# Patient Record
Sex: MALE | Race: BLACK OR AFRICAN AMERICAN | NOT HISPANIC OR LATINO | Employment: OTHER | URBAN - METROPOLITAN AREA
[De-identification: names, ages, dates, MRNs, and addresses within clinical notes are randomized per-mention and may not be internally consistent; named-entity substitution may affect disease eponyms.]

---

## 2018-01-23 ENCOUNTER — OFFICE VISIT CONVERTED (OUTPATIENT)
Dept: NEUROSURGERY | Facility: CLINIC | Age: 73
End: 2018-01-23
Attending: NEUROLOGICAL SURGERY

## 2018-02-08 ENCOUNTER — OFFICE VISIT CONVERTED (OUTPATIENT)
Dept: PULMONOLOGY | Facility: CLINIC | Age: 73
End: 2018-02-08
Attending: INTERNAL MEDICINE

## 2018-02-13 ENCOUNTER — OFFICE VISIT CONVERTED (OUTPATIENT)
Dept: PULMONOLOGY | Facility: CLINIC | Age: 73
End: 2018-02-13
Attending: INTERNAL MEDICINE

## 2018-04-16 ENCOUNTER — OFFICE VISIT CONVERTED (OUTPATIENT)
Dept: UROLOGY | Facility: CLINIC | Age: 73
End: 2018-04-16
Attending: UROLOGY

## 2018-04-16 ENCOUNTER — CONVERSION ENCOUNTER (OUTPATIENT)
Dept: UROLOGY | Facility: CLINIC | Age: 73
End: 2018-04-16

## 2018-08-20 ENCOUNTER — OFFICE VISIT CONVERTED (OUTPATIENT)
Dept: PULMONOLOGY | Facility: CLINIC | Age: 73
End: 2018-08-20
Attending: INTERNAL MEDICINE

## 2018-09-11 ENCOUNTER — OFFICE VISIT CONVERTED (OUTPATIENT)
Dept: PULMONOLOGY | Facility: CLINIC | Age: 73
End: 2018-09-11
Attending: PHYSICIAN ASSISTANT

## 2018-10-16 ENCOUNTER — OFFICE VISIT CONVERTED (OUTPATIENT)
Dept: UROLOGY | Facility: CLINIC | Age: 73
End: 2018-10-16
Attending: NURSE PRACTITIONER

## 2018-11-30 ENCOUNTER — OFFICE VISIT CONVERTED (OUTPATIENT)
Dept: SURGERY | Facility: CLINIC | Age: 73
End: 2018-11-30
Attending: NURSE PRACTITIONER

## 2018-11-30 ENCOUNTER — CONVERSION ENCOUNTER (OUTPATIENT)
Dept: SURGERY | Facility: CLINIC | Age: 73
End: 2018-11-30

## 2019-01-02 ENCOUNTER — HOSPITAL ENCOUNTER (OUTPATIENT)
Dept: OTHER | Facility: HOSPITAL | Age: 74
Discharge: HOME | End: 2019-01-02
Attending: NURSE PRACTITIONER

## 2019-01-07 ENCOUNTER — HOSPITAL ENCOUNTER (OUTPATIENT)
Dept: GASTROENTEROLOGY | Facility: HOSPITAL | Age: 74
Setting detail: HOSPITAL OUTPATIENT SURGERY
Discharge: HOME | End: 2019-01-07
Attending: SURGERY

## 2019-01-14 ENCOUNTER — OFFICE VISIT CONVERTED (OUTPATIENT)
Dept: SURGERY | Facility: CLINIC | Age: 74
End: 2019-01-14
Attending: SURGERY

## 2019-01-14 ENCOUNTER — CONVERSION ENCOUNTER (OUTPATIENT)
Dept: SURGERY | Facility: CLINIC | Age: 74
End: 2019-01-14

## 2019-01-30 ENCOUNTER — HOSPITAL ENCOUNTER (OUTPATIENT)
Dept: OTHER | Facility: HOSPITAL | Age: 74
Discharge: HOME | End: 2019-01-30
Attending: NURSE PRACTITIONER

## 2019-02-21 ENCOUNTER — HOSPITAL ENCOUNTER (OUTPATIENT)
Dept: CT IMAGING | Facility: HOSPITAL | Age: 74
Discharge: HOME | End: 2019-02-21
Attending: INTERNAL MEDICINE

## 2019-02-21 LAB
CREAT BLD-MCNC: 1.7 MG/DL (ref 0.6–1.4)
GFR SERPLBLD BASED ON 1.73 SQ M-ARVRAT: 45 ML/MIN/{1.73_M2}

## 2019-02-25 ENCOUNTER — OFFICE VISIT CONVERTED (OUTPATIENT)
Dept: PULMONOLOGY | Facility: CLINIC | Age: 74
End: 2019-02-25
Attending: INTERNAL MEDICINE

## 2019-02-25 ENCOUNTER — HOSPITAL ENCOUNTER (OUTPATIENT)
Dept: OTHER | Facility: HOSPITAL | Age: 74
Discharge: HOME | End: 2019-02-25
Attending: INTERNAL MEDICINE

## 2019-03-26 ENCOUNTER — OFFICE VISIT CONVERTED (OUTPATIENT)
Dept: PULMONOLOGY | Facility: CLINIC | Age: 74
End: 2019-03-26
Attending: PHYSICIAN ASSISTANT

## 2019-03-27 ENCOUNTER — HOSPITAL ENCOUNTER (OUTPATIENT)
Dept: OTHER | Facility: HOSPITAL | Age: 74
Discharge: HOME | End: 2019-03-27
Attending: INTERNAL MEDICINE

## 2019-03-29 ENCOUNTER — CONVERSION ENCOUNTER (OUTPATIENT)
Dept: GASTROENTEROLOGY | Facility: CLINIC | Age: 74
End: 2019-03-29

## 2019-04-16 ENCOUNTER — CONVERSION ENCOUNTER (OUTPATIENT)
Dept: UROLOGY | Facility: CLINIC | Age: 74
End: 2019-04-16

## 2019-04-16 ENCOUNTER — OFFICE VISIT CONVERTED (OUTPATIENT)
Dept: UROLOGY | Facility: CLINIC | Age: 74
End: 2019-04-16
Attending: UROLOGY

## 2019-05-01 ENCOUNTER — HOSPITAL ENCOUNTER (OUTPATIENT)
Dept: OTHER | Facility: HOSPITAL | Age: 74
Discharge: HOME | End: 2019-05-01
Attending: NURSE PRACTITIONER

## 2019-05-29 ENCOUNTER — HOSPITAL ENCOUNTER (OUTPATIENT)
Dept: OTHER | Facility: HOSPITAL | Age: 74
Discharge: HOME | End: 2019-05-29
Attending: INTERNAL MEDICINE

## 2019-06-25 ENCOUNTER — HOSPITAL ENCOUNTER (OUTPATIENT)
Dept: GASTROENTEROLOGY | Facility: HOSPITAL | Age: 74
Setting detail: HOSPITAL OUTPATIENT SURGERY
Discharge: HOME | End: 2019-06-25
Attending: INTERNAL MEDICINE

## 2019-07-24 ENCOUNTER — HOSPITAL ENCOUNTER (OUTPATIENT)
Dept: OTHER | Facility: HOSPITAL | Age: 74
Discharge: HOME | End: 2019-07-24
Attending: INTERNAL MEDICINE

## 2019-08-16 ENCOUNTER — OFFICE VISIT CONVERTED (OUTPATIENT)
Dept: PULMONOLOGY | Facility: CLINIC | Age: 74
End: 2019-08-16
Attending: INTERNAL MEDICINE

## 2019-08-21 ENCOUNTER — HOSPITAL ENCOUNTER (OUTPATIENT)
Dept: OTHER | Facility: HOSPITAL | Age: 74
Discharge: HOME | End: 2019-08-21
Attending: INTERNAL MEDICINE

## 2019-08-26 ENCOUNTER — HOSPITAL ENCOUNTER (OUTPATIENT)
Dept: CT IMAGING | Facility: HOSPITAL | Age: 74
Discharge: HOME | End: 2019-08-26
Attending: NURSE PRACTITIONER

## 2019-08-26 LAB
ALBUMIN SERPL-MCNC: 4 G/DL (ref 3.5–5)
ALBUMIN/GLOB SERPL: 1.4 {RATIO} (ref 1.4–2.6)
ALP SERPL-CCNC: 94 U/L (ref 56–155)
ALT SERPL-CCNC: 7 U/L (ref 10–40)
ANION GAP SERPL CALC-SCNC: 19 MMOL/L (ref 8–19)
AST SERPL-CCNC: 14 U/L (ref 15–50)
BASOPHILS # BLD AUTO: 0.04 10*3/UL (ref 0–0.2)
BASOPHILS NFR BLD AUTO: 1 % (ref 0–3)
BILIRUB SERPL-MCNC: 0.26 MG/DL (ref 0.2–1.3)
BUN SERPL-MCNC: 23 MG/DL (ref 5–25)
BUN/CREAT SERPL: 16 {RATIO} (ref 6–20)
CALCIUM SERPL-MCNC: 9.2 MG/DL (ref 8.7–10.4)
CHLORIDE SERPL-SCNC: 106 MMOL/L (ref 99–111)
CONV ABS IMM GRAN: 0.01 10*3/UL (ref 0–0.2)
CONV CO2: 24 MMOL/L (ref 22–32)
CONV IMMATURE GRAN: 0.2 % (ref 0–1.8)
CONV TOTAL PROTEIN: 6.9 G/DL (ref 6.3–8.2)
CREAT BLD-MCNC: 1.4 MG/DL (ref 0.6–1.4)
CREAT UR-MCNC: 1.45 MG/DL (ref 0.7–1.2)
DEPRECATED RDW RBC AUTO: 40.7 FL (ref 35.1–43.9)
EOSINOPHIL # BLD AUTO: 0.15 10*3/UL (ref 0–0.7)
EOSINOPHIL # BLD AUTO: 3.6 % (ref 0–7)
ERYTHROCYTE [DISTWIDTH] IN BLOOD BY AUTOMATED COUNT: 14.2 % (ref 11.6–14.4)
GFR SERPLBLD BASED ON 1.73 SQ M-ARVRAT: 54 ML/MIN/{1.73_M2}
GFR SERPLBLD BASED ON 1.73 SQ M-ARVRAT: 56 ML/MIN/{1.73_M2}
GLOBULIN UR ELPH-MCNC: 2.9 G/DL (ref 2–3.5)
GLUCOSE SERPL-MCNC: 112 MG/DL (ref 70–99)
HCT VFR BLD AUTO: 38.4 % (ref 42–52)
HGB BLD-MCNC: 12.1 G/DL (ref 14–18)
LYMPHOCYTES # BLD AUTO: 1.29 10*3/UL (ref 1–5)
LYMPHOCYTES NFR BLD AUTO: 30.8 % (ref 20–45)
MCH RBC QN AUTO: 25.1 PG (ref 27–31)
MCHC RBC AUTO-ENTMCNC: 31.5 G/DL (ref 33–37)
MCV RBC AUTO: 79.7 FL (ref 80–96)
MONOCYTES # BLD AUTO: 0.34 10*3/UL (ref 0.2–1.2)
MONOCYTES NFR BLD AUTO: 8.1 % (ref 3–10)
NEUTROPHILS # BLD AUTO: 2.36 10*3/UL (ref 2–8)
NEUTROPHILS NFR BLD AUTO: 56.3 % (ref 30–85)
NRBC CBCN: 0 % (ref 0–0.7)
OSMOLALITY SERPL CALC.SUM OF ELEC: 304 MOSM/KG (ref 273–304)
PLATELET # BLD AUTO: 161 10*3/UL (ref 130–400)
PMV BLD AUTO: 9.3 FL (ref 9.4–12.4)
POTASSIUM SERPL-SCNC: 3.9 MMOL/L (ref 3.5–5.3)
RBC # BLD AUTO: 4.82 10*6/UL (ref 4.7–6.1)
SODIUM SERPL-SCNC: 145 MMOL/L (ref 135–147)
WBC # BLD AUTO: 4.19 10*3/UL (ref 4.8–10.8)

## 2019-08-29 ENCOUNTER — OFFICE VISIT CONVERTED (OUTPATIENT)
Dept: PULMONOLOGY | Facility: CLINIC | Age: 74
End: 2019-08-29
Attending: INTERNAL MEDICINE

## 2019-08-29 ENCOUNTER — HOSPITAL ENCOUNTER (OUTPATIENT)
Dept: OTHER | Facility: HOSPITAL | Age: 74
Discharge: HOME | End: 2019-08-29
Attending: INTERNAL MEDICINE

## 2019-09-18 ENCOUNTER — HOSPITAL ENCOUNTER (OUTPATIENT)
Dept: OTHER | Facility: HOSPITAL | Age: 74
Discharge: HOME | End: 2019-09-18
Attending: NURSE PRACTITIONER

## 2019-10-23 ENCOUNTER — HOSPITAL ENCOUNTER (OUTPATIENT)
Dept: OTHER | Facility: HOSPITAL | Age: 74
Discharge: HOME | End: 2019-10-23
Attending: NURSE PRACTITIONER

## 2019-10-28 ENCOUNTER — OFFICE VISIT CONVERTED (OUTPATIENT)
Dept: UROLOGY | Facility: CLINIC | Age: 74
End: 2019-10-28
Attending: UROLOGY

## 2019-10-29 ENCOUNTER — HOSPITAL ENCOUNTER (OUTPATIENT)
Dept: GASTROENTEROLOGY | Facility: HOSPITAL | Age: 74
Setting detail: HOSPITAL OUTPATIENT SURGERY
Discharge: HOME | End: 2019-10-29
Attending: INTERNAL MEDICINE

## 2019-11-20 ENCOUNTER — HOSPITAL ENCOUNTER (OUTPATIENT)
Dept: OTHER | Facility: HOSPITAL | Age: 74
Discharge: HOME | End: 2019-11-20
Attending: INTERNAL MEDICINE

## 2019-12-18 ENCOUNTER — HOSPITAL ENCOUNTER (OUTPATIENT)
Dept: OTHER | Facility: HOSPITAL | Age: 74
Discharge: HOME | End: 2019-12-18
Attending: INTERNAL MEDICINE

## 2020-01-15 ENCOUNTER — HOSPITAL ENCOUNTER (OUTPATIENT)
Dept: OTHER | Facility: HOSPITAL | Age: 75
Discharge: HOME | End: 2020-01-15
Attending: NURSE PRACTITIONER

## 2020-02-06 ENCOUNTER — OFFICE VISIT CONVERTED (OUTPATIENT)
Dept: PULMONOLOGY | Facility: CLINIC | Age: 75
End: 2020-02-06
Attending: INTERNAL MEDICINE

## 2020-02-25 ENCOUNTER — HOSPITAL ENCOUNTER (OUTPATIENT)
Dept: CT IMAGING | Facility: HOSPITAL | Age: 75
Discharge: HOME | End: 2020-02-25
Attending: INTERNAL MEDICINE

## 2020-02-25 LAB
CREAT BLD-MCNC: 0.8 MG/DL (ref 0.6–1.4)
GFR SERPLBLD BASED ON 1.73 SQ M-ARVRAT: >60 ML/MIN/{1.73_M2}

## 2020-03-05 ENCOUNTER — OFFICE VISIT CONVERTED (OUTPATIENT)
Dept: PULMONOLOGY | Facility: CLINIC | Age: 75
End: 2020-03-05
Attending: INTERNAL MEDICINE

## 2020-03-05 ENCOUNTER — HOSPITAL ENCOUNTER (OUTPATIENT)
Dept: OTHER | Facility: HOSPITAL | Age: 75
Discharge: HOME | End: 2020-03-05
Attending: INTERNAL MEDICINE

## 2020-04-01 ENCOUNTER — HOSPITAL ENCOUNTER (OUTPATIENT)
Dept: OTHER | Facility: HOSPITAL | Age: 75
Discharge: HOME | End: 2020-04-01
Attending: INTERNAL MEDICINE

## 2020-04-28 ENCOUNTER — HOSPITAL ENCOUNTER (OUTPATIENT)
Dept: UROLOGY | Facility: CLINIC | Age: 75
Discharge: HOME | End: 2020-04-28
Attending: NURSE PRACTITIONER

## 2020-04-29 LAB — PSA SERPL-MCNC: <0.01 NG/ML (ref 0–4)

## 2020-05-19 ENCOUNTER — OFFICE VISIT CONVERTED (OUTPATIENT)
Dept: UROLOGY | Facility: CLINIC | Age: 75
End: 2020-05-19
Attending: NURSE PRACTITIONER

## 2020-05-27 ENCOUNTER — HOSPITAL ENCOUNTER (OUTPATIENT)
Dept: OTHER | Facility: HOSPITAL | Age: 75
Discharge: HOME | End: 2020-05-27
Attending: INTERNAL MEDICINE

## 2020-06-07 LAB — SARS-COV-2 RNA SPEC QL NAA+PROBE: NOT DETECTED

## 2020-06-08 ENCOUNTER — HOSPITAL ENCOUNTER (OUTPATIENT)
Dept: CARDIOLOGY | Facility: HOSPITAL | Age: 75
Discharge: HOME | End: 2020-06-08
Attending: INTERNAL MEDICINE

## 2020-07-22 ENCOUNTER — HOSPITAL ENCOUNTER (OUTPATIENT)
Dept: OTHER | Facility: HOSPITAL | Age: 75
Discharge: HOME | End: 2020-07-22
Attending: INTERNAL MEDICINE

## 2020-08-19 ENCOUNTER — HOSPITAL ENCOUNTER (OUTPATIENT)
Dept: OTHER | Facility: HOSPITAL | Age: 75
Discharge: HOME | End: 2020-08-19
Attending: INTERNAL MEDICINE

## 2020-08-20 ENCOUNTER — OFFICE VISIT CONVERTED (OUTPATIENT)
Dept: PULMONOLOGY | Facility: CLINIC | Age: 75
End: 2020-08-20
Attending: PHYSICIAN ASSISTANT

## 2020-09-04 ENCOUNTER — HOSPITAL ENCOUNTER (OUTPATIENT)
Dept: CT IMAGING | Facility: HOSPITAL | Age: 75
Discharge: HOME | End: 2020-09-04
Attending: INTERNAL MEDICINE

## 2020-09-08 ENCOUNTER — HOSPITAL ENCOUNTER (OUTPATIENT)
Dept: OTHER | Facility: HOSPITAL | Age: 75
Discharge: HOME | End: 2020-09-08
Attending: INTERNAL MEDICINE

## 2020-09-08 ENCOUNTER — OFFICE VISIT CONVERTED (OUTPATIENT)
Dept: ONCOLOGY | Facility: HOSPITAL | Age: 75
End: 2020-09-08
Attending: INTERNAL MEDICINE

## 2020-10-28 ENCOUNTER — HOSPITAL ENCOUNTER (OUTPATIENT)
Dept: OTHER | Facility: HOSPITAL | Age: 75
Discharge: HOME | End: 2020-10-28
Attending: INTERNAL MEDICINE

## 2020-11-04 ENCOUNTER — OFFICE VISIT CONVERTED (OUTPATIENT)
Dept: UROLOGY | Facility: CLINIC | Age: 75
End: 2020-11-04
Attending: UROLOGY

## 2020-11-04 ENCOUNTER — HOSPITAL ENCOUNTER (OUTPATIENT)
Dept: UROLOGY | Facility: CLINIC | Age: 75
Discharge: HOME | End: 2020-11-04
Attending: UROLOGY

## 2020-11-04 LAB — PSA SERPL-MCNC: <0.01 NG/ML (ref 0–4)

## 2020-11-27 ENCOUNTER — HOSPITAL ENCOUNTER (OUTPATIENT)
Dept: PREADMISSION TESTING | Facility: HOSPITAL | Age: 75
Discharge: HOME | End: 2020-11-27
Attending: INTERNAL MEDICINE

## 2020-11-27 LAB — SARS-COV-2 RNA SPEC QL NAA+PROBE: NOT DETECTED

## 2020-12-02 ENCOUNTER — HOSPITAL ENCOUNTER (OUTPATIENT)
Dept: GASTROENTEROLOGY | Facility: HOSPITAL | Age: 75
Setting detail: HOSPITAL OUTPATIENT SURGERY
Discharge: HOME | End: 2020-12-02
Attending: INTERNAL MEDICINE

## 2020-12-02 LAB — GLUCOSE BLD-MCNC: 81 MG/DL (ref 70–99)

## 2021-01-20 ENCOUNTER — HOSPITAL ENCOUNTER (OUTPATIENT)
Dept: OTHER | Facility: HOSPITAL | Age: 76
Discharge: HOME | End: 2021-01-20
Attending: INTERNAL MEDICINE

## 2021-02-26 ENCOUNTER — OFFICE VISIT CONVERTED (OUTPATIENT)
Dept: ONCOLOGY | Facility: HOSPITAL | Age: 76
End: 2021-02-26
Attending: NURSE PRACTITIONER

## 2021-03-04 ENCOUNTER — HOSPITAL ENCOUNTER (OUTPATIENT)
Dept: CT IMAGING | Facility: HOSPITAL | Age: 76
Discharge: HOME | End: 2021-03-04
Attending: INTERNAL MEDICINE

## 2021-03-04 LAB
CREAT BLD-MCNC: 1.5 MG/DL (ref 0.6–1.4)
GFR SERPLBLD BASED ON 1.73 SQ M-ARVRAT: 51 ML/MIN/{1.73_M2}

## 2021-03-08 ENCOUNTER — HOSPITAL ENCOUNTER (OUTPATIENT)
Dept: OTHER | Facility: HOSPITAL | Age: 76
Discharge: HOME | End: 2021-03-08
Attending: INTERNAL MEDICINE

## 2021-03-08 ENCOUNTER — OFFICE VISIT CONVERTED (OUTPATIENT)
Dept: ONCOLOGY | Facility: HOSPITAL | Age: 76
End: 2021-03-08
Attending: INTERNAL MEDICINE

## 2021-04-14 ENCOUNTER — HOSPITAL ENCOUNTER (OUTPATIENT)
Dept: OTHER | Facility: HOSPITAL | Age: 76
Discharge: HOME | End: 2021-04-14
Attending: INTERNAL MEDICINE

## 2021-05-04 ENCOUNTER — OFFICE VISIT CONVERTED (OUTPATIENT)
Dept: UROLOGY | Facility: CLINIC | Age: 76
End: 2021-05-04
Attending: UROLOGY

## 2021-05-04 LAB
BILIRUB UR QL STRIP: NEGATIVE
COLOR UR: YELLOW
CONV BACTERIA IN URINE MICRO: 0
CONV CALCIUM OXALATE CRYSTALS /HPF IN URINE SEDIMENT BY MICROSCOPY: 0
CONV CLARITY OF URINE: CLEAR
CONV PROTEIN IN URINE BY AUTOMATED TEST STRIP: NEGATIVE
CONV UROBILINOGEN IN URINE BY AUTOMATED TEST STRIP: NORMAL
GLUCOSE UR QL: NEGATIVE
HGB UR QL STRIP: NORMAL
KETONES UR QL STRIP: NEGATIVE
LEUKOCYTE ESTERASE UR QL STRIP: NEGATIVE
NITRITE UR QL STRIP: NEGATIVE
PH UR STRIP.AUTO: 5 [PH]
RBC #/AREA URNS HPF: 0 /[HPF]
RENAL EPI CELLS #/AREA URNS HPF: 0 /[HPF]
SP GR UR: 1.02
SQUAMOUS SPT QL MICRO: NORMAL
WBC #/AREA URNS HPF: 0 /[HPF]

## 2021-05-13 NOTE — PROGRESS NOTES
Progress Note      Patient Name: Daron Whitfield   Patient ID: [id number]   Sex: Male   YOB: 1945    Primary Care Provider: Angela STACK   Referring Provider: Angela STACK    Visit Date: May 19, 2020    Provider: LALY Salinas   Location: [facility]   Location Address: [address] [place] [address], [address] [place], [place] [address]  [id number]   Location Phone: [phone]          Chief Complaint  · Prostate cancer      History Of Present Illness  This patient returns for a scheduled follow-up visit for prostate cancer. He is on casodex and depolupron. He is doing well without any problems. CMP drawn per pcp office today and patient will have them forward to us. Denies any voiding problems and has a strong stream. Patient history of cryo and placed on androgen deprivation therapy due to increase in psas. His level as stayed down.        Lupron 22.5mg on 4/28/20  PSA was 0.01 on 4/28/20       Past Medical History  Bladder outflow obstruction; Cancer of lung; Cancer of prostate; Cervical spinal stenosis; Colon polyp; Emphysema; Hand paresthesia; HTN (hypertension); Hypercholesterolemia; Muscle atrophy of upper extremity; Numbness and tingling in hands; OAB (overactive bladder); Ulnar neuropathy         Past Surgical History  Colonoscopy; Cystoscopy; Lung Biopsy(s); Prostate cryoablation; Teeth extraction; TURP         Medication List  aspirin 81 mg Oral tablet,delayed release (DR/EC); bicalutamide 50 mg oral tablet; Detrol LA 4 mg oral capsule,extended release 24hr; Flomax 0.4 mg oral capsule; folic acid 1 mg oral tablet; lisinopril 10 mg Oral tablet; Lupron Depot (3 month) 22.5 mg intramuscular syringe kit; Spiriva with HandiHaler 18 mcg inhalation capsule, w/inhalation device; Toprol XL 25 mg oral tablet extended release 24 hr         Allergy List  NO KNOWN DRUG ALLERGIES         Family Medical History  Coronary Artery Disease; Colon Cancer; Diabetes         Social  "History  Alcohol (Current some day); ; Tobacco (Former)         Review of Systems  · Constitutional  o Denies  o : fever, headache, chills  · Eyes  o Denies  o : eye pain, double vision, blurred vision  · HENT  o Denies  o : sinus problems, sore throat, ear infection  · Cardiovascular  o Denies  o : chest pain, high blood pressure, varicosities  · Respiratory  o Denies  o : shortness of breath, wheezing, frequent cough  · Gastrointestinal  o Denies  o : nausea, vomiting, heartburn, indigestion, abdominal pain  · Genitourinary  o Denies  o : urgency, frequency, urinary retention, painful urination  · Integument  o Denies  o : rash, itching, boils  · Neurologic  o Denies  o : tingling or numbness, tremors, dizzy spells  · Musculoskeletal  o Denies  o : joint pain, neck pain, back pain  · Endocrine  o Denies  o : cold intolerance, heat intolerance, tired, excessive thirst, sluggish  · Psychiatric  o Admits  o : feels satisfied with life  o Denies  o : severe depression, concerns with hurting themselves  · Heme-Lymph  o Denies  o : swollen glands, blood clotting problems  · Allergic-Immunologic  o Denies  o : sinus allergy symptoms, hay fever  · All Others Negative      Vitals  Date Time BP Position Site L\R Cuff Size HR RR TEMP (F) WT  HT  BMI kg/m2 BSA m2 O2 Sat        05/19/2020 12:52 /66 Sitting    76 - R  98.2 195lbs 16oz 5'  11\" 27.34 2.11           Physical Examination  · Constitutional  o Appearance  o : Well nourished, well developed patient in no acute distress. Ambulating without difficulty.  · Respiratory  o Respiratory Effort  o : Breathing is unlabored without accessory muscle use  o Inspection of Chest  o : normal appearance, no retractions  o Auscultation of Lungs  o : diminished bibasilar fine crackles.  · Cardiovascular  o Heart  o :   § Auscultation of Heart  § : regular rate and rhythm, no murmurs, gallops or rubs  · Gastrointestinal  o Abdominal Examination  o : Scaphoid abdomen which " is non-tender to palpation with normal tone and without rigidity or guarding. Normal bowel sounds. No masses present.  o Hernias  o : No abdominal wall hernias are present.small left inguinal  · Genitourinary  o Bladder  o : no abnormalities  o Penis  o : Normal appearance without lesion, discharge or masses.meatus is very small and tight  o Urethral Meatus  o : very small stenosis  o Scrotum and Scrotal Contents  o :   § Scrotum  § : no abnormalities  § Epididymides  § : no abnormalities  § Testes  § : no abnormalities  o Digital Rectal Examination  o :   § Prostate  § : nontender to palpation, size small no nodules present, consistency normal external hemorrhoids x 3  · Lymphatic  o Groin  o : No lymphadenopathy present  · Skin and Subcutaneous Tissue  o General Inspection  o : No rashes, lesions or areas of discoloration present. Skin turgor is normal.  o General Palpation  o : No abnormalities, masses or tenderness on palpation.  · Neurologic  o Mental Status Examination  o : grossly oriented to person, place and time  o Gait and Station  o : normal gait, able to stand without difficulty  · Psychiatric  o Mood and Affect  o : mood normal, affect appropriate      Figure 1.0: Pain Rating Scale-[place]         Results  · In-Office Procedures  o Lab procedure  § Automated dipstick urinalysis with microscopy (12022)   § Color Ur: Yellow   § Clarity Ur: Clear   § Glucose Ur Ql Strip: Negative   § Bilirub Ur Ql Strip: Negative   § Ketones Ur Ql Strip: Negative   § Sp Gr Ur Qn: 1.010   § Hgb Ur Ql Strip: Moderate   § pH Ur-LsCnc: 5.0   § Prot Ur Ql Strip: Negative   § Urobilinogen Ur Strip-mCnc: 0.2 E.U./dL   § Nitrite Ur Ql Strip: Negative   § WBC Est Ur Ql Strip: Negative       Assessment  · Cancer of prostate     185/C61  · History of lung cancer in adulthood     V10.11/Z85.118  · Urethral meatal stenosis     598.9/N35.919      Plan  · Medications  o Medications have been Reconciled  o Transition of Care or Provider  Policy     continue depolupron q 3 months  and casodex as prescribed per dr Ames. follow up with dr Ames in 6 months.             Electronically Signed by: LALY Salinas -Author on May 19, 2020 01:37:48 PM

## 2021-05-13 NOTE — PROGRESS NOTES
Progress Note      Patient Name: Daron Whitfield   Patient ID: [id number]   Sex: Male   YOB: 1945    Primary Care Provider: LAURA RUFFIN   Referring Provider: Angela STACK    Visit Date: November 4, 2020    Provider: Merrill Ames MD   Location: [facility]   Location Address: [address] [place] [address], [address] [place] [address]  [id number]   Location Phone: [phone]          Chief Complaint  · F/U Prostate Cancer check      History Of Present Illness  The patient is a 75 year old /Black male , who presents to follow up on prostate cancer.        He gets Lupron, last injection was 10/27/2020. PSA 0.01 ON 4/28/2020.  Patient is doing well.  He has lost some weight because of side effect from higher dose of Metformin and inhalers.  He is taking lower dose of Metformin and is already feeling better.  Is urinating with no problems.  No dysuria or gross hematuria.  Patient had cryoablation of prostate in 2012.  Patient is also having erectile dysfunction which is concerning him       Past Medical History  Bladder outflow obstruction; Cancer of lung; Cancer of prostate; Cervical spinal stenosis; Colon polyp; Emphysema; Hand paresthesia; HTN (hypertension); Hypercholesterolemia; Muscle atrophy of upper extremity; Numbness and tingling in hands; OAB (overactive bladder); Ulnar neuropathy         Past Surgical History  Colonoscopy; Cystoscopy; Lung Biopsy(s); Prostate cryoablation; Teeth extraction; TURP         Medication List  aspirin 81 mg Oral tablet,delayed release (DR/EC); atorvastatin 40 mg oral tablet; bicalutamide 50 mg oral tablet; Detrol LA 4 mg oral capsule,extended release 24hr; ferrous sulfate 325 mg (65 mg iron) oral tablet,delayed release (DR/EC); Flomax 0.4 mg oral capsule; folic acid 1 mg oral tablet; lisinopril 10 mg Oral tablet; Lupron Depot (3 month) 22.5 mg intramuscular syringe kit; metformin 1,000 mg oral tablet; NuLYTELY with Flavor Packs  "420 gram oral recon soln; Spiriva with HandiHaler 18 mcg inhalation capsule, w/inhalation device; Toprol XL 25 mg oral tablet extended release 24 hr         Allergy List  NO KNOWN DRUG ALLERGIES         Family Medical History  Coronary Artery Disease; Colon Cancer; Family history of colon cancer; Diabetes         Social History  Alcohol (Current some day); ; Tobacco (Former)         Review of Systems  · Constitutional  o Denies  o : fever, headache, chills  · Eyes  o Denies  o : eye pain, double vision, blurred vision  · HENT  o Denies  o : sinus problems, sore throat, ear infection  · Cardiovascular  o Admits  o : high blood pressure  o Denies  o : chest pain, varicosities  · Respiratory  o Admits  o : shortness of breath  o Denies  o : wheezing, frequent cough  · Gastrointestinal  o Denies  o : nausea, vomiting, heartburn, indigestion, abdominal pain  · Genitourinary  o Denies  o : urgency, frequency, urinary retention, painful urination  · Integument  o Denies  o : rash, itching, boils  · Neurologic  o Denies  o : tingling or numbness, tremors, dizzy spells  · Musculoskeletal  o Denies  o : joint pain, neck pain, back pain  · Endocrine  o Denies  o : cold intolerance, heat intolerance, tired, excessive thirst, sluggish  · Psychiatric  o Admits  o : feels satisfied with life  o Denies  o : severe depression, concerns with hurting themselves  · Heme-Lymph  o Denies  o : swollen glands, blood clotting problems  · Allergic-Immunologic  o Denies  o : sinus allergy symptoms, hay fever      Vitals  Date Time BP Position Site L\R Cuff Size HR RR TEMP (F) WT  HT  BMI kg/m2 BSA m2 O2 Sat FR L/min FiO2        11/04/2020 10:41 /58 Sitting    94 - R  97.7 185lbs 16oz 5'  11\" 25.94 2.06             Physical Examination  · Constitutional  o Appearance  o : Well nourished, well developed patient in no acute distress. Ambulating without difficulty.  · Neck  o Thyroid  o : Normal size without tenderness, nodules or " masses  · Respiratory  o Respiratory Effort  o : Breathing is unlabored without accessory muscle use  o Inspection of Chest  o : normal appearance, no retractions  o Auscultation of Lungs  o : Rhonchi on the right side of chest. Left side is okay  · Cardiovascular  o Heart  o :   § Auscultation of Heart  § : regular rate and rhythm, no murmurs, gallops or rubs  o Peripheral Vascular System  o : No abnormalities  · Gastrointestinal  o Abdominal Examination  o : abdomen nontender to palpation, normal bowel sounds, tone normal without rigidity or guarding, no masses present, abdomen obese upon supine. Has divarication of recti  o Liver and spleen  o : No hepatomegaly present. Liver is non-tender to palpation and spleen is not palpable.  o Hernias  o : No abdominal wall hernias are present.  · Genitourinary  o Bladder  o : no abnormalities  o Penis  o : Normal appearance without lesion, discharge or masses.  o Urethral Meatus  o : no abnormalities  o Scrotum and Scrotal Contents  o :   § Scrotum  § : no abnormalities  § Epididymides  § : no abnormalities  § Testes  § : no abnormalities  o Digital Rectal Examination  o :   § Prostate  § : nontender to palpation, size normal, no nodules present, consistency normal  · Lymphatic  o Neck  o : No lymphadenopathy present  o Axilla  o : No lymphadenopathy present  o Groin  o : No lymphadenopathy present  · Skin and Subcutaneous Tissue  o General Inspection  o : No rashes, lesions or areas of discoloration present. Skin turgor is normal.  o General Palpation  o : No abnormalities, masses or tenderness on palpation.  · Neurologic  o Mental Status Examination  o : grossly oriented to person, place and time  o Gait and Station  o : normal gait, able to stand without difficulty  · Psychiatric  o Mood and Affect  o : mood normal, affect appropriate          Results  · In-Office Procedures  o Lab procedure  § Automated dipstick urinalysis with microscopy (17541)   § Color Ur: Yellow    § Clarity Ur: Clear   § Glucose Ur Ql Strip: Negative   § Bilirub Ur Ql Strip: Negative   § Ketones Ur Ql Strip: Negative   § Sp Gr Ur Qn: 1.020   § Hgb Ur Ql Strip: Negative   § pH Ur-LsCnc: 5.0   § Prot Ur Ql Strip: Negative   § Urobilinogen Ur Strip-mCnc: 0.2 E.U./dL   § Nitrite Ur Ql Strip: Negative   § WBC Est Ur Ql Strip: Negative   § RBC UrnS Qn HPF: 0   § WBC UrnS Qn HPF: 0   § Bacteria UrnS Qn HPF: 0   § Crystals UrnS Qn HPF: 0   § Epithelial Cells (non renal): 0 /HPF  § Epithelial Cells (renal): 0       Assessment  · Erectile dysfunction     607.84/N52.9  · OAB (overactive bladder)     596.51/N32.81  · Cancer of prostate     185/C61  · Cancer of lung     162.9/C34.90  Radiation and chemo      Plan  · Orders  o PSA ultrasensitive DIAGNOSTIC [facility] ([address]) - 185/C61 - 11/04/2020  · Instructions  o Patient is in remission from prostate carcinoma. At this time his concern is erectile dysfunction and I have offered him penile implant or injection in the penis but does not have any of those. We will continue injection of Depo-Lupron, Casodex and recheck him in 6 months            Electronically Signed by: Merrill Ames MD -Author on November 4, 2020 11:23:17 AM

## 2021-05-14 VITALS
WEIGHT: 186 LBS | HEART RATE: 94 BPM | BODY MASS INDEX: 26.04 KG/M2 | SYSTOLIC BLOOD PRESSURE: 113 MMHG | DIASTOLIC BLOOD PRESSURE: 58 MMHG | HEIGHT: 71 IN | TEMPERATURE: 97.7 F

## 2021-05-14 NOTE — PROGRESS NOTES
Progress Note      Patient Name: Daron Whitfield   Patient ID: [id number]   Sex: Male   YOB: 1945    Primary Care Provider: LAURA RUFFIN   Referring Provider: Angela STACK    Visit Date: May 4, 2021    Provider: Merrill Ames MD   Location: [facility]   Location Address: [address] [place] [address], [address] [place] [address]  [id number]   Location Phone: [phone]          Chief Complaint  · Prostate cancer      History Of Present Illness  This patient returns for a scheduled follow-up visit for prostate cancer.        he gets Lupron  PSA 0.01 ON 11/4/2020.  Patient is urinating just fine.  He has no dysuria.  Patient is not having any erection and I have discussed with him about androgen ablation therapy affecting his erection.  Patient has no problem except having sexual activity.       Past Medical History  Bladder outflow obstruction; Cancer of lung; Cancer of prostate; Cervical spinal stenosis; Colon polyp; Emphysema; Hand paresthesia; HTN (hypertension); Hypercholesterolemia; Muscle atrophy of upper extremity; Numbness and tingling in hands; OAB (overactive bladder); Ulnar neuropathy         Past Surgical History  Colonoscopy; Cystoscopy; Lung Biopsy(s); Prostate cryoablation; Teeth extraction; TURP         Medication List  aspirin 81 mg Oral tablet,delayed release (DR/EC); atorvastatin 40 mg oral tablet; bicalutamide 50 mg oral tablet; Detrol LA 4 mg oral capsule,extended release 24hr; ferrous sulfate 325 mg (65 mg iron) oral tablet,delayed release (DR/EC); Flomax 0.4 mg oral capsule; folic acid 1 mg oral tablet; lisinopril 10 mg Oral tablet; Lupron Depot (3 month) 22.5 mg intramuscular syringe kit; metformin 1,000 mg oral tablet; NuLYTELY with Flavor Packs 420 gram oral recon soln; Spiriva with HandiHaler 18 mcg inhalation capsule, w/inhalation device; Toprol XL 25 mg oral tablet extended release 24 hr         Allergy List  NO KNOWN DRUG ALLERGIES         Family  "Medical History  Coronary Artery Disease; Colon Cancer; Family history of colon cancer; Diabetes         Social History  Alcohol (Current some day); ; Tobacco (Former)         Review of Systems  · Constitutional  o Denies  o : fever, headache, chills  · Eyes  o Denies  o : eye pain, double vision, blurred vision  · HENT  o Denies  o : sinus problems, sore throat, ear infection  · Cardiovascular  o Denies  o : chest pain, high blood pressure, varicosities  · Respiratory  o Denies  o : shortness of breath, wheezing, frequent cough  · Gastrointestinal  o Denies  o : nausea, vomiting, heartburn, indigestion, abdominal pain  · Genitourinary  o Denies  o : urgency, frequency, urinary retention, painful urination  · Integument  o Denies  o : rash, itching, boils  · Neurologic  o Denies  o : tingling or numbness, tremors, dizzy spells  · Musculoskeletal  o Denies  o : joint pain, neck pain, back pain  · Endocrine  o Denies  o : cold intolerance, heat intolerance, tired, excessive thirst, sluggish  · Psychiatric  o Admits  o : feels satisfied with life  o Denies  o : severe depression, concerns with hurting themselves  · Heme-Lymph  o Denies  o : swollen glands, blood clotting problems  · Allergic-Immunologic  o Denies  o : sinus allergy symptoms, hay fever      Vitals  Date Time BP Position Site L\R Cuff Size HR RR TEMP (F) WT  HT  BMI kg/m2 BSA m2 O2 Sat FR L/min FiO2 HC       05/04/2021 10:29 /62 Sitting    95 - R   193lbs 16oz 5'  11\" 27.06 2.1             Physical Examination  · Constitutional  o Appearance  o : 76-year-old -American male who looks pale. Does have some shortness of breath on walking  · Neck  o Thyroid  o : gland size normal, nontender, no nodules or masses present on palpation  · Respiratory  o Respiratory Effort  o : Breathing is unlabored without accessory muscle use  o Inspection of Chest  o : normal appearance, no retractions  o Auscultation of Lungs  o : Normal breath " sounds  · Cardiovascular  o Heart  o :   § Auscultation of Heart  § : regular rate and rhythm, no murmurs, gallops or rubs  o Peripheral Vascular System  o : No abnormalities  · Gastrointestinal  o Abdominal Examination  o : Scaphoid abdomen which is non-tender to palpation with normal tone and without rigidity or guarding. Normal bowel sounds. No masses present.  o Liver and spleen  o : No hepatomegaly present. Liver is non-tender to palpation and spleen is not palpable.  o Hernias  o : No abdominal wall hernias are present.  · Genitourinary  o Bladder  o : no abnormalities  o Penis  o : normal general appearance, no lesions present, no discharge. Uncircumcised  o Urethral Meatus  o : no abnormalities  o Scrotum and Scrotal Contents  o :   § Scrotum  § : no abnormalities  § Epididymides  § : no abnormalities  § Testes  § : no abnormalities  o Digital Rectal Examination  o :   § Prostate  § : nontender to palpation, size normal, no nodules present, consistency normal  · Lymphatic  o Neck  o : No lymphadenopathy present  o Groin  o : No lymphadenopathy present  · Skin and Subcutaneous Tissue  o General Inspection  o : No rashes, lesions or areas of discoloration present. Skin turgor is normal.  o General Palpation  o : No abnormalities, masses or tenderness on palpation.  · Neurologic  o Mental Status Examination  o : grossly oriented to person, place and time  o Gait and Station  o : normal gait, able to stand without difficulty  · Psychiatric  o Mood and Affect  o : mood normal, affect appropriate          Results  · In-Office Procedures  o Lab procedure  § Automated dipstick urinalysis with microscopy (87797)   § Color Ur: Yellow   § Clarity Ur: Clear   § Glucose Ur Ql Strip: Negative   § Bilirub Ur Ql Strip: Negative   § Ketones Ur Ql Strip: Negative   § Sp Gr Ur Qn: 1.020   § Hgb Ur Ql Strip: Trace-Intact   § pH Ur-LsCnc: 5.0   § Prot Ur Ql Strip: Negative   § Urobilinogen Ur Strip-mCnc: 0.2 E.U./dL   § Nitrite  Ur Ql Strip: Negative   § WBC Est Ur Ql Strip: Negative   § RBC UrnS Qn HPF: 0   § WBC UrnS Qn HPF: 0   § Bacteria UrnS Qn HPF: 0   § Crystals UrnS Qn HPF: 0   § Epithelial Cells (non renal): 3-1-fhbqropt cells /HPF  § Epithelial Cells (renal): 0       Assessment  · Cancer of prostate     185/C61  · Cancer of lung     162.9/C34.90  Radiation and chemo      Plan  · Orders  o PSA ultrasensitive DIAGNOSTIC [facility] (06900) - 185/C61 - 05/04/2021  · Instructions  o Patient is in remission with prostate carcinoma. His main problem is erectile dysfunction but he is on antiandrogen ablation. The only way he will get erection will be either penile implant or injections in the penis and he refuses to get either one of them. He is a survivor for cancer of lung also. I will recheck him in 6 months time with a PSA.            Electronically Signed by: Merrill Ames MD -Author on May 4, 2021 10:54:42 AM

## 2021-05-15 VITALS
BODY MASS INDEX: 27.3 KG/M2 | DIASTOLIC BLOOD PRESSURE: 79 MMHG | WEIGHT: 195 LBS | HEART RATE: 73 BPM | TEMPERATURE: 98 F | HEIGHT: 71 IN | SYSTOLIC BLOOD PRESSURE: 136 MMHG

## 2021-05-15 VITALS
WEIGHT: 190 LBS | SYSTOLIC BLOOD PRESSURE: 143 MMHG | TEMPERATURE: 98.1 F | BODY MASS INDEX: 26.6 KG/M2 | HEIGHT: 71 IN | HEART RATE: 67 BPM | DIASTOLIC BLOOD PRESSURE: 76 MMHG

## 2021-05-15 VITALS
WEIGHT: 196 LBS | HEART RATE: 76 BPM | HEIGHT: 71 IN | TEMPERATURE: 98.2 F | DIASTOLIC BLOOD PRESSURE: 66 MMHG | SYSTOLIC BLOOD PRESSURE: 116 MMHG | BODY MASS INDEX: 27.44 KG/M2

## 2021-05-15 VITALS
TEMPERATURE: 98.4 F | HEART RATE: 93 BPM | WEIGHT: 190.12 LBS | HEIGHT: 71 IN | BODY MASS INDEX: 26.62 KG/M2 | SYSTOLIC BLOOD PRESSURE: 101 MMHG | DIASTOLIC BLOOD PRESSURE: 53 MMHG

## 2021-05-16 VITALS
SYSTOLIC BLOOD PRESSURE: 127 MMHG | BODY MASS INDEX: 28.6 KG/M2 | TEMPERATURE: 97.8 F | HEART RATE: 77 BPM | DIASTOLIC BLOOD PRESSURE: 54 MMHG | WEIGHT: 204.25 LBS | HEIGHT: 71 IN

## 2021-05-16 VITALS
WEIGHT: 197 LBS | DIASTOLIC BLOOD PRESSURE: 62 MMHG | SYSTOLIC BLOOD PRESSURE: 100 MMHG | BODY MASS INDEX: 27.58 KG/M2 | HEIGHT: 71 IN

## 2021-05-16 VITALS
HEART RATE: 90 BPM | WEIGHT: 204 LBS | TEMPERATURE: 98.5 F | SYSTOLIC BLOOD PRESSURE: 104 MMHG | HEIGHT: 71 IN | BODY MASS INDEX: 28.56 KG/M2 | DIASTOLIC BLOOD PRESSURE: 58 MMHG

## 2021-05-16 VITALS — HEIGHT: 71 IN | WEIGHT: 198 LBS | RESPIRATION RATE: 16 BRPM | BODY MASS INDEX: 27.72 KG/M2

## 2021-05-16 VITALS — RESPIRATION RATE: 16 BRPM | BODY MASS INDEX: 27.58 KG/M2 | WEIGHT: 197 LBS | HEIGHT: 71 IN

## 2021-05-23 ENCOUNTER — TRANSCRIBE ORDERS (OUTPATIENT)
Dept: ONCOLOGY | Facility: HOSPITAL | Age: 76
End: 2021-05-23

## 2021-05-23 DIAGNOSIS — C34.90 MALIGNANT NEOPLASM OF LUNG, UNSPECIFIED LATERALITY, UNSPECIFIED PART OF LUNG (HCC): Primary | ICD-10-CM

## 2021-05-28 VITALS
TEMPERATURE: 97.7 F | RESPIRATION RATE: 16 BRPM | HEART RATE: 93 BPM | WEIGHT: 191.8 LBS | DIASTOLIC BLOOD PRESSURE: 67 MMHG | BODY MASS INDEX: 26.75 KG/M2 | SYSTOLIC BLOOD PRESSURE: 138 MMHG | OXYGEN SATURATION: 94 %

## 2021-05-28 VITALS
WEIGHT: 195.31 LBS | RESPIRATION RATE: 16 BRPM | DIASTOLIC BLOOD PRESSURE: 62 MMHG | OXYGEN SATURATION: 92 % | SYSTOLIC BLOOD PRESSURE: 124 MMHG | OXYGEN SATURATION: 93 % | TEMPERATURE: 98.2 F | TEMPERATURE: 98.2 F | HEIGHT: 71 IN | WEIGHT: 200 LBS | HEART RATE: 83 BPM | DIASTOLIC BLOOD PRESSURE: 62 MMHG | SYSTOLIC BLOOD PRESSURE: 120 MMHG | DIASTOLIC BLOOD PRESSURE: 66 MMHG | RESPIRATION RATE: 18 BRPM | SYSTOLIC BLOOD PRESSURE: 122 MMHG | RESPIRATION RATE: 16 BRPM | HEIGHT: 71 IN | BODY MASS INDEX: 27.38 KG/M2 | WEIGHT: 195.56 LBS | HEIGHT: 71 IN | HEART RATE: 100 BPM | HEART RATE: 87 BPM | BODY MASS INDEX: 28 KG/M2 | BODY MASS INDEX: 27.34 KG/M2 | TEMPERATURE: 98.2 F | OXYGEN SATURATION: 94 %

## 2021-05-28 VITALS
BODY MASS INDEX: 25.89 KG/M2 | WEIGHT: 185.63 LBS | SYSTOLIC BLOOD PRESSURE: 123 MMHG | WEIGHT: 185.19 LBS | OXYGEN SATURATION: 95 % | SYSTOLIC BLOOD PRESSURE: 124 MMHG | OXYGEN SATURATION: 98 % | DIASTOLIC BLOOD PRESSURE: 64 MMHG | BODY MASS INDEX: 25.83 KG/M2 | TEMPERATURE: 97.5 F | DIASTOLIC BLOOD PRESSURE: 73 MMHG | RESPIRATION RATE: 16 BRPM | HEART RATE: 98 BPM | HEART RATE: 98 BPM | RESPIRATION RATE: 20 BRPM | TEMPERATURE: 99 F

## 2021-05-28 VITALS
RESPIRATION RATE: 14 BRPM | OXYGEN SATURATION: 88 % | SYSTOLIC BLOOD PRESSURE: 123 MMHG | DIASTOLIC BLOOD PRESSURE: 62 MMHG | HEIGHT: 71 IN | WEIGHT: 197.5 LBS | DIASTOLIC BLOOD PRESSURE: 60 MMHG | BODY MASS INDEX: 27.65 KG/M2 | BODY MASS INDEX: 25.81 KG/M2 | BODY MASS INDEX: 26.38 KG/M2 | WEIGHT: 184.37 LBS | HEIGHT: 71 IN | RESPIRATION RATE: 14 BRPM | TEMPERATURE: 98.6 F | HEART RATE: 88 BPM | WEIGHT: 188.44 LBS | SYSTOLIC BLOOD PRESSURE: 118 MMHG | TEMPERATURE: 98.2 F | HEIGHT: 71 IN | OXYGEN SATURATION: 96 % | DIASTOLIC BLOOD PRESSURE: 64 MMHG | TEMPERATURE: 98.3 F | HEART RATE: 84 BPM | OXYGEN SATURATION: 93 % | HEART RATE: 97 BPM | SYSTOLIC BLOOD PRESSURE: 102 MMHG | RESPIRATION RATE: 16 BRPM

## 2021-05-28 VITALS
RESPIRATION RATE: 18 BRPM | RESPIRATION RATE: 16 BRPM | BODY MASS INDEX: 28.53 KG/M2 | WEIGHT: 195.55 LBS | OXYGEN SATURATION: 97 % | OXYGEN SATURATION: 92 % | WEIGHT: 195.99 LBS | SYSTOLIC BLOOD PRESSURE: 119 MMHG | SYSTOLIC BLOOD PRESSURE: 126 MMHG | HEIGHT: 71 IN | OXYGEN SATURATION: 99 % | SYSTOLIC BLOOD PRESSURE: 141 MMHG | HEIGHT: 70 IN | BODY MASS INDEX: 27.44 KG/M2 | TEMPERATURE: 97.5 F | WEIGHT: 199.3 LBS | HEART RATE: 91 BPM | HEART RATE: 80 BPM | TEMPERATURE: 98.1 F | RESPIRATION RATE: 16 BRPM | DIASTOLIC BLOOD PRESSURE: 79 MMHG | BODY MASS INDEX: 27.27 KG/M2 | HEART RATE: 87 BPM | DIASTOLIC BLOOD PRESSURE: 75 MMHG | DIASTOLIC BLOOD PRESSURE: 60 MMHG | TEMPERATURE: 97.1 F

## 2021-05-28 VITALS
DIASTOLIC BLOOD PRESSURE: 67 MMHG | BODY MASS INDEX: 26.6 KG/M2 | WEIGHT: 190 LBS | TEMPERATURE: 97.7 F | SYSTOLIC BLOOD PRESSURE: 119 MMHG | RESPIRATION RATE: 15 BRPM | HEART RATE: 90 BPM | HEIGHT: 71 IN | OXYGEN SATURATION: 96 %

## 2021-05-28 VITALS
OXYGEN SATURATION: 91 % | SYSTOLIC BLOOD PRESSURE: 127 MMHG | TEMPERATURE: 97.2 F | HEIGHT: 70 IN | HEART RATE: 108 BPM | DIASTOLIC BLOOD PRESSURE: 64 MMHG | WEIGHT: 200.62 LBS | BODY MASS INDEX: 28.72 KG/M2

## 2021-05-28 NOTE — PROGRESS NOTES
Patient: SELEAN WU     Acct: [id number]     Report: #[id number]  UNIT #: [id number]     : 1945    Encounter Date:2018  PRIMARY CARE: LAURA HANCOCK  ***Signed***  --------------------------------------------------------------------------------------------------------------------  Chief Complaint      Encounter Date      2018            Referring Provider      VIVIANA SEWELL            Patient Complaint      Patient is complaining of      3 month follow up            VITALS      Height 5 ft 11 in / 180.34 cm      Weight 200 lbs 0 oz / 90.26941 kg      BSA 2.15 m2      BMI 27.9 kg/m2      Temperature 98.2 F / 36.78 C - Oral      Pulse 100      Respirations 16      Blood Pressure 120/62 Sitting, Right Arm      Pulse Oximetry 92%, room air      Exhaled Nitrous Oxide Testin            HPI      The patient is a 73 year old male with history of moderately severe chronic     obstructive pulmonary disease and adenocarcinoma. He is here for follow up.             He had repeat CT scan of the chest for surveillance of lung cancer. It does not     show any recurrence. He is currently taking Advair twice daily, Spiriva daily     and uses albuterol sparingly. He uses albuterol nebulizer 2-3 times a day. It     helps him clear up secretions. He has wheezing and feels like he not been able     to clear his secretions as much. No fevers or chills, no nausea and vomiting.     He has gained some weight. At times he feels that he has chest tightness and     wheezing and thinks it is related to his pneumonitis. He is going to follow up     with Dr. Bright and Dr. Bergeron later next week.  He has postnasal drip at times.      Tobacco      Counseling given:  Patient declined            ROS      Constitutional:  Complains of: Fatigue, Denies: Fever, Weight gain, Weight loss    , Chills, Insomnia, Other      Respiratory/Breathing:  Complains of: Shortness of air, Wheezing, Cough, Denies    : Hemoptysis,  Pleuritic pain, Other      Endocrine:  Denies: Polydipsia, Polyuria, Heat/cold intolerance, Diabetes, Other      Eyes:  Denies: Blurred vision, Vision Changes, Other      Ears, nose, mouth, throat:  Denies: Mouth lesions, Thrush, Throat pain,     Hoarseness, Allergies/Hay Fever, Post Nasal Drip, Headaches, Recent Head Injury    , Nose Bleeding, Neck Stiffness, Thyroid Mass, Hearing Loss, Ear Fullness, Dry     Mouth, Nasal or Sinus Pain, Dry Lips, Nasal discharge, Nasal congestion, Other      Cardiovascular:  Denies: Palpitations, Syncope, Claudication, Chest Pain, Wake     up Gasping for air, Leg Swelling, Irregular Heart Rate, Cyanosis, Dyspnea on     Exertion, Other      Gastrointestinal:  Denies: Nausea, Constipation, Diarrhea, Abdominal pain,     Vomiting, Difficulty Swallowing, Reflux/Heartburn, Dysphagia, Jaundice, Bloating    , Melena, Bloody stools, Other      Genitourinary:  Denies: Urinary frequency, Incontinence, Hematuria, Urgency,     Nocturia, Dysuria, Testicular problems, Other      Musculoskeletal:  Denies: Joint Pain, Joint Stiffness, Joint Swelling, Myalgias    , Other      Hematologic/lymphatic:  DENIES: Lymphadenopathy, Bruising, Bleeding tendencies,     Other      Neurological:  Denies: Headache, Numbness, Weakness, Seizures, Other      Psychiatric:  Denies: Anxiety, Appropriate Effect, Depression, Other      Sleep:  No: Excessive daytime sleep, Morning Headache?, Snoring, Insomnia?,     Stop breathing at sleep?, Other      Integumentary:  Denies: Rash, Dry skin, Skin Warm to Touch, Other      Immunologic/Allergic:  Denies: Latex allergy, Seasonal allergies, Asthma,     Urticaria, Eczema, Other      Immunization status:  No: Up to date            FAMILY/SOCIAL/MEDICAL HX      Current History      Lives with his wife.      Smoker for 50 years, 1 pack per day.      No alcohol or illicit drug use.      Surgical History:  Yes: Bowel Surgery (COLONOSCOPY, cryo prostate surgery),     Orthopedic  Surgery (RIGHT HAND SURG), No: AAA Repair, Abdominal Surgery,     Angioplasty, Appendectomy, Back Surgery, Bladder Surgery, Breast Surgery, CABG,     Carotid Stenosis, Cholecystectomy, Ear Surgery, Eye Surgery, Head Surgery,     Hernia Surgery, Kidney Surgery, Nose Surgery, Oral Surgery, Prostatectomy,     Rectal Surgery, Spinal Surgery, Testicular Surgery, Throat Surgery, Valve     Replacement, Vascular Surgery, Other Surgeries      Diabetes - Family Hx:  Mother, Sister      Cancer/Type - Family Hx:  Brother      Other Family Medical History:  Sister (copd)      Is Father Still Living?:  No      Is Mother Still Living?:  No      Social History:  Tobacco Use, No Alcohol Use, No Recreational Drug use      Smoking status:  Former smoker (1 ppd x 50y quit 2016)      Smoking packs/day:  1      Smoking history:  25-50 pack years      Counseling given:  Patient declined      Anticoagulation Therapy:  No      Antibiotic Prophylaxis:  No      Medical History:  Yes: Arthritis, Chemotherapy/Cancer (PROSTATE, LUNG), Chronic     Bronchitis/COPD, High Blood Pressure (CONTROLLED), Shortness Of Breath (LUNG CA    , ON INHALERS), No: Alcoholism, Allergies, Anemia, Asthma, Blood Disease,     Broken Bones, Cataracts, Chemical Dependency, Emphysema, Chronic Liver Disease,     Colon Trouble, Colitis, Diverticulitis, Congestive Heart Failu, Deafness or     Ringing Ears, Convulsions, Depression, Anxiety, Bipolar Disorder, Diabetes,     Epilepsy, Seizures, Forgetfullness, Glaucoma, Gall Stones, Gout, Head Injury,     Heart Attack, Heart Murmur, Hemorrhoids/Rectal Prob, Hepatitis, Hiatal Hernia,     High Cholesterol, HIV (Do not ask - volu, Jaundice, Kidney or Bladder Disease,     Kidney Stones, Migrane Headaches, Mitral Valve Prolapse, Night sweats, Phlebitis    , Psychiatric Care, Reflux Disease, Rheumatic Fever, Sexually Transmitted Dis,     Sinus Trouble, Skin Disease/Psoriais/Ecz, Stroke, Thyroid Problem, Tuberculosis     or Pos TB  Te, Miscellaneous Medical/oth            Hx Influenza Vaccination:  Yes      Date Influenza Vaccine Given:  Nov 1, 2017      Influenza Vaccine Declined:  Yes      2 or More Falls Past Year?:  No      Fall Past Year with Injury?:  No      Hx Pneumococcal Vaccination:  Yes      Encouraged to follow-up with:  PCP regarding preventative exams.      Chart initiated by      brandyn dubon ma            ALLERGIES/MEDICATIONS      Allergies:        Coded Allergies:             NO KNOWN ALLERGIES (Unverified , 2/8/18)      Medications    Last Reconciled on 2/8/18 08:57 by ROSHAN HOOD MD      Montelukast Sodium (Singulair*) 10 Mg Tab      10 MG PO HS, #30 TAB 9 Refills         Prov: Roshan Hood         2/8/18       Neb-NaCl 3% (Sodium Chloride 3% Neb) 4 Ml Vial.neb      4 ML INH RTBID for 30 Days, #60 NEB 4 Refills         Prov: Roshan Hood         2/8/18       Leuprolide Acetate (Lupron Depot) 22.5 Mg Syringekit      22.5 MG IM ONCE, SYRINGE         Reported         2/8/18       Cilostazol (Pletal*) 100 Mg Tab      100 MG PO BID, #60 TAB         Reported         2/8/18       NEB-Albuterol Sulf (Albuterol) 2.5 Mg/3 Ml Vial.neb      2.5 MG INH Q4-6H Y for DYSPNEA, #120 NEB 0 Refills         Prov: Roshan Hood         11/15/17       Nebulizer/Compressor (Nebulizer) 1 Each Each      EACH XX ONCE, #1 0 Refills         Prov: Roshan Hood         11/15/17       Metoprolol Succinate (Toprol XL*) 25 Mg Tab.er.24h      12.5 MG PO QDAY, #15 TAB 0 Refills         Reported         11/15/17       Tiotropium Bromide (Spiriva Respimat 2.5 mcg/Puff) 4 Gm Mist.inhal      2 PUFFS INH RTQDAY, #1 MDI 9 Refills         Prov: Roshan Hood         4/19/17       MDI-Advair 500/50 (Advair 500/50 Diskus) 1 Each Blst.w.dev      1 PUFF INH RTBID, #1 INH 9 Refills         Prov: Roshan Hood         4/19/17       Bicalutamide (Casodex) 50 Mg Tab      50 MG PO QDAY, #30 TAB         Reported         1/25/17       Folic Acid (Folic Acid*) 1 Mg Tablet      1  MG PO QDAY, #30 TAB 0 Refills         Reported         4/14/16       Tamsulosin HCL (Flomax*) 0.4 Mg Cap.sr.24h      0.4 MG PO QDAY, #30 CAP 0 Refills         Reported         6/16/15       Tolterodine Tartrate (Detrol LA) 4 Mg Cap.sr.24h      4 MG PO QDAY, CAP         Reported         6/16/15       Aspirin EC (Aspirin EC*) 81 Mg Tabec      81 MG PO QDAY, TAB.EC 0 Refills         Reported         6/16/15       Lisinopril* (Lisinopril*) 10 Mg Tablet      10 MG PO QDAY, TAB 0 Refills         Reported         6/16/15      Current Medications      Current Medications Reviewed 2/8/18            EXAM      CONSTITUTIONAL: Pleasant  male in no acute distress,  normal     conversant.       EYES : Pink conjunctive, no ptosis, PERRL.       ENMT :Mallampati classification II, no sinus tenderness.  Nose and ears appear     normal, normal dentition, mild posterior pharyngeal wall erythema.      Neck: Nontender, no masses, no thyromegaly, no nodules.      Resp : Bilateral diminished breath sounds, resonant to percussion bilaterally,     no wheezing, crackles or rhonchi.      CVS  : No carotid bruits, s1s2 nl, RRR, no murmur, rubs or gallop, no     peripheral edema       Chest wall: Normal rise with inspiration, nontender on palpation. Increased AP     diameter, nontender to palpation.       GI   : Abdomen soft, with no masses, no hepatosplenomegaly, no hernias, BS+      MSK  : Normal gait and station, no digital cyanosis or clubbing       Skin : No rashes, ulcerations or lesions, normal turgor and temperature      Neuro: CN II - XII intact, no sensory deficits, DTRs intact and symmetrical, no     motor weakness      Psych: Appropriate affect, A   Vtials      Vitals:             Height 5 ft 11 in / 180.34 cm           Weight 200 lbs 0 oz / 90.77345 kg           BSA 2.15 m2           BMI 27.9 kg/m2           Temperature 98.2 F / 36.78 C - Oral           Pulse 100           Respirations 16           Blood Pressure  120/62 Sitting, Right Arm           Pulse Oximetry 92%, room air            REVIEW      Results Reviewed      PCCS Results Reviewed?:  Yes Prev Lab Results, Yes Prev Radiology Results, Yes     Previous Mecial Records      Micro Results      Patient:  SELENA WU   Acct:  # [id number]   Report:  # [id number]      UNIT #: [id number]      Admit/Service Date: 16      LOCATION:   [facility]     : 1945            ****  : ***                                                        PATIENT       SELENA WU      NAME:      MRN:          [id number]            FINAL CYTOLOGIC INTERPRETATION:      A:  LYMPH NODE, STATION 7, FNA:        - NEGATIVE FOR MALIGNANT CELLS        - LYMPHOID TISSUE PRESENT            B:  LYMPH NODE, STATION 10R, #1, FNA:        - POSITIVE FOR MALIGNANT CELLS        - POORLY DIFFERENTIATED ADENOCARCINOMA        - SEE COMMENT            C:  LYMPH NODE, STATION 10R, #2, FNA:        - POSITIVE FOR MALIGNANT CELLS        - POORLY DIFFERENTIATED ADENOCARCINOMA        - SEE COMMENT            D:  LUNG, RIGHT LOWER LOBE, BAL:        - NON-DIAGNOSTIC            E:  LUNG, NOS, BRONCHIAL WASHING:        - SUSPICIOUS FOR MALIGNANCY        - SPARSE HIGHLY ATYPICAL EPITHELIAL CELLS, SUSPICIOUS FOR MALIGNANCY            COMMENT:  The cytomorphology, together with the immunoperoxidase stain results,     supports      the above diagnosis of poorly differentiated adenocarcinoma.  However, the     immuno      profile is non-specific as to site of origin, but is compatible with a lung     primary      (positive for CK7, but negative for TTF-1 and Napsin A).  See microscopic     description      for additional immunostain results.  Clinical/radiological correlation is     recommended.            REMARKS:  The above positive (malignant) diagnosis was called to Alize in Dr. Hood's      office at 9:57 a.m. on 16 by bre [email or web address]:ERT            MICROSCOPIC DESCRIPTION:       A:  The cytospin preparation is hypocellular.  The cell block preparation     demonstrates      fragments of lymphoid tissue containing many anthracotic pigment-laden     histiocytes.      Also present are benign bronchial epithelial cells and fragments of cartilage.      No      malignant cells are identified.            B:  The cytospin preparation is hypocellular.  The cell block preparation     demonstrates      discohesive to loosely cohesive clusters of malignant epithelial cells with     enlarged      hyperchromatic nuclei, irregular nuclear contours, occasionally prominent     nucleoli, and      a variable amount of dense cytoplasm.  Scattered mitoses are seen.  The     background      consists of necrosis.  Also present are fragments of lymphoid tissue and     cartilage.            C:  The cytospin preparation is hypocellular.  The cell block preparation     demonstrates      cohesive fragments and clusters of malignant epithelial cells with enlarged      hyperchromatic nuclei, irregular nuclear contours, nuclear pleomorphism,     variably sized      nucleoli, and a variable amount of dense cytoplasm.  Scattered mitoses are seen.      Lymphoid tissue is additionally present.  Immunoperoxidase stains, performed on     the cell      block preparation, yielded the following results:  Tumor cells are positive for     CK7 and      pancytokeratin; tumor cells are negative for TTF-1, p63, Napsin-A, CK5/6, CD56,     CDX2,      chromogranin, CK20, LCA, melanoma cocktail, and synaptophysin.            D:  The cytospin preparation demonstrates blood elements and extremely scant     benign      bronchial epithelial cells.  Alveolar macrophages are not identified.            E:  The cytospin preparation demonstrates an extremely sparse cluster of highly     atypical      epithelioid cells with enlarged hyperchromatic nuclei, prominent nucleoli,     nuclear size      variation, and a small amount of cytoplasm.           "  All immunohistochemical/cytochemical stains (IHC) are performed on separate     slides per      different antibody unless otherwise specified in the documentation that a     cocktail      (multiple stain) was performed.  Controls are appropriate.            CLINICAL INFORMATION: EBUS.      PULMONARY NODULE / R MEDIASTINAL HYLAR LYMPHADENOPATHY.            SPECIMEN(S) SUBMITTED:             A) BRONCHOSCOPY WITH NEEDLE ASPIRATE, STATION \"7\" NEEDLE ASPIRATE.           Specimen Description: 60 ml of, fixed fluid.           Materials Prepared and Examined: 2 cytospins, 1 cell block             B) BRONCHOSCOPY WITH NEEDLE ASPIRATE, STATION \"10R\" NEEDLE ASPIRATE #1.           Specimen Description: 56 ml of, fixed fluid.           Materials Prepared and Examined: 2 cytospins, 1 cell block             C) BRONCHOSCOPY WITH NEEDLE ASPIRATE, STATION \"10R\" NEEDLE ASPIRATE #2.           Specimen Description: 58 ml of, fixed fluid.           Materials Prepared and Examined: 2 cytospins, 1 cell block             D) BRONCHIOALVEOLAR LAVAGE, RIGHT LOWER LOBE LUNG           Specimen Description: 9 ml of thin, bloody fluid.           Materials Prepared and Examined: 2 cytospins             E) BRONCHIAL WASHINGS           Specimen Description: 80 ml of thin, bloody fluid.           Materials Prepared and Examined: 2 cytospins                                                             <Sign Out Dr. Combs>                                                       JENI WATERMAN M.D.,    Pathologist            FINAL MEDICAL CYTOLOGY REPORT             [facility]     Page 1     of 1            Accesioned Date and Time:16 1443            Patient:  SELENA WU   Acct:  # [id number]      UNIT #:[id number]      Admit/Service Date: 16      LOCATION:   ENDO          ATTENDING PHYS:Roshan Hood MD      SEX:M      :1945            ****  S-: ***                                                  " S-      PATIENT       DARON WU      NAME:      MRN:          [id number]            FINAL PATHOLOGIC DIAGNOSIS:      LUNG, RIGHT LOWER LOBE, SUPERIOR SEGMENT, TRANSBRONCHIAL BIOPSY:       - BENIGN PULMONARY PARENCHYMA WITH SCATTERED INTRA-ALVEOLAR MACROPHAGES            JAC1:JAC1            MICROSCOPIC DESCRIPTION: Microscopic examination performed.            CLINICAL INFORMATION:      Pre-Op Dx:  Pulmonary nodule, right hilar lymphadenitis.            SPECIMEN(S) SUBMITTED:     BRONCHIAL BX, RIGHT LOWER LOBE            GROSS DESCRIPTION:      Formalin/Daron Wu. and RIGHT LOWER LOBE BIOPSY SUPERIOR SEGMENT/contents    :  seven      tan, possible tissue fragments, 0.1-0.3 cm, submitted in one cassette.     JLW:    GAVIN                                                             <Sign Out  Signature>                                                       JENI WATERMAN M.D.,    Pathologist            FINAL SURGICAL PATHOLOGY REPORT           [facility]     Page 1     of 1            Accesioned Date and Time: 16 1340      Radiographic Results               [facility]                PACS RADIOLOGY REPORT            Patient: DARON WU   Acct: #[id number]   Report: #[id number]            UNIT #: [id number]    DOS: 18 0920      INSURANCE:MEDICARE PART A   LOCATION:CT     : 1945            PROVIDERS      ADMITTING:     ATTENDING: Asa Bright      FAMILY:  VIVIANA SEWELL   ORDERING:  Asa Bright         OTHER:    DICTATING:  Lashonda Roy MD            REQ #:[id number]   EXAM:CTACPWC - CT ABD CHEST PEL w CONTR      REASON FOR EXAM:  NSCLC RESTAGING      REASON FOR VISIT:  NSCLC RESTAGING            *******Signed******         PROCEDURE:   CT ABDOMEN; CT CHEST; CT PELVIS WITH CONTRAST             COMPARISON:   [facility], CT, CT CHEST ABD PEL W CONTRAST, 10/18/    2017, 8:57.              INDICATIONS:   NSCLC, RLL RESTAGING             TECHNIQUE:   After obtaining the patient's consent, CT images were obtained     with intravenous contrast       material.      PROTOCOL:     Standard imaging protocol performed                RADIATION:     DLP: 2111mGy*cm          Automated exposure control was utilized to minimize radiation dose.       CONTRAST:   100cc Isovue 370 I.V.      LABS:     eGFR: 48ml/min/1.73m2             FINDINGS:   There is stable chronic consolidation in the right paramediastinal     region, consistent       with postradiation and postsurgical change. There is no suspicious pulmonary     nodule. No new       consolidation is identified. No pleural effusion or pericardial effusion. The     heart size is normal.       There are extensive calcified plaques involving the coronary arteries. Several     calcified left hilar       lymph nodes are noted, consistent with prior granulomatous infection. There is     no hilar mediastinal       or axillary adenopathy. Calcified granulomas noted in the left upper lobe.             The liver, spleen, adrenal glands and kidneys demonstrate normal enhancement.     Approximately 1 cm       low attenuation lesion is noted in the upper pole of the right kidney and 2-3     small sub-centimeter       low attenuation lesions are present in the cortex of the left kidney. These are     all stable and       likely represent simple cysts. There is a moderate amount of calcified plaque     involving the       aortoiliac vessels and ectasia of both common iliac arteries. There is no     pelvic or abdominal       adenopathy. No free fluid is present. Gallbladder is unremarkable. Urinary     bladder is grossly       unremarkable. Postsurgical changes in the prostate are noted. Moderate     diverticulosis involves the       sigmoid and descending colon. No pathologic osseous abnormality. There is     moderate osteoarthritic       change involving both hips and the  lumbosacral spine. Moderate to severe facet     arthropathy is       present throughout the lumbar spine. Ankylosis of both sacroiliac joints is     noted. Fairly       significant bridging anterior osteophytosis in the lower thoracic spine is     present, likely due to       diffuse idiopathic skeletal hyperostosis.             CONCLUSION:         1. Stable appearance of the linear chronic consolidation and scarring in the     right paramediastinal       region, most consistent with radiation/postsurgical change. No evidence to     suggest local recurrent       malignancy.             2. No evidence of distant metastatic disease.             3. Moderate diverticulosis of the sigmoid and descending colon.             4. Moderate calcified plaque involves the aortoiliac vessels and coronary     arteries.             5. 2 small characterize low attenuation lesions in the left kidney,     statistically represent cysts       and simple cyst right kidney.              VICTORINO SCHAFER MD             Electronically Signed and Approved By: VICTORINO SCHAFER MD on 2018 at 12:59                   Until signed, this is an unconfirmed preliminary report that may contain      errors and is subject to change.                                              CALSA:      D:18 1259      PFT Results               [facility]               Pulmonary Funtion Test 1            Patient: SELENA WU   Acct: #[id number]   Report: #[id number]            UNIT #: [id number]    DOS:        LOCATION:[facility]     : 1945            PROVIDERS      ADMITTING:  Roshan Hood MD   FAMILY:  EDGARJENI         ORDERING:     OTHER:       DICTATING:  Roshan Hood MD            REASON FOR VISIT:  COPD            *******Signed******               PFT      Date of Procedure      DATE: 16            Ordering Provider            PFT Results      Pulmonary function test interpretation:            Spirometry shows moderately severe  obstructive defect. ( FEV1/ FVC ratio- 53,     FEV1- 1.56 lits, 52% of predicted; FVC- 2.91 lits, 74% of predicted)            There is a significant response to bronchodilator demonstrated. FVC increased     from 2.91 lits to 3.31 lits, 14% change; FEV1 increased from 1.56 lits to 1.63     lits, 5% change.            Lung volumes show air trapping. ( TLC 99% of predicted, 6.08 liters; % of     predicted, 3.17 liters)            Flow volume loop is compatible with obstructive process.             Diffusion capacity is severely decreased. (39% of predicted)            Comparison: No previous tests to compare.            Conclusion:    Low FEV1/FVC with low FEV1, FVC, with air trapping, and low     diffusion capacity is suggestive of moderately severe obstructive airway disease    , likely emphysema.            Some underlying reversible airway disease component as well.            Please correlate clinically.            Roshan Hood MD Feb 13, 2016 21:51            Until signed, this is an unconfirmed preliminary report that may contain      errors and is subject to change.                     <Electronically signed by Roshan Hood MD>                02/13/16               Roshan Hood MD:LIA      D:02/13/16 2152            PLAN      Assessment      Notes      New Medications      * CILOSTAZOL (Pletal*) 100 MG TAB: 100 MG PO BID #60      * Leuprolide Acetate (Lupron Depot) 22.5 MG SYRINGEKIT: 22.5 MG IM ONCE      * Neb-NaCl 3% (Sodium Chloride 3% Neb) 4 ML VIAL.NEB: 4 ML INH RTBID 30 Days #60      * Montelukast Sodium (Singulair*) 10 MG TAB: 10 MG PO HS #30      Discontinued Medications      * Levofloxacin (Levaquin*) 500 MG TABLET: 500 MG PO QDAY #7      PLAN:      The patient is a 73 years old male with moderately severe chronic obstructive     pulmonary disease with lung adenocarcinoma.             1. Lung adenocarcinoma. He is status post chemotherapy and radiation therapy.     He is  following with Dr. Bright and Dr. Bergeron.  CT scan of the chest on 02/06/18     showed stable imaging with no recurrence of the disease. Continue follow up     surveillance imaging per Dr. Bright.              2. Chronic obstructive pulmonary disease. Continue with Advair twice daily and     Spiriva daily.  Continue with albuterol as needed.  He finished pulmonary     rehabilitation and is doing well. If he has worsening cough I will give him a     prolonged Prednisone taper. For now I will start him on 3% saline nebulizer.     Continue with albuterol nebulizer 3-4 times a day.             3. I will follow up with him in 6-8 months, earlier if needed.  He is up to     date on his vaccinations through his primary care provider.            Patient Education      Education resources provided:  Yes      Patient Education Provided:  COPD, Lung Cancer                 Disclaimer: Converted document may not contain table formatting or lab diagrams. Please see [employer] for the authenticated document.

## 2021-05-28 NOTE — PROGRESS NOTES
Patient: SELENA WU     Acct: [id number]     Report: #[id number]  UNIT #: [id number]     : 1945    Encounter Date:2018  PRIMARY CARE: LAURA HANCOCK  ***Signed***  --------------------------------------------------------------------------------------------------------------------  Chief Complaint      Encounter Date      Sep 11, 2018            Primary Care Provider      VIVIANA SEWELL            Referring Provider      VIVIANA SEWELL            Patient Complaint      Patient is complaining of      Pt is here for 7 month follow up/COPD            VITALS      Height 5 ft 11 in / 180.34 cm      Weight 197 lbs 8 oz / 89.652861 kg      BSA 2.13 m2      BMI 27.5 kg/m2      Temperature 98.6 F / 37 C - Oral      Pulse 88      Respirations 14      Blood Pressure 123/62 Sitting, Right Arm      Pulse Oximetry 93%, roomair      Exhaled Nitrous Oxide Testin            HPI      The patient is a very pleasant 73-year-old  male who is a     patient of Dr. Erwin beaulieu. He has a history of lung cancer being followed by Dr. Fifi beaulieu group status post chemo and radiation remotely. He also has a history of     moderately severe COPD. He has been on Advair Diskus twice daily and Spiriva     Respimat 2.5 daily, and feels that they are helping him. He will sometimes have     times where he coughs during the night and will at times have issues with     wheezing, but he overall feels that he is doing no worse than he was doing in     2018 when he last saw Dr. Hood. He does not feel like he is doing     any better either, but has not really tried using the 3% saline nebs that Dr. Hood sent for him. He denies any purulent sputum production, denies any     hemoptysis, fever, or chills. He denies any increased dyspnea, coughing, or     wheezing. He did recently have a chest CT on 2018 which I have reviewed     with him today. It shows stable post treatment changes in the  right hilum and     medial right lower lobe with no evidence for metastatic disease, no evidence for    recurrence of disease.             I have reviewed his review of systems, medical, surgical, and family history and    agree with those as entered.            ROS      Constitutional:  Denies: Fatigue, Fever, Weight gain, Weight loss, Chills,     Insomnia, Other      Respiratory/Breathing:  Complains of: Shortness of air, Wheezing, Cough; Denies:    Hemoptysis, Pleuritic pain, Other      Endocrine:  Denies: Polydipsia, Polyuria, Heat/cold intolerance, Diabetes, Other      Eyes:  Denies: Blurred vision, Vision Changes, Other      Ears, nose, mouth, throat:  Denies: Congestion, Dysphagia, Hearing Changes, Nose    Bleeding, Nasal Discharge, Throat pain, Tinnitus, Other      Cardiovascular:  Denies: Chest Pain, Exertional dyspnea, Peripheral Edema,     Palpitations, Syncope, Wake up Gasping for air, Orthopnea, Tachycardia, Other      Gastrointestinal:  Denies: Abdominal pain/cramping, Bloody stools, Constipation,    Diarrhea, Melena, Nausea, Vomiting, Other      Genitourinary:  Denies: Dysuria, Urinary frequency, Incontinence, Hematuria,     Urgency, Other      Musculoskeletal:  Denies: Joint Pain, Joint Stiffness, Joint Swelling, Myalgias,    Other      Hematologic/lymphatic:  DENIES: Lymphadenopathy, Bruising, Bleeding tendencies,     Other      Neurologic:  Denies: Headache, Numbness, Weakness, Seizures, Other      Psychiatric:  Denies: Anxiety, Appropriate Effect, Depression, Other      Sleep:  No: Excessive daytime sleep, Morning Headache?, Snoring, Insomnia?, Stop    breathing at sleep?, Other      Integumentary:  Denies: Rash, Dry skin, Skin Warm to Touch, Other            FAMILY/SOCIAL/MEDICAL HX      Current History      Lives with his wife.      Smoker for 50 years, 1 pack per day.      No alcohol or illicit drug use.      Surgical History:  Yes: Bowel Surgery (COLONOSCOPY, cryo prostate surgery),      Orthopedic Surgery (RIGHT HAND SURG); No: AAA Repair, Abdominal Surgery,     Angioplasty, Appendectomy, Back Surgery, Bladder Surgery, Breast Surgery, CABG,     Carotid Stenosis, Cholecystectomy, Ear Surgery, Eye Surgery, Head Surgery,     Hernia Surgery, Kidney Surgery, Nose Surgery, Oral Surgery, Prostatectomy,     Rectal Surgery, Spinal Surgery, Testicular Surgery, Throat Surgery, Valve     Replacement, Vascular Surgery, Other Surgeries      Diabetes - Family Hx:  Mother, Sister      Cancer/Type - Family Hx:  Brother      Other Family Medical History:  Sister (copd)      Is Father Still Living?:  No      Is Mother Still Living?:  No      Social History:  Tobacco Use; No Alcohol Use, No Recreational Drug use      Smoking status:  Former smoker (1 ppd x 50y quit 2016)      Smoking packs/day:  1      Smoking history:  25-50 pack years      Counseling given:  Patient declined      Anticoagulation Therapy:  No      Antibiotic Prophylaxis:  No      Medical History:  Yes: Arthritis, Chemotherapy/Cancer (PROSTATE, LUNG), Chronic     Bronchitis/COPD, High Blood Pressure (CONTROLLED), Shortness Of Breath (LUNG CA,    ON INHALERS); No: Alcoholism, Allergies, Anemia, Asthma, Blood Disease, Broken     Bones, Cataracts, Chemical Dependency, Emphysema, Chronic Liver Disease, Colon     Trouble, Colitis, Diverticulitis, Congestive Heart Failu, Deafness or Ringing     Ears, Convulsions, Depression, Anxiety, Bipolar Disorder, Diabetes, Epilepsy,     Seizures, Forgetfullness, Glaucoma, Gall Stones, Gout, Head Injury, Heart     Attack, Heart Murmur, Hemorrhoids/Rectal Prob, Hepatitis, Hiatal Hernia, High     Cholesterol, HIV (Do not ask - volu, Jaundice, Kidney or Bladder Disease, Kidney    Stones, Migrane Headaches, Mitral Valve Prolapse, Night sweats, Phlebitis,     Psychiatric Care, Reflux Disease, Rheumatic Fever, Sexually Transmitted Dis,     Sinus Trouble, Skin Disease/Psoriais/Ecz, Stroke, Thyroid Problem, Tuberculosis      or Pos TB Te, Miscellaneous Medical/oth      Psychiatric History      None            PREVENTION      Hx Influenza Vaccination:  Yes      Date Influenza Vaccine Given:  Nov 1, 2017      Influenza Vaccine Declined:  Yes      2 or More Falls Past Year?:  No      Fall Past Year with Injury?:  No      Hx Pneumococcal Vaccination:  Yes      Encouraged to follow-up with:  PCP regarding preventative exams.      Chart initiated by      Umu Florence MA            ALLERGIES/MEDICATIONS      Allergies:        Coded Allergies:             NO KNOWN ALLERGIES (Unverified , 9/11/18)      Medications    Last Reconciled on 9/11/18 10:06 by ROSEMARIE GARCIAS      Tiotropium Bromide (Spiriva Respimat 2.5 mcg/Puff) 4 Gm Mist.inhal      2 PUFFS INH RTQDAY, #1 MDI 9 Refills         Prov: Roshan Hood         4/20/18       MDI-Advair 500/50 (Advair 500/50 Diskus) 1 Each Blst.w.dev      1 PUFF INH RTBID, #1 INH 9 Refills         Prov: Roshan Hood         4/20/18       Neb-NaCl 3% (Sodium Chloride 3% Neb) 4 Ml Vial.neb      4 ML INH RTBID for 30 Days, #60 NEB 4 Refills         Prov: Roshan Hood         2/8/18       Leuprolide Acetate (Lupron Depot) 22.5 Mg Syringekit      22.5 MG IM ONCE, SYRINGE         Reported         2/8/18       NEB-Albuterol Sulf (Albuterol) 2.5 Mg/3 Ml Vial.neb      2.5 MG INH Q4-6H PRN for DYSPNEA, #120 NEB 0 Refills         Prov: Roshan Hood         11/15/17       Nebulizer/Compressor (Nebulizer) 1 Each Each      EACH XX ONCE, #1 0 Refills         Prov: Roshan Hood         11/15/17       Metoprolol Succinate (Toprol XL*) 25 Mg Tab.er.24h      12.5 MG PO QDAY, #15 TAB 0 Refills         Reported         11/15/17       Folic Acid (Folic Acid*) 1 Mg Tablet      1 MG PO QDAY, #30 TAB 0 Refills         Reported         4/14/16       Tamsulosin HCL (Flomax) 0.4 Mg Cap.sr.24h      0.4 MG PO QDAY, #30 CAP 0 Refills         Reported         6/16/15       Tolterodine Tartrate (Detrol LA) 4 Mg Cap.sr.24h      4 MG PO QDAY,  CAP         Reported         6/16/15       Aspirin EC (Aspirin EC*) 81 Mg Tabec      81 MG PO QDAY, TAB.EC 0 Refills         Reported         6/16/15       Lisinopril* (Lisinopril*) 10 Mg Tablet      10 MG PO QDAY, TAB 0 Refills         Reported         6/16/15      Current Medications      Current Medications Reviewed 9/11/18            EXAM      CONSTITUTIONAL: Pleasant  normal conversant.      EYES : Pink conjunctive, no ptosis, PERRL.      ENMT : Nose and ears appear normal, normal dentition, mild posterior pharyngeal     wall erythema. Mallampati classification      Neck: Nontender, no masses, no thyromegaly, no nodules.      Resp : Lungs are grossly clear to auscultation. No wheezes, rhonchi, or crackles    appreciated. Normal work of breathing noted.      CVS   : No carotid bruits, S1, S2 nl, RRR, no murmur, rubs or gallop, no     peripheral edema      Chest wall: Normal rise with inspiration, nontender on palpation      GI     : Abdomen soft, with no masses, no hepatosplenomegaly, no hernias, BS+      MSK   : Normal gait and station, no digital cyanosis or clubbing      Skin : No rashes, ulcerations or lesions, normal turgor and temperature      Neuro: CN II - XII intact, no sensory deficits, DTRs intact and symmetrical, no     motor weakness      Psych: Appropriate affect, A   Vitals      Vitals:             Height 5 ft 11 in / 180.34 cm           Weight 197 lbs 8 oz / 89.775578 kg           BSA 2.13 m2           BMI 27.5 kg/m2           Temperature 98.6 F / 37 C - Oral           Pulse 88           Respirations 14           Blood Pressure 123/62 Sitting, Right Arm           Pulse Oximetry 93%, roomair            REVIEW      Results Reviewed      PCCS Results Reviewed?:  Yes Prev Lab Results, Yes Prev Radiology Results, Yes     Previous [facility] Records      Lab Results      I have personally reviewed previous lab work, imaging, and provider notes     including most recent CT.      Radiographic Results                [facility]                PACS RADIOLOGY REPORT            Patient: SELENA WU   Acct: #[id number]   Report: #[id number]            UNIT #: [id number]    DOS: 18 1124      INSURANCE:MEDICARE PART A   LOCATION:CT     : 1945            PROVIDERS      ADMITTING:     ATTENDING: Asa Bright      FAMILY:  VIVIANA SEWELL   ORDERING:  Asa Bright         OTHER:    DICTATING:  DAPHNE RICHARDS MD            REQ #:[id number]   EXAM:CTACPWC - CT ABD CHEST PEL w CONTR      REASON FOR EXAM:  RLL NSCLC RESTAGING      REASON FOR VISIT:  RLL NSCLC RESTAGING            *******Signed******         PROCEDURE:   CT ABDOMEN; CT CHEST; CT PELVIS WITH CONTRAST             COMPARISON:   None.             INDICATIONS:   RLL NSCLC RESTAGING.  Lung cancer restaging.  Observation for     metastatic disease.             TECHNIQUE:   After obtaining the patient's consent, CT images were obtained with    intravenous contrast       material.      PROTOCOL:     Standard imaging protocol performed                RADIATION:     DLP: 2053mGy*cm          Automated exposure control was utilized to minimize radiation dose.       CONTRAST:   100cc Isovue 370 I.V.      LABS:     eGFR: 60ml/min/1.73m2             FINDINGS:         Chest with contrast:  significant emphysema.  Soft tissue thickening in the     right hilum and along       the medial right lower lobe is stable, and presumed post treatment change.  No     suspicious pulmonary       nodule.  No adenopathy in the chest.             Abdomen with contrast:  Liver, spleen, adrenal glands, pancreas, gallbladder,     are unremarkable.        Uncomplicated sigmoid diverticula.  Normal appendix.  Stable subcentimeter cyst     upper pole right       kidney.  No adenopathy.             Pelvis with contrast:  No pelvic mass or fluid.  No aggressive appearing bone     lesion.             CONCLUSION:   Stable post treatment change in  the right hilum and medial right     lower lobe.             No evidence for active metastatic disease in the chest, abdomen, or pelvis.              DAPHNE RICHARDS MD             Electronically Signed and Approved By: DAPHNE RICHARDS MD on 8/13/2018 at 13:20                        Until signed, this is an unconfirmed preliminary report that may contain      errors and is subject to change.                                              BRADENER:      D:08/13/18 1320            Assessment      Notes      Renewed Medications      * MDI-Advair 500/50 (Advair 500/50 Diskus) 1 EACH BLST.W.DEV:         From: 1 PUFF INH RTBID #1         To: 1 PUFF INH RTBID #3      * TIOTROPIUM BROMIDE (Spiriva Respimat 2.5 mcg/Puff) 4 GM MIST.INHAL:         From: 2 PUFFS INH RTQDAY #1         To: 2 PUFFS INH RTQDAY #4      Discontinued Medications      * Montelukast Sodium (Singulair*) 10 MG TAB: 10 MG PO HS #30      ASSESSMENT:      1. Moderately severe COPD without acute exacerbation.      2. Adenocarcinoma of the lung status post chemo and radiation followed by Dr. Calix and Dr. Bergeron.      3. Cough.            PLAN:       1. At this time, I will have the patient continue following up with radiation     and oncology. I have reviewed with him his most recent chest CT which shows no     recurrence of his lung cancer.       2. His COPD appears to be reasonably well controlled. I have offered to try     different inhalers for the patient, but as he likes to get his medication at     [place], he prefers to stay on the Advair Diskus and the Spiriva Respimat 2.5     at this time.      3. I have asked him to try using the 3% saline nebs more frequently and continue    using albuterol as needed.       4. I have advised the patient that if he feels like he is not doing as well and     would like to make a change to his breathing medications that we could certainly    do that and that he should let us know, but at this time he has declined to make     any changes to his inhaler regimen.            Patient Education      Patient Education Provided:  How to use an Inhaler, How to use a Nebulizer      Time Spent:  > 50% /[facility]                 Disclaimer: Converted document may not contain table formatting or lab diagrams. Please see [employer] for the authenticated document.

## 2021-05-28 NOTE — PROGRESS NOTES
Patient: SELENA WU     Acct: [id number]     Report: #[id number]  UNIT #: [id number]     : 1945    Encounter Date:2020  PRIMARY CARE: LAURA HANCOCK  ***Signed***  --------------------------------------------------------------------------------------------------------------------  Chief Complaint      Encounter Date      2020            Primary Care Provider      VIVIANA SEWELL            Referring Provider      VIVIANA SEWELL            Patient Complaint      Patient is complaining of      6 month follow up/soa            VITALS      Height 71 in / 180.34 cm      Weight 195 lbs 5 oz / 88.081557 kg      BSA 2.09 m2      BMI 27.2 kg/m2      Temperature 98.2 F / 36.78 C - Oral      Pulse 87      Respirations 18      Blood Pressure 124/66 Sitting, Right Arm      Pulse Oximetry 93%, room air      Initial Exhaled Nitrous Oxide      Exhaled Nitrous Oxide Results:  23            HPI      The patient is a 75 year old male with moderately COPD, adenocarcinoma of lung,     status post chemotherapy and radiation, followed by Dr. Flores and Dr. Moore.      He had chemoradiation through Dr. Flores and Dr. Bergeron.  He was suppose to be     on Symbicort, but ___ is not refilling.  He is okay to use Advair. He continues     to Spiriva and uses albuterol for rescue which he uses several times a week, but    tries to not use it at times.  He is not using Advair, he has not had any     changes in weight or appetite, no coughing up blood, no nausea or vomiting. He     already had flu shot. He is going to have repeat CT scan of chest later this     month and is going to follow up with Dr. Flores.  He is up-to-date on     vaccinations.      Tobacco      Counseling given:  Patient declined            ROS      Constitutional:  Complains of: Fatigue; Denies: Fever, Weight gain, Weight loss,    Chills, Insomnia, Other      Respiratory/Breathing:  Complains of: Shortness of air, Wheezing, Cough; Denies:     Hemoptysis, Pleuritic pain, Other      Endocrine:  Denies: Polydipsia, Polyuria, Heat/cold intolerance, Diabetes, Other      Eyes:  Denies: Blurred vision, Vision Changes, Other      Ears, nose, mouth, throat:  Denies: Mouth lesions, Thrush, Throat pain,     Hoarseness, Allergies/Hay Fever, Post Nasal Drip, Headaches, Recent Head Injury,    Nose Bleeding, Neck Stiffness, Thyroid Mass, Hearing Loss, Ear Fullness, Dry     Mouth, Nasal or Sinus Pain, Dry Lips, Nasal discharge, Nasal congestion, Other      Cardiovascular:  Denies: Palpitations, Syncope, Claudication, Chest Pain, Wake     up Gasping for air, Leg Swelling, Irregular Heart Rate, Cyanosis, Dyspnea on     Exertion, Other      Gastrointestinal:  Denies: Nausea, Constipation, Diarrhea, Abdominal pain,     Vomiting, Difficulty Swallowing, Reflux/Heartburn, Dysphagia, Jaundice,     Bloating, Melena, Bloody stools, Other      Genitourinary:  Denies: Urinary frequency, Incontinence, Hematuria, Urgency,     Nocturia, Dysuria, Testicular problems, Other      Musculoskeletal:  Denies: Joint Pain, Joint Stiffness, Joint Swelling, Myalgias,    Other      Hematologic/lymphatic:  DENIES: Lymphadenopathy, Bruising, Bleeding tendencies,     Other      Neurological:  Denies: Headache, Numbness, Weakness, Seizures, Other      Psychiatric:  Denies: Anxiety, Appropriate Effect, Depression, Other      Sleep:  No: Excessive daytime sleep, Morning Headache?, Snoring, Insomnia?, Stop    breathing at sleep?, Other      Integumentary:  Denies: Rash, Dry skin, Skin Warm to Touch, Other      Immunologic/Allergic:  Denies: Latex allergy, Seasonal allergies, Asthma,     Urticaria, Eczema, Other      Immunization status:  No: Up to date            FAMILY/SOCIAL/MEDICAL HX      Current History      Lives with his wife.      Smoker for 50 years, 1 pack per day.      No alcohol or illicit drug use.      Surgical History:  Yes: Bowel Surgery (COLONOSCOPY, cryo prostate surgery),      Orthopedic Surgery (RIGHT HAND SURG.); No: AAA Repair, Abdominal Surgery, Angio    plasty, Appendectomy, Back Surgery, Bladder Surgery, Breast Surgery, CABG,     Carotid Stenosis, Cholecystectomy, Ear Surgery, Eye Surgery, Head Surgery,     Hernia Surgery, Kidney Surgery, Nose Surgery, Oral Surgery, Prostatectomy,     Rectal Surgery, Spinal Surgery, Testicular Surgery, Throat Surgery, Valve     Replacement, Vascular Surgery, Other Surgeries      Diabetes - Family Hx:  Mother, Sister      Cancer/Type - Family Hx:  Brother      Other Family Medical History:  Sister (copd)      Is Father Still Living?:  No      Is Mother Still Living?:  No      Social History:  No Alcohol Use, No Recreational Drug use      Smoking status:  Former smoker (1 ppd x 50y quit 2016)      Smoking packs/day:  1      Smoking history:  25-50 pack years      Counseling given:  Patient declined      Anticoagulation Therapy:  No      Antibiotic Prophylaxis:  No      Medical History:  Yes: Arthritis, Chemotherapy/Cancer (PROSTATE, LUNG-), Chronic    Bronchitis/COPD, Deafness or Ringing Ears (BILAT. ), Hemorrhoids/Rectal Prob (     LARGE COLON POLYP), High Blood Pressure (CONTROLLED), Shortness Of Breath (LUNG     CA, ON INHALERS); No: Alcoholism, Allergies, Anemia, Asthma, Blood Disease,     Broken Bones, Cataracts, Chemical Dependency, Emphysema, Chronic Liver Disease,     Colon Trouble, Colitis, Diverticulitis, Congestive Heart Failu, Convulsions,     Depression, Anxiety, Bipolar Disorder, Diabetes, Epilepsy, Seizures,     Forgetfullness, Glaucoma, Gall Stones, Gout, Head Injury, Heart Attack, Heart     Murmur, Hepatitis, Hiatal Hernia, High Cholesterol, HIV (Do not ask - volu,     Jaundice, Kidney or Bladder Disease, Kidney Stones, Migrane Headaches, Mitral     Valve Prolapse, Night sweats, Phlebitis, Psychiatric Care, Reflux Disease,     Rheumatic Fever, Sexually Transmitted Dis, Sinus Trouble, Skin     Disease/Psoriais/Ecz, Stroke, Thyroid  Problem, Tuberculosis or Pos TB Te, Misce    llaneous Medical/oth      Psychiatric History      none            PREVENTION      Hx Influenza Vaccination:  Yes      Date Influenza Vaccine Given:  Dec 1, 2019      Influenza Vaccine Declined:  No      2 or More Falls Past Year?:  No      Fall Past Year with Injury?:  No      Hx Pneumococcal Vaccination:  Yes      Encouraged to follow-up with:  PCP regarding preventative exams.      Chart initiated by      brandyn dubon ma            ALLERGIES/MEDICATIONS      Allergies:        Coded Allergies:             NO KNOWN ALLERGIES (Unverified , 2/6/20)      Medications    Last Reconciled on 2/6/20 10:35 by ROSHAN HOOD MD      Fluticasone/Salmeterol 230/21 (Advair /21 MCG) 12 Gm Hfa.aer.ad      2 PUFF INH RTBID, #1 INH 9 Refills         Prov: Roshan Hood         2/6/20       Tiotropium Bromide (Spiriva Respimat 2.5 mcg/Puff) 4 Gm Mist.inhal      2 PUFFS INH RTQDAY, #1 MDI 9 Refills         Prov: Roshan Hood         2/6/20       Atorvastatin (Atorvastatin) 20 Mg Tablet      20 MG PO HS, #30 TAB 0 Refills         Reported         2/6/20       metFORMIN ER (metFORMIN ER) 500 Mg Tab.er.24      500 MG PO QDAY, #30 TAB.ER 0 Refills         Reported         2/6/20       MDI-Albuterol (Proair HFA) 8.5 Gm Hfa.aer.ad      1 PUFFS INH Q6H PRN for SHORTNESS OF BREATH, #1 MDI 0 Refills         Reported         8/29/19       Bicalutamide (BICALUTAMIDE) 50 Mg Tab      50 MG PO HS, #30 TAB         Reported         6/21/19       Neb-NaCl 3% (Sodium Chloride 3% Neb) 4 Ml Vial.neb      4 ML INH RTBID for 30 Days, #60 NEB 4 Refills         Prov: Luz Mercedes PA-C         3/26/19       NEB-Albuterol Sulf (Albuterol) 2.5 Mg/3 Ml Vial.neb      2.5 MG INH Q4-6H PRN for DYSPNEA, #120 NEB 0 Refills         Prov: Luz Mercedes PA-C         3/26/19       Leuprolide Acetate (Lupron Depot) 22.5 Mg Syringekit      22.5 MG IM ONCE, SYRINGE         Reported         2/8/18       Metoprolol  Succinate (Metoprolol Succinate) 25 Mg Tab.er.24h      25 MG PO QDAY, #15 TAB 0 Refills         Reported         11/15/17       Folic Acid (Folic Acid) 1 Mg Tablet      1 MG PO QDAY, #30 TAB 0 Refills         Reported         4/14/16       Tamsulosin HCL (Flomax) 0.4 Mg Cap.sr.24h      0.4 MG PO QPM, #30 CAP 0 Refills         Reported         6/16/15       Tolterodine Tartrate (Detrol LA) 4 Mg Cap.sr.24h      4 MG PO QPM, CAP         Reported         6/16/15       Aspirin EC (Aspirin EC) 81 Mg Tabec      81 MG PO QDAY, TAB.EC 0 Refills         Reported         6/16/15       Lisinopril* (Lisinopril*) 10 Mg Tablet      10 MG PO QDAY, TAB 0 Refills         Reported         6/16/15      Current Medications      Current Medications Reviewed 2/6/20            EXAM      CONSTITUTIONAL: Pleasant  male in no acute distress,  normal     conversant.       EYES : Pink conjunctive, no ptosis, PERRL.       ENMT :Mallampati classification II, no sinus tenderness.  Nose and ears appear     normal, normal dentition, mild posterior pharyngeal wall erythema.      Neck: Nontender, no masses, no thyromegaly, no nodules.      Resp : Bilateral diminished breath sounds, resonant to percussion bilaterally,     no wheezing, crackles or rhonchi.      CVS  : No carotid bruits, s1s2 nl, RRR, no murmur, rubs or gallop, no peripheral    edema       Chest wall: Normal rise with inspiration, nontender on palpation. Increased AP     diameter, nontender to palpation.       GI   : Abdomen soft, with no masses, no hepatosplenomegaly, no hernias, BS+      MSK  : Normal gait and station, no digital cyanosis or clubbing       Skin : No rashes, ulcerations or lesions, normal turgor and temperature      Neuro: CN II - XII intact, no sensory deficits, DTRs intact and symmetrical, no     motor weakness      Psych: Appropriate affect, A   Vtials      Vitals:             Height 71 in / 180.34 cm           Weight 195 lbs 5 oz / 88.371761 kg            BSA 2.09 m2           BMI 27.2 kg/m2           Temperature 98.2 F / 36.78 C - Oral           Pulse 87           Respirations 18           Blood Pressure 124/66 Sitting, Right Arm           Pulse Oximetry 93%, room air            REVIEW      Results Reviewed      PCCS Results Reviewed?:  Yes Prev Lab Results, Yes Prev Radiology Results, Yes     Previous Mecial Records      Lab Results      The patient had adenocarcinoma of the lung.      Radiographic Results               [facility]                PACS RADIOLOGY REPORT            Patient: SELENA WU   Acct: #[id number]   Report: #[id number]            UNIT #: [id number]    DOS: 19 0755      INSURANCE:MEDICARE PART A   LOCATION:CT     : 1945            PROVIDERS      ADMITTING:     ATTENDING: DAVID CIFUENTES      FAMILY:  VIVIANA SEWELL   ORDERING:  DAVID CIFUENTES         OTHER: Roshan Hood   DICTATING:  Yoan Perera MD            REQ #:[id number]   EXAM:CHW - CT CHEST with CONTRAST      REASON FOR EXAM:  LUNG CA      REASON FOR VISIT:  LUNG CA            *******Signed******         PROCEDURE:   CT CHEST W/ CONTRAST             COMPARISON:   [facility], CT, CHEST W/ CONTRAST, 2019, 14:31.             INDICATIONS:   RT. LUNG CA             TECHNIQUE:   After obtaining the patient's consent, CT images were obtained with    non-ionic       intravenous contrast material.               PROTOCOL:     Standard imaging protocol performed                RADIATION:     DLP: 570mGy*cm          Automated exposure control was utilized to minimize radiation dose.       CONTRAST:   100cc Isovue 370 I.V.      LABS:     eGFR: 60ml/min/1.73m2             FINDINGS:         There is persistent right hilar and infrahilar soft tissue density,     bronchiectasis and       consolidation, consistent with post treatment changes.  No definite evidence of     recurrent disease       is  identified.  Changes of emphysema are again noted.  No new or suspicious     pulmonary nodules are       identified.  No axillary, supraclavicular, or mediastinal adenopathy is     identified.  Gynecomastia.        The thyroid is normal.  Aorta and pulmonary artery are normal in caliber.      Limited evaluation of       the upper abdomen is unremarkable.  Diverticulosis without evidence of     diverticulitis.  No       aggressive appearing lytic or sclerotic bone lesions are identified.             CONCLUSION:         1. Post treatment changes in the right hilar and infrahilar region, without     evidence of recurrent       disease.              ZACH ERICKSON MD             Electronically Signed and Approved By: ZACH ERICKSON MD on 2019 at 9:11                        Until signed, this is an unconfirmed preliminary report that may contain      errors and is subject to change.                                              DUNS1:      D:19 0911      PFT Results               [facility]               Pulmonary Funtion Test 1            Patient: SELENA WU   Acct: #[id number]   Report: #[id number]            UNIT #: [id number]    DOS:        LOCATION[place] [place]     : 1945            PROVIDERS      ADMITTING:  Roshan Hood MD   FAMILY:  EDGARJENI         ORDERING:     OTHER:       DICTATING:  Roshan Hood MD            REASON FOR VISIT:  COPD            *******Signed******               PFT      Date of Procedure      DATE: 16            Ordering Provider            PFT Results      Pulmonary function test interpretation:            Spirometry shows moderately severe obstructive defect. ( FEV1/ FVC ratio- 53,     FEV1- 1.56 lits, 52% of predicted; FVC- 2.91 lits, 74% of predicted)            There is a significant response to bronchodilator demonstrated. FVC increased     from 2.91 lits to 3.31 lits, 14% change; FEV1 increased from 1.56 lits to 1.63     lits, 5% change.             Lung volumes show air trapping. ( TLC 99% of predicted, 6.08 liters; % of     predicted, 3.17 liters)            Flow volume loop is compatible with obstructive process.             Diffusion capacity is severely decreased. (39% of predicted)            Comparison: No previous tests to compare.            Conclusion:    Low FEV1/FVC with low FEV1, FVC, with air trapping, and low     diffusion capacity is suggestive of moderately severe obstructive airway     disease, likely emphysema.            Some underlying reversible airway disease component as well.            Please correlate clinically.            Roshan Hood MD                 Feb 13, 2016 21:51            Until signed, this is an unconfirmed preliminary report that may contain      errors and is subject to change.                     <Electronically signed by Roshan Hood MD>                02/13/16               Roshan Hood MD:LIA      D:02/13/16 2151            Assessment      COPD (chronic obstructive pulmonary disease)         Centrilobular emphysema - J43.2         Emphysema type: centrilobular            Notes      New Medications      * metFORMIN  MG TAB.ER.24: 500 MG PO QDAY #30      * Atorvastatin 20 MG TABLET: 20 MG PO HS #30      * TIOTROPIUM BROMIDE (Spiriva Respimat 2.5 mcg/Puff) 4 GM MIST.INHAL: 2 PUFFS       INH RTQDAY #1      * Fluticasone/Salmeterol 230/21 (Advair /21 MCG) 12 GM HFA.AER.AD: 2 PUFF      INH RTBID #1      Discontinued Medications      * TIOTROPIUM BROMIDE (Spiriva Respimat 2.5 mcg/Puff) 4 GM MIST.INHAL: 2 PUFFS       INH RTQDAY #4      New Diagnostics      * 6 Min Walk w O2 Titration Test, Routine         Dx: COPD (chronic obstructive pulmonary disease) - J44.9      * PFT-Comp, PrePost,DLCO,BodyBox, Week         Dx: COPD (chronic obstructive pulmonary disease) - J44.9      PLAN:       The patient is a 75 year old male with moderately severe chronic obstructive     pulmonary  disease, adenocarcinoma of the lung status post chemotherapy and     radiation followed by Dr. Flores and Dr. Bergeron.             1.  Chronic obstructive pulmonary disease. Switch Symbicort to Advair 230/21.      Continue with Spiriva 2.5 mcg once daily, use albuterol as needed, use DuoNeb     nebulizer if needed.        2.  Lung cancer, adenocarcinoma status post chemotherapy and radiation. Follow     up with Dr. Flores.       3.  He is up-to-date on vaccinations.      4.  I will check pulmonary function test and six minute walk test prior to next     office visit.      5.  Follow up in six months.            Patient Education      Education resources provided:  Yes      Patient Education Provided:  Acute Bronchitis            Patient Education:        Chronic Obstructive Pulmonary Disease            Electronically signed by Roshan Hood  02/21/2020 15:53       Disclaimer: Converted document may not contain table formatting or lab diagrams. Please see [employer] for the authenticated document.

## 2021-05-28 NOTE — PROGRESS NOTES
Patient: SELENA WHITFIELD     Acct: [id number]     Report: #[id number]  UNIT #: [id number]     : 1945    Encounter Date:2020  PRIMARY CARE: LAURA HANCOCK  ***Signed***  --------------------------------------------------------------------------------------------------------------------  NURSE INTAKE      Visit Type      Established Patient Visit            Chief Complaint      lung ca follow up      Intent of Therapy:  Curative            History and Present Illness      Past Oncology Illness History      Mr. Whitfield was dx with adenocarcinoma of the right lung. He states he first came     to medical attention for this issue when he developed 3-4 days a wheezing and a     chest x-ray showed an abnormality which ultimately led to a PET/CT on     2016. This study showed a 1.1 cm hypermetabolic superior segment right     lower lobe nodule compatible with neoplasm. 2 abnormal right hilar lymph nodes     measuring up to 3.1 cm with a maximum SUV of 12.8 were noted as well as a     superior right infrahilar lymph node which was hypermetabolic. Also noted was a     metabolic area of activity in the T9 vertebral body concerning for metastatic     lesion. No other significant areas concerning for obvious metastatic disease.            EBUS with biopsy was performed on 2016 which showed negative station 7,     nondiagnostic right lower lobe bronchoalveolar lavage, suspicious bronchial     washings, and positive lymph nodes on to have been a biopsies of station 10R     which was consistent with poorly differentiated adenocarcinoma compatible with     lung primary. A thoracic MRI was then performed on 2016 which showed no     evidence of osseous metastatic disease in the thoracic spine. MRI of the brain     was performed on 2016 which was negative for any intracranial metastatic     disease. He was subsequently referred to my clinic and for consideration of     resection with [facility] CT surgery.   Underwent mediastinoscopy on 16 at  which    revealed a positive subcarinal lymph node. Level 4 node was negative; deemed     inappropriate for surgery per .  Completed 3 cycles Alimta and Cisplatin,     along with radiation.  Repeat imaging 10/12/16 showed a complete response to     treatment with no evidence of worsening disease.            HPI - Oncology Interim      Patient returns for follow-up of his lung cancer.  He status treatments as     outlined completed .  On return today, he notes he is doing very well.  He     reports occasional shortness of breath especially with the hotter weather.  He     denies productive cough, hemoptysis.  No unusual aches or pains.  No swollen or     painful lymphadenopathy or masses.  He is eating drinking well, his weight is     stable.  He reports adequate energy level.            Cancer Details            RLL poorly differentiated NSCLC adenocarcinoma            Clinical Staging      Stage IIIA ([id number])            Treatments      Chemotherapy      16 thru 16 completed 3C Cisplatin   Radiation Therapy      16 thru 16 received 6000cGy XRT to right hilum/mediastinum            Clinical Trial Participant      No            ECOG Performance Status      0            Most Recent Imaging Findings      Patient: SELENA WU   Acct: #[id number]   Report: #[id number]            UNIT #: [id number]    DOS: 20 1131      INSURANCE:MEDICARE PART A   LOCATION:CT     : 1945            PROVIDERS      ADMITTING:     ATTENDING: ANGELITA BALBUENA      FAMILY:  LAURA MANRIQUEZ   ORDERING:  ANGELITA BALBUENA         OTHER:    DICTATING:  Alhaji Lyle DO            REQ #:[id number]   EXAM:CHW - CT CHEST with CONTRAST      REASON FOR EXAM:  LUNG CA      REASON FOR VISIT:  LUNG CA            *******Signed******         PROCEDURE:   CT CHEST W/ CONTRAST             COMPARISON:   [facility], PET, SKULL BASE TO MID THIGH INITIAL,      1/08/2016, 9:02.        [facility], CT, CHEST W/ CONTRAST, 8/26/2019, 8:26.  [facility], CT,       CHEST W/ CONTRAST, 2/21/2019, 14:31.  [facility], CT, CT CHEST ABD    PEL W CONTRAST,       1/18/2017, 10:06.  [facility], CT, CHEST W/ CONTRAST, 2/25/2020,     8:40.             INDICATIONS:   LUNG CA, 75-year-old male with history of right lung cancer     diagnosed in January of 2016, also has a history of prostate cancer diagnosed in 2015. Prior smoker.      Restaging.             TECHNIQUE:   After obtaining the patient's consent, CT images were obtained with    non-ionic       intravenous contrast material.               PROTOCOL:     Standard imaging protocol performed                RADIATION:     DLP: 509mGy*cm          Automated exposure control was utilized to minimize radiation dose.       CONTRAST:   100cc Isovue 370 I.V.      LABS:     eGFR: >60ml/min/1.73m2             FINDINGS:   There is severe emphysema.  Volume loss with opacity and mild     bronchiectasis in the       posterior medial right lower lobe and right hilum is unchanged since 2019 and     has improved since       2017 and is compatible with treatment related changes.  There is evidence of     prior granulomatous       disease.  No suspicious pulmonary nodules or consolidations.  No pathologically     enlarged thoracic       or axillary lymph nodes.  The bilateral gynecomastia is present slightly     increased in amount since       2019.  The tracheobronchial tree is widely patent.  The thyroid gland is normal     in size.  Thoracic       aorta and pulmonary arteries are normal in caliber.  Mild degree of vascular     calcification is       present in the thoracic aorta and coronary arteries.  No pleural or pericardial     effusions or       pneumothorax.  Limited images of the upper abdomen demonstrate normal adrenal     glands.  No acute       bony abnormality or  aggressive focal osseous lesions.  There is a stable     incidental cysts in the       upper pole of the right kidney.             CONCLUSION:   1. Severe emphysema and stable treatment related changes.  No CT     evidence for residual,       recurrent, or metastatic disease in the chest.      2. Bilateral symmetric benign gynecomastia.              JULIO SAHA DO             Electronically Signed and Approved By: JULIO SAHA DO on 9/04/2020 at 13:02                                  Until signed, this is an unconfirmed preliminary report that may contain      errors and is subject to change.                                              VALERIA:      D:09/04/20 1302            PAST, FAMILY   Past Medical History      Past Medical History:  Arthritis, High Cholesterol, Hypertension      Hematology/Oncology (M):  Lung Cancer, Prostate Cancer            Past Surgical History      Biopsy, Lung Biopsy, VAD Placement            Family History      Family History:  Prostate Cancer            Social History      Lives independently:  Yes      Number of Children:  1      Occupation:  RETIRED            Tobacco Use      Tobacco status:  Former smoker      Smoking packs/day:  1            Substance Use      Substance use:  Marijuana            REVIEW OF SYSTEMS      General:  Admits: Fatigue      Eye:  Admits Corrective Lenses      ENT:  Denies Headache      Cardiovascular:  Denies Chest Pain      Respiratory:  Admits: Shortness of Air      Musculoskeletal:  Denies Back Pain; Admits Painful Joints      Neurologic:  Denies Dizziness, Denies Numbness\Tingling            VITAL SIGNS AND SCORES      Vitals      Weight 185 lbs 10.037 oz / 84.2 kg      Temperature 97.5 F / 36.39 C - Temporal      Pulse 98      Respirations 16      Blood Pressure 124/73 Sitting, Left Arm      Pulse Oximetry 98%, RM AIR            Pain Score      Pain Scale Used:  Numerical      Pain Intensity:  0            Fatigue Score      Fatigue (0-10 scale):   10            EXAM      General Appearance:  Positive for: Alert, Cooperative;          Negative for: Acute Distress      Neck:  Positive for: Supple;          Negative for: JVD, Masses      Respiratory:  Positive for: CTAB, Normal Respiratory Effort      Abdomen/Gastro:  Positive for: Normal Active Bowel Sounds, Soft;          Negative for: Distention, Hepatosplenomegaly, Tenderness      Cardiovascular:  Positive for: RRR;          Negative for: Gallop, Murmur, Peripheral Edema, Rub      Psychiatric:  Positive for: Appropriate Affect, Intact Judgement      Lymphatic:  Negative for: Cervical, Infraclavicular, Supraclavicular            PREVENTION      Date Influenza Vaccine Given:  Dec 1, 2019      Influenza Vaccine Declined:  No      2 or More Falls in Past Year?:  No      Fall Past Year with Injury?:  No      Encouraged to follow-up with:  PCP regarding preventative exams.      Chart initiated by      DEEP PUGH MA            ALLERGY/MEDS      Allergies      Coded Allergies:             NO KNOWN ALLERGIES (Unverified , 9/8/20)            Medications      Last Reconciled on 9/8/20 15:54 by ANGELITA BALBUENA      Neb-Budesonide (Pulmicort) 0.5 Mg/2 Ml Ampul.neb      0.5 MG INH BID, #60 NEB 5 Refills         Prov: EPI STAHL PA-C         8/20/20       Arformoterol Tartrate (Brovana) 15 Mcg/2 Ml Vial.neb      15 MCG INH BID, #60 NEB 5 Refills         Prov: EPI STAHL PA-C         8/20/20       Ergocalciferol (Vitamin D2) (Vitamin D2) 50,000 Units Capsule      12897 UNITS PO MO, #8 CAP         Reported         8/20/20       Ferrous Sulfate (Ferrous Sulfate*) Unknown Strength Tablet      PO QDAY, #30 TAB 0 Refills         Reported         8/20/20       Tiotropium Bromide (Spiriva Respimat 2.5 mcg/Puff) 4 Gm Mist.inhal      2 PUFFS INH RTQDAY, #1 MDI 9 Refills         Prov: Roshan Hood         2/6/20       Atorvastatin (Atorvastatin) 20 Mg Tablet      20 MG PO HS, #30 TAB 0 Refills         Reported          2/6/20       metFORMIN ER (metFORMIN ER) 500 Mg Tab.er.24      1000 MG PO BID, #30 TAB.ER 0 Refills         Reported         2/6/20       MDI-Albuterol (Proair HFA) 8.5 Gm Hfa.aer.ad      1 PUFFS INH Q6H PRN for SHORTNESS OF BREATH, #1 MDI 0 Refills         Reported         8/29/19       Bicalutamide (BICALUTAMIDE) 50 Mg Tab      50 MG PO HS, #30 TAB         Reported         6/21/19       Neb-NaCl 3% (Sodium Chloride 3% Neb) 4 Ml Vial.neb      4 ML INH RTBID for 30 Days, #60 NEB 4 Refills         Prov: EPI STAHL PA-C         3/26/19       NEB-Albuterol Sulf (Albuterol) 2.5 Mg/3 Ml Vial.neb      2.5 MG INH Q4-6H PRN for DYSPNEA, #120 NEB 0 Refills         Prov: EPI STAHL PA-C         3/26/19       Leuprolide Acetate (Lupron Depot) 22.5 Mg Syringekit      22.5 MG IM ONCE, SYRINGE         Reported         2/8/18       Metoprolol Succinate (Metoprolol Succinate) 25 Mg Tab.er.24h      25 MG PO QDAY, #15 TAB 0 Refills         Reported         11/15/17       Folic Acid (Folic Acid) 1 Mg Tablet      1 MG PO QDAY, #30 TAB 0 Refills         Reported         4/14/16       Tamsulosin HCL (Flomax) 0.4 Mg Cap.sr.24h      0.4 MG PO QPM, #30 CAP 0 Refills         Reported         6/16/15       Tolterodine Tartrate (Detrol LA) 4 Mg Cap.sr.24h      4 MG PO QPM, CAP         Reported         6/16/15       Aspirin EC (Aspirin EC) 81 Mg Tabec      81 MG PO QDAY, TAB.EC 0 Refills         Reported         6/16/15       Lisinopril* (Lisinopril*) 10 Mg Tablet      10 MG PO QDAY, TAB 0 Refills         Reported         6/16/15      Medications Reviewed:  No Changes made to meds            IMPRESSION/PLAN      Diagnosis      Adenocarcinoma of right lung - C34.91      Patient is doing well.  I see no evidence of disease recurrence by his history,     physical examination or recent CT of the chest.  He is now more than 4 years out    from his treatment.  RTC 6 months for OV with CT chest prior.            Notes      New  Diagnostics      * Chest W/ Cont CT, 6 Months         Dx: Adenocarcinoma of right lung - C34.91            Patient Education            Eat Well, Exercise Well, Be Well: Dietary and Fitness Guidelines      Patient Education Provided:  Yes            Electronically signed by ANGELITA BALBUENA  09/08/2020 15:54       Disclaimer: Converted document may not contain table formatting or lab diagrams. Please see [employer] for the authenticated document.

## 2021-05-28 NOTE — PROGRESS NOTES
Patient: SELENA WU     Acct: [id number]     Report: #[id number]  UNIT #: [id number]     : 1945    Encounter Date:2019  PRIMARY CARE: LAURA HANCOCK  ***Signed***  --------------------------------------------------------------------------------------------------------------------  Chief Complaint      Encounter Date      Aug 16, 2019            Primary Care Provider      VIVIANA SEWELL            Referring Provider      VIVIANA SEWELL            Patient Complaint      Patient is complaining of      3-4 month follow up/soa            VITALS      Height 5 ft 11 in / 180.34 cm      Weight 195 lbs 9 oz / 88.540091 kg      BSA 2.09 m2      BMI 27.3 kg/m2      Temperature 98.2 F / 36.78 C - Oral      Pulse 83      Respirations 16      Blood Pressure 122/62 Sitting, Right Arm      Pulse Oximetry 94%, room air      Initial Exhaled Nitrous Oxide      Exhaled Nitrous Oxide Results:  23            HPI      The patient is a 74 year old male with moderately severe chronic obstructive     pulmonary disease, history of influenza pneumonia in the past, bronchitis,     adenocarcinoma of the lung status post chemotherapy and radiation followed by     Dr. Flores and Dr. Bergeron. He is here for follow up today.             Since his last office visit he was supposed to be on Symbicort and we gave him     samples but he never got it because it was going to cost him $400 so he did not     get it. He is currently using spiriva and DuoNeb nebulizers 2-3 times a day. He     is short of breath with exertion. He has no fever or chills, no nausea or     vomiting and has not needed any antibiotics or steroids since his last office     visit.      Tobacco      Counseling given:  Patient declined            ROS      Constitutional:  Complains of: Fatigue; Denies: Fever, Weight gain, Weight loss,    Chills, Insomnia, Other      Respiratory/Breathing:  Complains of: Shortness of air, Wheezing, Cough; Denies:    Hemoptysis,  Pleuritic pain, Other      Endocrine:  Denies: Polydipsia, Polyuria, Heat/cold intolerance, Diabetes, Other      Eyes:  Denies: Blurred vision, Vision Changes, Other      Ears, nose, mouth, throat:  Denies: Mouth lesions, Thrush, Throat pain,     Hoarseness, Allergies/Hay Fever, Post Nasal Drip, Headaches, Recent Head Injury,    Nose Bleeding, Neck Stiffness, Thyroid Mass, Hearing Loss, Ear Fullness, Dry     Mouth, Nasal or Sinus Pain, Dry Lips, Nasal discharge, Nasal congestion, Other      Cardiovascular:  Denies: Palpitations, Syncope, Claudication, Chest Pain, Wake     up Gasping for air, Leg Swelling, Irregular Heart Rate, Cyanosis, Dyspnea on     Exertion, Other      Gastrointestinal:  Denies: Nausea, Constipation, Diarrhea, Abdominal pain,     Vomiting, Difficulty Swallowing, Reflux/Heartburn, Dysphagia, Jaundice,     Bloating, Melena, Bloody stools, Other      Genitourinary:  Denies: Urinary frequency, Incontinence, Hematuria, Urgency,     Nocturia, Dysuria, Testicular problems, Other      Musculoskeletal:  Complains of: Joint Pain; Denies: Joint Stiffness, Joint     Swelling, Myalgias, Other      Hematologic/lymphatic:  DENIES: Lymphadenopathy, Bruising, Bleeding tendencies,     Other      Neurological:  Denies: Headache, Numbness, Weakness, Seizures, Other      Psychiatric:  Denies: Anxiety, Appropriate Effect, Depression, Other      Sleep:  No: Excessive daytime sleep, Morning Headache?, Snoring, Insomnia?, Stop    breathing at sleep?, Other      Integumentary:  Denies: Rash, Dry skin, Skin Warm to Touch, Other      Immunologic/Allergic:  Complains of: Seasonal allergies; Denies: Latex allergy,     Asthma, Urticaria, Eczema, Other      Immunization status:  No: Up to date            FAMILY/SOCIAL/MEDICAL HX      Current History      Lives with his wife.      Smoker for 50 years, 1 pack per day.      No alcohol or illicit drug use.      Surgical History:  Yes: Bowel Surgery (COLONOSCOPY, cryo prostate  surgery),     Orthopedic Surgery (RIGHT HAND SURG.); No: AAA Repair, Abdominal Surgery,     Angioplasty, Appendectomy, Back Surgery, Bladder Surgery, Breast Surgery, CABG,     Carotid Stenosis, Cholecystectomy, Ear Surgery, Eye Surgery, Head Surgery,     Hernia Surgery, Kidney Surgery, Nose Surgery, Oral Surgery, Prostatectomy,     Rectal Surgery, Spinal Surgery, Testicular Surgery, Throat Surgery, Valve     Replacement, Vascular Surgery, Other Surgeries      Diabetes - Family Hx:  Mother, Sister      Cancer/Type - Family Hx:  Brother      Other Family Medical History:  Sister (copd)      Is Father Still Living?:  No      Is Mother Still Living?:  No      Social History:  No Alcohol Use, No Recreational Drug use      Smoking status:  Former smoker (1 ppd x 50y quit 2016)      Smoking packs/day:  1      Smoking history:  25-50 pack years      Counseling given:  Patient declined      Anticoagulation Therapy:  No      Antibiotic Prophylaxis:  No      Medical History:  Yes: Arthritis, Chemotherapy/Cancer (PROSTATE, LUNG--FAMILY     HX. COLON CANCER), Chronic Bronchitis/COPD, Deafness or Ringing Ears (BILAT. ),     Hemorrhoids/Rectal Prob ( LARGE COLON POLYP), High Blood Pressure (CONTROLLED),     Shortness Of Breath (LUNG CA, ON INHALERS); No: Alcoholism, Allergies, Anemia,     Asthma, Blood Disease, Broken Bones, Cataracts, Chemical Dependency, Emphysema,     Chronic Liver Disease, Colon Trouble, Colitis, Diverticulitis, Congestive Heart     Failu, Convulsions, Depression, Anxiety, Bipolar Disorder, Diabetes, Epilepsy,     Seizures, Forgetfullness, Glaucoma, Gall Stones, Gout, Head Injury, Heart     Attack, Heart Murmur, Hepatitis, Hiatal Hernia, High Cholesterol, HIV (Do not     ask - volu, Jaundice, Kidney or Bladder Disease, Kidney Stones, Migrane     Headaches, Mitral Valve Prolapse, Night sweats, Phlebitis, Psychiatric Care,     Reflux Disease, Rheumatic Fever, Sexually Transmitted Dis, Sinus Trouble, Skin      Disease/Psoriais/Ecz, Stroke, Thyroid Problem, Tuberculosis or Pos TB Te,     Miscellaneous Medical/oth      Psychiatric History      none            PREVENTION      Hx Influenza Vaccination:  Yes      Date Influenza Vaccine Given:  Dec 1, 2018      Influenza Vaccine Declined:  No      2 or More Falls Past Year?:  No      Fall Past Year with Injury?:  No      Hx Pneumococcal Vaccination:  Yes      Encouraged to follow-up with:  PCP regarding preventative exams.      Chart initiated by      brandyn dubon ma            ALLERGIES/MEDICATIONS      Allergies:        Coded Allergies:             NO KNOWN ALLERGIES (Unverified , 8/16/19)      Medications    Last Reconciled on 8/16/19 10:00 by ROSHAN HOOD MD      Albuterol/Ipratropium (Duoneb) 3 Ml Ampul.neb      3 ML INH RTQ4H, #180 NEB 4 Refills         Prov: Roshan Hood         8/16/19       MDI-Albuterol (Ventolin HFA) 8 Gm Hfa.aer.ad      2 PUFFS INH Q4-6H PRN for DYSPNEA, #1 INH 6 Refills         Prov: Roshan Hood         8/16/19       Budesonide/Formoterol Fumarate (Symbicort 160/4.5 Mcg) 10.2 Gm Inh      2 PUFF INH RTBID, #1 INH 9 Refills         Prov: Roshan Hood         8/16/19       Bicalutamide (BICALUTAMIDE) 50 Mg Tab      50 MG PO HS, #30 TAB         Reported         6/21/19       Tiotropium Bromide (Spiriva Respimat 2.5 mcg/Puff) 4 Gm Mist.inhal      2 PUFFS INH RTQDAY, #4 MDI 9 Refills         Prov: Luz Mercedes PA-C         3/26/19       Neb-NaCl 3% (Sodium Chloride 3% Neb) 4 Ml Vial.neb      4 ML INH RTBID for 30 Days, #60 NEB 4 Refills         Prov: Luz Mercedes PA-C         3/26/19       NEB-Albuterol Sulf (Albuterol) 2.5 Mg/3 Ml Vial.neb      2.5 MG INH Q4-6H PRN for DYSPNEA, #120 NEB 0 Refills         Prov: Luz Mercedes PA-C         3/26/19       Leuprolide Acetate (Lupron Depot) 22.5 Mg Syringekit      22.5 MG IM ONCE, SYRINGE         Reported         2/8/18       Metoprolol Succinate (Metoprolol Succinate) 25 Mg Tab.er.24h      25  MG PO QDAY, #15 TAB 0 Refills         Reported         11/15/17       Folic Acid (Folic Acid*) 1 Mg Tablet      1 MG PO QDAY, #30 TAB 0 Refills         Reported         4/14/16       Tamsulosin HCL (Flomax) 0.4 Mg Cap.sr.24h      0.4 MG PO QPM, #30 CAP 0 Refills         Reported         6/16/15       Tolterodine Tartrate (Detrol LA) 4 Mg Cap.sr.24h      4 MG PO QPM, CAP         Reported         6/16/15       Aspirin EC (Aspirin EC) 81 Mg Tabec      81 MG PO QDAY, TAB.EC 0 Refills         Reported         6/16/15       Lisinopril* (Lisinopril*) 10 Mg Tablet      10 MG PO QDAY, TAB 0 Refills         Reported         6/16/15      Current Medications      Current Medications Reviewed 8/16/19            EXAM      CONSTITUTIONAL: Pleasant  male in no acute distress,  normal     conversant.       EYES : Pink conjunctive, no ptosis, PERRL.       ENMT :Mallampati classification II, no sinus tenderness.  Nose and ears appear     normal, normal dentition, mild posterior pharyngeal wall erythema.      Neck: Nontender, no masses, no thyromegaly, no nodules.      Resp : Bilateral diminished breath sounds, resonant to percussion bilaterally,     no wheezing, crackles or rhonchi.      CVS  : No carotid bruits, s1s2 nl, RRR, no murmur, rubs or gallop, no peripheral    edema       Chest wall: Normal rise with inspiration, nontender on palpation. Increased AP     diameter, nontender to palpation.       GI   : Abdomen soft, with no masses, no hepatosplenomegaly, no hernias, BS+      MSK  : Normal gait and station, no digital cyanosis or clubbing       Skin : No rashes, ulcerations or lesions, normal turgor and temperature      Neuro: CN II - XII intact, no sensory deficits, DTRs intact and symmetrical, no     motor weakness      Psych: Appropriate affect, A   Vtials      Vitals:             Height 5 ft 11 in / 180.34 cm           Weight 195 lbs 9 oz / 88.657129 kg           BSA 2.09 m2           BMI 27.3 kg/m2            Temperature 98.2 F / 36.78 C - Oral           Pulse 83           Respirations 16           Blood Pressure 122/62 Sitting, Right Arm           Pulse Oximetry 94%, room air            REVIEW      Results Reviewed      PCCS Results Reviewed?:  Yes Prev Lab Results, Yes Prev Radiology Results, Yes     Previous Mecial Records      Radiographic Results               [facility]                PACS RADIOLOGY REPORT            Patient: SELENA WU   Acct: #[id number]   Report: #[id number]            UNIT #: [id number]    DOS: 19 0755      INSURANCE:MEDICARE PART A   LOCATION:CT     : 1945            PROVIDERS      ADMITTING:     ATTENDING: DAVID CIFUENTES      FAMILY:  VIVIANA SEWELL   ORDERING:  DAVID CIFUENTES         OTHER: Roshan Hood   DICTATING:  Yoan Perera MD            REQ #:[id number]   EXAM:CH[facility] - CT CHEST with CONTRAST      REASON FOR EXAM:  LUNG CA      REASON FOR VISIT:  LUNG CA            *******Signed******         PROCEDURE:   CT CHEST W/ CONTRAST             COMPARISON:   [facility], CT, CHEST W/ CONTRAST, 2019, 14:31.             INDICATIONS:   RT. LUNG CA             TECHNIQUE:   After obtaining the patient's consent, CT images were obtained with    non-ionic       intravenous contrast material.               PROTOCOL:     Standard imaging protocol performed                RADIATION:     DLP: 570mGy*cm          Automated exposure control was utilized to minimize radiation dose.       CONTRAST:   100cc Isovue 370 I.V.      LABS:     eGFR: 60ml/min/1.73m2             FINDINGS:         There is persistent right hilar and infrahilar soft tissue density,     bronchiectasis and       consolidation, consistent with post treatment changes.  No definite evidence of     recurrent disease       is identified.  Changes of emphysema are again noted.  No new or suspicious     pulmonary nodules are       identified.  No  axillary, supraclavicular, or mediastinal adenopathy is     identified.  Gynecomastia.        The thyroid is normal.  Aorta and pulmonary artery are normal in caliber.      Limited evaluation of       the upper abdomen is unremarkable.  Diverticulosis without evidence of     diverticulitis.  No       aggressive appearing lytic or sclerotic bone lesions are identified.             CONCLUSION:         1. Post treatment changes in the right hilar and infrahilar region, without     evidence of recurrent       disease.              ZACH ERICKSON MD             Electronically Signed and Approved By: ZACH ERICKSON MD on 2019 at 9:11                        Until signed, this is an unconfirmed preliminary report that may contain      errors and is subject to change.                                              DUNS1:      D:19 0911      PFT Results               [facility]               Pulmonary Funtion Test 1            Patient: SELENA WU   Acct: #[id number]   Report: #[id number]            UNIT #: [id number]    DOS:        LOCATION:[facility]     : 1945            PROVIDERS      ADMITTING:  Roshan Hood MD   FAMILY:  EDGARJENI         ORDERING:     OTHER:       DICTATING:  Roshan Hood MD            REASON FOR VISIT:  COPD            *******Signed******               PFT      Date of Procedure      DATE: 16            Ordering Provider            PFT Results      Pulmonary function test interpretation:            Spirometry shows moderately severe obstructive defect. ( FEV1/ FVC ratio- 53,     FEV1- 1.56 lits, 52% of predicted; FVC- 2.91 lits, 74% of predicted)            There is a significant response to bronchodilator demonstrated. FVC increased     from 2.91 lits to 3.31 lits, 14% change; FEV1 increased from 1.56 lits to 1.63     lits, 5% change.            Lung volumes show air trapping. ( TLC 99% of predicted, 6.08 liters; % of     predicted, 3.17 liters)            Flow  volume loop is compatible with obstructive process.             Diffusion capacity is severely decreased. (39% of predicted)            Comparison: No previous tests to compare.            Conclusion:    Low FEV1/FVC with low FEV1, FVC, with air trapping, and low     diffusion capacity is suggestive of moderately severe obstructive airway     disease, likely emphysema.            Some underlying reversible airway disease component as well.            Please correlate clinically.            Roshan Hood MD                 Feb 13, 2016 21:51            Until signed, this is an unconfirmed preliminary report that may contain      errors and is subject to change.                     <Electronically signed by Roshan Hood MD>                02/13/16               Roshan Hood MD:NRK      D:02/13/16 2155            Assessment      Notes      New Medications      * Budesonide/Formoterol Fumarate (Symbicort 160/4.5 Mcg) 10.2 GM INH: 2 PUFF INH      RTBID #1      * MDI-Albuterol (Ventolin HFA) 8 GM HFA.AER.AD: 2 PUFFS INH Q4-6H PRN DYSPNEA #1      * Albuterol/Ipratropium (Duoneb) 3 ML AMPUL.NEB: 3 ML INH RTQ4H #180         Instructions: DIAGNOSIS CODE REQUIRED PRIOR TO PRESCRIBING.      * Fluticasone/Vilanterol 200-25 Mcg Inh (Breo Ellipta 200-25 Mcg Inh) 1 EACH       BLST.W.DEV: 1 PUFF INH QDAY 30 Days #1         Instructions: to replace symbicort 160      PLAN:       The patient is a 74 year old male with moderately severe chronic obstructive     pulmonary disease, adenocarcinoma of the lung status post chemotherapy and     radiation followed by Dr. Flores and Dr. Bergeron.             1. Chronic obstructive pulmonary disease. I will give him samples of Symbicort     and sent a script to [facility]. If it is expensive we will find an alternative.     Use spiriva daily and albuterol as needed. I will send DuoNeb nebulizer to use     as needed as well.        2. Lung cancer, adenocarcinoma status post  chemotherapy and radiation. He is     currently followed by Dr. Flores. He has a repeat CT scan of the chest ordered     for 2 weeks from now. I advised him to have the results sent to me as well to     see if there is anything that needs to be adjusted.      3. He is up to date on his flu vaccine and pneumonia vaccines. I advised him to     get his flu vaccine when it is available.       4. Follow up in 4-6 months, earlier if needed.            Patient Education      Education resources provided:  Yes      Patient Education Provided:  Acute Bronchitis                 Disclaimer: Converted document may not contain table formatting or lab diagrams. Please see [employer] for the authenticated document.

## 2021-05-28 NOTE — PROGRESS NOTES
Patient: SELENA WU     Acct: [id number]     Report: #[id number]  UNIT #: [id number]     : 1945    Encounter Date:2018  PRIMARY CARE: LAURA HANCOCK  ***Signed***  --------------------------------------------------------------------------------------------------------------------  Visit Type      Established Patient Visit            Chief Complaint      NSCLC      Intent of Therapy:  Curative            Allergies      Coded Allergies:             NO KNOWN ALLERGIES (Unverified , 18)            Medications      Last Reconciled on 18 10:13 by LALY LUCAS      Tiotropium Bromide (Spiriva Respimat 2.5 mcg/Puff) 4 Gm Mist.inhal      2 PUFFS INH RTQDAY, #1 MDI 9 Refills         Prov: Roshan Hood         18       MDI-Advair 500/50 (Advair 500/50 Diskus) 1 Each Blst.w.dev      1 PUFF INH RTBID, #1 INH 9 Refills         Prov: Roshan Hood         18       Montelukast Sodium (Singulair*) 10 Mg Tab      10 MG PO HS, #30 TAB 9 Refills         Prov: Roshan Hood         18       Neb-NaCl 3% (Sodium Chloride 3% Neb) 4 Ml Vial.neb      4 ML INH RTBID for 30 Days, #60 NEB 4 Refills         Prov: Roshan Hood         18       Leuprolide Acetate (Lupron Depot) 22.5 Mg Syringekit      22.5 MG IM ONCE, SYRINGE         Reported         18       Cilostazol (Pletal*) 100 Mg Tab      100 MG PO BID, #60 TAB         Reported         18       NEB-Albuterol Sulf (Albuterol) 2.5 Mg/3 Ml Vial.neb      2.5 MG INH Q4-6H Y for DYSPNEA, #120 NEB 0 Refills         Prov: Roshan Hood         11/15/17       Nebulizer/Compressor (Nebulizer) 1 Each Each      EACH XX ONCE, #1 0 Refills         Prov: Roshan Hood         11/15/17       Metoprolol Succinate (Toprol XL*) 25 Mg Tab.er.24h      12.5 MG PO QDAY, #15 TAB 0 Refills         Reported         11/15/17       Bicalutamide (Casodex) 50 Mg Tab      50 MG PO QDAY, #30 TAB         Reported         17       Folic Acid (Folic Acid*) 1 Mg  Tablet      1 MG PO QDAY, #30 TAB 0 Refills         Reported         4/14/16       Tamsulosin HCL (Flomax*) 0.4 Mg Cap.sr.24h      0.4 MG PO QDAY, #30 CAP 0 Refills         Reported         6/16/15       Tolterodine Tartrate (Detrol LA) 4 Mg Cap.sr.24h      4 MG PO QDAY, CAP         Reported         6/16/15       Aspirin EC (Aspirin EC*) 81 Mg Tabec      81 MG PO QDAY, TAB.EC 0 Refills         Reported         6/16/15       Lisinopril* (Lisinopril*) 10 Mg Tablet      10 MG PO QDAY, TAB 0 Refills         Reported         6/16/15      Medications Reviewed:  No Changes made to meds            History and Present Illness      Past Oncology Illness History             adenocarcinoma of the right lung. He states he first came to medical attention     for this issue when he developed 3-4 days a wheezing and a chest x-ray showed     an abnormality which ultimately led to a PET/CT on 01/08/2016. This study     showed a 1.1 cm hypermetabolic superior segment right lower lobe nodule     compatible with neoplasm. 2 abnormal right hilar lymph nodes measuring up to     3.1 cm with a maximum SUV of 12.8 were noted as well as a superior right     infrahilar lymph node which was hypermetabolic. Also noted was a metabolic area     of activity in the T9 vertebral body concerning for metastatic lesion. No other     significant areas concerning for obvious metastatic disease.            EBUS with biopsy was performed on 02/03/2016 which showed negative station 7,     nondiagnostic right lower lobe bronchoalveolar lavage, suspicious bronchial     washings, and positive lymph nodes on to have been a biopsies of station 10R     which was consistent with poorly differentiated adenocarcinoma compatible with     lung primary. A thoracic MRI was then performed on 02/08/2016 which showed no     evidence of osseous metastatic disease in the thoracic spine. MRI of the brain     was performed on 02/11/2016 which was negative for any intracranial  metastatic     disease. He was subsequently referred to my clinic and for consideration of     resection with  CT surgery.  Underwent mediastinoscopy on 16 at      which revealed a positive subcarinal lymph node. Level 4 node was negative;     deemed inappropriate for surgery per .  Completed 3 cycles Alimta and     Cisplatin, along with radiation.  Repeat imaging 10/12/16 showed a complete     response to treatment with no evidence of worsening disease.            HPI - Oncology Interim      Returns to office for 6mo follow-up.  Denies pain and SOA-reports coughing at     HS with minimal sputum production.  He denies new masses or LAD. He reports     good energy adequate for all daily needs.            Cancer Details            Right Lower Lobe poorly differentiated adenocarcinoma with lymph node      metastases            Clinical Staging      Stage IIIA NSCLC Right Lung Cancer            Clinical Trial Participant      No            ECOG Performance Status      0            Most Recent Imaging Findings      Patient: SELENA WU   Acct: #[id number]   Report: #[id number]            UNIT #: [id number]    DOS: 18 1124      INSURANCE:MEDICARE PART A   LOCATION:CT     : 1945            PROVIDERS      ADMITTING:     ATTENDING: Asa Bright      FAMILY:  VIVIANA SEWELL   ORDERING:  Asa Bright         OTHER:    DICTATING:  DAPHNE RICHARDS MD            REQ #:[id number]   EXAM:CTACPWC - CT ABD CHEST PEL w CONTR      REASON FOR EXAM:  RLL NSCLC RESTAGING      REASON FOR VISIT:  RLL NSCLC RESTAGING            *******Signed******         PROCEDURE:   CT ABDOMEN; CT CHEST; CT PELVIS WITH CONTRAST             COMPARISON:   None.             INDICATIONS:   RLL NSCLC RESTAGING.  Lung cancer restaging.  Observation for     metastatic disease.             TECHNIQUE:   After obtaining the patient's consent, CT images were obtained     with intravenous contrast       material.      PROTOCOL:     Standard  imaging protocol performed                RADIATION:     DLP: 2053mGy*cm          Automated exposure control was utilized to minimize radiation dose.       CONTRAST:   100cc Isovue 370 I.V.      LABS:     eGFR: 60ml/min/1.73m2             FINDINGS:         Chest with contrast:  significant emphysema.  Soft tissue thickening in the     right hilum and along       the medial right lower lobe is stable, and presumed post treatment change.  No     suspicious pulmonary       nodule.  No adenopathy in the chest.             Abdomen with contrast:  Liver, spleen, adrenal glands, pancreas, gallbladder,     are unremarkable.        Uncomplicated sigmoid diverticula.  Normal appendix.  Stable subcentimeter cyst     upper pole right       kidney.  No adenopathy.             Pelvis with contrast:  No pelvic mass or fluid.  No aggressive appearing bone     lesion.             CONCLUSION:   Stable post treatment change in the right hilum and medial right     lower lobe.             No evidence for active metastatic disease in the chest, abdomen, or pelvis.              DAPHNE RICHARDS MD             Electronically Signed and Approved By: DAPHNE RICHARDS MD on 8/13/2018 at 13:20                        Until signed, this is an unconfirmed preliminary report that may contain      errors and is subject to change.                                              RAMOS:      D:08/13/18 1320            PAST, FAMILY   Past Medical History      Past Medical History:  No Diabetes Type 1, No Diabetes Type 2, No Thyroid     Disease, No COPD, No Emphysema, No Hypertension, No Stroke, No Heart Attack, No     Bleeding Condition, No Low or High RBC Count, No Low or High WBC Count, No Low     or High Platelet Coun, No Hepatitis, No Kidney Disease, No Depression, No     Alzheimer's Disease, No Mental Disease, No Seizures, No Arthritis, No     Osteoporosis, No Osteopenia, No Sleep apnea, No Liver Disease, No STD, Enlarged     Prostate       Hematology/oncology:  REPORTS HX OF: Lung cancer, Prostate cancer, DENIES HX OF    : Previous Treatment for CA, Anemia, Bladder Cancer, Blood cancer, Brain cancer    , Breast cancer, Coagulopathy, Colorectal cancer, Endocrine cancer, Eye cancer,     GI cancer,  cancer, Kidney cancer, Leukemia, Leukocytosis, Leukopenia, Liver     cancer, Lymphoma, Musculoskeletal cancer, Myeloma, Neurologic cancer, Oral     cancer, Skin cancer, Stomach cancer, Testicular cancer, Thrombocytopenia,     Thyroid cancer, Other cancer history, Other hematologic history      Genetic/metabolic:  DENIES HX OF: Cystic fibrosis, Down syndrome, Other genetic     history, Other metabolic history            Past Surgical History      REPORTS HX OF: Lung biopsy, Other Past Surgical Hx (prostate cryo surgery),     DENIES HX OF: Cataract extraction, Thyroid surgery, CABG surgery, Coronary stent    , Valve replacement, Appendectomy, Cholecystectomy, Splenectomy, Bladder surgery    , Nephrectomy, Joint replacement, Frature repair, Skin cancer removal, Melanoma     excision, Spinal surgery, Breast biopsy, Mastectomy, bilateral, Mastectomy,     right, Mastectomy, left, Hysterectomy, Peg Tube Placement, VAD Placement, Biopsy            Family History      DENIES HX OF: Anemia, Blood disorders, Blood Cancer, Breast cancer, Cervical     cancer, Coagulopathy, Colorectal cancer, Endocrine Cancer, Eye Cancer, GI Cancer    ,  Cancer, Kidney Cancer, Leukemia, Leukocytosis, Leukopenia, Liver Cancer,     Lung cancer, Lymphoma, Melanoma, Musculoskeletal Cancer, Myeloma, Neurologic     Cancer, Oral Cancer, Ovarian cancer, Prostate cancer, Skin Cancer, Stomach     Cancer, Testicular Cancer, Thrombocytopenia, Thyroid cancer, Uterine cancer,     Other Cancer History, Other Hematology History            Social History      Lives independently:  Yes            Tobacco Use      Tobacco status:  Former smoker (1 ppd x 50y quit 2016)      Smoking packs/day:  1       Smoking history:  25-50 pack years      Quit status:  Quit date established (01/2016)            Alcohol Use      Alcohol intake:  socially            Substance Use      Substance use:  Marijuana            REVIEW OF SYSTEMS      General:  Denies: Appetite change, Excessive sweating, Fatigue, Fever, Night     sweats, Weight gain, Weight loss, Other      Eyes:  Denies: Blurred vision, Corrective lenses, Diplopia, Eye irritation, Eye     pain, Eye redness, Spots in vision, Vision loss, Other      Ears, nose, mouth, throat:  Denies: Headache, Seizures, Visual Changes, Hearing     loss, Sinus Congestion, Hoarseness, Sore throat, Other      Cardiovascular:  Denies: Chest pain, Irregular heartbeat, Palpitations, Swollen     ankles/legs, Other      Respiratory:  Denies: Chest pain, Shortness of Air, Productive cough, Coughing     blood, Other      Gastrointestinal:  Denies: Nausea, Vomiting, Problem swallowing, Frequent     heartburn, Constipation, Diarrhea, Tarry stools, Bloody stools, Unable to     control bowels, Other      Kidney/Bladder:  Denies: Painful Urination, Blood in Urine, Incontinence,     Frequent Urination, Decreased Urine Stream, Other      Musculoskeletal:  Denies: New Back pain, Leg Cramps, Painful Joints, Swollen     Joints, Muscle Pain, Muscle weakness, Other      Skin:  DENIES: Bruises Easily, Hair changes, Lesions/changes in moles, Nail     changes, Pigment changes, Rash, Other      Neurological:  Denies: Dizziness, Fainting, Numbness\Tingling, Paralysis,     Seizures, Other      Psychiatric:  Complains of: AAO X 3, Denies: Anxiety, Panic attacks, Depression    , Memory loss, Other      Endocrine:  DiabetesThyroid DisorderOsteoporosisEndocrine Other      Hematologic/lymphatic:  Denies: Bruising, Bleeding, Enlarged Lymph Nodes,     Recurrent infections, Other            VITAL SIGNS,PAIN/FATIGUE SCORE      Vitals      Height 5 ft 9.50 in / 176.53 cm      Weight 199 lbs 4.733 oz / 90.4 kg      BSA  2.13 m2      BMI 29.0 kg/m2      Temperature 97.1 F / 36.17 C - Temporal      Pulse 91      Respirations 16      Blood Pressure 119/60 Sitting, Right Arm      Pulse Oximetry 92%, RM AIR            Pain Score      Experiencing any pain?:  No            Fatigue Score      Experiencing any fatigue?:  No            General Appearance:  Alert, Oriented X3, Cooperative, No acute distress      Eyes:  Anicteric Sclerae, Moist Conjunctiva      Neck:  Supple, No Masses or JVD      Respiratory:  CTAB, Normal Respiratory Effort      Abdomen\Gastro:  Soft, No NABS, No Masses, No Hernias, No Hepatosplenomegaly      Cardio:  No, Peripheral Edema      Psychiatric:  Appropriate Affect, Intact Judgement, AAO x3      Muscularskeletal:  Normal Gait and Station      Extremities:  No Digital Cyanosis, No Digital Ischemia      Lymphatic:  Cervical, Supraclavicular, Infraclavicular            PREVENTION      Hx Influenza Vaccination:  Yes      Date Influenza Vaccine Given:  Nov 1, 2017      Influenza Vaccine Declined:  Yes      2 or More Falls Past Year?:  No      Fall Past Year with Injury?:  No      Hx Pneumococcal Vaccination:  Yes      Encouraged to follow-up with:  PCP regarding preventative exams.      Chart initiated by      DEEP PUGH MA            IMPRESSION/PLAN      Impression      stage IIIA lung cancer s/p treatment as above            Diagnosis      Adenocarcinoma of right lung - C34.91      Pt remains CLEMENT by history, PE and recent scans. Cont ongoing surveillance. RTC     6 mo with labs and CT chest prior      New Diagnostics      * CBC With Auto Diff, Routine      * CMP Comp Metabolic Panel, Routine      * Chest W/ Cont CT, SCHEDULED PROCEDURE            Patient Education      Patient Education Provided:  Yes                 Disclaimer: Converted document may not contain table formatting or lab diagrams. Please see [employer] [facility] for the authenticated document.

## 2021-05-28 NOTE — PROGRESS NOTES
Patient: SELENA WU     Acct: [id number]     Report: #[id number]  UNIT #: [id number]     : 1945    Encounter Date:2021  PRIMARY CARE: LAURA HANCOCK  ***Signed***  --------------------------------------------------------------------------------------------------------------------  Chief Complaint      Encounter Date      2021            Primary Care Provider      LAURA HANCOCK            Referring Provider      VIVIANA SEWELL            Patient Complaint      Patient is complaining of      PT here today for F/U, COPD            VITALS      Height 5 ft 11 in / 180.34 cm      Weight 190 lbs  / 86.938251 kg      BSA 2.06 m2      BMI 26.5 kg/m2      Temperature 97.7 F / 36.5 C - Temporal      Pulse 90      Respirations 15      Blood Pressure 119/67 Sitting, Left Arm      Pulse Oximetry 96%, room air            HPI      The patient is a 76 year old male patient of Dr. Hood who has a history of     moderate COPD and history of lung cancer and has been in remission for over five    years under the care of Dr. Flores who is monitoring serial CT scans. The     patient states he is scheduled to have a repeat chest CT scan on 2021.      The patient states he has also received his first COVID vaccine and is scheduled    to receive his second COVID vaccine on 2021.  The patient was started on     Brovana and Pulmicort last visit, however patient states he did not feel like     they were beneficial so he stopped taking them and he is only taking Spiriva     everyday as prescribed and uses DuoNebs as needed.  The patient currently denies    any cough, wheezing, fever, chills, night sweats, hemoptysis, purulent sputum     production, chest pain, chest tightness, swollen glands in the head and neck,     abdominal pain, nausea, vomiting or diarrhea.   The patient denies any     headaches, myalgias, sore throat, changes in sense of taste and/or smell or any     other coronavirus or flu-like  symptoms.  The patient states that he did see a     vascular surgeon regarding his leg pain and was told that he did not have any     circulation issues. The patient states he is able to perform ADLs without     difficulty.            I have personally reviewed the review of systems, past family, social, surgical     and medical histories and I agree with those as entered in the chart.      Copies To:   Roshan Hood      Constitutional:  Denies: Fatigue, Fever, Weight gain, Weight loss, Chills,     Insomnia, Other      Respiratory/Breathing:  Complains of: Shortness of air, Wheezing, Cough; Denies:    Hemoptysis, Pleuritic pain, Other      Endocrine:  Denies: Polydipsia, Polyuria, Heat/cold intolerance, Diabetes, Other      Eyes:  Denies: Blurred vision, Vision Changes, Other      Ears, nose, mouth, throat:  Denies: Congestion, Dysphagia, Hearing Changes, Nose    Bleeding, Nasal Discharge, Throat pain, Tinnitus, Other      Cardiovascular:  Denies: Chest Pain, Exertional dyspnea, Peripheral Edema,     Palpitations, Syncope, Wake up Gasping for air, Orthopnea, Tachycardia, Other      Gastrointestinal:  Denies: Abdominal pain/cramping, Bloody stools, Constipation,    Diarrhea, Melena, Nausea, Vomiting, Other      Genitourinary:  Denies: Dysuria, Urinary frequency, Incontinence, Hematuria,     Urgency, Other      Musculoskeletal:  Denies: Joint Pain, Joint Stiffness, Joint Swelling, Myalgias,    Other      Hematologic/lymphatic:  DENIES: Lymphadenopathy, Bruising, Bleeding tendencies,     Other      Neurologic:  Denies: Headache, Numbness, Weakness, Seizures, Other      Psychiatric:  Denies: Anxiety, Appropriate Effect, Depression, Other      Sleep:  No: Excessive daytime sleep, Morning Headache?, Snoring, Insomnia?, Stop    breathing at sleep?, Other      Integumentary:  Denies: Rash, Dry skin, Skin Warm to Touch, Other            FAMILY/SOCIAL/MEDICAL HX      Surgical History:  Yes: Bowel Surgery  (COLONOSCOPY, cryo prostate surgery),     Orthopedic Surgery (RIGHT HAND SURG.), Vascular Surgery; No: AAA Repair,     Abdominal Surgery, Angioplasty, Appendectomy, Back Surgery, Bladder Surgery,     Breast Surgery, CABG, Carotid Stenosis, Cholecystectomy, Ear Surgery, Eye     Surgery, Head Surgery, Hernia Surgery, Kidney Surgery, Nose Surgery, Oral     Surgery, Prostatectomy, Rectal Surgery, Spinal Surgery, Testicular Surgery,     Throat Surgery, Valve Replacement, Other Surgeries      Diabetes - Family Hx:  Mother, Sister      Cancer/Type - Family Hx:  Brother      Other Family Medical History:  Sister      Smoking status:  Former smoker ((1 ppd x 50y quit 2016))      Smoking packs/day:  1      Anticoagulation Therapy:  No      Antibiotic Prophylaxis:  No      Medical History:  Yes: Chemotherapy/Cancer (PROSTATE, LUNG-), Chronic     Bronchitis/COPD, Deafness or Ringing Ears (BILAT. ), Diabetes (TYPE II),     Hemorrhoids/Rectal Prob ( LARGE COLON POLYP), High Blood Pressure (ON MEDS),     Shortness Of Breath (LUNG CA, ON INHALERS); No: Anemia, Arthritis, Asthma, Blood    Disease, Broken Bones, Cataracts, Chemical Dependency, Emphysema, Chronic Liver     Disease, Colon Trouble, Colitis, Diverticulitis, Congestive Heart Failu,     Depression, Anxiety, Bipolar Disorder, Epilepsy, Seizures, Glaucoma, Gall     Stones, Gout, Head Injury, Heart Attack, Heart Murmur, Hepatitis, Hiatal Hernia,    HIV (Do not ask - volu, Kidney or Bladder Disease, Kidney Stones, Migrane H    eadaches, Mitral Valve Prolapse, Psychiatric Care, Reflux Disease, Rheumatic     Fever, Sexually Transmitted Dis, Sinus Trouble, Skin Disease/Psoriais/Ecz,     Stroke, Thyroid Problem, Tuberculosis or Pos TB Te, Miscellaneous Medical/oth      Psychiatric History      none            PREVENTION      Hx Influenza Vaccination:  Yes      Date Influenza Vaccine Given:  Oct 1, 2020      Influenza Vaccine Declined:  No      2 or More Falls in Past Year?:  No       Fall Past Year with Injury?:  No      Hx Pneumococcal Vaccination:  Yes      Encouraged to follow-up with:  PCP regarding preventative exams.      Chart initiated by      Janelle Yung CMA            ALLERGIES/MEDICATIONS      Allergies:        Coded Allergies:             NO KNOWN ALLERGIES (Unverified , 2/26/21)      Medications    Last Reconciled on 2/26/21 10:32 by YAMILA MONTES DE OCA,       NEB-Albuterol Sulf (Albuterol Sulfate) 5 Mg/1 Ml Solution      2.5 MG INH Q6H PRN for SHORTNESS OF BREATH, #60 ML 0 Refills         Reported         2/26/21       Atorvastatin (Atorvastatin) 40 Mg Tablet      40 MG PO HS, #30 TAB 0 Refills         Reported         12/1/20       Ergocalciferol (Vitamin D2) (Vitamin D2) 50,000 Units Capsule      51097 UNITS PO FR, #8 CAP         Reported         12/1/20       Ferrous Sulfate (Ferrous Sulfate*) 325 Mg Tablet      325 MG PO HS, #30 TAB 0 Refills         Reported         12/1/20       Ergocalciferol (Vitamin D2) (Vitamin D2) 50,000 Units Capsule      78951 UNITS PO MO, #8 CAP         Reported         8/20/20       Tiotropium Bromide (Spiriva Respimat 2.5 mcg/Puff) 4 Gm Mist.inhal      2 PUFFS INH RTQDAY, #1 MDI 9 Refills         Prov: Roshan Hood         2/6/20       metFORMIN ER (metFORMIN ER) 500 Mg Tab.er.24      500 MG PO BID, #30 TAB.ER 0 Refills         Reported         2/6/20       MDI-Albuterol (Proair HFA) 8.5 Gm Hfa.aer.ad      1 PUFFS INH Q6H PRN for SHORTNESS OF BREATH, #1 MDI 0 Refills         Reported         8/29/19       Bicalutamide (BICALUTAMIDE) 50 Mg Tab      50 MG PO HS, #30 TAB         Reported         6/21/19       NEB-Albuterol Sulf (Albuterol) 2.5 Mg/3 Ml Vial.neb      2.5 MG INH Q4-6H PRN for DYSPNEA, #120 NEB 0 Refills         Prov: EPI STAHL PA-C         3/26/19       Leuprolide Acetate (Lupron Depot) 22.5 Mg Syringekit      22.5 MG IM Q30DAY, SYRINGE         Reported         2/8/18       Metoprolol Succinate (Metoprolol Succinate) 25 Mg  Tab.er.24h      25 MG PO QDAY, #15 TAB 0 Refills         Reported         11/15/17       Tamsulosin HCL (Flomax) 0.4 Mg Cap.sr.24h      0.4 MG PO QPM, #30 CAP 0 Refills         Reported         6/16/15       Tolterodine Tartrate (Detrol LA) 4 Mg Cap.sr.24h      4 MG PO QPM, CAP         Reported         6/16/15       Lisinopril* (Lisinopril*) 10 Mg Tablet      10 MG PO QDAY, TAB 0 Refills         Reported         6/16/15      Current Medications      Current Medications Reviewed 2/26/21            EXAM      Vital Signs Reviewed.      General:  WDWN, Alert, NAD.      HEENT: PERRL, EOMI.  OP, nares clear, no sinus tenderness.      Neck: Supple, no JVD, no thyromegaly.      Lymph: No axillary, cervical, supraclavicular lymphadenopathy noted bilaterally.      Chest: Mildly decreased breath sounds throughout, no wheezes, rales or rhonchi     appreciated, normal work of breathing noted.  Patient is able to speak full     sentences without difficulty.        CV: RRR, no MGR, pulses 2+, equal.        Abd: Soft, NT, ND, +BS, no HSM.      EXT: No clubbing, no cyanosis, no edema, no joint tenderness.        Neuro:  A  Skin: No rashes or lesions.      Vitals      Vitals:             Height 5 ft 11 in / 180.34 cm           Weight 190 lbs  / 86.721553 kg           BSA 2.06 m2           BMI 26.5 kg/m2           Temperature 97.7 F / 36.5 C - Temporal           Pulse 90           Respirations 15           Blood Pressure 119/67 Sitting, Left Arm           Pulse Oximetry 96%, room air            REVIEW      Results Reviewed      PCCS Results Reviewed?:  Yes Prev Lab Results, Yes Prev Radiology Results, Yes     Previous [facility] Records      Lab Results      I reviewed Telma Villa last office visit note.            Assessment      Notes      New Medications      * NEB-Albuterol Sulf (Albuterol Sulfate) 5 MG/1 ML SOLUTION: 2.5 MG INH Q6H PRN       SHORTNESS OF BREATH #60         Instructions: DIAGNOSIS CODE REQUIRED PRIOR TO  PRESCRIBING.      Discontinued Medications      * ARFORMOTEROL TARTRATE (Brovana) 15 MCG/2 ML VIAL.NEB: 15 MCG INH BID #60         Instructions: [id number] NPI:[id number]      ASSESSMENT:       1.  Moderate COPD without acute exacerbation, patient on LABA/LAMA therapy.      2.  History of adenocarcinoma of the lung, status post chemoradiation followed     by Dr. Flores and Dr. Bergeron, has been in remission reportedly for five years.      3. Claudication. The patient is under the care of vascular.      4. Exertional dyspnea.      5. Tobacco abuse with cigarettes in remission, patient reports he quit smoking     back in 2016.            PLAN:      1. The patient to continue Spiriva everyday as prescribed. The patient has tried    Advair in the past and it irritated his mouth. The patient has also tried     Brovana and Pulmicort and did not find they are beneficial and is not wanting     additional inhalers or nebulizer treatments at this time.      2. The patient to continue albuterol inhaler and DuoNebs as needed.      3. Follow up with Dr. Flores for a history of his lung cancer and Dr. Bergeron.      4. The patient is advised to have a copy of chest report that is completed in     March sent to our office.      5. Patient is advised to call the office, 911 or go to the ER with any new or     worsening symptoms.      6. Follow up with vascular regarding claudication pain.      7. The patient is up to date on flu and pneumonia vaccines and received his     first COVID vaccine.  The patient is advised to continue to follow CDC     recommendations of social distancing, wearing a mask and washing hands for at     least 20 seconds.        8. Follow up with Dr. Hood in six months, sooner if needed.            Patient Education      Patient Education Provided:  COPD      Time Spent:  > 50% /Coord Care            Electronically signed by YAMILA MONTES DE OCA [facility]  03/01/2021 15:59       Disclaimer: Converted document may  not contain table formatting or lab diagrams. Please see [facility] for the authenticated document.

## 2021-05-28 NOTE — H&P
Patient: SELENA WU     Acct: [id number]     Report: #[id number]  UNIT #: [id number]     : 1945    Encounter Date:2018  PRIMARY CARE: LAURA HANCOCK  ***Signed***  --------------------------------------------------------------------------------------------------------------------  Chief Complaint      2018      LUNG CANCER            Vitals      Height 5 ft 9.5 in / 176.53 cm      Weight 200 lbs 9.898 oz / 91.0 kg      BSA 2.13 m2      BMI 29.2 kg/m2      Temperature 97.2 F / 36.22 C - Temporal      Pulse 108      Blood Pressure 127/64 Sitting, Right Arm      Pulse Oximetry 91%, ROOM AIR            General Information      Primary Provider:  VIVIANA SEWELL            Allergies      Coded Allergies:             NO KNOWN ALLERGIES (Unverified , 18)            Medications      Last Reconciled on 18 08:43 by ROBBY STOCK MD      Montelukast Sodium (Singulair*) 10 Mg Tab      10 MG PO HS, #30 TAB 9 Refills         Prov: Roshan Hood         18       Neb-NaCl 3% (Sodium Chloride 3% Neb) 4 Ml Vial.neb      4 ML INH RTBID for 30 Days, #60 NEB 4 Refills         Prov: Roshan Hood         18       Leuprolide Acetate (Lupron Depot) 22.5 Mg Syringekit      22.5 MG IM ONCE, SYRINGE         Reported         18       Cilostazol (Pletal*) 100 Mg Tab      100 MG PO BID, #60 TAB         Reported         18       NEB-Albuterol Sulf (Albuterol) 2.5 Mg/3 Ml Vial.neb      2.5 MG INH Q4-6H Y for DYSPNEA, #120 NEB 0 Refills         Prov: Roshan Hood         11/15/17       Nebulizer/Compressor (Nebulizer) 1 Each Each      EACH XX ONCE, #1 0 Refills         Prov: Roshan Hood         11/15/17       Metoprolol Succinate (Toprol XL*) 25 Mg Tab.er.24h      12.5 MG PO QDAY, #15 TAB 0 Refills         Reported         11/15/17       Tiotropium Bromide (Spiriva Respimat 2.5 mcg/Puff) 4 Gm Mist.inhal      2 PUFFS INH RTQDAY, #1 MDI 9 Refills         Prov: Roshan Hood         17        MDI-Advair 500/50 (Advair 500/50 Diskus) 1 Each Blst.w.dev      1 PUFF INH RTBID, #1 INH 9 Refills         Prov: Roshan Hood         4/19/17       Bicalutamide (Casodex) 50 Mg Tab      50 MG PO QDAY, #30 TAB         Reported         1/25/17       Folic Acid (Folic Acid*) 1 Mg Tablet      1 MG PO QDAY, #30 TAB 0 Refills         Reported         4/14/16       Tamsulosin HCL (Flomax*) 0.4 Mg Cap.sr.24h      0.4 MG PO QDAY, #30 CAP 0 Refills         Reported         6/16/15       Tolterodine Tartrate (Detrol LA) 4 Mg Cap.sr.24h      4 MG PO QDAY, CAP         Reported         6/16/15       Aspirin EC (Aspirin EC*) 81 Mg Tabec      81 MG PO QDAY, TAB.EC 0 Refills         Reported         6/16/15       Lisinopril* (Lisinopril*) 10 Mg Tablet      10 MG PO QDAY, TAB 0 Refills         Reported         6/16/15      Current Medications      Current Medications Reviewed 2/13/18            Pain and Fatigue Scales      Pain Assessment:           Experiencing any pain?:  No      Fatigue:           Experiencing any fatigue?:  Yes         Fatigue (0-10 scale):  3            Other      Clinical Trial Participant?:  No            Past Medical History      No Diabetes Type 1, No Diabetes Type 2, No Thyroid Disease, No COPD, No     Emphysema, No Hypertension, No Stroke, No Heart Attack, No Bleeding Condition,     No Low or High RBC Count, No Low or High WBC Count, No Low or High Platelet Coun    , No Hepatitis, No Kidney Disease, No Depression, No Alzheimer's Disease, No     Mental Disease, No Seizures, No Arthritis, No Osteoporosis, No Osteopenia, No     Sleep apnea, No Liver Disease, No STD, Yes Enlarged Prostate      Hematology/oncology:  REPORTS HX OF: Lung cancer, Prostate cancer, DENIES HX OF    : Previous Treatment for CA, Anemia, Bladder Cancer, Blood cancer, Brain cancer    , Breast cancer, Coagulopathy, Colorectal cancer, Endocrine cancer, Eye cancer,     GI cancer,  cancer, Kidney cancer, Leukemia, Leukocytosis,  Leukopenia, Liver     cancer, Lymphoma, Musculoskeletal cancer, Myeloma, Neurologic cancer, Oral     cancer, Skin cancer, Stomach cancer, Testicular cancer, Thrombocytopenia,     Thyroid cancer, Other cancer history, Other hematologic history      Genetic/metabolic:  DENIES HX OF: Cystic fibrosis, Down syndrome, Other genetic     history, Other metabolic history            Past Surgical History      REPORTS HX OF: Lung biopsy, Other Past Surgical Hx (prostate cryo surgery),     DENIES HX OF: Cataract extraction, Thyroid surgery, CABG surgery, Coronary stent    , Valve replacement, Appendectomy, Cholecystectomy, Splenectomy, Bladder surgery    , Nephrectomy, Joint replacement, Frature repair, Skin cancer removal, Melanoma     excision, Spinal surgery, Breast biopsy, Mastectomy, bilateral, Mastectomy,     right, Mastectomy, left, Hysterectomy, Peg Tube Placement, VAD Placement, Biopsy            Family History      DENIES HX OF: Anemia, Blood disorders, Blood Cancer, Breast cancer, Cervical     cancer, Coagulopathy, Colorectal cancer, Endocrine Cancer, Eye Cancer, GI Cancer    ,  Cancer, Kidney Cancer, Leukemia, Leukocytosis, Leukopenia, Liver Cancer,     Lung cancer, Lymphoma, Melanoma, Musculoskeletal Cancer, Myeloma, Neurologic     Cancer, Oral Cancer, Ovarian cancer, Prostate cancer, Skin Cancer, Stomach     Cancer, Testicular Cancer, Thrombocytopenia, Thyroid cancer, Uterine cancer,     Other Cancer History, Other Hematology History            Social History      Yes            Tobacco Use      Tobacco status:  Former smoker (1 ppd x 50y quit 2016)      Smoking packs/day:  1      Smoking history:  25-50 pack years      Quit status:  Quit date established (01/2016)      Counseling given:  Patient declined            Alcohol Use      Alcohol intake:  socially            Substance Use      Substance use:  Marijuana            Past Oncology Illness History      Chief Complaint: Lung Cancer            Mr. Neri  Nahid is a very pleasant 72-year-old  gentleman with     recently diagnosed adenocarcinoma of the right lung. He states he first came to     medical attention for this issue when he developed 3-4 days a wheezing and a     chest x-ray showed an abnormality which ultimately led to a PET/CT on 01/08/ 2016. This study showed a 1.1 cm hypermetabolic superior segment right lower     lobe nodule compatible with neoplasm. 2 abnormal right hilar lymph nodes     measuring up to 3.1 cm with a maximum SUV of 12.8 were noted as well as a     superior right infrahilar lymph node which was hypermetabolic. Also noted was a     metabolic area of activity in the T9 vertebral body concerning for metastatic     lesion. No other significant areas concerning for obvious metastatic disease.            EBUS with biopsy was performed on 02/03/2016 which showed negative station 7,     nondiagnostic right lower lobe bronchoalveolar lavage, suspicious bronchial     washings, and positive lymph nodes on to have been a biopsies of station 10R     which was consistent with poorly differentiated adenocarcinoma compatible with     lung primary. A thoracic MRI was then performed on 02/08/2016 which showed no     evidence of osseous metastatic disease in the thoracic spine. MRI of the brain     was performed on 02/11/2016 which was negative for any intracranial metastatic     disease. He was subsequently referred to my clinic and for consideration of     resection with  CT surgery. Of note I am quite familiar with Mr. Whitfield as I     been taking care of his wife who has multiple myeloma for many years.            Interim History:04/10/17      He was seen by Dr. Uriarte at  and has undergone repeat PFTs, cardiac testing,     etc in preparation for a possible resection in the future. He underwent     mediastinoscopy on 03/14/16 at  which revealed a positive subcarinal lymph     node. Level 4 node was negative.             He  "completed cycle #3 Cisplatin + Alimta chemotherapy with concurrent XRT on 06/ 14/16 which was initially held x2 weeks due to neutropenia. He again denies any     hemoptysis. No pain. No worsening SOA. He has quit smoking. He states that his     lung \"rattles\" when he coughs but this has improved. No nausea. No vomiting. He     has completed XRT. He is anxious to continue chemotherapy. He reported some     decreased fluid intake. He reports less wheezing.            He wanted to have surgery if this is indicated. He was deemed not a resection     candidate at .            Repeat imaging 10/12/16 showed a complete response to treatment with no     evidence of disease.            Here in follow up.  Scans obtained 1/18/17 and showed what appears to be post     XRT changes. Stable scans again noted from 04/03/2017 and he returns to review     these results. He reports some vascular issues causing pain to the lower     extremities.            Interim History: 10/18/17      He is here for scheduled follow up to review restaging scans from 10/18/17     which showed no evidence of progression or recurrence in the chest, abdomen, or     pelvis. He denies smoking, cough or weight loss. He denies any worsening SOA.     He is actively trying to lose weight.            Interim History: 2/13/18      He returns for scheduled follow-up and to review imaging. CT of the chest,     abdomen, and pelvis was performed on 02/06/2018 which showed no evidence to     suggest local recurrent malignancy and no evidence of distant metastatic     disease. He does report some upper airway issues consistent with seasonal     allergies. He denies any fever. He denies any nausea. He denies any hemoptysis.     He denies any fever.      OTHER:      Most Recent Scans               [facility]                PACS RADIOLOGY REPORT            Patient: SELENA WU   Acct: #[id number]   " Report: #[id number]            UNIT #: [id number]    DOS: 10/18/17 0816      INSURANCE:MEDICARE PART A   LOCATION:CT     : 1945            PROVIDERS      ADMITTING:     ATTENDING: Asa Bright      FAMILY:  NONE,MD   ORDERING:  Asa Bright         OTHER:    DICTATING:  Kentrell Winston MD            REQ #:[id number]    CPT CODE:   EXAM:CTACPWC - CT ABD CHEST PEL w CONTR      REASON FOR EXAM:  LUNG CA      REASON FOR VISIT:  LUNG CA            *******Signed******         PROCEDURE:   CT ABDOMEN; CT CHEST; CT PELVIS WITH CONTRAST             COMPARISON:   [facility], CT, CT CHEST ABD PEL W CONTRAST, 7/10/    2017, 8:21.             INDICATIONS:   LUNG CA             TECHNIQUE:   After obtaining the patient's consent, CT images were obtained     with intravenous contrast       material.      PROTOCOL:     Standard imaging protocol performed                RADIATION:     DLP: 1834mGy*cm          Automated exposure control was utilized to minimize radiation dose.       CONTRAST:   100cc Isovue 370 I.V.      LABS:     eGFR: 48ml/min/1.73m2             FINDINGS:         CT scan of the chest:             Several small non-pathologically enlarged mediastinal lymph nodes are noted as     well as calcified       mediastinal lymph nodes. There is no axillary adenopathy. Again is noted     moderate diffuse       predominantly upper lobe emphysema. There are somewhat plaque-like areas of     soft tissue density       associated with some plaque-like areas of consolidation right perihilar as well     as adjacent to the       right side of the posterior mediastinum unchanged from the previous study and     may reflect changes       of radiation therapy. Similar change is medially in the right lower lobe     subpleural are also noted.       The appearance is unchanged from the previous study. There are degenerative     changes of the thoracic       spine. There is a subcarinal lymph node 2.4 x 1.2 cm in dimension  unchanged.             CT scan of the abdomen and pelvis:             The liver and spleen and adrenal glands and pancreas and kidneys appear normal.     Again is noted a       small 1.5 cm right renal cyst. There is sigmoid and left colonic diverticulosis     without evidence of       diverticulitis. The small bowel appears normal. There is atherosclerotic     tortuosity of the       abdominal aorta. There are degenerative changes of the lower lumbar spine.             CONCLUSION:                #1. No change in the appearance of the CT scan of the chest. The appearance is     suggestive of post       radiation changes to the right hilum.             #2. No change in the appearance of the CT scan of the abdomen and pelvis. There     is no evidence of       metastatic disease.              SANDEEP JAIMES MD             Electronically Signed and Approved By: SANDEEP JAIMES MD on 10/18/2017 at 12:    48                        Until signed, this is an unconfirmed preliminary report that may contain      errors and is subject to change.                                              SCHJ1:      D:10/18/17 1248            ROS      General:  Complains of: Fatigue      Eyes:  Complains of: Corrective lenses      Ears, nose, mouth, throat:  Denies: Headache      Cardiovascular:  Denies: Chest pain      Respiratory:  Complains of: Shortness of Air, Denies: Coughing blood      Gastrointestinal:  Denies: Vomiting      Kidney/Bladder:  Denies: Painful Urination      Musculoskeletal:  Denies: New Back pain      Skin:  DENIES: Bruises Easily      Neurological:  Denies: Dizziness      Psychiatric:  Complains of: AAO X 3      Endocrine:  Diabetes      Hematologic/lymphatic:  Denies: Bruising            Vitals            Vital Signs              Date Time  Temp Pulse Resp B/P (MAP) Pulse Ox O2 Delivery O2 Flow Rate FiO2             2/8/18 08:27 98.2 100 16 120/62 92 room air              General Appearance:  Alert, Oriented X3, No  acute distress      Eyes:  Anicteric Sclerae, Moist Conjunctiva      HEENT:  Orophraynx clear, No Erythema      Respiratory:  CTAB, Diminished Breath      Abdomen\Gastro:  Soft, No Masses      Cardio:  RRR, No Murmur, No, Peripheral Edema      Skin:  Normal Temperature, Normal Tone, No Rash, lesions, ulcers      Psychiatric:  Appropriate Affect, AAO x3      Neuro:  Cranial Nerve II-XII Inta, No Focal Sensory Deficit      Muscularskeletal:  Normal Gait and Station, Full ROM of extremeties      Extremities:  No Digital Cyanosis, No Digital Ischemia      Lymphatic:  No Cervical, No Supraclavicular, No Axillary            Current Plan      I reviewed the results of his multiple imaging studies including PET CT, brain     MRI and thoracic MRI. I also reviewed the results of his pathology which was     consistent with a poorly differentiated adenocarcinoma of the right lung. I     note that he has evidence of multiple hilar nodes involved with carcinoma but     no evidence of distant metastatic disease. I reviewed with him the NCCN Non-    Small Cell Lung Cancer Version 4.2016 NCCN Guidelines.            I reviewed with him that for cancer which is amenable to resection he should be     evaluated by CT surgery as surgical resection (if appropriate) offers the best     chance of durable remission or long-term cure. I note that it appears he has     negative mediastinal nodes and thus would be a possible surgical candidate. I     agree with the referral to the [facility] Department of     cardiothoracic surgery which per his report will be on 03/03/2016. I counseled     him that if he is felt to be a candidate for upfront surgical resection this     should be pursued. If he is felt to benefit from upfront chemotherapy plus or     minus radiation then this would be performed as soon as possible. Thus, the     upcoming CT surgery appointment is quite important test to determine the next     in his oncologic care.  I did  him that he may still require adjuvant     chemotherapy plus or minus radiation depending on the results of the surgical     pathology if an upfront resection is recommended. I note that he has completed     his staging including MRI of the brain and MRI of the thoracic spine neither of     which revealed any distant metastatic disease.            As noted mediastinoscopy on 03/14/16 at  revealed an involved subcarinal     lymph node which makes this a N2 node (Stage III). I had counseled him that the     results will determine whether he should undergo surgery upfront, chemo +/- XRT     upfront, etc. As he has a N2 node the recommended Category 1 NCCN treatment is     definitive chemotherapy + radiation. I will plan on him receiving Cisplatin +     Pemetrexed chemotherapy per NCCN guidelines. He will receive a B12 injection     per pemetrexed protocol. I had referred him for a port to be placed. He was     referred to radiation oncology. I reviewed the risks and complications of     chemotherapy and he voiced understanding and was in agreement with these risks.            He presented after cycle #3 of pemetrexed + cisplatin chemotherapy which     completed on 06/14/16. This was also previously held for 2 consecutive weeks as     he was neutropenic. He received 3 cycles per NCCN guidelines. Toxicity     monitoring. Labs and chemotherapy reviewed. As noted he also began concurrent     XRT on 04/14/16. Again no nausea. No vomiting. No significant weight loss. G-    CSF was contraindicated while receiving XRT. Dose reduced cisplatin 25% and     pemetrexed 20% due to cytopenias starting with cycle #2. XRT completed.            I had asked him to increase his PO intake. Do not feel his increased creatinine     is due to cisplatin as this increased in the 2 weeks he did not receive any     treatment. This has improved.            Repeat imaging 6 weeks after completing XRT with PET/CT 07/13/16 was  negative     for any progression or recurrence and significant response to therapy was     noted. CT surgery at  deemed him not a resection candidate at this time.            He is seen today in follow up to review most recent PET (report noted in     interim history) with his biggest complaint of swollen ankles now for about 2     weeks. We will draw a CBC and CMP today and call him with abnormal results.We     will begin active surveillance. I will repeat PET/CT every 3 months with follow-    up at that time. PET/CT 1/18/17 showed CLEMENT. This was reviewed with him at     bedside.            Scans from 04/03/17 reviewed with patient and by me personally.  His main     concern is again his weight as he is now over 200 pounds. Discussed dietary     changes and exercise.              I agree with his vascular workup and have asked him to keep these appointments.            Scheduled follow up to review CT scans from 10/18/17.  Once again showing no     recurrent or progression of malignancy. These studies were reviewed at bedside.     I will repeat imaging in 4 months.            Recent plan:      He returned on 2/13/18 for scheduled follow-up. As noted CT of the chest,     abdomen, and pelvis was performed on 02/06/2018 which showed no evidence to     suggest local recurrent malignancy and no evidence of distant metastatic     disease. This was reviewed and I discussed the results with him at bedside.     Will repeat imaging in 6 months. Most recent labs reviewed. Pathology again     reviewed.            I again congratulated him on smoking cessation and offered encouragement to     continue with smoking cessation.            He is now over 2 years post treatment as noted.            At the conclusion of the clinic appointment he had no additional questions or     concerns. All questions were answered to his satisfaction. Will repeat imaging     in 6 months.            Available for immediate release. Please forward  a copy of this dictation to Dr. Roshan Hood, Dr. Uriarte, and Rin Buckner.            TREATMENT HISTORY       Chemotherapy:      1. Cisplatin + Pemetrexed (completed 6/14/16) + XRT      Adenocarcinoma of right lung - C34.91            Hilar lymphadenopathy - R59.0            History of tobacco abuse - Z87.891            Abnormal CT scan, chest - R93.8            Notes      New Diagnostics      * Abd/Pel W/ Cont CT, 6 Months       Dx: Non-small cell lung cancer (NSCLC) - C34.90      * Chest W/ Cont CT, 6 Months       Dx: Non-small cell lung cancer (NSCLC) - C34.90      Follow-up:  3 Months      Intensive Monitor for Toxicity:  Labs Reviewed, Meds\Narcotics Reviewed      Labs Reviewed During Visit?:  Yes      Review/Other:  Received Lab Test, Reviewed Radiology Test, Ordered Radiology     Test, Reviewed Medicine Test      Patient Education Provided:  Yes      ECOG Score:  0      Clinical Staging      Right Lung Cancer            Hx Influenza Vaccination:  Yes      Date Influenza Vaccine Given:  Nov 1, 2017      Influenza Vaccine Declined:  Yes      2 or More Falls Past Year?:  No      Fall Past Year with Injury?:  No      Hx Pneumococcal Vaccination:  Yes      Encouraged to follow-up with:  PCP regarding preventative exams.      Chart initiated by      FRANCES CORTÉS                 Disclaimer: Converted document may not contain table formatting or lab diagrams. Please see [employer] for the authenticated document.

## 2021-05-28 NOTE — PROGRESS NOTES
Patient: SELENA WHITFIELD     Acct: [id number]     Report: #[id number]  UNIT #: [id number]     : 1945    Encounter Date:2021  PRIMARY CARE: LAURA HANCOCK  ***Signed***  --------------------------------------------------------------------------------------------------------------------  NURSE INTAKE      Visit Type      Established Patient Visit            Chief Complaint      lung ca f/u      Intent of Therapy:  Curative            Referring Provider/Copies To      Primary Care Provider:  LAURA MANRIQUEZ      Copies To:   LAURA MANRIQUEZ            History and Present Illness      Past Oncology Illness History      Mr. Whitfield was dx with adenocarcinoma of the right lung. He states he first came     to medical attention for this issue when he developed 3-4 days a wheezing and a     chest x-ray showed an abnormality which ultimately led to a PET/CT on     2016. This study showed a 1.1 cm hypermetabolic superior segment right     lower lobe nodule compatible with neoplasm. 2 abnormal right hilar lymph nodes     measuring up to 3.1 cm with a maximum SUV of 12.8 were noted as well as a     superior right infrahilar lymph node which was hypermetabolic. Also noted was a     metabolic area of activity in the T9 vertebral body concerning for metastatic     lesion. No other significant areas concerning for obvious metastatic disease.            EBUS with biopsy was performed on 2016 which showed negative station 7,     nondiagnostic right lower lobe bronchoalveolar lavage, suspicious bronchial     washings, and positive lymph nodes on to have been a biopsies of station 10R     which was consistent with poorly differentiated adenocarcinoma compatible with     lung primary. A thoracic MRI was then performed on 2016 which showed no     evidence of osseous metastatic disease in the thoracic spine. MRI of the brain     was performed on 2016 which was negative for any  intracranial metastatic     disease. He was subsequently referred to my clinic and for consideration of     resection with  CT surgery.  Underwent mediastinoscopy on 16 at  which    revealed a positive subcarinal lymph node. Level 4 node was negative; deemed     inappropriate for surgery per .  Completed 3 cycles Alimta and Cisplatin,     along with radiation.  Repeat imaging 10/12/16 showed a complete response to     treatment with no evidence of worsening disease.            [facility] - Oncology Interim      Patient returns today for follow-up.  He states that he is feeling well.  He     denies shortness of breath, cough or hemoptysis.  He does get winded with     heavier activity but not with normal day-to-day actions.  He denies any masses     or adenopathy.  No unusual aches or pains.  He denies any issues with his     Port-A-Cath and has it flushed routinely.  He reports good appetite.  He has     received his Covid vaccine.            Cancer Details            RLL poorly differentiated NSCLC adenocarcinoma            Clinical Staging      Stage IIIA ([id number])            Treatments      Chemotherapy      16 thru 16 completed 3C Cisplatin   Radiation Therapy      16 thru 16 received 6000cGy XRT to right hilum/mediastinum            Clinical Trial Participant      No            ECOG Performance Status      0            Most Recent Imaging Findings      Patient: SELENA WU   Acct: #[id number]   Report: #[id number]            UNIT #: [id number]    DOS: 21 0938      INSURANCE:MEDICARE PART A   LOCATION:CT     : 1945            PROVIDERS      ADMITTING:     ATTENDING: ANGELITA BALBUENA      FAMILY:  LAURA MANRIQUEZ   ORDERING:  ANGELITA BALBUENA         OTHER:    DICTATING:  Cris Hurley MD            REQ #:[id number]   EXAM:W - CT CHEST with CONTRAST      REASON FOR EXAM:  MALIGNANT NEOPLASM OF LUNG      REASON FOR VISIT:  MALIGNANT NEOPLASM OF LUNG             *******Signed******         PROCEDURE:   CT CHEST W/ CONTRAST             COMPARISON:   [facility], CT, CHEST W/ CONTRAST, 9/04/2020, 11:59.             INDICATIONS:   MALIGNANT NEOPLASM OF RIGHT LUNG             TECHNIQUE:   After obtaining the patient's consent, CT images were obtained with    non-ionic       intravenous contrast material.               PROTOCOL:     Standard imaging protocol performed                RADIATION:     DLP: 661mGy*cm          Automated exposure control was utilized to minimize radiation dose.       CONTRAST:   100cc Isovue 370 I.V.      LABS:     eGFR: 55ml/min/1.73m2             FINDINGS:      No well-defined consolidations or significant pleural effusions are observed. No    dominant pulmonary       nodules or abnormal pulmonary masses are seen.  Significant emphysematous     changes are present       predominantly within the bilateral upper lobes.  Granulomatous calcifications     are present.        Atherosclerotic calcifications are present.  A right-sided MediPort is present.     Scarring is       present within the right upper lobe             No significant hilar, mediastinal, or axillary lymphadenopathy is seen. A normal    aortic arch       branching pattern is noted. The heart appears normal in size. No pericardial     effusion identified.       The thyroid gland is unremarkable. The esophagus is unremarkable.             The limited evaluation of the upper abdomen demonstrates no definitive acute     abnormalities.             No acute osseous abnormalities are seen.                CONCLUSION:         1. No evidence for acute intrathoracic abnormality.  No evidence of metastatic     disease or recurrent       disease.  No suspicious pulmonary nodule or adenopathy.      2. Significant emphysema.      3. Ancillary findings as described above..              GARRETT HERNADEZ MD             Electronically Signed and Approved By: GARRETT HERNADEZ MD on 3/04/2021 at 10:40                                Until signed, this is an unconfirmed preliminary report that may contain      errors and is subject to change.                                              KOGIL:      D:03/04/21 1040            PAST, FAMILY   Past Medical History      Past Medical History:  Arthritis, High Cholesterol, Hypertension      Hematology/Oncology (M):  Lung Cancer, Prostate Cancer            Past Surgical History      Biopsy, Lung Biopsy, VAD Placement            Family History      Family History:  Prostate Cancer            Social History      Lives independently:  Yes      Number of Children:  1      Occupation:  RETIRED            Tobacco Use      Tobacco status:  Former smoker ((1 ppd x 50y quit 2016))      Smoking packs/day:  1            Substance Use      Substance use:  Marijuana            REVIEW OF SYSTEMS      General:  Admits: Fatigue      Eye:  Admits Corrective Lenses      Respiratory:  Admits: Shortness of Air;          Denies: Productive Cough      Gastrointestinal:  Denies Diarrhea, Denies Nausea/Vomiting      Musculoskeletal:  Denies Back Pain, Denies Painful Joints      Neurologic:  Denies Numbness\Tingling      Psychiatric:  Denies Anxiety            VITAL SIGNS AND SCORES      Vitals      Weight 185 lbs 2.982 oz / 84 kg      Temperature 99 F / 37.22 C - Temporal      Pulse 98      Respirations 20      Blood Pressure 123/64 Sitting, Left Arm      Pulse Oximetry 95%, RM AIR            Pain Score      Pain Scale Used:  Numerical      Pain Intensity:  0            Fatigue Score      Fatigue (0-10 scale):  5            EXAM      General Appearance:  Positive for: Alert, Cooperative;          Negative for: Acute Distress      Neck:  Positive for: Supple;          Negative for: JVD, Masses      Respiratory:  Positive for: CTAB, Normal Respiratory Effort      Chest:  Port in Place      Abdomen/Gastro:  Positive for: Normal Active Bowel Sounds, Soft;          Negative for: Distention,  Hepatosplenomegaly, Tenderness      Cardiovascular:  Positive for: RRR;          Negative for: Gallop, Murmur, Peripheral Edema, Rub      Psychiatric:  Positive for: Appropriate Affect, Intact Judgement      Lymphatic:  Negative for: Cervical, Infraclavicular, Supraclavicular            PREVENTION      Hx Influenza Vaccination:  Yes      Date Influenza Vaccine Given:  Oct 1, 2020      Influenza Vaccine Declined:  No      2 or More Falls in Past Year?:  No      Fall Past Year with Injury?:  No      Hx Pneumococcal Vaccination:  Yes      Encouraged to follow-up with:  PCP regarding preventative exams.      Chart initiated by      DEEP PUGH MA            ALLERGY/MEDS      Allergies      Coded Allergies:             NO KNOWN ALLERGIES (Unverified , 3/8/21)            Medications      Last Reconciled on 3/8/21 14:55 by ANGELITA BALBUENA      NEB-Albuterol Sulf (Albuterol Sulfate) 5 Mg/1 Ml Solution      2.5 MG INH Q6H PRN for SHORTNESS OF BREATH, #60 ML 0 Refills         Reported         2/26/21       Atorvastatin (Atorvastatin) 40 Mg Tablet      40 MG PO HS, #30 TAB 0 Refills         Reported         12/1/20       Ergocalciferol (Vitamin D2) (Vitamin D2) 50,000 Units Capsule      01219 UNITS PO FR, #8 CAP         Reported         12/1/20       Ferrous Sulfate (Ferrous Sulfate*) 325 Mg Tablet      325 MG PO HS, #30 TAB 0 Refills         Reported         12/1/20       Ergocalciferol (Vitamin D2) (Vitamin D2) 50,000 Units Capsule      34569 UNITS PO MO, #8 CAP         Reported         8/20/20       Tiotropium Bromide (Spiriva Respimat 2.5 mcg/Puff) 4 Gm Mist.inhal      2 PUFFS INH RTQDAY, #1 MDI 9 Refills         Prov: Roshan Hood         2/6/20       metFORMIN ER (metFORMIN ER) 500 Mg Tab.er.24      500 MG PO BID, #30 TAB.ER 0 Refills         Reported         2/6/20       MDI-Albuterol (Proair HFA) 8.5 Gm Hfa.aer.ad      1 PUFFS INH Q6H PRN for SHORTNESS OF BREATH, #1 MDI 0 Refills         Reported         8/29/19        Bicalutamide (BICALUTAMIDE) 50 Mg Tab      50 MG PO HS, #30 TAB         Reported         6/21/19       NEB-Albuterol Sulf (Albuterol) 2.5 Mg/3 Ml Vial.neb      2.5 MG INH Q4-6H PRN for DYSPNEA, #120 NEB 0 Refills         Prov: EPI STAHL PA-C         3/26/19       Leuprolide Acetate (Lupron Depot) 22.5 Mg Syringekit      22.5 MG IM Q30DAY, SYRINGE         Reported         2/8/18       Metoprolol Succinate (Metoprolol Succinate) 25 Mg Tab.er.24h      25 MG PO QDAY, #15 TAB 0 Refills         Reported         11/15/17       Tamsulosin HCL (Flomax) 0.4 Mg Cap.sr.24h      0.4 MG PO QPM, #30 CAP 0 Refills         Reported         6/16/15       Tolterodine Tartrate (Detrol LA) 4 Mg Cap.sr.24h      4 MG PO QPM, CAP         Reported         6/16/15       Lisinopril* (Lisinopril*) 10 Mg Tablet      10 MG PO QDAY, TAB 0 Refills         Reported         6/16/15      Medications Reviewed:  No Changes made to meds            IMPRESSION/PLAN      Diagnosis      Adenocarcinoma of right lung - C34.91            Notes      Patient is now approaching 5 years out from his sedative concurrent chemo RT.      Clinically doing well.  I see no evidence of disease recurrence by history,     physical examination or CT of the chest.  Port flush routinely while not in     active use.  RTC 1 year for OV with CT chest prior.  Exercise recommended to     help with pulmonary function.  He has completed pulmonary rehab in the past.      New Diagnostics      * Chest W/ Cont CT, Year         Dx: Adenocarcinoma of right lung - C34.91            Advanced Care Plan Discussion      Declines Discussion [id number]            Patient Education            Eat Well, Exercise Well, Be Well: Dietary and Fitness Guidelines      Patient Education Provided:  Yes            Electronically signed by ANGELITA BALBUENA  03/08/2021 14:55       Disclaimer: Converted document may not contain table formatting or lab diagrams. Please see [employer] [facility]  for the authenticated document.

## 2021-05-28 NOTE — PROGRESS NOTES
Patient: SELENA WU     Acct: [id number]     Report: #[id number]  UNIT #: [id number]     : 1945    Encounter Date:2019  PRIMARY CARE: LAURA HANCOCK  ***Signed***  --------------------------------------------------------------------------------------------------------------------  Chief Complaint      Encounter Date      Mar 26, 2019            Primary Care Provider      VIVIANA SEWELL            Referring Provider      VIVIANA SEWELL            Patient Complaint      Patient is complaining of      Pt here for 6m f/u, copd            VITALS      Height 5 ft 11 in / 180.34 cm      Weight 188 lbs 7 oz / 85.286183 kg      BSA 2.06 m2      BMI 26.3 kg/m2      Temperature 98.2 F / 36.78 C - Oral      Pulse 84      Respirations 14      Blood Pressure 118/64 Sitting, Right Arm      Pulse Oximetry 88%, Room air      Comment: O2 was 82% with ambulation      Initial Exhaled Nitrous Oxide      Exhaled Nitrous Oxide Results:  23            HPI      The patient is a very pleasant 74 year old  male patient of Dr. Hood's here for 6 month follow up today. He has a history of moderately severe     chronic obstructive pulmonary disease, adenocarcinoma of the lung status post     chemotherapy and radiation now being followed by Dr. Flores. The patient states     that he had influenza and was seen in the emergency room at [facility] at the beginning of March and has not felt like he has fully recovered     since then. He is still having some increased cough, wheezing and dyspnea. He is    coughing up green sputum. He has not been treated with antibiotics or steroids     for this. He says he still uses Spiriva Respimat 2.5 and feels it helps and he     is getting this from [place] but has stopped using Advair discus because it was    bothering his mouth. He did not like the powder, it irritated his mouth and     throat even after using mouthwash and rinsing his mouth out.  He would like to     try an alternative to this.  He uses albuterol as needed.             I have reviewed his Review of Systems medical, surgical and family history and     agree with those as entered.      Copies To:   Roshan Hood      Constitutional:  Denies: Fatigue, Fever, Weight gain, Weight loss, Chills,     Insomnia, Other      Respiratory/Breathing:  Complains of: Shortness of air, Wheezing, Cough; Denies:    Hemoptysis, Pleuritic pain, Other      Endocrine:  Denies: Polydipsia, Polyuria, Heat/cold intolerance, Diabetes, Other      Eyes:  Denies: Blurred vision, Vision Changes, Other      Ears, nose, mouth, throat:  Denies: Congestion, Dysphagia, Hearing Changes, Nose    Bleeding, Nasal Discharge, Throat pain, Tinnitus, Other      Cardiovascular:  Denies: Chest Pain, Exertional dyspnea, Peripheral Edema,     Palpitations, Syncope, Wake up Gasping for air, Orthopnea, Tachycardia, Other      Gastrointestinal:  Denies: Abdominal pain/cramping, Bloody stools, Constipation,    Diarrhea, Melena, Nausea, Vomiting, Other      Genitourinary:  Denies: Dysuria, Urinary frequency, Incontinence, Hematuria,     Urgency, Other      Musculoskeletal:  Denies: Joint Pain, Joint Stiffness, Joint Swelling, Myalgias,    Other      Hematologic/lymphatic:  DENIES: Lymphadenopathy, Bruising, Bleeding tendencies,     Other      Neurologic:  Denies: Headache, Numbness, Weakness, Seizures, Other      Psychiatric:  Denies: Anxiety, Appropriate Effect, Depression, Other      Sleep:  No: Excessive daytime sleep, Morning Headache?, Snoring, Insomnia?, Stop    breathing at sleep?, Other      Integumentary:  Denies: Rash, Dry skin, Skin Warm to Touch, Other            FAMILY/SOCIAL/MEDICAL HX      Current History      Lives with his wife.      Smoker for 50 years, 1 pack per day.      No alcohol or illicit drug use.      Surgical History:  Yes: Bowel Surgery (COLONOSCOPY, cryo prostate surgery),     Orthopedic Surgery  (RIGHT HAND SURG.); No: AAA Repair, Abdominal Surgery,     Angioplasty, Appendectomy, Back Surgery, Bladder Surgery, Breast Surgery, CABG,     Carotid Stenosis, Cholecystectomy, Ear Surgery, Eye Surgery, Head Surgery,     Hernia Surgery, Kidney Surgery, Nose Surgery, Oral Surgery, Prostatectomy,     Rectal Surgery, Spinal Surgery, Testicular Surgery, Throat Surgery, Valve     Replacement, Vascular Surgery, Other Surgeries      Diabetes - Family Hx:  Mother, Sister      Cancer/Type - Family Hx:  Brother      Other Family Medical History:  Sister (copd)      Is Father Still Living?:  No      Is Mother Still Living?:  No      Social History:  No Alcohol Use, No Recreational Drug use      Smoking status:  Former smoker (1 ppd x 50y quit 2016)      Smoking packs/day:  1      Smoking history:  25-50 pack years      Counseling given:  Patient declined      Anticoagulation Therapy:  No      Antibiotic Prophylaxis:  No      Medical History:  Yes: Arthritis, Chemotherapy/Cancer (PROSTATE, LUNG), Chronic     Bronchitis/COPD, Hemorrhoids/Rectal Prob ( COLON POLYPS), High Blood Pressure     (CONTROLLED), Shortness Of Breath (LUNG CA, ON INHALERS); No: Alcoholism,     Allergies, Anemia, Asthma, Blood Disease, Broken Bones, Cataracts, Chemical     Dependency, Emphysema, Chronic Liver Disease, Colon Trouble, Colitis,     Diverticulitis, Congestive Heart Failu, Deafness or Ringing Ears, Convulsions,     Depression, Anxiety, Bipolar Disorder, Diabetes, Epilepsy, Seizures,     Forgetfullness, Glaucoma, Gall Stones, Gout, Head Injury, Heart Attack, Heart     Murmur, Hepatitis, Hiatal Hernia, High Cholesterol, HIV (Do not ask - volu,     Jaundice, Kidney or Bladder Disease, Kidney Stones, Migrane Headaches, Mitral     Valve Prolapse, Night sweats, Phlebitis, Psychiatric Care, Reflux Disease,     Rheumatic Fever, Sexually Transmitted Dis, Sinus Trouble, Skin Disease/    Psoriais/Ecz, Stroke, Thyroid Problem, Tuberculosis or Pos TB Te,  Miscellaneous     Medical/oth      Psychiatric History      None            PREVENTION      Hx Influenza Vaccination:  Yes      Date Influenza Vaccine Given:  Dec 1, 2018      Influenza Vaccine Declined:  No      2 or More Falls Past Year?:  No      Fall Past Year with Injury?:  No      Hx Pneumococcal Vaccination:  Yes      Encouraged to follow-up with:  PCP regarding preventative exams.      Chart initiated by      Gwendolyn Sanders MA            ALLERGIES/MEDICATIONS      Allergies:        Coded Allergies:             NO KNOWN ALLERGIES (Unverified , 3/26/19)      Medications    Last Reconciled on 3/26/19 08:25 by ROSEMARIE GARCIAS      Budesonide/Formoterol Fumarate (Symbicort 160/4.5 Mcg) 10.2 Gm Inh      2 PUFF INH RTBID, #1 INH 5 Refills         Prov: Luz Mercedes PA-C         3/26/19       Amoxicillin/Clavulanic Acid 875/125 (Augmentin 875/125) 1 Each Tablet      875 MG PO BID for 7 Days, #14 TAB 0 Refills         Prov: Luz Mercedes PA-C         3/26/19       predniSONE* (predniSONE*) 20 Mg Tablet      40 MG PO QDAY for 7 Days, #14 TAB 0 Refills         Prov: Luz Mercedes PA-C         3/26/19       Tiotropium Bromide (Spiriva Respimat 2.5 mcg/Puff) 4 Gm Mist.inhal      2 PUFFS INH RTQDAY, #4 MDI 9 Refills         Prov: Luz Mercedes PA-C         3/26/19       Neb-NaCl 3% (Sodium Chloride 3% Neb) 4 Ml Vial.neb      4 ML INH RTBID for 30 Days, #60 NEB 4 Refills         Prov: Luz Mercedes PA-C         3/26/19       NEB-Albuterol Sulf (Albuterol) 2.5 Mg/3 Ml Vial.neb      2.5 MG INH Q4-6H PRN for DYSPNEA, #120 NEB 0 Refills         Prov: Luz Mercedes PA-C         3/26/19       MDI-Advair 500/50 (Advair 500/50 Diskus) 1 Each Blst.w.dev      1 PUFF INH RTBID, #3 INH 4 Refills         Prov: Luz Mercedes PA-C         9/11/18       Leuprolide Acetate (Lupron Depot) 22.5 Mg Syringekit      22.5 MG IM ONCE, SYRINGE         Reported         2/8/18       Metoprolol Succinate (Toprol XL*) 25 Mg  Tab.er.24h      25 MG PO QDAY, #15 TAB 0 Refills         Reported         11/15/17       Folic Acid (Folic Acid*) 1 Mg Tablet      1 MG PO QDAY, #30 TAB 0 Refills         Reported         4/14/16       Tamsulosin HCL (Flomax) 0.4 Mg Cap.sr.24h      0.4 MG PO QDAY, #30 CAP 0 Refills         Reported         6/16/15       Tolterodine Tartrate (Detrol LA) 4 Mg Cap.sr.24h      4 MG PO QDAY, CAP         Reported         6/16/15       Aspirin EC (Aspirin EC) 81 Mg Tabec      81 MG PO QDAY, TAB.EC 0 Refills         Reported         6/16/15       Lisinopril* (Lisinopril*) 10 Mg Tablet      10 MG PO QDAY, TAB 0 Refills         Reported         6/16/15      Current Medications      Current Medications Reviewed 3/26/19            EXAM      CONSTITUTIONAL: Pleasant  normal conversant.       EYES : Pink conjunctive, no ptosis, PERRL.       ENMT : Nose and ears appear normal, normal dentition, mild posterior pharyngeal     wall erythema. Mallampati classification       Neck: Nontender, no masses, no thyromegaly, no nodules.      Resp : Mildly decreased bilateral breath sounds throughout, no wheezing, rhonchi    or crackles appreciated, normal work of breathing noted.        CVS  : No carotid bruits, s1s2 nl, RRR, no murmur, rubs or gallop, no peripheral    edema       Chest wall: Normal rise with inspiration, nontender on palpation      GI   : Abdomen soft, with no masses, no hepatosplenomegaly, no hernias, BS+      MSK  : Normal gait and station, no digital cyanosis or clubbing       Skin : No rashes, ulcerations or lesions, normal turgor and temperature      Neuro: CN II - XII intact, no sensory deficits, DTRs intact and symmetrical, no     motor weakness      Psych: Appropriate affect, A   Vitals      Vitals:             Height 5 ft 11 in / 180.34 cm           Weight 188 lbs 7 oz / 85.734681 kg           BSA 2.06 m2           BMI 26.3 kg/m2           Temperature 98.2 F / 36.78 C - Oral           Pulse 84           Respirations  14           Blood Pressure 118/64 Sitting, Right Arm           Pulse Oximetry 88%, Room air           Comment: O2 was 82% with ambulation            REVIEW      Results Reviewed      PCCS Results Reviewed?:  Yes Prev Lab Results, Yes Prev Radiology Results, Yes     Previous Mecial Records            Assessment      Notes      New Medications      * predniSONE* 20 MG TABLET: 40 MG PO QDAY 7 Days #14      * Amoxicillin/Clavulanic Acid 875/125 (Augmentin 875/125) 1 EACH TABLET: 875 MG       PO BID 7 Days #14      * Budesonide/Formoterol Fumarate (Symbicort 160/4.5 Mcg) 10.2 GM INH: 2 PUFF INH      RTBID #1      * Budesonide/Formoterol Fumarate (Symbicort 160/4.5 Mcg) 10.2 GM INH          Sample - Qty 1         Instructions: 2 puff bid         Dx: Adenocarcinoma of right lung - C34.91      * Fluticasone/Vilanterol 200-25 Mcg Inh (Breo Ellipta 200-25 Mcg Inh) 1 EACH       BLST.W.DEV: 1 PUFF INH QDAY 30 Days #1      Renewed Medications      * NEB-Albuterol Sulf (Albuterol) 2.5 MG/3 ML VIAL.NEB: 2.5 MG INH Q4-6H PRN       DYSPNEA #120         Instructions: Submit to Medicare B if applicable. Call for Diagnosis if        needed.      * Neb-NaCl 3% (Sodium Chloride 3% Neb) 4 ML VIAL.NEB: 4 ML INH RTBID 30 Days #60      * TIOTROPIUM BROMIDE (Spiriva Respimat 2.5 mcg/Puff) 4 GM MIST.INHAL: 2 PUFFS       INH RTQDAY #4      ASSESSMENT:      1. Moderately severe COPD with acute exacerbation.      2. Recent influenza infection.       3. Probable bronchitis.       4. Adenocarcinoma of the lung status post chemo and radiation followed by Dr. Flores and Dr. Bergeron.      5. Cough.      6. Dyspnea.              PLAN:       1. At this time, I will treat the patient's mild chronic obstructive pulmonary     disease exacerbation with a course of Augmentin and a short Prednisone burst.       2. He recent had a chest x-ray done in the emergency room at [facility] this month that was negative for pneumonia or other  acute process. I     discussed with him that his last chest CT scan in late February 2019 appeared to    be stable without any evidence of cancer recurrence.       3. Regarding his maintenance inhalers, the patient is not able to tolerate     Advair discus as it irritates his mouth and he self discontinued this. I will     try him on Symbicort 160/4.5 two puffs twice daily. I have discussed with him     that it is not available at [facility] but he does not mind to pay a small copay     at another pharmacy. I have given him a sample of Symbicort 160 to see if he     likes it. If he does well with that he can pick it up at [facility] in [place].     I sent his Spiriva Respimat and albuterol to [facility].       4. The patient is up to date with his flu and pneumonia vaccines.        5. Follow up with Dr. Hood or myself in 3-4 months to see how he is doing on     his inhaler changes, sooner if needed.            Patient Education      Patient Education Provided:  COPD, How to use an Inhaler, How to use a Nebulizer      Time Spent:  > 50% /[facility]                 Disclaimer: Converted document may not contain table formatting or lab diagrams. Please see [employer] for the authenticated document.

## 2021-05-28 NOTE — PROGRESS NOTES
Patient: SELENA WHITFIELD     Acct: [id number]     Report: #[id number]  UNIT #: [id number]     : 1945    Encounter Date:2019  PRIMARY CARE: LAURA HANCOCK  ***Signed***  --------------------------------------------------------------------------------------------------------------------  NURSE INTAKE      Visit Type      Established Patient Visit            Chief Complaint      Right lung ca      Intent of Therapy:  Curative            Referring Provider/Copies To      Provider Not Found in Lookup:  DONALDO SEWELL      PCP Not Found in Lookup:  DONALDO SEWELL            History and Present Illness      Past Oncology Illness History      Mr. Whitfield was recently dx with adenocarcinoma of the right lung. He states he     first came to medical attention for this issue when he developed 3-4 days a     wheezing and a chest x-ray showed an abnormality which ultimately led to a     PET/CT on 2016. This study showed a 1.1 cm hypermetabolic superior segment    right lower lobe nodule compatible with neoplasm. 2 abnormal right hilar lymph     nodes measuring up to 3.1 cm with a maximum SUV of 12.8 were noted as well as a     superior right infrahilar lymph node which was hypermetabolic. Also noted was a     metabolic area of activity in the T9 vertebral body concerning for metastatic     lesion. No other significant areas concerning for obvious metastatic disease.            EBUS with biopsy was performed on 2016 which showed negative station 7,     nondiagnostic right lower lobe bronchoalveolar lavage, suspicious bronchial     washings, and positive lymph nodes on to have been a biopsies of station 10R     which was consistent with poorly differentiated adenocarcinoma compatible with     lung primary. A thoracic MRI was then performed on 2016 which showed no     evidence of osseous metastatic disease in the thoracic spine. MRI of the brain     was performed on 2016 which was negative for  any intracranial metastatic     disease. He was subsequently referred to my clinic and for consideration of     resection with  CT surgery.  Underwent mediastinoscopy on 03/14/16 at  which    revealed a positive subcarinal lymph node. Level 4 node was negative; deemed     inappropriate for surgery per .  Completed 3 cycles Alimta and Cisplatin,     along with radiation.  Repeat imaging 10/12/16 showed a complete response to     treatment with no evidence of worsening disease.            [facility]      F/u for stage III NSCLC--reports doing well.  States he has a non-productive     cough most of the time but rarely brings up anything.  He has a very good     appetite; stable weight.  Indicates his breathing is good until he does any     activity then fatigues easily and becomes SOA.  He doesn't exercise routinely.     Denies fever, lymphadenopathy or distress.            Cancer Details            RLL poorly differentiated NSCLC adenocarcinoma            Clinical Staging      Stage IIIA ([id number])            Treatments      Chemotherapy      4/14/16 thru 6/14/16 completed 3C Cisplatin   Radiation Therapy      4/14/16 thru 6/2/16 received 6000cGy XRT to right hilum/mediastinum            Clinical Trial Participant      No            ECOG Performance Status      0            Most Recent Imaging Findings      2/21/19            PROCEDURE:   CT CHEST W/ CONTRAST             COMPARISON:   [facility], CT, CT CHEST ABD PEL W CONTRAST,     8/13/2018, 11:34.             INDICATIONS:   LUNG CA             TECHNIQUE:   After obtaining the patient's consent, CT images were obtained with    non-ionic       intravenous contrast material.               PROTOCOL:     Standard imaging protocol performed                RADIATION:     DLP: 518mGy*cm          Automated exposure control was utilized to minimize radiation dose.       CONTRAST:   100cc Isovue 370 I.V.      LABS:     eGFR: 47.9ml/min/1.73m2              FINDINGS:         Changes of emphysema are again identified.  There is persistent opacification     and volume loss       within the right infrahilar region and right medial lower lobe, consistent with     post treatment       changes.  These appear stable compared to prior exam.  No new or suspicious     pulmonary nodules are       identified.  Aorta and pulmonary artery are normal in caliber.  Limited     evaluation of the upper       abdomen is unchanged.  No aggressive appearing lytic or sclerotic bone lesions     are identified.             CONCLUSION:         1. Stable post treatment changes within the right infrahilar and medial lower     lobe            PAST, FAMILY   Past Medical History      Past Medical History:  Arthritis, High Cholesterol, Hypertension      Hematology/Oncology (M):  Lung Cancer, Prostate Cancer            Past Surgical History      Biopsy (THORACIC), Lung Biopsy, VAD Placement      PROSTATE SURGERY            Family History      Family History:  Prostate Cancer (BROTHER)            Social History      Marital Status:        Lives independently:  Yes      Number of Children:  1      Occupation:  RETIRED            Tobacco Use      Tobacco status:  Former smoker (1 ppd x 50y quit 2016)      Smoking packs/day:  1      Smoking history:  25-50 pack years      Quit status:  Quit date established (01/2016)            Alcohol Use      Alcohol intake:  socially            Substance Use      Substance use:  Marijuana            REVIEW OF SYSTEMS      General:  Admits: Fatigue;          Denies: Appetite Change, Fever, Night Sweats, Weight Gain, Weight Loss      Eye:  Denies: Blurred Vision, Corrective Lenses, Diplopia, Eye Irritation, Eye     Pain, Eye Redness, Spots in Vision, Vision Loss      ENT:  Admits: Hearing Loss;          Denies: Headache, Hoarseness, Seizures, Sinus Congestion, Sore Throat      Cardiovascular:  Denies: Chest Pain, Edema Ankles, Edema Legs, Irregular      Heartbeat, Palpitations      Respiratory:  Admits: Shortness of Air (with moderate activity);          Denies: Coughing Blood, Productive Cough, Wheezing      Gastrointestinal:  Denies: Bloody Stools, Constipation, Diarrhea, Frequent     Heartburn, Nausea, Problem Swallowing, Tarry Stools, Unable to Control Bowels,     Vomiting      Genitourinary (male):  Denies: Blood in Urine, Decrease Urine Stream, Frequent     Urination, Incontinence, Painful Urination      Musculoskeletal:  Denies: Back Pain, Leg Cramps, Muscle Pain, Muscle Weakness,     Painful Joints, Swollen Joints      Integumentary:  Denies: Bleeds Easily, Bruises Easily, Hair Changes, Jaundice,     Lesions, Mole Changes, Nail Changes, Pigment Changes, Rash, Skin Discoloration      Neurologic:  Denies: Dizziness, Fainting, Numbness\Tingling, Paralysis, Seizures      Psychiatric:  Denies: Anxiety, Confused, Depression, Disoriented, Memory Loss      Endocrine:  Denies: Cold Intolerance, Diabetes, Excessive Sweating, Excessive     Thirst, Excessive Urination, Heat Intolerance, Flushing, Hyperthyroidism,     Hypothyroidism      Hematologic/Lymphatic:  Denies: Bruising, Bleeding, Enlarged Lymph Nodes,     Recurrent Infections, Transfusions            VITAL SIGNS AND SCORES      Vitals      Height 5 ft 11.00 in / 180.34 cm      Weight 195 lbs 15.823 oz / 88.9 kg      BSA 2.09 m2      BMI 27.3 kg/m2      Temperature 98.1 F / 36.72 C - Temporal      Pulse 87      Respirations 16      Blood Pressure 126/75 Sitting, Left Arm      Pulse Oximetry 97%, RM AIR            Pain Score      Pain Scale Used:  Numerical      Pain Intensity:  0            Fatigue Score      Fatigue (0-10 scale):  5            EXAM      General Appearance:  Positive for: Alert, Oriented x3, Cooperative;          Negative for: Acute Distress      Neck:  Positive for: Supple;          Negative for: JVD, Masses      Respiratory:  Positive for: CTAB, Normal Respiratory Effort      Chest:  Port in  Place      Abdomen/Gastro:  Positive for: Normal Active Bowel Sounds, Soft;          Negative for: Distention, Hepatosplenomegaly, Tenderness      Cardiovascular:  Positive for: RRR;          Negative for: Gallop, Murmur, Peripheral Edema, Rub      Psychiatric:  Positive for: Appropriate Affect, Intact Judgement      Lymphatic:  Negative for: Axillary, Cervical, Infraclavicular, Supraclavicular            PREVENTION      Date Influenza Vaccine Given:  Dec 1, 2018      Influenza Vaccine Declined:  Yes      2 or More Falls Past Year?:  No      Fall Past Year with Injury?:  No      Encouraged to follow-up with:  PCP regarding preventative exams.      Chart initiated by      DEEP PUGH MA            ALLERGY/MEDS      Allergies      Coded Allergies:             NO KNOWN ALLERGIES (Unverified , 2/25/19)            Medications      Last Reconciled on 2/25/19 09:02 by LALY LUCAS      Tiotropium Bromide (Spiriva Respimat 2.5 mcg/Puff) 4 Gm Mist.inhal      2 PUFFS INH RTQDAY, #4 MDI 9 Refills         Prov: Luz Mercedes PA-C         9/11/18       MDI-Advair 500/50 (Advair 500/50 Diskus) 1 Each Blst.w.dev      1 PUFF INH RTBID, #3 INH 4 Refills         Prov: Luz Mercedes PA-C         9/11/18       Neb-NaCl 3% (Sodium Chloride 3% Neb) 4 Ml Vial.neb      4 ML INH RTBID for 30 Days, #60 NEB 4 Refills         Prov: Roshan Hood         2/8/18       Leuprolide Acetate (Lupron Depot) 22.5 Mg Syringekit      22.5 MG IM ONCE, SYRINGE         Reported         2/8/18       NEB-Albuterol Sulf (Albuterol) 2.5 Mg/3 Ml Vial.neb      2.5 MG INH Q4-6H PRN for DYSPNEA, #120 NEB 0 Refills         Prov: Roshan Hood         11/15/17       Metoprolol Succinate (Toprol XL*) 25 Mg Tab.er.24h      25 MG PO QDAY, #15 TAB 0 Refills         Reported         11/15/17       Folic Acid (Folic Acid*) 1 Mg Tablet      1 MG PO QDAY, #30 TAB 0 Refills         Reported         4/14/16       Tamsulosin HCL (Flomax) 0.4 Mg Cap.sr.24h       0.4 MG PO QDAY, #30 CAP 0 Refills         Reported         6/16/15       Tolterodine Tartrate (Detrol LA) 4 Mg Cap.sr.24h      4 MG PO QDAY, CAP         Reported         6/16/15       Aspirin EC (Aspirin EC) 81 Mg Tabec      81 MG PO QDAY, TAB.EC 0 Refills         Reported         6/16/15       Lisinopril* (Lisinopril*) 10 Mg Tablet      10 MG PO QDAY, TAB 0 Refills         Reported         6/16/15      Medications Reviewed:  No Changes made to meds            IMPRESSION/PLAN      Impression      lung cancer.            Diagnosis      Adenocarcinoma of right lung - C34.91      Patient is doing well.  I see no evidence of disease recurrence by his history,     physical examination recent CT scan.  Continue ongoing surveillance.  Port flush    monthly while not in use.  We discussed increasing exercise to hopefully help     with his pulmonary status.  RTC 6 months for OV with labs and scans prior.      New Diagnostics      * CMP Comp Metabolic Panel, 6 Months      * CBC With Auto Diff, 6 Months      * Chest W/ Cont CT, 6 Months            Patient Education            Exercise (Alternative Therapy)      Patient Education Provided:  Yes                 Disclaimer: Converted document may not contain table formatting or lab diagrams. Please see [employer] for the authenticated document.

## 2021-05-28 NOTE — PROGRESS NOTES
Patient: SELENA WHITFIELD     Acct: [id number]     Report: #[id number]  UNIT #: [id number]     : 1945    Encounter Date:2019  PRIMARY CARE: LUARA HANCOCK  ***Signed***  --------------------------------------------------------------------------------------------------------------------  NURSE INTAKE      Visit Type      Established Patient Visit            Chief Complaint      CT RESULTS I HAD DONE MONDAY      Intent of Therapy:  Curative            Referring Provider/Copies To      Provider Not Found in Lookup:  DONALDO SEWELL      PCP Not Found in Lookup:  DONALDO SEWELL            History and Present Illness      Past Oncology Illness History      Mr. Whitfield was recently dx with adenocarcinoma of the right lung. He states he     first came to medical attention for this issue when he developed 3-4 days a     wheezing and a chest x-ray showed an abnormality which ultimately led to a     PET/CT on 2016. This study showed a 1.1 cm hypermetabolic superior segment    right lower lobe nodule compatible with neoplasm. 2 abnormal right hilar lymph     nodes measuring up to 3.1 cm with a maximum SUV of 12.8 were noted as well as a     superior right infrahilar lymph node which was hypermetabolic. Also noted was a     metabolic area of activity in the T9 vertebral body concerning for metastatic     lesion. No other significant areas concerning for obvious metastatic disease.            EBUS with biopsy was performed on 2016 which showed negative station 7,     nondiagnostic right lower lobe bronchoalveolar lavage, suspicious bronchial     washings, and positive lymph nodes on to have been a biopsies of station 10R     which was consistent with poorly differentiated adenocarcinoma compatible with     lung primary. A thoracic MRI was then performed on 2016 which showed no     evidence of osseous metastatic disease in the thoracic spine. MRI of the brain     was performed on 2016 which  was negative for any intracranial metastatic     disease. He was subsequently referred to my clinic and for consideration of     resection with  CT surgery.  Underwent mediastinoscopy on 03/14/16 at  which    revealed a positive subcarinal lymph node. Level 4 node was negative; deemed     inappropriate for surgery per .  Completed 3 cycles Alimta and Cisplatin,     along with radiation.  Repeat imaging 10/12/16 showed a complete response to     treatment with no evidence of worsening disease.            HPI - Oncology Interim      f/u rt lung ca--doing well--denies changes to resp status.  He does have some     SOA with inc of activity; however, no distress or episodes noted.  He has a good    appetite; wt is stable.  He denies cough at this time.  No distress noted.            Cancer Details            RLL poorly differentiated NSCLC adenocarcinoma            Clinical Staging      Stage IIIA (T5vD3H9)            Treatments      Chemotherapy      4/14/16 thru 6/14/16 completed 3C Cisplatin   Radiation Therapy      4/14/16 thru 6/2/16 received 6000cGy XRT to right hilum/mediastinum            Clinical Trial Participant      No            ECOG Performance Status      0            Most Recent Lab Findings      Laboratory Tests      8/26/19 08:22            Most Recent Imaging Findings      8/26/19            PROCEDURE:   CT CHEST W/ CONTRAST             COMPARISON:   [facility], CT, CHEST W/ CONTRAST, 2/21/2019, 14:31.             INDICATIONS:   RT. LUNG CA             TECHNIQUE:   After obtaining the patient's consent, CT images were obtained with    non-ionic       intravenous contrast material.               PROTOCOL:     Standard imaging protocol performed                RADIATION:     DLP: 570mGy*cm          Automated exposure control was utilized to minimize radiation dose.       CONTRAST:   100cc Isovue 370 I.V.      LABS:     eGFR: 60ml/min/1.73m2             FINDINGS:         There is  persistent right hilar and infrahilar soft tissue density,     bronchiectasis and       consolidation, consistent with post treatment changes.  No definite evidence of     recurrent disease       is identified.  Changes of emphysema are again noted.  No new or suspicious     pulmonary nodules are       identified.  No axillary, supraclavicular, or mediastinal adenopathy is i    dentified.  Gynecomastia.        The thyroid is normal.  Aorta and pulmonary artery are normal in caliber.      Limited evaluation of       the upper abdomen is unremarkable.  Diverticulosis without evidence of div    erticulitis.  No       aggressive appearing lytic or sclerotic bone lesions are identified.             CONCLUSION:         1. Post treatment changes in the right hilar and infrahilar region, without     evidence of recurrent       disease.            PAST, FAMILY   Past Medical History      Past Medical History:  Arthritis, High Cholesterol, Hypertension      Hematology/Oncology (M):  Lung Cancer, Prostate Cancer            Past Surgical History      Biopsy (THORACIC), Lung Biopsy, VAD Placement            Family History      Family History:  Prostate Cancer (BROTHER)            Social History      Marital Status:        Lives independently:  Yes      Number of Children:  1      Occupation:  RETIRED            Tobacco Use      Tobacco status:  Former smoker (1 ppd x 50y quit 2016)      Smoking packs/day:  1      Smoking history:  25-50 pack years      Quit status:  Quit date established (01/2016)            Alcohol Use      Alcohol intake:  socially            Substance Use      Substance use:  Marijuana            REVIEW OF SYSTEMS      General:  Denies: Fatigue      Eye:  Admits: Corrective Lenses      Cardiovascular:  Denies: Chest Pain      Respiratory:  Admits: Shortness of Air (WHEN I AM DOING SOMETHING), Wheezing      Gastrointestinal:  Denies: Nausea      Musculoskeletal:  Admits: Painful Joints      Neurologic:   Admits: Numbness\Tingling      Psychiatric:  Denies: Depression      Endocrine:  Denies: Diabetes            VITAL SIGNS AND SCORES      Vitals      Weight 195 lbs 8.768 oz / 88.7 kg      Temperature 97.5 F / 36.39 C - Temporal      Pulse 80      Respirations 18      Blood Pressure 141/79 Sitting, Left Arm      Pulse Oximetry 99%, RM AIR            EXAM      General Appearance:  Positive for: Alert, Oriented x3, Cooperative;          Negative for: Acute Distress      Neck:  Positive for: Supple;          Negative for: JVD, Masses      Respiratory:  Positive for: CTAB, Normal Respiratory Effort      Abdomen/Gastro:  Positive for: Normal Active Bowel Sounds, Soft;          Negative for: Distention, Hepatosplenomegaly, Tenderness      Cardiovascular:  Positive for: RRR;          Negative for: Gallop, Murmur, Peripheral Edema, Rub      Psychiatric:  Positive for: Appropriate Affect, Intact Judgement            PREVENTION      Hx Influenza Vaccination:  Yes      Date Influenza Vaccine Given:  Dec 1, 2018      Influenza Vaccine Declined:  No      2 or More Falls Past Year?:  No      Fall Past Year with Injury?:  No      Hx Pneumococcal Vaccination:  Yes      Encouraged to follow-up with:  PCP regarding preventative exams.      Chart initiated by      DEEP PUGH MA            ALLERGY/MEDS      Allergies      Coded Allergies:             NO KNOWN ALLERGIES (Unverified , 8/29/19)            Medications      Last Reconciled on 8/29/19 11:25 by DAVID CIFUENTES, APRN      Budesonide/Formoterol Fumarate (Symbicort 160/4.5 Mcg) 10.2 Gm Inh      2 PUFF INH RTBID, #1 INH 0 Refills         Reported         8/29/19       MDI-Albuterol (Proair HFA) 8.5 Gm Hfa.aer.ad      1 PUFFS INH Q6H PRN for SHORTNESS OF BREATH, #1 MDI 0 Refills         Reported         8/29/19       Fluticasone/Vilanterol 200-25 Mcg Inh (Breo Ellipta 200-25 Mcg Inh) 1 Each     Blst.w.dev      1 PUFF INH QDAY for 30 Days, #1 INH 3 Refills         Prov:  ErwinRoshan         8/26/19       Albuterol/Ipratropium (Duoneb) 3 Ml Ampul.neb      3 ML INH RTQ4H, #180 NEB 4 Refills         Prov: ErwinRoshan         8/16/19       MDI-Albuterol (Ventolin HFA) 8 Gm Hfa.aer.ad      2 PUFFS INH Q4-6H PRN for DYSPNEA, #1 INH 6 Refills         Prov: MarkyaoRoshan         8/16/19       Bicalutamide (BICALUTAMIDE) 50 Mg Tab      50 MG PO HS, #30 TAB         Reported         6/21/19       Tiotropium Bromide (Spiriva Respimat 2.5 mcg/Puff) 4 Gm Mist.inhal      2 PUFFS INH RTQDAY, #4 MDI 9 Refills         Prov: Luz Mercedes PA-C         3/26/19       Neb-NaCl 3% (Sodium Chloride 3% Neb) 4 Ml Vial.neb      4 ML INH RTBID for 30 Days, #60 NEB 4 Refills         Prov: Luz Mercedes PA-C         3/26/19       NEB-Albuterol Sulf (Albuterol) 2.5 Mg/3 Ml Vial.neb      2.5 MG INH Q4-6H PRN for DYSPNEA, #120 NEB 0 Refills         Prov: Luz Mercedes PA-C         3/26/19       Leuprolide Acetate (Lupron Depot) 22.5 Mg Syringekit      22.5 MG IM ONCE, SYRINGE         Reported         2/8/18       Metoprolol Succinate (Metoprolol Succinate) 25 Mg Tab.er.24h      25 MG PO QDAY, #15 TAB 0 Refills         Reported         11/15/17       Folic Acid (Folic Acid*) 1 Mg Tablet      1 MG PO QDAY, #30 TAB 0 Refills         Reported         4/14/16       Tamsulosin HCL (Flomax) 0.4 Mg Cap.sr.24h      0.4 MG PO QPM, #30 CAP 0 Refills         Reported         6/16/15       Tolterodine Tartrate (Detrol LA) 4 Mg Cap.sr.24h      4 MG PO QPM, CAP         Reported         6/16/15       Aspirin EC (Aspirin EC) 81 Mg Tabec      81 MG PO QDAY, TAB.EC 0 Refills         Reported         6/16/15       Lisinopril* (Lisinopril*) 10 Mg Tablet      10 MG PO QDAY, TAB 0 Refills         Reported         6/16/15      Medications Reviewed:  No Changes made to meds            IMPRESSION/PLAN      Diagnosis      Adenocarcinoma of right lung - C34.91            Notes      Patient doing well.  I see no evidence of disease  recurrence by his history,     physical examination or recent CT scans.  I will see him back in 6 months time     for ongoing surveillance with labs and scans prior      New Medications      * MDI-Albuterol (Proair HFA) 8.5 GM HFA.AER.AD: 1 PUFFS INH Q6H PRN SHORTNESS OF      BREATH #1      * Budesonide/Formoterol Fumarate (Symbicort 160/4.5 Mcg) 10.2 GM INH: 2 PUFF INH      RTBID #1      New Diagnostics      * CBC With Auto Diff, 6 Months         Dx: Adenocarcinoma of right lung - C34.91      * CMP Comp Metabolic Panel, 6 Months         Dx: Adenocarcinoma of right lung - C34.91      * Chest W/ Cont CT, 6 Months         Dx: Adenocarcinoma of right lung - C34.91            Patient Education            DI for Influenza -- Adult      Patient Education Provided:  Yes            Topics Patient Counseled on      CT chest scan results                 Disclaimer: Converted document may not contain table formatting or lab diagrams. Please see [employer] for the authenticated document.

## 2021-05-28 NOTE — PROGRESS NOTES
Patient: SELENA WHITFIELD     Acct: [id number]     Report: #[id number]  UNIT #: [id number]     : 1945    Encounter Date:2020  PRIMARY CARE: LAURA HANCOCK  ***Signed***  --------------------------------------------------------------------------------------------------------------------  NURSE INTAKE      Visit Type      Established Patient Visit            Chief Complaint      LUNG CA FOLLOW UP      Intent of Therapy:  Curative            History and Present Illness      Past Oncology Illness History      Mr. Whitfield was dx with adenocarcinoma of the right lung. He states he first came     to medical attention for this issue when he developed 3-4 days a wheezing and a     chest x-ray showed an abnormality which ultimately led to a PET/CT on     2016. This study showed a 1.1 cm hypermetabolic superior segment right     lower lobe nodule compatible with neoplasm. 2 abnormal right hilar lymph nodes     measuring up to 3.1 cm with a maximum SUV of 12.8 were noted as well as a     superior right infrahilar lymph node which was hypermetabolic. Also noted was a     metabolic area of activity in the T9 vertebral body concerning for metastatic     lesion. No other significant areas concerning for obvious metastatic disease.            EBUS with biopsy was performed on 2016 which showed negative station 7,     nondiagnostic right lower lobe bronchoalveolar lavage, suspicious bronchial     washings, and positive lymph nodes on to have been a biopsies of station 10R     which was consistent with poorly differentiated adenocarcinoma compatible with     lung primary. A thoracic MRI was then performed on 2016 which showed no     evidence of osseous metastatic disease in the thoracic spine. MRI of the brain     was performed on 2016 which was negative for any intracranial metastatic     disease. He was subsequently referred to my clinic and for consideration of     resection with [facility] CT surgery.   Underwent mediastinoscopy on 16 at  which    revealed a positive subcarinal lymph node. Level 4 node was negative; deemed     inappropriate for surgery per .  Completed 3 cycles Alimta and Cisplatin,     along with radiation.  Repeat imaging 10/12/16 showed a complete response to     treatment with no evidence of worsening disease.            HPI - Oncology Interim      Patient returns for follow-up of his non-small cell carcinoma.  He is now     approaching 4 years out from his definitive treatment.  Patient states he is     doing well.  He denies any chest pain, shortness of breath, exertional dyspnea,     cough or hemoptysis.  No new masses or lymphadenopathy.  No unusual aches or     pains.  He reports good appetite and his weight is maintained.  He notes     excellent energy level.            Cancer Details            RLL poorly differentiated NSCLC adenocarcinoma            Clinical Staging      Stage IIIA (U0xV3D2)            Treatments      Chemotherapy      16 thru 16 completed 3C Cisplatin   Radiation Therapy      16 thru 16 received 6000cGy XRT to right hilum/mediastinum            Clinical Trial Participant      No            ECOG Performance Status      0            Most Recent Imaging Findings      Patient: SELENA WU   Acct: #[id number]   Report: #[id number]            UNIT #: [id number]    DOS: 20 0827      INSURANCE:MEDICARE PART A   LOCATION:CT     : 1945            PROVIDERS      ADMITTING:     ATTENDING: ANGELITA BALBUENA      FAMILY:  LAURA MANRIQUEZ   ORDERING:  DAVID CIFUENTES         OTHER:    DICTATING:  Sam Sloan MD            REQ #:[id number]   EXAM:[facility] - CT CHEST with CONTRAST      REASON FOR EXAM:  LUNG CA      REASON FOR VISIT:  LUNG CA            *******Signed******         PROCEDURE:   CT CHEST W/ CONTRAST             COMPARISON:   [facility], CT, CHEST W/ CONTRAST, 2019, 8:26.             INDICATIONS:    LUNG CA             TECHNIQUE:   After obtaining the patient's consent, CT images were obtained with    non-ionic       intravenous contrast material.               PROTOCOL:     Standard imaging protocol performed                RADIATION:     DLP: 688 mGy*cm          Automated exposure control was utilized to minimize radiation dose.       CONTRAST:   100cc Isovue 370 I.V.      LABS:     eGFR: >60ml/min/1.73m2             FINDINGS:         Gynecomastia is noted, unchanged.  No adenopathy is evident.  No pleural or     pericardial effusion is       seen.  Coronary artery atherosclerotic calcification is present.             Severe emphysematous changes are present.  Airspace opacity is noted in the     right perihilar and       infrahilar regions, unchanged in the interval and likely representing post XRT     changes.  No       pulmonary nodule or mass is identified.             There is a 0.8 cm hypodense focus in the superior right kidney, likely a small     cyst.  The       visualized upper abdomen otherwise appears unremarkable.  No focal osseous     lesion is seen.             CONCLUSION:         1. Severe emphysematous changes      2. Post XRT changes in the right perihilar region      3. Bilateral gynecomastia      4. No evidence of the aplastic recurrence or metastatic disease              Sam Sloan M.D.             Electronically Signed and Approved By: Sam Sloan M.D. on 2/25/2020 at 10:39                           Until signed, this is an unconfirmed preliminary report that may contain      errors and is subject to change.                                              NORMARRO:      D:02/25/20 1039            PAST, FAMILY   Past Medical History      Past Medical History:  Arthritis, High Cholesterol, Hypertension      Hematology/Oncology (M):  Lung Cancer, Prostate Cancer            Past Surgical History      Biopsy (THORACIC), Lung Biopsy, VAD Placement            Family History      Family History:   Prostate Cancer (BROTHER)            Social History      Marital Status:        Lives independently:  Yes      Number of Children:  1      Occupation:  RETIRED            Tobacco Use      Tobacco status:  Former smoker (1 ppd x 50y quit 2016)      Smoking packs/day:  1      Smoking history:  25-50 pack years      Quit status:  Quit date established (01/2016)            Alcohol Use      Alcohol intake:  socially            Substance Use      Substance use:  Marijuana            REVIEW OF SYSTEMS      General:  Admits: Fatigue;          Denies: Weight Loss      Eye:  Admits Corrective Lenses      ENT:  Admits Hearing Loss      Cardiovascular:  Denies Chest Pain      Respiratory:  Denies: Productive Cough, Shortness of Air      Gastrointestinal:  Denies Constipation, Denies Diarrhea      Musculoskeletal:  Denies Muscle Pain, Denies Painful Joints      Neurologic:  Denies Numbness\Tingling      Hematologic/Lymphatic:  Denies Bruising, Denies Bleeding            VITAL SIGNS AND SCORES      Vitals      Weight 191 lbs 12.803 oz / 87 kg      Temperature 97.7 F / 36.5 C - Temporal      Pulse 93      Respirations 16      Blood Pressure 138/67 Sitting, Left Arm      Pulse Oximetry 94%, RM AIR            Pain Score      Pain Scale Used:  Numerical      Pain Intensity:  0            Fatigue Score      Fatigue (0-10 scale):  5            EXAM      General Appearance:  Positive for: Alert, Oriented x3, Cooperative;          Negative for: Acute Distress      Eye:  Positive for: Anicteric Sclerae, Moist Conjunctiva      Neck:  Positive for: Supple;          Negative for: JVD, Masses      Respiratory:  Positive for: CTAB, Normal Respiratory Effort      Abdomen/Gastro:  Positive for: Normal Active Bowel Sounds, Soft;          Negative for: Distention, Hepatosplenomegaly, Tenderness      Cardiovascular:  Positive for: RRR;          Negative for: Gallop, Murmur, Peripheral Edema, Rub      Psychiatric:  Positive for: Appropriate  Affect, Intact Judgement      Lymphatic:  Negative for: Cervical, Infraclavicular, Supraclavicular            PREVENTION      Hx Influenza Vaccination:  Yes      Date Influenza Vaccine Given:  Dec 1, 2019      Influenza Vaccine Declined:  No      2 or More Falls Past Year?:  No      Fall Past Year with Injury?:  No      Hx Pneumococcal Vaccination:  Yes      Encouraged to follow-up with:  PCP regarding preventative exams.      Chart initiated by      DEEP PUGH MA            ALLERGY/MEDS      Allergies      Coded Allergies:             NO KNOWN ALLERGIES (Unverified , 3/5/20)            Medications      Last Reconciled on 3/5/20 13:38 by ANGELITA BALBUENA      Fluticasone/Salmeterol 230/21 (Advair /21 MCG) 12 Gm Hfa.aer.ad      2 PUFF INH RTBID, #1 INH 9 Refills         Prov: Roshan Hood         2/6/20       Tiotropium Bromide (Spiriva Respimat 2.5 mcg/Puff) 4 Gm Mist.inhal      2 PUFFS INH RTQDAY, #1 MDI 9 Refills         Prov: Roshan Hood         2/6/20       Atorvastatin (Atorvastatin) 20 Mg Tablet      20 MG PO HS, #30 TAB 0 Refills         Reported         2/6/20       metFORMIN ER (metFORMIN ER) 500 Mg Tab.er.24      500 MG PO QDAY, #30 TAB.ER 0 Refills         Reported         2/6/20       MDI-Albuterol (Proair HFA) 8.5 Gm Hfa.aer.ad      1 PUFFS INH Q6H PRN for SHORTNESS OF BREATH, #1 MDI 0 Refills         Reported         8/29/19       Bicalutamide (BICALUTAMIDE) 50 Mg Tab      50 MG PO HS, #30 TAB         Reported         6/21/19       Neb-NaCl 3% (Sodium Chloride 3% Neb) 4 Ml Vial.neb      4 ML INH RTBID for 30 Days, #60 NEB 4 Refills         Prov: Luz Mercedes PA-C         3/26/19       NEB-Albuterol Sulf (Albuterol) 2.5 Mg/3 Ml Vial.neb      2.5 MG INH Q4-6H PRN for DYSPNEA, #120 NEB 0 Refills         Prov: Luz Mercedes PA-C         3/26/19       Leuprolide Acetate (Lupron Depot) 22.5 Mg Syringekit      22.5 MG IM ONCE, SYRINGE         Reported         2/8/18       Metoprolol Succinate  (Metoprolol Succinate) 25 Mg Tab.er.24h      25 MG PO QDAY, #15 TAB 0 Refills         Reported         11/15/17       Folic Acid (Folic Acid) 1 Mg Tablet      1 MG PO QDAY, #30 TAB 0 Refills         Reported         4/14/16       Tamsulosin HCL (Flomax) 0.4 Mg Cap.sr.24h      0.4 MG PO QPM, #30 CAP 0 Refills         Reported         6/16/15       Tolterodine Tartrate (Detrol LA) 4 Mg Cap.sr.24h      4 MG PO QPM, CAP         Reported         6/16/15       Aspirin EC (Aspirin EC) 81 Mg Tabec      81 MG PO QDAY, TAB.EC 0 Refills         Reported         6/16/15       Lisinopril* (Lisinopril*) 10 Mg Tablet      10 MG PO QDAY, TAB 0 Refills         Reported         6/16/15      Medications Reviewed:  No Changes made to meds            IMPRESSION/PLAN      Diagnosis      Adenocarcinoma of right lung - C34.91      Patient is doing well.  I see no evidence of disease recurrence by his history,     physical examination or recent CT of the chest.  Healthy lifestyle recommended.     RTC 6 months for ongoing surveillance with CT of the chest prior            Notes      New Diagnostics      * Chest W/ Cont CT, 6 Months         Dx: Adenocarcinoma of right lung - C34.91            Pain      Pain Zero Today            Patient Education            Eat Well, Exercise Well, Be Well: Dietary and Fitness Guidelines      Patient Education Provided:  Yes            Electronically signed by ANGELITA BALBUENA  03/05/2020 13:38       Disclaimer: Converted document may not contain table formatting or lab diagrams. Please see [employer] for the authenticated document.

## 2021-05-28 NOTE — PROGRESS NOTES
Patient: ESLENA WU     Acct: [id number]     Report: #[id number]  UNIT #: [id number]     : 1945    Encounter Date:2020  PRIMARY CARE: LAURA HANCOCK  ***Signed***  --------------------------------------------------------------------------------------------------------------------  Chief Complaint      Encounter Date      Aug 20, 2020            Primary Care Provider      VIVIANA SEWELL            Referring Provider      VIVIANA SEWELL            Patient Complaint      Patient is complaining of      6 month f/u, results, COPD            VITALS      Height 5 ft 11 in / 180.34 cm      Weight 184 lbs 6 oz / 83.536948 kg      BSA 2.04 m2      BMI 25.7 kg/m2      Temperature 98.3 F / 36.83 C - Tympanic      Pulse 97      Respirations 16      Blood Pressure 102/60 Sitting, Right Arm      Pulse Oximetry 96%, room air      Initial Exhaled Nitrous Oxide      Exhaled Nitrous Oxide Results:  23            HPI      The patient is a pleasant 75 year old -American male, patient of Dr. Hood's last seen by him in 2020.  He has a history of moderate COPD.  He has    a history of lung cancer and has apparently been in remission for five years.      He is followed by oncology, Dr. Flores for this.  He has had PFTs done since his    last visit.  They were done in 2020 and showed moderate obstructive defect of    67%.  This is actually a 22% improvement from prior PFTs in 2016, however his     diffusion capacity had decreased to 29%, previously it was 39%.  He denies any     increased dyspnea, cough or wheezing.  Denies hemoptysis, fever or chills, but     does report that he is more fatigued and gets a cramping type pain in his     buttocks and down the back of his legs when he tries to ambulate. He tells me he    does not discuss this with his PCP, but he did some vascular studies several     years ago that were apparently ordered through his podiatrist for his lower     extremities.  He is  no longer taking the Advair because it just left such a bad     taste in his mouth and caused his mouth to be irritated. He did feel like his     breathing did better and he was able to do more activity around the house and go    outside and walk further distances when he was on the Advair.  He is still using    Spiriva and feels like this helps him.  He quit smoking years ago.              I have reviewed ROS, medical, surgical and family history and agree with those     as entered in the chart.      Copies To:   Roshan Hood      Constitutional:  Denies: Fatigue, Fever, Weight gain, Weight loss, Chills,     Insomnia, Other      Respiratory/Breathing:  Denies: Shortness of air, Wheezing, Cough, Hemoptysis,     Pleuritic pain, Other      Endocrine:  Denies: Polydipsia, Polyuria, Heat/cold intolerance, Diabetes, Other      Eyes:  Denies: Blurred vision, Vision Changes, Other      Ears, nose, mouth, throat:  Denies: Congestion, Dysphagia, Hearing Changes, Nose    Bleeding, Nasal Discharge, Throat pain, Tinnitus, Other      Cardiovascular:  Denies: Chest Pain, Exertional dyspnea, Peripheral Edema,     Palpitations, Syncope, Wake up Gasping for air, Orthopnea, Tachycardia, Other      Gastrointestinal:  Denies: Abdominal pain/cramping, Bloody stools, Constipation,    Diarrhea, Melena, Nausea, Vomiting, Other      Genitourinary:  Denies: Dysuria, Urinary frequency, Incontinence, Hematuria,     Urgency, Other      Musculoskeletal:  Denies: Joint Pain, Joint Stiffness, Joint Swelling, Myalgias,    Other      Hematologic/lymphatic:  DENIES: Lymphadenopathy, Bruising, Bleeding tendencies,     Other      Neurologic:  Denies: Headache, Numbness, Weakness, Seizures, Other      Psychiatric:  Denies: Anxiety, Appropriate Effect, Depression, Other      Sleep:  No: Excessive daytime sleep, Morning Headache?, Snoring, Insomnia?, Stop    breathing at sleep?, Other      Integumentary:  Denies: Rash, Dry skin, Skin Warm to  Touch, Other            FAMILY/SOCIAL/MEDICAL HX      Current History      Lives with his wife.      Smoker for 50 years, 1 pack per day.      No alcohol or illicit drug use.      Surgical History:  Yes: Bowel Surgery (COLONOSCOPY, cryo prostate surgery),     Orthopedic Surgery (RIGHT HAND SURG.); No: AAA Repair, Abdominal Surgery,     Angioplasty, Appendectomy, Back Surgery, Bladder Surgery, Breast Surgery, CABG,     Carotid Stenosis, Cholecystectomy, Ear Surgery, Eye Surgery, Head Surgery,     Hernia Surgery, Kidney Surgery, Nose Surgery, Oral Surgery, Prostatectomy,     Rectal Surgery, Spinal Surgery, Testicular Surgery, Throat Surgery, Valve     Replacement, Vascular Surgery, Other Surgeries      Diabetes - Family Hx:  Mother, Sister      Cancer/Type - Family Hx:  Brother      Other Family Medical History:  Sister (copd)      Is Father Still Living?:  No      Is Mother Still Living?:  No      Social History:  No Tobacco Use, No Alcohol Use, No Recreational Drug use      Smoking status:  Former smoker (1 ppd x 50y quit 2016)      Smoking packs/day:  1      Smoking history:  25-50 pack years      Counseling given:  Patient declined      Anticoagulation Therapy:  No      Antibiotic Prophylaxis:  No      Medical History:  Yes: Arthritis, Chemotherapy/Cancer (PROSTATE, LUNG-), Chronic    Bronchitis/COPD, Deafness or Ringing Ears (BILAT. ), Hemorrhoids/Rectal Prob (     LARGE COLON POLYP), High Blood Pressure (CONTROLLED), Shortness Of Breath (LUNG     CA, ON INHALERS); No: Alcoholism, Allergies, Anemia, Asthma, Blood Disease,     Broken Bones, Cataracts, Chemical Dependency, Emphysema, Chronic Liver Disease,     Colon Trouble, Colitis, Diverticulitis, Congestive Heart Failu, Convulsions,     Depression, Anxiety, Bipolar Disorder, Diabetes, Epilepsy, Seizures,     Forgetfullness, Glaucoma, Gall Stones, Gout, Head Injury, Heart Attack, Heart     Murmur, Hepatitis, Hiatal Hernia, High Cholesterol, HIV (Do not ask -  volu,     Jaundice, Kidney or Bladder Disease, Kidney Stones, Migrane Headaches, Mitral     Valve Prolapse, Night sweats, Phlebitis, Psychiatric Care, Reflux Disease,     Rheumatic Fever, Sexually Transmitted Dis, Sinus Trouble, Skin     Disease/Psoriais/Ecz, Stroke, Thyroid Problem, Tuberculosis or Pos TB Te,     Miscellaneous Medical/oth      Psychiatric History      none            PREVENTION      Hx Influenza Vaccination:  Yes      Date Influenza Vaccine Given:  Dec 1, 2019      Influenza Vaccine Declined:  No      2 or More Falls in Past Year?:  No      Fall Past Year with Injury?:  No      Hx Pneumococcal Vaccination:  Yes      Encouraged to follow-up with:  PCP regarding preventative exams.      Chart initiated by      Martha Momin CMA            ALLERGIES/MEDICATIONS      Allergies:        Coded Allergies:             NO KNOWN ALLERGIES (Unverified , 8/20/20)      Medications    Last Reconciled on 8/20/20 10:31 by ROSEMARIE GARCIAS      Ergocalciferol (Vitamin D2) (Vitamin D2) 50,000 Units Capsule      08984 UNITS PO MO, #8 CAP         Reported         8/20/20       Ferrous Sulfate (Ferrous Sulfate*) Unknown Strength Tablet      PO QDAY, #30 TAB 0 Refills         Reported         8/20/20       Tiotropium Bromide (Spiriva Respimat 2.5 mcg/Puff) 4 Gm Mist.inhal      2 PUFFS INH RTQDAY, #1 MDI 9 Refills         Prov: Roshan Hood         2/6/20       Atorvastatin (Atorvastatin) 20 Mg Tablet      20 MG PO HS, #30 TAB 0 Refills         Reported         2/6/20       metFORMIN ER (metFORMIN ER) 500 Mg Tab.er.24      1000 MG PO BID, #30 TAB.ER 0 Refills         Reported         2/6/20       MDI-Albuterol (Proair HFA) 8.5 Gm Hfa.aer.ad      1 PUFFS INH Q6H PRN for SHORTNESS OF BREATH, #1 MDI 0 Refills         Reported         8/29/19       Bicalutamide (BICALUTAMIDE) 50 Mg Tab      50 MG PO HS, #30 TAB         Reported         6/21/19       Neb-NaCl 3% (Sodium Chloride 3% Neb) 4 Ml Vial.neb      4 ML INH RTBID for 30  Days, #60 NEB 4 Refills         Prov: Daren,Luz TILLMAN         3/26/19       NEB-Albuterol Sulf (Albuterol) 2.5 Mg/3 Ml Vial.neb      2.5 MG INH Q4-6H PRN for DYSPNEA, #120 NEB 0 Refills         Prov: Marianne Mercedesbrie TILLMAN         3/26/19       Leuprolide Acetate (Lupron Depot) 22.5 Mg Syringekit      22.5 MG IM ONCE, SYRINGE         Reported         2/8/18       Metoprolol Succinate (Metoprolol Succinate) 25 Mg Tab.er.24h      25 MG PO QDAY, #15 TAB 0 Refills         Reported         11/15/17       Folic Acid (Folic Acid) 1 Mg Tablet      1 MG PO QDAY, #30 TAB 0 Refills         Reported         4/14/16       Tamsulosin HCL (Flomax) 0.4 Mg Cap.sr.24h      0.4 MG PO QPM, #30 CAP 0 Refills         Reported         6/16/15       Tolterodine Tartrate (Detrol LA) 4 Mg Cap.sr.24h      4 MG PO QPM, CAP         Reported         6/16/15       Aspirin EC (Aspirin EC) 81 Mg Tabec      81 MG PO QDAY, TAB.EC 0 Refills         Reported         6/16/15       Lisinopril* (Lisinopril*) 10 Mg Tablet      10 MG PO QDAY, TAB 0 Refills         Reported         6/16/15      Current Medications      Current Medications Reviewed 8/20/20            EXAM      CONSTITUTIONAL: Pleasant  normal conversant.       EYES : Pink conjunctive, no ptosis, PERRL.       ENMT : Nose and ears appear normal, normal dentition, mild posterior pharyngeal     wall erythema, no sinus tenderness. Mallampati classification       Neck: Nontender, no masses, no thyromegaly, no nodules.      Resp : Lungs have mildly decreased breath sounds, but no wheezes, rhonchi or     crackles appreciated, normal work of breathing noted.        CVS  : No carotid bruits, s1s2 nl, RRR, no murmur, rubs or gallop, no peripheral    edema       Chest wall: Normal rise with inspiration, nontender on palpation.      GI   : Abdomen soft, with no masses, no hepatosplenomegaly, no hernias, BS+      MSK  : Normal gait and station, no digital cyanosis or clubbing       Skin : No rashes,  ulcerations or lesions, normal turgor and temperature      Neuro: CN II - XII intact, no sensory deficits, DTRs intact and symmetrical, no     motor weakness      Psych: Appropriate affect, A   Vitals      Vitals:             Height 5 ft 11 in / 180.34 cm           Weight 184 lbs 6 oz / 83.583615 kg           BSA 2.04 m2           BMI 25.7 kg/m2           Temperature 98.3 F / 36.83 C - Tympanic           Pulse 97           Respirations 16           Blood Pressure 102/60 Sitting, Right Arm           Pulse Oximetry 96%, room air            REVIEW      Results Reviewed      PCCS Results Reviewed?:  Yes Prev Lab Results, Yes Prev Radiology Results, Yes     Previous [facility] Records      Lab Results      I personally reviewed previous lab work, imaging and provider notes including     most recent PFTs.            Assessment      Notes      New Medications      * FERROUS SULFATE (Ferrous Sulfate*) Unknown Strength TABLET: PO QDAY #30      * Ergocalciferol (Vitamin D2) (Vitamin D2) 50,000 UNITS CAPSULE: 50,000 UNITS PO      MO #8      * ARFORMOTEROL TARTRATE (Brovana) 15 MCG/2 ML VIAL.NEB: 15 MCG INH BID #60         Instructions: [id number] NPI:[id number]      * Neb-Budesonide (Pulmicort) 0.5 MG/2 ML AMPUL.NEB: 0.5 MG INH BID #60         Instructions: [id number] NPI:[id number]      Changed Medications      * metFORMIN  MG TAB.ER.24: 1,000 MG PO BID #30      Discontinued Medications      * Fluticasone/Salmeterol 230/21 (Advair /21 MCG) 12 GM HFA.AER.AD: 2 PUFF      INH RTBID #1      ASSESSMENT:       1. Moderate COPD without acute exacerbation.      2. History of adenocarcinoma of the lungs, status post chemoradiation, followed     by Dr. Flores and Dr. Bergeron, has been in remission reportedly for five years.      3. Claudication.        4. Exertional dyspnea.        5. Tobacco abuse with cigarettes in remission, longstanding.            PLAN:      1.  I have discussed with patient that I can try starting him on  Brovana and     Pulmicort nebs that he will mix and use twice daily.  [facility]     [facility], but he is to call us if he has any issues obtaining     them.  He should continue Spiriva Respimat 2.5 with two puffs once daily.  He     failed Advair due to mouth irritation and bad taste in his mouth. He may     continue to use albuterol or DuoNebs as needed.        2. He should continue following with Dr. Flores for a history of his lung     cancer.        3. I did discuss that recent PFTs showed some improvement from prior other than     diffusion capacity had decreased somewhat.  He did not have any desaturations on    six minute walk test and did not qualify for oxygen with exertion.      4. I have encouraged him to increase his activity as tolerated.        5. Regarding his claudication, I have offered to refer him to vascular surgery,     but he prefers to discuss with his PCP first.      6.  He is up-to-date on flu and pneumonia vaccinations.      7. I will have him follow up in six months with Dr. Hood, sooner if needed.            Patient Education      Patient Education Provided:  COPD, How to use an Inhaler, How to use a Nebulizer      Time Spent:  > 50% /Coord Care            Electronically signed by EPI STAHL PA-C  08/24/2020 16:04       Disclaimer: Converted document may not contain table formatting or lab diagrams. Please see [employer] for the authenticated document.

## 2021-06-03 PROBLEM — C34.91: Status: ACTIVE | Noted: 2021-06-03

## 2021-06-03 RX ORDER — SODIUM CHLORIDE 0.9 % (FLUSH) 0.9 %
20 SYRINGE (ML) INJECTION AS NEEDED
Status: CANCELLED | OUTPATIENT
Start: 2021-06-09

## 2021-06-03 RX ORDER — SODIUM CHLORIDE 0.9 % (FLUSH) 0.9 %
10 SYRINGE (ML) INJECTION AS NEEDED
Status: CANCELLED | OUTPATIENT
Start: 2021-06-09

## 2021-06-03 RX ORDER — HEPARIN SODIUM (PORCINE) LOCK FLUSH IV SOLN 100 UNIT/ML 100 UNIT/ML
500 SOLUTION INTRAVENOUS AS NEEDED
Status: CANCELLED | OUTPATIENT
Start: 2021-06-09

## 2021-06-09 ENCOUNTER — HOSPITAL ENCOUNTER (OUTPATIENT)
Dept: ONCOLOGY | Facility: HOSPITAL | Age: 76
Discharge: HOME | End: 2021-06-09
Admitting: INTERNAL MEDICINE

## 2021-06-09 DIAGNOSIS — C34.91 ADENOCARCINOMA OF BRONCHUS, RIGHT (HCC): Primary | ICD-10-CM

## 2021-06-09 PROCEDURE — 96523 IRRIG DRUG DELIVERY DEVICE: CPT

## 2021-06-09 PROCEDURE — 25010000002 HEPARIN LOCK FLUSH PER 10 UNITS: Performed by: INTERNAL MEDICINE

## 2021-06-09 RX ORDER — SODIUM CHLORIDE 0.9 % (FLUSH) 0.9 %
20 SYRINGE (ML) INJECTION AS NEEDED
Status: DISCONTINUED | OUTPATIENT
Start: 2021-06-09 | End: 2021-06-10 | Stop reason: HOSPADM

## 2021-06-09 RX ORDER — SODIUM CHLORIDE 0.9 % (FLUSH) 0.9 %
10 SYRINGE (ML) INJECTION AS NEEDED
Status: CANCELLED | OUTPATIENT
Start: 2021-06-09

## 2021-06-09 RX ORDER — SODIUM CHLORIDE 0.9 % (FLUSH) 0.9 %
20 SYRINGE (ML) INJECTION AS NEEDED
Status: CANCELLED | OUTPATIENT
Start: 2021-06-09

## 2021-06-09 RX ORDER — HEPARIN SODIUM (PORCINE) LOCK FLUSH IV SOLN 100 UNIT/ML 100 UNIT/ML
500 SOLUTION INTRAVENOUS AS NEEDED
Status: DISCONTINUED | OUTPATIENT
Start: 2021-06-09 | End: 2021-06-10 | Stop reason: HOSPADM

## 2021-06-09 RX ORDER — HEPARIN SODIUM (PORCINE) LOCK FLUSH IV SOLN 100 UNIT/ML 100 UNIT/ML
500 SOLUTION INTRAVENOUS AS NEEDED
Status: CANCELLED | OUTPATIENT
Start: 2021-06-09

## 2021-06-09 RX ADMIN — SODIUM CHLORIDE, PRESERVATIVE FREE 20 ML: 5 INJECTION INTRAVENOUS at 08:34

## 2021-06-09 RX ADMIN — HEPARIN SODIUM (PORCINE) LOCK FLUSH IV SOLN 100 UNIT/ML 500 UNITS: 100 SOLUTION at 08:35

## 2021-06-09 NOTE — ADDENDUM NOTE
Encounter addended by: Jv Sullivan RN on: 6/9/2021 9:06 AM   Actions taken: Order Reconciliation Section accessed

## 2021-06-30 ENCOUNTER — CLINICAL SUPPORT (OUTPATIENT)
Dept: UROLOGY | Facility: CLINIC | Age: 76
End: 2021-06-30

## 2021-06-30 DIAGNOSIS — C61 CANCER OF PROSTATE (HCC): Primary | ICD-10-CM

## 2021-06-30 PROBLEM — J43.9 PULMONARY EMPHYSEMA: Status: ACTIVE | Noted: 2021-06-30

## 2021-06-30 PROBLEM — N32.0 BLADDER OUTFLOW OBSTRUCTION: Status: ACTIVE | Noted: 2021-06-30

## 2021-06-30 PROBLEM — E78.00 HYPERCHOLESTEROLEMIA: Status: ACTIVE | Noted: 2021-06-30

## 2021-06-30 PROBLEM — M48.02 CERVICAL SPINAL STENOSIS: Status: ACTIVE | Noted: 2018-01-23

## 2021-06-30 PROBLEM — M62.58 MUSCLE ATROPHY OF UPPER EXTREMITY: Status: ACTIVE | Noted: 2018-01-23

## 2021-06-30 PROBLEM — N18.31 STAGE 3A CHRONIC KIDNEY DISEASE: Status: ACTIVE | Noted: 2021-01-13

## 2021-06-30 PROBLEM — R20.2 HAND PARESTHESIA: Status: ACTIVE | Noted: 2018-01-23

## 2021-06-30 PROBLEM — E11.21 DIABETIC NEPHROPATHY: Status: ACTIVE | Noted: 2021-01-13

## 2021-06-30 PROBLEM — K63.5 COLON POLYP: Status: ACTIVE | Noted: 2021-06-30

## 2021-06-30 PROBLEM — I10 HTN (HYPERTENSION): Status: ACTIVE | Noted: 2021-01-13

## 2021-06-30 PROCEDURE — 96402 CHEMO HORMON ANTINEOPL SQ/IM: CPT | Performed by: UROLOGY

## 2021-06-30 RX ORDER — TAMSULOSIN HYDROCHLORIDE 0.4 MG/1
CAPSULE ORAL
COMMUNITY
Start: 2021-03-04 | End: 2022-04-05 | Stop reason: SDUPTHER

## 2021-06-30 RX ORDER — TOLTERODINE 4 MG/1
CAPSULE, EXTENDED RELEASE ORAL
COMMUNITY
Start: 2021-03-04 | End: 2022-04-05 | Stop reason: SDUPTHER

## 2021-06-30 RX ORDER — ASPIRIN 81 MG/1
TABLET ORAL
COMMUNITY
End: 2021-10-05

## 2021-06-30 RX ORDER — FOLIC ACID 1 MG/1
TABLET ORAL
COMMUNITY

## 2021-06-30 RX ORDER — TIOTROPIUM BROMIDE INHALATION SPRAY 3.12 UG/1
SPRAY, METERED RESPIRATORY (INHALATION)
COMMUNITY
Start: 2020-12-27 | End: 2021-07-14

## 2021-06-30 RX ORDER — LEUPROLIDE ACETATE 22.5 MG
22.5 KIT INTRAMUSCULAR
COMMUNITY

## 2021-06-30 RX ORDER — METOPROLOL SUCCINATE 25 MG/1
TABLET, EXTENDED RELEASE ORAL
COMMUNITY

## 2021-06-30 RX ORDER — LISINOPRIL 10 MG/1
TABLET ORAL
COMMUNITY
End: 2023-03-08 | Stop reason: SDUPTHER

## 2021-06-30 RX ORDER — ATORVASTATIN CALCIUM 40 MG/1
TABLET, FILM COATED ORAL
COMMUNITY

## 2021-06-30 RX ORDER — DARIFENACIN HYDROBROMIDE 15 MG/1
TABLET, EXTENDED RELEASE ORAL
COMMUNITY
End: 2022-05-27 | Stop reason: SDUPTHER

## 2021-06-30 RX ORDER — LANOLIN ALCOHOL/MO/W.PET/CERES
CREAM (GRAM) TOPICAL
COMMUNITY
End: 2021-08-17 | Stop reason: SDUPTHER

## 2021-07-14 ENCOUNTER — OFFICE VISIT (OUTPATIENT)
Dept: PULMONOLOGY | Facility: CLINIC | Age: 76
End: 2021-07-14

## 2021-07-14 VITALS
HEART RATE: 72 BPM | RESPIRATION RATE: 14 BRPM | WEIGHT: 186 LBS | DIASTOLIC BLOOD PRESSURE: 64 MMHG | SYSTOLIC BLOOD PRESSURE: 120 MMHG | BODY MASS INDEX: 26.04 KG/M2 | OXYGEN SATURATION: 96 % | HEIGHT: 71 IN

## 2021-07-14 DIAGNOSIS — J43.2 CENTRILOBULAR EMPHYSEMA (HCC): Primary | ICD-10-CM

## 2021-07-14 DIAGNOSIS — C34.91 ADENOCARCINOMA OF BRONCHUS, RIGHT (HCC): ICD-10-CM

## 2021-07-14 PROBLEM — C34.90 CANCER OF LUNG (HCC): Status: ACTIVE | Noted: 2021-07-14

## 2021-07-14 PROCEDURE — 99214 OFFICE O/P EST MOD 30 MIN: CPT | Performed by: INTERNAL MEDICINE

## 2021-07-14 RX ORDER — TIOTROPIUM BROMIDE AND OLODATEROL 3.124; 2.736 UG/1; UG/1
2 SPRAY, METERED RESPIRATORY (INHALATION)
Qty: 1 EACH | Refills: 11 | COMMUNITY
Start: 2021-07-14 | End: 2022-11-30 | Stop reason: SDUPTHER

## 2021-07-14 RX ORDER — TIOTROPIUM BROMIDE AND OLODATEROL 3.124; 2.736 UG/1; UG/1
2 SPRAY, METERED RESPIRATORY (INHALATION)
Qty: 1 EACH | Refills: 11 | Status: SHIPPED | OUTPATIENT
Start: 2021-07-14 | End: 2021-07-14 | Stop reason: SDUPTHER

## 2021-07-14 RX ORDER — IPRATROPIUM BROMIDE AND ALBUTEROL SULFATE 2.5; .5 MG/3ML; MG/3ML
3 SOLUTION RESPIRATORY (INHALATION) EVERY 4 HOURS PRN
Qty: 360 ML | Refills: 4 | Status: SHIPPED | OUTPATIENT
Start: 2021-07-14 | End: 2022-11-30 | Stop reason: SDUPTHER

## 2021-07-14 RX ORDER — ARFORMOTEROL TARTRATE 15 UG/2ML
SOLUTION RESPIRATORY (INHALATION)
COMMUNITY
End: 2021-07-14 | Stop reason: SDUPTHER

## 2021-07-14 RX ORDER — FLUTICASONE PROPIONATE 220 UG/1
1 AEROSOL, METERED RESPIRATORY (INHALATION)
Qty: 1 EACH | Refills: 11 | Status: SHIPPED | OUTPATIENT
Start: 2021-07-14 | End: 2022-11-30 | Stop reason: SDUPTHER

## 2021-07-14 RX ORDER — IPRATROPIUM BROMIDE AND ALBUTEROL SULFATE 2.5; .5 MG/3ML; MG/3ML
3 SOLUTION RESPIRATORY (INHALATION) EVERY 4 HOURS PRN
COMMUNITY
End: 2021-07-14 | Stop reason: SDUPTHER

## 2021-07-14 RX ORDER — ALBUTEROL SULFATE 2.5 MG/3ML
2.5 SOLUTION RESPIRATORY (INHALATION) EVERY 4 HOURS PRN
Qty: 1 EACH | Refills: 11 | Status: SHIPPED | OUTPATIENT
Start: 2021-07-14 | End: 2022-05-19

## 2021-07-14 RX ORDER — BICALUTAMIDE 50 MG/1
TABLET, FILM COATED ORAL DAILY
COMMUNITY
End: 2022-01-28 | Stop reason: SDUPTHER

## 2021-07-14 RX ORDER — ALBUTEROL SULFATE 2.5 MG/3ML
2.5 SOLUTION RESPIRATORY (INHALATION) EVERY 4 HOURS PRN
COMMUNITY
End: 2021-07-14 | Stop reason: SDUPTHER

## 2021-07-14 RX ORDER — BUDESONIDE 0.25 MG/2ML
0.5 INHALANT ORAL
COMMUNITY
End: 2021-07-14

## 2021-07-14 NOTE — PROGRESS NOTES
Primary Care Provider  Angelic Malagon APRN     Referring Provider  No ref. provider found     Chief Complaint  Emphysema and Follow-up    Subjective          Daron Whitfield presents to [facility]MON[facility]ARE [facility]ICINE  History of Present Illness    Mr. Whitfield is 76 years old male with history of stage III lung cancer status post chemo and radiation in past, COPD, exertional dyspnea.  He is here for follow-up.  Since his last office visit he has been taking Spiriva once daily.  He was supposed to be on Brovana and Pulmicort twice daily but did not feel like it helped him significantly.  So he stopped using Brovana and Pulmicort.  He continues to have shortness of breath with minimal exertion and so complains that he is not able to do much activities at home at all.  He has no changes in weight or appetite.  He had repeat CT scan of the chest recently which shows no recurrence of cancer.  He was told that he would have repeat imagings once a year now.  He does not cough up blood.  Does not have any changes in weight or appetite.  No nausea or vomiting no fever or chills.  He is willing to switch his inhalers.  He says Spiriva helps the most.    Review of Systems   Constitutional: Negative for chills, fatigue, fever, unexpected weight gain and unexpected weight loss.        Negative for Insomnia   HENT: Negative for congestion (Nasal), hearing loss, mouth sores, nosebleeds, postnasal drip, sore throat and trouble swallowing.         Negative for Thrush  Negative for Hoarseness  Negative for Allergies/Hay Fever  Negative for Recent Head injury  Negative for Ear Fullness  Negative for Nasal or Sinus pain  Negative for Dry lips  Negative for Nasal discharge   Eyes: Negative for blurred vision and visual disturbance.   Respiratory: Positive for cough, shortness of breath and wheezing. Negative for apnea.         Negative for Hemoptysis  Negative for Pleuritic pain   Cardiovascular:  Negative for chest pain, palpitations and leg swelling.        Negative for Claudication  Negative for Cyanosis  Negative for Dyspnea on exertion   Gastrointestinal: Negative for abdominal pain, blood in stool, constipation, diarrhea, nausea, vomiting and GERD.        Negative for Jaundice  Negative for Bloating  Negative for Melena   Endocrine: Negative for cold intolerance, heat intolerance, polydipsia and polyuria.        Negative for Diabetes   Genitourinary: Negative for dysuria, frequency, hematuria, urgency and urinary incontinence.        Negative for Nocturia  Negative for Testicular problems   Musculoskeletal: Negative for joint swelling and myalgias.        Negative for Joint pain  Negative for Joint stiffness   Skin: Negative for dry skin and rash.        Negative for Skin warm to the touch   Neurological: Negative for seizures, syncope, weakness, numbness and headache.   Hematological: Negative for adenopathy. Does not bruise/bleed easily.   Psychiatric/Behavioral: Negative for depressed mood.          Family History   Problem Relation Age of Onset   • Diabetes Mother    • Cancer Sister    • Diabetes Sister    • Hypertension Sister         Social History     Socioeconomic History   • Marital status: Unknown     Spouse name: Not on file   • Number of children: Not on file   • Years of education: Not on file   • Highest education level: Not on file   Tobacco Use   • Smoking status: Former Smoker     Packs/day: 1.00     Years: 50.00     Pack years: 50.00     Types: Cigarettes     Quit date:      Years since quittin.5   • Smokeless tobacco: Never Used   Vaping Use   • Vaping Use: Never used   Substance and Sexual Activity   • Alcohol use: Yes     Alcohol/week: 1.0 standard drinks     Types: 1 Cans of beer per week     Comment: casual drinker   • Drug use: Never   • Sexual activity: Defer        Past Medical History:   Diagnosis Date   • Emphysema of lung (CMS/HCC)           There is no immunization  history on file for this patient.        No Known Allergies       Current Outpatient Medications:   •  albuterol (PROVENTIL) (2.5 MG/3ML) 0.083% nebulizer solution, Take 2.5 mg by nebulization Every 4 (Four) Hours As Needed for Wheezing., Disp: , Rfl:   •  arformoterol (BROVANA) 15 MCG/2ML nebulizer solution, Take  by nebulization 2 (Two) Times a Day., Disp: , Rfl:   •  aspirin (aspirin) 81 MG EC tablet, aspirin 81 mg oral tablet,delayed release (DR/EC) take 1 tablet (81 mg) by oral route once daily   Active, Disp: , Rfl:   •  atorvastatin (Lipitor) 40 MG tablet, Lipitor 40 mg oral tablet take 1 tablet (40 mg) by oral route once daily   Suspended, Disp: , Rfl:   •  bicalutamide (CASODEX) 50 MG chemo tablet, Take  by mouth Daily., Disp: , Rfl:   •  budesonide (PULMICORT) 0.25 MG/2ML nebulizer solution, Take 0.5 mg by nebulization Daily., Disp: , Rfl:   •  Ergocalciferol (VITAMIN D2 PO), Take  by mouth., Disp: , Rfl:   •  ferrous sulfate 325 (65 FE) MG EC tablet, ferrous sulfate 325 mg (65 mg iron) oral tablet,delayed release (DR/EC) take 1 tablet by oral route daily   Active, Disp: , Rfl:   •  ipratropium-albuterol (DUO-NEB) 0.5-2.5 mg/3 ml nebulizer, Take 3 mL by nebulization Every 4 (Four) Hours As Needed for Wheezing., Disp: , Rfl:   •  leuprolide (Lupron Depot, 3-Month,) 22.5 MG injection, 22.5 mg., Disp: , Rfl:   •  lisinopril (PRINIVIL,ZESTRIL) 10 MG tablet, lisinopril 10 mg oral tablet take 1 tablet (10 mg) by oral route once daily   Active, Disp: , Rfl:   •  metFORMIN (GLUCOPHAGE) 1000 MG tablet, metformin 1,000 mg oral tablet take 1 tablet (1,000 mg) by oral route 2 times per day with morning and evening meals   Active, Disp: , Rfl:   •  metoprolol succinate XL (Toprol XL) 25 MG 24 hr tablet, Toprol XL 25 mg oral tablet extended release 24 hr take 1 tablet (25 mg) by oral route once daily   Active, Disp: , Rfl:   •  tamsulosin (Flomax) 0.4 MG capsule 24 hr capsule, Flomax 0.4 mg oral capsule take 1 capsule  "(0.4 mg) by oral route once daily 1/2 hour following the same meal each day for 90 days 3/4/2021  Active, Disp: , Rfl:   •  tiotropium bromide monohydrate (Spiriva Respimat) 2.5 MCG/ACT aerosol solution inhaler, , Disp: , Rfl:   •  tolterodine LA (Detrol LA) 4 MG 24 hr capsule, Detrol LA 4 mg oral capsule,extended release 24hr take 1 capsule (4 mg) by oral route once daily for 90 days 3/4/2021  Active, Disp: , Rfl:   •  Cholecalciferol 50 MCG (2000 UT) capsule, Take  by mouth., Disp: , Rfl:   •  darifenacin (Enablex) 15 MG 24 hr tablet, Enablex 15 mg oral tablet extended release 24 hr take 1 tablet (15 mg) by oral route once daily   Suspended, Disp: , Rfl:   •  fluticasone-salmeterol (Advair Diskus) 100-50 MCG/DOSE DISKUS, Advair Diskus 100-50 mcg/dose inhalation blister with device inhale 1 puff by inhalation route 2 times per day in the morning and evening approximately 12 hours apart   Suspended, Disp: , Rfl:   •  folic acid (FOLVITE) 1 MG tablet, folic acid 1 mg oral tablet take 1 tablet (1 mg) by oral route once daily   Active, Disp: , Rfl:      Objective   Vital Signs:   /64   Pulse 72   Resp 14   Ht 180.3 cm (71\")   Wt 84.4 kg (186 lb)   SpO2 96% Comment: room air  BMI 25.94 kg/m²     Mallampatti classification : 1  Physical Exam  Vitals and nursing note reviewed.   Constitutional:       General: He is not in acute distress.     Appearance: Normal appearance. He is normal weight.   HENT:      Head: Normocephalic and atraumatic.      Right Ear: Hearing normal.      Left Ear: Hearing normal.      Nose: No nasal tenderness or congestion.      Mouth/Throat:      Mouth: Mucous membranes are moist. No oral lesions.      Pharynx: Oropharynx is clear.   Eyes:      Extraocular Movements: Extraocular movements intact.      Pupils: Pupils are equal, round, and reactive to light.   Neck:      Thyroid: No thyroid mass or thyromegaly.   Cardiovascular:      Rate and Rhythm: Normal rate and regular rhythm.      " Pulses: Normal pulses.      Heart sounds: No murmur heard.     Pulmonary:      Effort: Pulmonary effort is normal. No respiratory distress.      Breath sounds: No wheezing, rhonchi or rales.      Comments: Bilateral diminished breath sounds  Chest:      Chest wall: No tenderness.   Abdominal:      General: Bowel sounds are normal. There is no distension.      Palpations: Abdomen is soft. There is no mass.      Tenderness: There is no abdominal tenderness. There is no guarding.   Musculoskeletal:         General: Normal range of motion.      Cervical back: Normal range of motion and neck supple.      Right lower leg: No edema.      Left lower leg: No edema.   Lymphadenopathy:      Cervical: No cervical adenopathy.      Upper Body:      Right upper body: No supraclavicular or axillary adenopathy.      Left upper body: No supraclavicular or axillary adenopathy.   Skin:     General: Skin is warm and dry.      Capillary Refill: Capillary refill takes less than 2 seconds.      Findings: No lesion or rash.   Neurological:      General: No focal deficit present.      Mental Status: He is alert and oriented to person, place, and time.      Cranial Nerves: Cranial nerves are intact.   Psychiatric:         Mood and Affect: Mood and affect normal. Mood is not anxious or depressed.         Behavior: Behavior normal.            Result Review :     The following data was reviewed by: Roshan Hood MD on 07/14/2021:  Common labs    Common Labsle 11/4/20   PSA <0.01               Data reviewed: Radiologic studies Recent CT scan of the chest was reviewed and Cardiology studies Previous echocardiogram was reviewed.            Assessment and Plan      Severe COPD  Chronic exertional dyspnea  History of lung cancer stage III, status post chemoradiation, currently in remission    Plan:  Switch Spiriva to Stiolto once daily.  I have given him sample of Stiolto.  Start Flovent 2 puffs twice daily.  Use albuterol as needed.  He has DuoNeb  at home, I advised him to use DuoNeb several times a day.  Stop Brovana and Pulmicort.  Continue using albuterol as needed.  Keep himself as active as possible.  Pulmonary rehab was discussed, patient wants to hold off on it now.  [facility] 3.  Yearly CT scan for surveillance of lung cancer recurrence per oncology service.    Follow Up     4 months   Patient was given instructions and counseling regarding his condition or for health maintenance advice. Please see specific information pulled into the AVS if appropriate.       Electronically signed by Roshan Hood MD, 7/14/2021, 09:39 EDT.

## 2021-07-15 VITALS
BODY MASS INDEX: 27.16 KG/M2 | WEIGHT: 194 LBS | SYSTOLIC BLOOD PRESSURE: 115 MMHG | DIASTOLIC BLOOD PRESSURE: 62 MMHG | HEIGHT: 71 IN | HEART RATE: 95 BPM

## 2021-07-30 DIAGNOSIS — R35.1 NOCTURIA: Primary | ICD-10-CM

## 2021-07-30 RX ORDER — TAMSULOSIN HYDROCHLORIDE 0.4 MG/1
1 CAPSULE ORAL DAILY
Qty: 90 CAPSULE | Refills: 3 | Status: SHIPPED | OUTPATIENT
Start: 2021-07-30 | End: 2021-10-28

## 2021-08-04 ENCOUNTER — HOSPITAL ENCOUNTER (OUTPATIENT)
Dept: ONCOLOGY | Facility: HOSPITAL | Age: 76
Setting detail: INFUSION SERIES
Discharge: HOME | End: 2021-08-04

## 2021-08-04 DIAGNOSIS — C34.91 ADENOCARCINOMA OF BRONCHUS, RIGHT (HCC): Primary | ICD-10-CM

## 2021-08-04 PROCEDURE — 96523 IRRIG DRUG DELIVERY DEVICE: CPT

## 2021-08-04 PROCEDURE — 25010000002 HEPARIN LOCK FLUSH PER 10 UNITS: Performed by: INTERNAL MEDICINE

## 2021-08-04 RX ORDER — SODIUM CHLORIDE 0.9 % (FLUSH) 0.9 %
10 SYRINGE (ML) INJECTION AS NEEDED
Status: CANCELLED | OUTPATIENT
Start: 2021-08-04

## 2021-08-04 RX ORDER — SODIUM CHLORIDE 0.9 % (FLUSH) 0.9 %
20 SYRINGE (ML) INJECTION AS NEEDED
Status: CANCELLED | OUTPATIENT
Start: 2021-08-04

## 2021-08-04 RX ORDER — HEPARIN SODIUM (PORCINE) LOCK FLUSH IV SOLN 100 UNIT/ML 100 UNIT/ML
500 SOLUTION INTRAVENOUS AS NEEDED
Status: DISCONTINUED | OUTPATIENT
Start: 2021-08-04 | End: 2021-08-05 | Stop reason: HOSPADM

## 2021-08-04 RX ORDER — HEPARIN SODIUM (PORCINE) LOCK FLUSH IV SOLN 100 UNIT/ML 100 UNIT/ML
500 SOLUTION INTRAVENOUS AS NEEDED
Status: CANCELLED | OUTPATIENT
Start: 2021-08-04

## 2021-08-04 RX ORDER — SODIUM CHLORIDE 0.9 % (FLUSH) 0.9 %
20 SYRINGE (ML) INJECTION AS NEEDED
Status: DISCONTINUED | OUTPATIENT
Start: 2021-08-04 | End: 2021-08-05 | Stop reason: HOSPADM

## 2021-08-04 RX ADMIN — SODIUM CHLORIDE, PRESERVATIVE FREE 20 ML: 5 INJECTION INTRAVENOUS at 08:34

## 2021-08-04 RX ADMIN — HEPARIN SODIUM (PORCINE) LOCK FLUSH IV SOLN 100 UNIT/ML 500 UNITS: 100 SOLUTION at 08:35

## 2021-08-17 ENCOUNTER — OFFICE VISIT (OUTPATIENT)
Dept: OTOLARYNGOLOGY | Facility: CLINIC | Age: 76
End: 2021-08-17

## 2021-08-17 VITALS — HEIGHT: 71 IN | TEMPERATURE: 97.3 F | BODY MASS INDEX: 26.6 KG/M2 | WEIGHT: 190 LBS

## 2021-08-17 DIAGNOSIS — K11.5 SIALOLITHIASIS OF SUBMANDIBULAR GLAND: Primary | ICD-10-CM

## 2021-08-17 PROCEDURE — 99203 OFFICE O/P NEW LOW 30 MIN: CPT | Performed by: OTOLARYNGOLOGY

## 2021-08-17 RX ORDER — FESOTERODINE FUMARATE 8 MG/1
TABLET, FILM COATED, EXTENDED RELEASE ORAL
COMMUNITY
End: 2022-06-20

## 2021-08-17 RX ORDER — SILDENAFIL 100 MG/1
TABLET, FILM COATED ORAL
COMMUNITY
Start: 2021-06-08

## 2021-08-17 RX ORDER — FERROUS SULFATE 325(65) MG
TABLET ORAL
COMMUNITY
Start: 2021-06-08

## 2021-08-17 RX ORDER — ERGOCALCIFEROL 1.25 MG/1
CAPSULE ORAL
COMMUNITY
Start: 2021-06-08 | End: 2022-09-10

## 2021-08-17 NOTE — PROGRESS NOTES
Patient Name: Daron Whitfield   Visit Date: 08/17/2021   Patient ID: [id number]  Provider: Guillermo Tariq MD    Sex: male  Location: [facility]   YOB: 1945  Location Address: [address] [place] [address], [address] [place] [address],?[phone]    Primary Care Provider Angelic Malagon APRN  Location Phone: [phone]    Referring Provider: Angelic Malagon*        Chief Complaint  Swollen Glands    Subjective    History of Present Illness  Daron Whitfield is a 76 y.o. male who presents to [facility] today as a consult from Angelic Malagon*.    He presents the clinic today for evaluation of left submandibular gland stone that was discovered by his dentist on routine imaging.  The patient notes no current symptoms from this, denies any pain or swelling during mealtime.  He notes that he has had swelling in the past, about 6 months ago on the left side which resolved within several days without any treatments.  I reviewed the [employer] images he had saved on his phone and this shows a large sialolith, over 1 cm in the left submandibular gland.  Overall the patient has been feeling well, and he denies any weight loss or malaise.    Past Medical History:   Diagnosis Date   • Bladder outflow obstruction    • Cervical spinal stenosis 01/23/2018   • Colon polyp    • Emphysema lung (CMS/HCC)    • Emphysema of lung (CMS/HCC)    • Hand paresthesia 01/23/2018   • HTN (hypertension)    • Hypercholesteremia    • Lung cancer (CMS/HCC) 2016   • Muscle atrophy of upper extremity 01/23/2018   • Numbness and tingling in both hands    • OAB (overactive bladder) 12/09/2014   • Prostate cancer (CMS/HCC) 2012   • Ulnar neuropathy 01/23/2018       Past Surgical History:   Procedure Laterality Date   • COLONOSCOPY  2019    DR CUETO   • CRYOABLATION OF PROSTATE  01/17/2012   • CYSTOSCOPY     • LUNG BIOPSY     • TEETH EXTRACTION     • TURP / TRANSURETHRAL INCISION  / DRAINAGE PROSTATE  06/16/2015         Current Outpatient Medications:   •  albuterol (PROVENTIL) (2.5 MG/3ML) 0.083% nebulizer solution, Take 2.5 mg by nebulization Every 4 (Four) Hours As Needed for Wheezing., Disp: 1 each, Rfl: 11  •  atorvastatin (Lipitor) 40 MG tablet, Lipitor 40 mg oral tablet take 1 tablet (40 mg) by oral route once daily   Suspended, Disp: , Rfl:   •  bicalutamide (CASODEX) 50 MG chemo tablet, Take  by mouth Daily., Disp: , Rfl:   •  Cholecalciferol 50 MCG (2000 UT) capsule, Take  by mouth., Disp: , Rfl:   •  darifenacin (Enablex) 15 MG 24 hr tablet, Enablex 15 mg oral tablet extended release 24 hr take 1 tablet (15 mg) by oral route once daily   Suspended, Disp: , Rfl:   •  FeroSul 325 (65 Fe) MG tablet, , Disp: , Rfl:   •  fesoterodine fumarate (Toviaz) 8 MG tablet sustained-release 24 hour tablet, Toviaz 8 mg oral tablet extended release 24 hr take 1 tablet (8 mg) by oral route once daily   Suspended, Disp: , Rfl:   •  fluticasone (Flovent HFA) 220 MCG/ACT inhaler, Inhale 1 puff 2 (Two) Times a Day., Disp: 1 each, Rfl: 11  •  ipratropium-albuterol (DUO-NEB) 0.5-2.5 mg/3 ml nebulizer, Take 3 mL by nebulization Every 4 (Four) Hours As Needed for Wheezing., Disp: 360 mL, Rfl: 4  •  leuprolide (Lupron Depot, 3-Month,) 22.5 MG injection, 22.5 mg., Disp: , Rfl:   •  lisinopril (PRINIVIL,ZESTRIL) 10 MG tablet, lisinopril 10 mg oral tablet take 1 tablet (10 mg) by oral route once daily   Active, Disp: , Rfl:   •  metFORMIN (GLUCOPHAGE) 1000 MG tablet, metformin 1,000 mg oral tablet take 1 tablet (1,000 mg) by oral route 2 times per day with morning and evening meals   Active, Disp: , Rfl:   •  metFORMIN (GLUCOPHAGE) 500 MG tablet, , Disp: , Rfl:   •  metoprolol succinate XL (Toprol XL) 25 MG 24 hr tablet, Toprol XL 25 mg oral tablet extended release 24 hr take 1 tablet (25 mg) by oral route once daily   Active, Disp: , Rfl:   •  sildenafil (VIAGRA) 100 MG tablet, , Disp: , Rfl:   •  tamsulosin  "(Flomax) 0.4 MG capsule 24 hr capsule, Flomax 0.4 mg oral capsule take 1 capsule (0.4 mg) by oral route once daily 1/2 hour following the same meal each day for 90 days 3/4/2021  Active, Disp: , Rfl:   •  tamsulosin (FLOMAX) 0.4 MG capsule 24 hr capsule, Take 1 capsule by mouth Daily for 90 days., Disp: 90 capsule, Rfl: 3  •  tiotropium (Spiriva HandiHaler) 18 MCG per inhalation capsule, Spiriva with HandiHaler 18 mcg inhalation capsule, w/inhalation device inhale 1 capsule (18 mcg) by inhalation route once daily   Active, Disp: , Rfl:   •  tiotropium bromide-olodaterol (Stiolto Respimat) 2.5-2.5 MCG/ACT aerosol solution inhaler, Inhale 2 puffs Daily. Lot # [id number] Expiration: June 2023, Disp: 1 each, Rfl: 11  •  tolterodine LA (Detrol LA) 4 MG 24 hr capsule, Detrol LA 4 mg oral capsule,extended release 24hr take 1 capsule (4 mg) by oral route once daily for 90 days 3/4/2021  Active, Disp: , Rfl:   •  vitamin D (ERGOCALCIFEROL) 1.25 MG (65860 UT) capsule capsule, , Disp: , Rfl:   •  aspirin (aspirin) 81 MG EC tablet, aspirin 81 mg oral tablet,delayed release (DR/EC) take 1 tablet (81 mg) by oral route once daily   Active, Disp: , Rfl:   •  folic acid (FOLVITE) 1 MG tablet, folic acid 1 mg oral tablet take 1 tablet (1 mg) by oral route once daily   Active, Disp: , Rfl:      No Known Allergies    Family History   Problem Relation Age of Onset   • Diabetes Mother    • Cancer Sister    • Diabetes Sister    • Colon cancer Sister 60   • Hypertension Sister    • Diabetes Sister    • Coronary artery disease Brother         Social History     Social History Narrative   • Not on file       Objective     Vital Signs:   Temp 97.3 °F (36.3 °C) (Temporal)   Ht 180.3 cm (71\")   Wt 86.2 kg (190 lb)   BMI 26.50 kg/m²       Physical Exam         Constitutional   Appearance  · : well developed, well-nourished, alert and in no acute distress, voice clear and strong    Head  Inspection  · : no deformities or " lesions  Face  Inspection  · : No facial lesions; House-Brackmann I/VI bilaterally  Palpation  · : No TMJ crepitus nor  muscle tenderness bilaterally    Eyes  Vision  Visual Fields  · : Extraocular movements are intact. No spontaneous or gaze-induced nystagmus.  Conjunctivae  · : clear  Sclerae  · : clear  Pupils and Irises  · : pupils equal, round, and reactive to light.     Ears, Nose, Mouth and Throat    Ears    External Ears  · : appearance within normal limits, no lesions present  Otoscopic Examination  · : Tympanic membrane appearance within normal limits bilaterally without perforations, well-aerated middle ears  Hearing  · : intact to conversational voice both ears  Tunning fork testing:     :    Nose    External Nose  · : appearance normal  Intranasal Exam  · : mucosa within normal limits, vestibules normal, no intranasal lesions present, septum midline, sinuses non tender to percussion  Oral Cavity    Oral Mucosa  · : oral mucosa normal without pallor or cyanosis  Lips  · : lip appearance normal  Teeth  · : normal dentition for age  Gums  · : gums pink, non-swollen, no bleeding present  Tongue  · : tongue appearance normal; normal mobility  Palate  · : hard palate normal, soft palate appearance normal with symmetric mobility    Throat    Oropharynx  · : no inflammation or lesions present, tonsils within normal limits  Hypopharynx  · : appearance within normal limits, superior epiglottis within normal limits  Larynx  · : appearance within normal limits, vocal cords within normal limits, no lesions present    Neck  Inspection/Palpation  · : normal appearance, no masses or tenderness, trachea midline; thyroid size normal, nontender, palpation of the floor of mouth revealed a large submandibular gland stone in the left side with no surrounding inflammation or swelling    Respiratory  Respiratory Effort  · : breathing unlabored  Inspection of Chest  · : normal appearance, no  retractions    Cardiovascular  Heart  · : regular rate and rhythm    Lymphatic  Neck  · : no lymphadenopathy present  Supraclavicular Nodes  · : no lymphadenopathy present  Preauricular Nodes  · : no lymphadenopathy present    Skin and Subcutaneous Tissue  General Inspection  · : Regarding face and neck - there are no rashes present, no lesions present, and no areas of discoloration    Neurologic  Cranial Nerves  · : cranial nerves II-XII are grossly intact bilaterally  Gait and Station  · : normal gait, able to stand without diffculty    Psychiatric  Judgement and Insight  · : judgment and insight intact  Mood and Affect  · : mood normal, affect appropriate          Assessment and Plan    Diagnoses and all orders for this visit:    1. Sialolithiasis of submandibular gland (Primary)    Examination revealed large left submandibular gland stone with no surrounding inflammation or swelling. I discussed treatment options for this including observation as he has only had swelling from this once. We also discussed excision including both transoral and external incision along with the submandibular gland. For now, we will plan to observe. I have recommended adequate hydration and sialagogues as well as massaging should there be any swelling. If this becomes a recurrent problem, of asked him to contact me for further evaluation and management.    Follow Up   No follow-ups on file.  Patient was given instructions and counseling regarding his condition or for health maintenance advice. Please see specific information pulled into the AVS if appropriate.

## 2021-09-29 ENCOUNTER — HOSPITAL ENCOUNTER (OUTPATIENT)
Dept: ONCOLOGY | Facility: HOSPITAL | Age: 76
Setting detail: INFUSION SERIES
Discharge: HOME | End: 2021-09-29

## 2021-09-29 DIAGNOSIS — C34.91 ADENOCARCINOMA OF BRONCHUS, RIGHT (HCC): ICD-10-CM

## 2021-09-29 DIAGNOSIS — C34.91 ADENOCARCINOMA OF BRONCHUS, RIGHT (HCC): Primary | ICD-10-CM

## 2021-09-29 PROCEDURE — 25010000002 HEPARIN LOCK FLUSH PER 10 UNITS: Performed by: INTERNAL MEDICINE

## 2021-09-29 PROCEDURE — 96523 IRRIG DRUG DELIVERY DEVICE: CPT

## 2021-09-29 RX ORDER — HEPARIN SODIUM (PORCINE) LOCK FLUSH IV SOLN 100 UNIT/ML 100 UNIT/ML
500 SOLUTION INTRAVENOUS AS NEEDED
Status: DISCONTINUED | OUTPATIENT
Start: 2021-09-29 | End: 2021-09-30 | Stop reason: HOSPADM

## 2021-09-29 RX ORDER — HEPARIN SODIUM (PORCINE) LOCK FLUSH IV SOLN 100 UNIT/ML 100 UNIT/ML
500 SOLUTION INTRAVENOUS AS NEEDED
Status: CANCELLED | OUTPATIENT
Start: 2021-09-29

## 2021-09-29 RX ORDER — SODIUM CHLORIDE 0.9 % (FLUSH) 0.9 %
20 SYRINGE (ML) INJECTION AS NEEDED
Status: CANCELLED | OUTPATIENT
Start: 2021-09-29

## 2021-09-29 RX ORDER — SODIUM CHLORIDE 0.9 % (FLUSH) 0.9 %
20 SYRINGE (ML) INJECTION AS NEEDED
Status: DISCONTINUED | OUTPATIENT
Start: 2021-09-29 | End: 2021-09-30 | Stop reason: HOSPADM

## 2021-09-29 RX ORDER — SODIUM CHLORIDE 0.9 % (FLUSH) 0.9 %
10 SYRINGE (ML) INJECTION AS NEEDED
Status: CANCELLED | OUTPATIENT
Start: 2021-09-29

## 2021-09-29 RX ADMIN — SODIUM CHLORIDE, PRESERVATIVE FREE 20 ML: 5 INJECTION INTRAVENOUS at 08:06

## 2021-09-29 RX ADMIN — HEPARIN SODIUM (PORCINE) LOCK FLUSH IV SOLN 100 UNIT/ML 500 UNITS: 100 SOLUTION at 08:06

## 2021-09-30 ENCOUNTER — CLINICAL SUPPORT (OUTPATIENT)
Dept: UROLOGY | Facility: CLINIC | Age: 76
End: 2021-09-30

## 2021-09-30 DIAGNOSIS — C61 CANCER OF PROSTATE (HCC): Primary | ICD-10-CM

## 2021-09-30 PROCEDURE — 96402 CHEMO HORMON ANTINEOPL SQ/IM: CPT | Performed by: UROLOGY

## 2021-10-05 ENCOUNTER — HOSPITAL ENCOUNTER (EMERGENCY)
Facility: HOSPITAL | Age: 76
Discharge: HOME | End: 2021-10-05
Attending: EMERGENCY MEDICINE | Admitting: EMERGENCY MEDICINE

## 2021-10-05 ENCOUNTER — APPOINTMENT (OUTPATIENT)
Dept: GENERAL RADIOLOGY | Facility: HOSPITAL | Age: 76
End: 2021-10-05

## 2021-10-05 VITALS
DIASTOLIC BLOOD PRESSURE: 72 MMHG | RESPIRATION RATE: 20 BRPM | HEIGHT: 71 IN | BODY MASS INDEX: 26.48 KG/M2 | SYSTOLIC BLOOD PRESSURE: 129 MMHG | HEART RATE: 72 BPM | TEMPERATURE: 98.5 F | WEIGHT: 189.15 LBS | OXYGEN SATURATION: 95 %
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
## 2021-10-05 DIAGNOSIS — S46.812A STRAIN OF LEFT TRAPEZIUS MUSCLE, INITIAL ENCOUNTER: ICD-10-CM

## 2021-10-05 DIAGNOSIS — S46.912A STRAIN OF LEFT SHOULDER, INITIAL ENCOUNTER: ICD-10-CM

## 2021-10-05 LAB
HOLD SPECIMEN: NORMAL
HOLD SPECIMEN: NORMAL
WHOLE BLOOD HOLD SPECIMEN: NORMAL
WHOLE BLOOD HOLD SPECIMEN: NORMAL

## 2021-10-05 PROCEDURE — 96375 TX/PRO/DX INJ NEW DRUG ADDON: CPT

## 2021-10-05 PROCEDURE — 25010000002 ORPHENADRINE CITRATE PER 60 MG: Performed by: EMERGENCY MEDICINE

## 2021-10-05 PROCEDURE — 73030 X-RAY EXAM OF SHOULDER: CPT

## 2021-10-05 PROCEDURE — 99283 EMERGENCY DEPT VISIT LOW MDM: CPT

## 2021-10-05 PROCEDURE — 96374 THER/PROPH/DIAG INJ IV PUSH: CPT

## 2021-10-05 PROCEDURE — 25010000002 HEPARIN LOCK FLUSH PER 10 UNITS

## 2021-10-05 RX ORDER — ORPHENADRINE CITRATE 30 MG/ML
60 INJECTION INTRAMUSCULAR; INTRAVENOUS ONCE
Status: COMPLETED | OUTPATIENT
Start: 2021-10-05 | End: 2021-10-05

## 2021-10-05 RX ORDER — CYCLOBENZAPRINE HCL 10 MG
10 TABLET ORAL 3 TIMES DAILY
Qty: 20 TABLET | Refills: 0 | Status: SHIPPED | OUTPATIENT
Start: 2021-10-05 | End: 2022-09-10

## 2021-10-05 RX ORDER — SODIUM CHLORIDE 0.9 % (FLUSH) 0.9 %
10 SYRINGE (ML) INJECTION AS NEEDED
Status: DISCONTINUED | OUTPATIENT
Start: 2021-10-05 | End: 2021-10-05 | Stop reason: HOSPADM

## 2021-10-05 RX ORDER — HEPARIN SODIUM (PORCINE) LOCK FLUSH IV SOLN 100 UNIT/ML 100 UNIT/ML
500 SOLUTION INTRAVENOUS ONCE
Status: COMPLETED | OUTPATIENT
Start: 2021-10-05 | End: 2021-10-05

## 2021-10-05 RX ORDER — HYDROCODONE BITARTRATE AND ACETAMINOPHEN 5; 325 MG/1; MG/1
1 TABLET ORAL EVERY 4 HOURS PRN
Qty: 18 TABLET | Refills: 0 | Status: SHIPPED | OUTPATIENT
Start: 2021-10-05 | End: 2021-10-08

## 2021-10-05 RX ORDER — HEPARIN SODIUM (PORCINE) LOCK FLUSH IV SOLN 100 UNIT/ML 100 UNIT/ML
SOLUTION INTRAVENOUS
Status: COMPLETED
Start: 2021-10-05 | End: 2021-10-05

## 2021-10-05 RX ADMIN — HEPARIN SODIUM (PORCINE) LOCK FLUSH IV SOLN 100 UNIT/ML 500 UNITS: 100 SOLUTION at 21:43

## 2021-10-05 RX ADMIN — ORPHENADRINE CITRATE 60 MG: 30 INJECTION INTRAMUSCULAR; INTRAVENOUS at 18:39
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
## 2021-10-19 ENCOUNTER — OFFICE VISIT (OUTPATIENT)
Dept: ORTHOPEDIC SURGERY | Facility: CLINIC | Age: 76
End: 2021-10-19

## 2021-10-19 VITALS — HEART RATE: 91 BPM | WEIGHT: 194.4 LBS | HEIGHT: 71 IN | BODY MASS INDEX: 27.22 KG/M2 | OXYGEN SATURATION: 96 %

## 2021-10-19 DIAGNOSIS — M72.0 DUPUYTREN'S CONTRACTURE OF RIGHT HAND: Primary | ICD-10-CM

## 2021-10-19 DIAGNOSIS — G56.21 NEUROPATHY OF RIGHT ULNAR NERVE AT WRIST: ICD-10-CM

## 2021-10-19 DIAGNOSIS — M62.541 ATROPHY OF MUSCLE OF RIGHT HAND: ICD-10-CM

## 2021-10-19 PROCEDURE — 99203 OFFICE O/P NEW LOW 30 MIN: CPT | Performed by: ORTHOPAEDIC SURGERY

## 2021-10-19 NOTE — PROGRESS NOTES
"Chief Complaint  Initial Evaluation of the Right Hand     Subjective      Daron Whitfield presents to [facility] for an evaluation of right hand. Patient has a history of a dupuytren's contracture release on the 4th digit performed by kleinert and kutz several years ago. He states having similar symptoms in his 4th digit and his 5th digit. He states that he has trouble with gripping with his right hand. He has some numbness and tingling in the 4th digit.     No Known Allergies     Social History     Socioeconomic History   • Marital status:    Tobacco Use   • Smoking status: Former Smoker     Packs/day: 1.00     Years: 50.00     Pack years: 50.00     Types: Cigarettes     Quit date: 2016     Years since quittin.8   • Smokeless tobacco: Never Used   • Tobacco comment: STARTED AGE 17  QUIT IN 2016   Vaping Use   • Vaping Use: Never used   Substance and Sexual Activity   • Alcohol use: Not Currently     Alcohol/week: 1.0 standard drink     Types: 1 Cans of beer per week     Comment: casual drinker   • Drug use: Never   • Sexual activity: Defer        Review of Systems     Objective   Vital Signs:   Pulse 91   Ht 180.3 cm (71\")   Wt 88.2 kg (194 lb 6.4 oz)   SpO2 96%   BMI 27.11 kg/m²       Physical Exam  Constitutional:       Appearance: Normal appearance. Patient is well-developed and normal weight.   HENT:      Head: Normocephalic.      Right Ear: Hearing and external ear normal.      Left Ear: Hearing and external ear normal.      Nose: Nose normal.   Eyes:      Conjunctiva/sclera: Conjunctivae normal.   Cardiovascular:      Rate and Rhythm: Normal rate.   Pulmonary:      Effort: Pulmonary effort is normal.      Breath sounds: No wheezing or rales.   Abdominal:      Palpations: Abdomen is soft.      Tenderness: There is no abdominal tenderness.   Musculoskeletal:      Cervical back: Normal range of motion.   Skin:     Findings: No rash.   Neurological:      Mental Status: " Patient is alert and oriented to person, place, and time.   Psychiatric:         Mood and Affect: Mood and affect normal.         Judgment: Judgment normal.       Ortho Exam      RIGHT HAND: Positive Table Top test. Evidence of dupytrens contracture of the 4th and 5th digit. Patient can form a a fist with 1st to 3rd digits. Limited on the 4th and 5th digit. Skin intact. No swelling or skin discoloration. Radial pulse 2+, ulnar pulse 2+. Sensation grossly intact. Neurovascular intact. Negative Finkelstein's. Atrophy of right first dorsal interosseous space.        Procedures      Imaging Results (Most Recent)     None           Result Review :          Assessment and Plan     DX: Right hand dupytrens contracture   Right hand atrophy   Right ulnar nerve neuropathy     Discussed obtaining an EMG of the right hand. He wishes to proceed.     Call or return if worsening symptoms.    Follow Up     Follow-up after EMG.       Patient was given instructions and counseling regarding his condition or for health maintenance advice. Please see specific information pulled into the AVS if appropriate.     Scribed for Junie Graf MD by Isis Ramos.  10/19/21   10:29 EDT        I have personally performed the services described in this document as scribed by the above individual and it is both accurate and complete. Junie Graf MD 10/20/21

## 2021-10-29 ENCOUNTER — OFFICE VISIT (OUTPATIENT)
Dept: ORTHOPEDIC SURGERY | Facility: CLINIC | Age: 76
End: 2021-10-29

## 2021-10-29 VITALS — WEIGHT: 194 LBS | HEIGHT: 71 IN | BODY MASS INDEX: 27.16 KG/M2

## 2021-10-29 DIAGNOSIS — S49.92XA INJURY OF LEFT SHOULDER, INITIAL ENCOUNTER: Primary | ICD-10-CM

## 2021-10-29 PROBLEM — M19.012 PRIMARY OSTEOARTHRITIS OF LEFT SHOULDER: Status: ACTIVE | Noted: 2021-10-29

## 2021-10-29 PROCEDURE — 99213 OFFICE O/P EST LOW 20 MIN: CPT | Performed by: ORTHOPAEDIC SURGERY

## 2021-11-16 ENCOUNTER — OFFICE VISIT (OUTPATIENT)
Dept: UROLOGY | Facility: CLINIC | Age: 76
End: 2021-11-16

## 2021-11-16 ENCOUNTER — OFFICE VISIT (OUTPATIENT)
Dept: PULMONOLOGY | Facility: CLINIC | Age: 76
End: 2021-11-16

## 2021-11-16 VITALS
TEMPERATURE: 97.3 F | HEIGHT: 71 IN | RESPIRATION RATE: 17 BRPM | BODY MASS INDEX: 26.6 KG/M2 | SYSTOLIC BLOOD PRESSURE: 119 MMHG | HEART RATE: 100 BPM | DIASTOLIC BLOOD PRESSURE: 67 MMHG | WEIGHT: 190 LBS | OXYGEN SATURATION: 95 %

## 2021-11-16 VITALS
DIASTOLIC BLOOD PRESSURE: 68 MMHG | WEIGHT: 190 LBS | SYSTOLIC BLOOD PRESSURE: 103 MMHG | HEIGHT: 71 IN | BODY MASS INDEX: 26.6 KG/M2 | TEMPERATURE: 97.5 F | HEART RATE: 97 BPM

## 2021-11-16 DIAGNOSIS — J43.9 PULMONARY EMPHYSEMA, UNSPECIFIED EMPHYSEMA TYPE (HCC): ICD-10-CM

## 2021-11-16 DIAGNOSIS — C61 PROSTATE CANCER (HCC): Primary | ICD-10-CM

## 2021-11-16 DIAGNOSIS — C34.91 ADENOCARCINOMA OF BRONCHUS, RIGHT (HCC): ICD-10-CM

## 2021-11-16 DIAGNOSIS — R06.2 WHEEZING: ICD-10-CM

## 2021-11-16 DIAGNOSIS — J44.9 CHRONIC OBSTRUCTIVE PULMONARY DISEASE, UNSPECIFIED COPD TYPE (HCC): Primary | ICD-10-CM

## 2021-11-16 DIAGNOSIS — R06.09 DYSPNEA ON EXERTION: ICD-10-CM

## 2021-11-16 DIAGNOSIS — F17.211 NICOTINE DEPENDENCE, CIGARETTES, IN REMISSION: ICD-10-CM

## 2021-11-16 DIAGNOSIS — R05.9 COUGH: ICD-10-CM

## 2021-11-16 PROCEDURE — 99212 OFFICE O/P EST SF 10 MIN: CPT | Performed by: UROLOGY

## 2021-11-16 PROCEDURE — 99213 OFFICE O/P EST LOW 20 MIN: CPT | Performed by: NURSE PRACTITIONER

## 2021-11-16 RX ORDER — ALBUTEROL SULFATE 90 UG/1
2 AEROSOL, METERED RESPIRATORY (INHALATION) EVERY 4 HOURS PRN
COMMUNITY

## 2021-11-16 NOTE — PROGRESS NOTES
Primary Care Provider  Angelic Malagon APRN     Referring Provider  No ref. provider found     Chief Complaint  Emphysema (4mo f/u ), Cough (productive sometimes, normal color ), Shortness of Breath, and Wheezing    Subjective          Daron Whitfield presents to [facility]  History of Present Illness  Daron Whitfield is a 76 y.o. male patient of Dr. Hood with a history of stage III lung cancer status post chemo and radiation in the past, COPD, dyspnea on exertion, and tobacco abuse of cigarettes in remission.  He is here for follow-up visit today.    Patient states that he is doing well since his last visit.  He denies having use any antibiotics or steroids for his lungs.  He continues to use Stiolto and Flovent as prescribed.  He uses his DuoNeb's approximately twice a day.  He does have an occasional cough with clear sputum.  His shortness of breath is at baseline.  He describes as mild to moderate in severity, worse with exertion and improved with rest.  He denies having any fevers or chills.  He continues to follow-up with Dr. Flores and surveillance CT scans are being ordered by that provider.  Patient is able to form his ADLs without difficulty.  He is up-to-date with his flu, pneumonia, and Covid vaccine.     His history of smoking is      Tobacco Use: Medium Risk   • Smoking Tobacco Use: Former Smoker   • Smokeless Tobacco Use: Never Used   .    Review of Systems   Constitutional: Negative for chills, fatigue, fever, unexpected weight gain and unexpected weight loss.   HENT: Negative for congestion (Nasal), hearing loss, mouth sores, nosebleeds, postnasal drip, sore throat and trouble swallowing.    Eyes: Negative for blurred vision and visual disturbance.   Respiratory: Positive for cough, shortness of breath and wheezing. Negative for apnea.         Negative for Hemoptysis     Cardiovascular: Negative for chest pain, palpitations and leg  swelling.   Gastrointestinal: Negative for abdominal pain, constipation, diarrhea, nausea, vomiting and GERD.        Negative for Jaundice  Negative for Bloating  Negative for Melena   Musculoskeletal: Negative for joint swelling and myalgias.        Negative for Joint pain  Negative for Joint stiffness   Skin: Negative for color change.        Negative for cyanosis   Neurological: Negative for syncope, weakness, numbness and headache.      Sleep: Negative for Excessive daytime sleepiness  Negative for morning headaches  Negative for Snoring    Family History   Problem Relation Age of Onset   • Diabetes Mother    • Cancer Sister    • Diabetes Sister    • Colon cancer Sister 60   • Hypertension Sister    • Diabetes Sister    • Coronary artery disease Brother         Social History     Socioeconomic History   • Marital status:    Tobacco Use   • Smoking status: Former Smoker     Packs/day: 1.00     Years: 50.00     Pack years: 50.00     Types: Cigarettes     Quit date:      Years since quittin.8   • Smokeless tobacco: Never Used   • Tobacco comment: STARTED AGE 17  QUIT IN    Vaping Use   • Vaping Use: Never used   Substance and Sexual Activity   • Alcohol use: Not Currently     Alcohol/week: 1.0 standard drink     Types: 1 Cans of beer per week     Comment: casual drinker   • Drug use: Never   • Sexual activity: Defer        Past Medical History:   Diagnosis Date   • Bladder outflow obstruction    • Cervical spinal stenosis 2018   • Colon polyp    • Emphysema lung (HCC)    • Emphysema of lung (HCC)    • Hand paresthesia 2018   • HTN (hypertension)    • Hypercholesteremia    • Lung cancer (HCC)    • Muscle atrophy of upper extremity 2018   • Numbness and tingling in both hands    • OAB (overactive bladder) 2014   • Prostate cancer (HCC)    • Ulnar neuropathy 2018        Immunization History   Administered Date(s) Administered   • COVID-19 (MODERNA) 1st, 2nd, 3rd  Dose Only 02/05/2021, 03/09/2021   • Fluzone High Dose =>65 Years ([employer] ONLY) 12/11/2019   • Fluzone High-Dose 65+yrs 11/13/2021         No Known Allergies       Current Outpatient Medications:   •  albuterol (PROVENTIL) (2.5 MG/3ML) 0.083% nebulizer solution, Take 2.5 mg by nebulization Every 4 (Four) Hours As Needed for Wheezing., Disp: 1 each, Rfl: 11  •  albuterol sulfate  (90 Base) MCG/ACT inhaler, Inhale 2 puffs Every 4 (Four) Hours As Needed for Wheezing., Disp: , Rfl:   •  atorvastatin (Lipitor) 40 MG tablet, Lipitor 40 mg oral tablet take 1 tablet (40 mg) by oral route once daily   Suspended, Disp: , Rfl:   •  bicalutamide (CASODEX) 50 MG chemo tablet, Take  by mouth Daily., Disp: , Rfl:   •  FeroSul 325 (65 Fe) MG tablet, , Disp: , Rfl:   •  fluticasone (Flovent HFA) 220 MCG/ACT inhaler, Inhale 1 puff 2 (Two) Times a Day., Disp: 1 each, Rfl: 11  •  ipratropium-albuterol (DUO-NEB) 0.5-2.5 mg/3 ml nebulizer, Take 3 mL by nebulization Every 4 (Four) Hours As Needed for Wheezing., Disp: 360 mL, Rfl: 4  •  leuprolide (Lupron Depot, 3-Month,) 22.5 MG injection, 22.5 mg., Disp: , Rfl:   •  lisinopril (PRINIVIL,ZESTRIL) 10 MG tablet, lisinopril 10 mg oral tablet take 1 tablet (10 mg) by oral route once daily   Active, Disp: , Rfl:   •  metFORMIN (GLUCOPHAGE) 500 MG tablet, , Disp: , Rfl:   •  metoprolol succinate XL (Toprol XL) 25 MG 24 hr tablet, Toprol XL 25 mg oral tablet extended release 24 hr take 1 tablet (25 mg) by oral route once daily   Active, Disp: , Rfl:   •  tiotropium bromide-olodaterol (Stiolto Respimat) 2.5-2.5 MCG/ACT aerosol solution inhaler, Inhale 2 puffs Daily. Lot # [id number] Expiration: June 2023, Disp: 1 each, Rfl: 11  •  Cholecalciferol 50 MCG (2000 UT) capsule, Take  by mouth., Disp: , Rfl:   •  cyclobenzaprine (FLEXERIL) 10 MG tablet, Take 1 tablet by mouth 3 (Three) Times a Day., Disp: 20 tablet, Rfl: 0  •  darifenacin (Enablex) 15 MG 24 hr tablet, Enablex 15 mg oral tablet  extended release 24 hr take 1 tablet (15 mg) by oral route once daily   Suspended, Disp: , Rfl:   •  fesoterodine fumarate (Toviaz) 8 MG tablet sustained-release 24 hour tablet, Toviaz 8 mg oral tablet extended release 24 hr take 1 tablet (8 mg) by oral route once daily   Suspended, Disp: , Rfl:   •  folic acid (FOLVITE) 1 MG tablet, folic acid 1 mg oral tablet take 1 tablet (1 mg) by oral route once daily   Active, Disp: , Rfl:   •  metFORMIN (GLUCOPHAGE) 1000 MG tablet, metformin 1,000 mg oral tablet take 1 tablet (1,000 mg) by oral route 2 times per day with morning and evening meals   Active, Disp: , Rfl:   •  sildenafil (VIAGRA) 100 MG tablet, , Disp: , Rfl:   •  tamsulosin (Flomax) 0.4 MG capsule 24 hr capsule, Flomax 0.4 mg oral capsule take 1 capsule (0.4 mg) by oral route once daily 1/2 hour following the same meal each day for 90 days 3/4/2021  Active, Disp: , Rfl:   •  tiotropium (Spiriva HandiHaler) 18 MCG per inhalation capsule, Spiriva with HandiHaler 18 mcg inhalation capsule, w/inhalation device inhale 1 capsule (18 mcg) by inhalation route once daily   Active, Disp: , Rfl:   •  tolterodine LA (Detrol LA) 4 MG 24 hr capsule, Detrol LA 4 mg oral capsule,extended release 24hr take 1 capsule (4 mg) by oral route once daily for 90 days 3/4/2021  Active, Disp: , Rfl:   •  vitamin D (ERGOCALCIFEROL) 1.25 MG (69906 UT) capsule capsule, , Disp: , Rfl:      Objective   Physical Exam  Constitutional:       General: He is not in acute distress.     Appearance: Normal appearance. He is normal weight.   HENT:      Right Ear: Hearing normal.      Left Ear: Hearing normal.      Nose: No nasal tenderness or congestion.      Mouth/Throat:      Mouth: Mucous membranes are moist. No oral lesions.   Eyes:      Extraocular Movements: Extraocular movements intact.      Pupils: Pupils are equal, round, and reactive to light.   Neck:      Thyroid: No thyroid mass or thyromegaly.   Cardiovascular:      Rate and Rhythm:  "Normal rate and regular rhythm.      Pulses: Normal pulses.      Heart sounds: Normal heart sounds. No murmur heard.      Pulmonary:      Effort: Pulmonary effort is normal.      Breath sounds: Examination of the right-lower field reveals decreased breath sounds. Examination of the left-lower field reveals decreased breath sounds. Decreased breath sounds present. No wheezing, rhonchi or rales.   Chest:   Breasts:      Right: No axillary adenopathy.       Abdominal:      General: Bowel sounds are normal. There is no distension.      Palpations: Abdomen is soft.      Tenderness: There is no abdominal tenderness.   Musculoskeletal:      Cervical back: Neck supple.      Right lower leg: No edema.      Left lower leg: No edema.   Lymphadenopathy:      Cervical: No cervical adenopathy.      Upper Body:      Right upper body: No axillary adenopathy.   Skin:     General: Skin is warm and dry.      Findings: No lesion or rash.   Neurological:      General: No focal deficit present.      Mental Status: He is alert and oriented to person, place, and time.      Cranial Nerves: Cranial nerves are intact.   Psychiatric:         Mood and Affect: Affect normal. Mood is not anxious or depressed.         Vital Signs:   /67 (BP Location: Left arm, Patient Position: Sitting, Cuff Size: Adult)   Pulse 100   Temp 97.3 °F (36.3 °C) (Tympanic)   Resp 17   Ht 180.3 cm (71\")   Wt 86.2 kg (190 lb)   SpO2 95% Comment: room air  BMI 26.50 kg/m²        Result Review :         Data reviewed: Radiologic studies Chest CT 3/4/2021, pulmonary function test 6/8/2020 and Dr. Hood last office note     Procedures        Assessment and Plan    Diagnoses and all orders for this visit:    1. Chronic obstructive pulmonary disease, unspecified COPD type (HCC) (Primary)    2. Pulmonary emphysema, unspecified emphysema type (HCC)    3. Adenocarcinoma of bronchus, right (HCC)    4. Nicotine dependence, cigarettes, in remission    5. Dyspnea on " exertion    6. Cough    7. Wheezing    8.  Continue Stiolto and Flovent as prescribed.  Rinse mouth after each use.  9.  Continue albuterol and nebulizer treatments as needed.  10.  Continue follow-up with oncology as scheduled.  11.  Follow-up in 6 months, sooner if needed.        Follow Up   Return in about 6 months (around 5/16/2022) for Recheck with Dr. Hood.  Patient was given instructions and counseling regarding his condition or for health maintenance advice. Please see specific information pulled into the AVS if appropriate.

## 2021-11-16 NOTE — PROGRESS NOTES
"Chief Complaint  Follow-up (PSA 11/2/21 .01) and Elevated PSA    Patient is to follow-up for prostate carcinoma.  Patient is on antiandrogen therapy    Subjective          Daron Whitfield presents to [facility]  History of Present Illness    Patient is doing very well and is urinating okay.  Overall AUA score is 7/35.  No dysuria or gross hematuria.  Patient had prostate carcinoma diagnosed in 2011 and cryoablation in 2012.  Patient is on antiandrogen therapy.  Patient also has lung cancer    Objective No acute distress  Vital Signs:   /68   Pulse 97   Temp 97.5 °F (36.4 °C)   Ht 180.3 cm (71\")   Wt 86.2 kg (190 lb)   BMI 26.50 kg/m²     No Known Allergies   Review of Systems    Prostate cancer    Lung cancer    Physical Exam  Constitutional:       General: He is not in acute distress.     Appearance: Normal appearance. He is obese. He is not ill-appearing, toxic-appearing or diaphoretic.   HENT:      Head: Normocephalic and atraumatic.   Eyes:      Pupils: Pupils are equal, round, and reactive to light.   Cardiovascular:      Rate and Rhythm: Normal rate and regular rhythm.      Pulses: Normal pulses.      Heart sounds: No murmur heard.      Pulmonary:      Effort: Pulmonary effort is normal. No respiratory distress.      Breath sounds: Normal breath sounds. No rhonchi or rales.   Abdominal:      General: Abdomen is flat. There is no distension.      Palpations: Abdomen is soft.      Tenderness: There is no abdominal tenderness. There is no right CVA tenderness or left CVA tenderness.   Genitourinary:     Comments: Uncircumcised normal penis    Right and left testicles are normal    Epididymis on the left and right is normal    LUL.  Prostate gland is soft is only about 15 g or less  Musculoskeletal:         General: No swelling. Normal range of motion.      Cervical back: Neck supple. No rigidity or tenderness.   Lymphadenopathy:      Cervical: No cervical adenopathy.   Skin:    "  General: Skin is warm.      Coloration: Skin is not jaundiced.   Neurological:      General: No focal deficit present.      Mental Status: He is alert and oriented to person, place, and time.      Motor: No weakness.      Gait: Gait normal.   Psychiatric:         Mood and Affect: Mood normal.         Behavior: Behavior normal.         Thought Content: Thought content normal.         Judgment: Judgment normal.        Result Review :                 Assessment and Plan    There are no diagnoses linked to this encounter.    Follow Up     Patient is in remission from prostate carcinoma and is on Depo-Lupron and Casodex.  His PSA today was 0.10  Return in about 6 months (around 5/16/2022).  Patient was given instructions and counseling regarding his condition or for health maintenance advice. Please see specific information pulled into the AVS if appropriate.     Merrill Ames MD

## 2021-11-19 ENCOUNTER — OFFICE VISIT (OUTPATIENT)
Dept: ORTHOPEDIC SURGERY | Facility: CLINIC | Age: 76
End: 2021-11-19

## 2021-11-19 VITALS — HEIGHT: 71 IN | BODY MASS INDEX: 26.6 KG/M2 | WEIGHT: 190 LBS

## 2021-11-19 DIAGNOSIS — M19.012 PRIMARY OSTEOARTHRITIS OF LEFT SHOULDER: ICD-10-CM

## 2021-11-19 DIAGNOSIS — S49.92XA INJURY OF LEFT SHOULDER, INITIAL ENCOUNTER: Primary | ICD-10-CM

## 2021-11-19 PROCEDURE — 99213 OFFICE O/P EST LOW 20 MIN: CPT | Performed by: ORTHOPAEDIC SURGERY

## 2021-11-19 RX ORDER — MELOXICAM 15 MG/1
15 TABLET ORAL DAILY
Qty: 21 TABLET | Refills: 0 | Status: SHIPPED | OUTPATIENT
Start: 2021-11-19 | End: 2022-09-10
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
## 2021-11-24 ENCOUNTER — HOSPITAL ENCOUNTER (OUTPATIENT)
Dept: ONCOLOGY | Facility: HOSPITAL | Age: 76
Setting detail: INFUSION SERIES
Discharge: HOME | End: 2021-11-24

## 2021-11-24 DIAGNOSIS — C34.91 ADENOCARCINOMA OF BRONCHUS, RIGHT (HCC): Primary | ICD-10-CM

## 2021-11-24 PROCEDURE — 96523 IRRIG DRUG DELIVERY DEVICE: CPT

## 2021-11-24 PROCEDURE — 25010000002 HEPARIN LOCK FLUSH PER 10 UNITS: Performed by: INTERNAL MEDICINE

## 2021-11-24 RX ORDER — SODIUM CHLORIDE 0.9 % (FLUSH) 0.9 %
10 SYRINGE (ML) INJECTION AS NEEDED
Status: CANCELLED | OUTPATIENT
Start: 2021-11-24

## 2021-11-24 RX ORDER — HEPARIN SODIUM (PORCINE) LOCK FLUSH IV SOLN 100 UNIT/ML 100 UNIT/ML
500 SOLUTION INTRAVENOUS AS NEEDED
Status: CANCELLED | OUTPATIENT
Start: 2021-11-24

## 2021-11-24 RX ORDER — SODIUM CHLORIDE 0.9 % (FLUSH) 0.9 %
20 SYRINGE (ML) INJECTION AS NEEDED
Status: DISCONTINUED | OUTPATIENT
Start: 2021-11-24 | End: 2021-11-25 | Stop reason: HOSPADM

## 2021-11-24 RX ORDER — HEPARIN SODIUM (PORCINE) LOCK FLUSH IV SOLN 100 UNIT/ML 100 UNIT/ML
500 SOLUTION INTRAVENOUS AS NEEDED
Status: DISCONTINUED | OUTPATIENT
Start: 2021-11-24 | End: 2021-11-25 | Stop reason: HOSPADM

## 2021-11-24 RX ORDER — SODIUM CHLORIDE 0.9 % (FLUSH) 0.9 %
20 SYRINGE (ML) INJECTION AS NEEDED
Status: CANCELLED | OUTPATIENT
Start: 2021-11-24

## 2021-11-24 RX ADMIN — SODIUM CHLORIDE, PRESERVATIVE FREE 20 ML: 5 INJECTION INTRAVENOUS at 08:13

## 2021-11-24 RX ADMIN — HEPARIN SODIUM (PORCINE) LOCK FLUSH IV SOLN 100 UNIT/ML 500 UNITS: 100 SOLUTION at 08:13

## 2021-12-16 ENCOUNTER — PROCEDURE VISIT (OUTPATIENT)
Dept: NEUROLOGY | Facility: CLINIC | Age: 76
End: 2021-12-16

## 2021-12-16 VITALS
DIASTOLIC BLOOD PRESSURE: 61 MMHG | BODY MASS INDEX: 26.74 KG/M2 | HEIGHT: 71 IN | WEIGHT: 191 LBS | HEART RATE: 80 BPM | SYSTOLIC BLOOD PRESSURE: 115 MMHG

## 2021-12-16 DIAGNOSIS — G56.03 BILATERAL CARPAL TUNNEL SYNDROME: Primary | ICD-10-CM

## 2021-12-16 DIAGNOSIS — G62.9 POLYNEUROPATHY: ICD-10-CM

## 2021-12-16 DIAGNOSIS — G56.21 NEUROPATHY OF RIGHT ULNAR NERVE AT WRIST: ICD-10-CM

## 2021-12-16 DIAGNOSIS — M62.541 ATROPHY OF MUSCLE OF RIGHT HAND: ICD-10-CM

## 2021-12-16 PROCEDURE — 95885 MUSC TST DONE W/NERV TST LIM: CPT | Performed by: PSYCHIATRY & NEUROLOGY

## 2021-12-16 PROCEDURE — 95910 NRV CNDJ TEST 7-8 STUDIES: CPT | Performed by: PSYCHIATRY & NEUROLOGY

## 2021-12-16 PROCEDURE — 99202 OFFICE O/P NEW SF 15 MIN: CPT | Performed by: PSYCHIATRY & NEUROLOGY

## 2021-12-16 NOTE — ASSESSMENT & PLAN NOTE
Bilateral carpal tunnel syndrome is probable but definite diagnosis cannot be made secondary to the polyneuropathy.

## 2021-12-16 NOTE — PROGRESS NOTES
"Chief Complaint  Neurologic Problem (Right hand)    Subjective          Daron Whitfield is a 76 y.o. male who presents to [facility]  History of Present Illness  76-year-old man evaluated for right hand numbness and atrophy.  He states that the atrophy started about 3 years ago.  He noted the numbness about a year and 1/2 to 2 years ago and ulnar distribution.  He states that surgery for a claw hand several years ago however the pinky is now permanently deformed.  He is here today for nerve conduction EMG study.  He has no problems with his left hand.  He has a history of diabetes.  Objective   Vital Signs:   /61   Pulse 80   Ht 180.3 cm (70.98\")   Wt 86.6 kg (191 lb)   BMI 26.65 kg/m²     Physical Exam   There is complete atrophy of the right first dorsal ossei and moderate weakness of the right abductor digiti minimi.  There is atrophy of the interossei muscles.  There is no weakness of the abductor pollicis brevis muscle, there is no weakness of the flexor digitorum profundus 1-3 and 4.  There is no weakness of the upper extremities otherwise and with muscle testing.  There is no weakness in the left hand.  Sensory is decreased to pinprick in the distribution of the ulnar nerve in the right hand.  No sign is negative at both elbows.        Assessment and Plan  Diagnoses and all orders for this visit:    1. Bilateral carpal tunnel syndrome (Primary)  Assessment & Plan:  Bilateral carpal tunnel syndrome is probable but definite diagnosis cannot be made secondary to the polyneuropathy.      2. Atrophy of muscle of right hand  -     EMG & Nerve Conduction Test    3. Neuropathy of right ulnar nerve at wrist  Assessment & Plan:  Nerve conduction study is abnormal and shows electrophysiologic evidence for moderate sensory motor polyneuropathy.  There is severe ulnar neuropathy at the right wrist.  I discussed with him that I do not think that [facility] will be successful however he can have it performed.  I discussed with him that deformity in his left pinky is secondary to fixed joint deformity.    Orders:  -     EMG & Nerve Conduction Test    4. Polyneuropathy  Assessment & Plan:  Nerve conduction studies abnormal shows electrophysiologic evidence for moderate sensorimotor polyneuropathy.         Nerve Conduction Study:  8 Nerves     EMG:  Limited    Total time spent with the patient and coordinating patient care was 15 minutes.    Follow Up  No follow-ups on file.  Patient was given instructions and counseling regarding his condition or for health maintenance advice. Please see specific information pulled into the AVS if appropriate.

## 2021-12-16 NOTE — ASSESSMENT & PLAN NOTE
Nerve conduction study is abnormal and shows electrophysiologic evidence for moderate sensory motor polyneuropathy.  There is severe ulnar neuropathy at the right wrist.  I discussed with him that I do not think that surgery to the Guyon's canal will be successful however he can have it performed.  I discussed with him that deformity in his left pinky is secondary to fixed joint deformity.

## 2021-12-16 NOTE — ASSESSMENT & PLAN NOTE
Nerve conduction studies abnormal shows electrophysiologic evidence for moderate sensorimotor polyneuropathy.

## 2021-12-22 ENCOUNTER — HOSPITAL ENCOUNTER (OUTPATIENT)
Dept: ONCOLOGY | Facility: HOSPITAL | Age: 76
Setting detail: INFUSION SERIES
Discharge: HOME | End: 2021-12-22

## 2021-12-22 DIAGNOSIS — C34.91 ADENOCARCINOMA OF BRONCHUS, RIGHT (HCC): Primary | ICD-10-CM

## 2021-12-22 PROCEDURE — 96523 IRRIG DRUG DELIVERY DEVICE: CPT

## 2021-12-22 PROCEDURE — 25010000002 HEPARIN LOCK FLUSH PER 10 UNITS: Performed by: INTERNAL MEDICINE

## 2021-12-22 RX ORDER — HEPARIN SODIUM (PORCINE) LOCK FLUSH IV SOLN 100 UNIT/ML 100 UNIT/ML
500 SOLUTION INTRAVENOUS AS NEEDED
Status: CANCELLED | OUTPATIENT
Start: 2021-12-22

## 2021-12-22 RX ORDER — HEPARIN SODIUM (PORCINE) LOCK FLUSH IV SOLN 100 UNIT/ML 100 UNIT/ML
500 SOLUTION INTRAVENOUS AS NEEDED
Status: DISCONTINUED | OUTPATIENT
Start: 2021-12-22 | End: 2021-12-23 | Stop reason: HOSPADM

## 2021-12-22 RX ORDER — SODIUM CHLORIDE 0.9 % (FLUSH) 0.9 %
20 SYRINGE (ML) INJECTION AS NEEDED
Status: CANCELLED | OUTPATIENT
Start: 2021-12-22

## 2021-12-22 RX ORDER — SODIUM CHLORIDE 0.9 % (FLUSH) 0.9 %
20 SYRINGE (ML) INJECTION AS NEEDED
Status: DISCONTINUED | OUTPATIENT
Start: 2021-12-22 | End: 2021-12-23 | Stop reason: HOSPADM

## 2021-12-22 RX ORDER — SODIUM CHLORIDE 0.9 % (FLUSH) 0.9 %
10 SYRINGE (ML) INJECTION AS NEEDED
Status: CANCELLED | OUTPATIENT
Start: 2021-12-22

## 2021-12-22 RX ADMIN — HEPARIN SODIUM (PORCINE) LOCK FLUSH IV SOLN 100 UNIT/ML 500 UNITS: 100 SOLUTION at 08:34

## 2021-12-22 RX ADMIN — SODIUM CHLORIDE, PRESERVATIVE FREE 20 ML: 5 INJECTION INTRAVENOUS at 08:33

## 2021-12-28 ENCOUNTER — OFFICE VISIT (OUTPATIENT)
Dept: ORTHOPEDIC SURGERY | Facility: CLINIC | Age: 76
End: 2021-12-28

## 2021-12-28 VITALS — WEIGHT: 191 LBS | OXYGEN SATURATION: 96 % | HEART RATE: 63 BPM | BODY MASS INDEX: 26.74 KG/M2 | HEIGHT: 71 IN

## 2021-12-28 DIAGNOSIS — G56.03 BILATERAL CARPAL TUNNEL SYNDROME: Primary | ICD-10-CM

## 2021-12-28 DIAGNOSIS — M72.0 DUPUYTREN'S CONTRACTURE OF RIGHT HAND: ICD-10-CM

## 2021-12-28 PROCEDURE — 99213 OFFICE O/P EST LOW 20 MIN: CPT | Performed by: ORTHOPAEDIC SURGERY

## 2021-12-28 NOTE — PROGRESS NOTES
"Chief Complaint  Follow-up of the Right Hand     Subjective      Daron Whitfield presents to [facility] for a follow-up of right hand.  Patient has a history of a dupuytren's contracture release on the 4th digit performed by kleinert and kutz several years ago. He states having similar symptoms in his 4th digit and his 5th digit. He states that he has trouble with gripping with his right hand. He has some numbness and tingling in the 4th digit. He is present today with EMG results.     No Known Allergies     Social History     Socioeconomic History   • Marital status:    Tobacco Use   • Smoking status: Former Smoker     Packs/day: 1.00     Years: 50.00     Pack years: 50.00     Types: Cigarettes     Quit date:      Years since quittin.0   • Smokeless tobacco: Never Used   • Tobacco comment: STARTED AGE 17  QUIT IN 2016   Vaping Use   • Vaping Use: Never used   Substance and Sexual Activity   • Alcohol use: Not Currently     Alcohol/week: 1.0 standard drink     Types: 1 Cans of beer per week     Comment: casual drinker   • Drug use: Never   • Sexual activity: Defer        Review of Systems     Objective   Vital Signs:   Pulse 63   Ht 180.3 cm (70.98\")   Wt 86.6 kg (191 lb)   SpO2 96%   BMI 26.65 kg/m²       Physical Exam  Constitutional:       Appearance: Normal appearance. Patient is well-developed and normal weight.   HENT:      Head: Normocephalic.      Right Ear: Hearing and external ear normal.      Left Ear: Hearing and external ear normal.      Nose: Nose normal.   Eyes:      Conjunctiva/sclera: Conjunctivae normal.   Cardiovascular:      Rate and Rhythm: Normal rate.   Pulmonary:      Effort: Pulmonary effort is normal.      Breath sounds: No wheezing or rales.   Abdominal:      Palpations: Abdomen is soft.      Tenderness: There is no abdominal tenderness.   Musculoskeletal:      Cervical back: Normal range of motion.   Skin:     Findings: No rash. "   Neurological:      Mental Status: Patient is alert and oriented to person, place, and time.   Psychiatric:         Mood and Affect: Mood and affect normal.         Judgment: Judgment normal.       Ortho Exam      RIGHT HAND: Patient able to form a fist. Patient able to wiggle fingers. Weak . Atrophy. Intact wrist flexion and extension. Decreased sensation. Positive Tinel's. Positive median nerve compression test. No swelling or skin discoloration.       Procedures      Imaging Results (Most Recent)     None           Result Review :     [facility]   12/16/21  IMPRESSION: Moderate sensory motor polyneuropathy.   Severe ulnar neuropathy at the right wrist.   Bilateral carpal tunnel syndrome is probably but definite diagnosis cannot be made secondary to the polyneuropathy.       Assessment and Plan     DX: Bilateral carpal tunnel syndrome  Dupuytren's contracture of right hand    Discussed referral to [facility]utz and kleinert, patient agrees with this. A referral has been placed.     Call or return if worsening symptoms.    Follow Up     PRN.       Patient was given instructions and counseling regarding his condition or for health maintenance advice. Please see specific information pulled into the AVS if appropriate.     Scribed for Junie Graf MD by Isis Ramos.  12/28/21   10:17 EST        I have personally performed the services described in this document as scribed by the above individual and it is both accurate and complete. Junie Graf MD 12/30/21

## 2021-12-29 ENCOUNTER — CLINICAL SUPPORT (OUTPATIENT)
Dept: UROLOGY | Facility: CLINIC | Age: 76
End: 2021-12-29

## 2021-12-29 DIAGNOSIS — C61 PROSTATE CANCER (HCC): Primary | ICD-10-CM

## 2021-12-29 PROCEDURE — 96402 CHEMO HORMON ANTINEOPL SQ/IM: CPT | Performed by: NURSE PRACTITIONER

## 2022-01-28 DIAGNOSIS — C61 PROSTATE CANCER: Primary | ICD-10-CM

## 2022-01-28 RX ORDER — BICALUTAMIDE 50 MG/1
50 TABLET, FILM COATED ORAL DAILY
Qty: 90 TABLET | Refills: 3 | Status: SHIPPED | OUTPATIENT
Start: 2022-01-28 | End: 2022-04-28

## 2022-02-01 ENCOUNTER — LAB (OUTPATIENT)
Dept: LAB | Facility: HOSPITAL | Age: 77
End: 2022-02-01

## 2022-02-01 ENCOUNTER — TRANSCRIBE ORDERS (OUTPATIENT)
Dept: LAB | Facility: HOSPITAL | Age: 77
End: 2022-02-01

## 2022-02-01 DIAGNOSIS — Z01.818 PREOP TESTING: ICD-10-CM

## 2022-02-01 DIAGNOSIS — Z01.818 PREOP TESTING: Primary | ICD-10-CM

## 2022-02-01 PROCEDURE — U0005 INFEC AGEN DETEC AMPLI PROBE: HCPCS

## 2022-02-01 PROCEDURE — U0004 COV-19 TEST NON-CDC HGH THRU: HCPCS

## 2022-02-01 PROCEDURE — C9803 HOPD COVID-19 SPEC COLLECT: HCPCS

## 2022-02-02 LAB — SARS-COV-2 RNA PNL SPEC NAA+PROBE: NOT DETECTED

## 2022-02-09 ENCOUNTER — TRANSCRIBE ORDERS (OUTPATIENT)
Dept: LAB | Facility: HOSPITAL | Age: 77
End: 2022-02-09

## 2022-02-09 DIAGNOSIS — Z01.818 PREOP TESTING: Primary | ICD-10-CM

## 2022-02-14 ENCOUNTER — LAB (OUTPATIENT)
Dept: LAB | Facility: HOSPITAL | Age: 77
End: 2022-02-14

## 2022-02-14 DIAGNOSIS — Z01.818 PREOP TESTING: ICD-10-CM

## 2022-02-14 LAB — SARS-COV-2 RNA PNL SPEC NAA+PROBE: NOT DETECTED

## 2022-02-14 PROCEDURE — U0004 COV-19 TEST NON-CDC HGH THRU: HCPCS

## 2022-02-14 PROCEDURE — U0005 INFEC AGEN DETEC AMPLI PROBE: HCPCS

## 2022-02-14 PROCEDURE — C9803 HOPD COVID-19 SPEC COLLECT: HCPCS

## 2022-02-16 ENCOUNTER — HOSPITAL ENCOUNTER (EMERGENCY)
Facility: HOSPITAL | Age: 77
Discharge: HOME | End: 2022-02-16
Attending: EMERGENCY MEDICINE | Admitting: EMERGENCY MEDICINE

## 2022-02-16 ENCOUNTER — HOSPITAL ENCOUNTER (OUTPATIENT)
Dept: ONCOLOGY | Facility: HOSPITAL | Age: 77
Setting detail: INFUSION SERIES
Discharge: HOME | End: 2022-02-16

## 2022-02-16 ENCOUNTER — APPOINTMENT (OUTPATIENT)
Dept: CT IMAGING | Facility: HOSPITAL | Age: 77
End: 2022-02-16

## 2022-02-16 ENCOUNTER — APPOINTMENT (OUTPATIENT)
Dept: GENERAL RADIOLOGY | Facility: HOSPITAL | Age: 77
End: 2022-02-16

## 2022-02-16 VITALS
RESPIRATION RATE: 20 BRPM | TEMPERATURE: 98.2 F | HEART RATE: 65 BPM | HEIGHT: 71 IN | WEIGHT: 187.39 LBS | DIASTOLIC BLOOD PRESSURE: 68 MMHG | BODY MASS INDEX: 26.23 KG/M2 | SYSTOLIC BLOOD PRESSURE: 122 MMHG | OXYGEN SATURATION: 91 %

## 2022-02-16 DIAGNOSIS — C34.91 ADENOCARCINOMA OF BRONCHUS, RIGHT: Primary | ICD-10-CM

## 2022-02-16 DIAGNOSIS — S60.221A CONTUSION OF RIGHT HAND, INITIAL ENCOUNTER: ICD-10-CM

## 2022-02-16 DIAGNOSIS — K11.20 SIALADENITIS: Primary | ICD-10-CM

## 2022-02-16 DIAGNOSIS — K11.5 SIALOLITHIASIS OF SUBMANDIBULAR GLAND: ICD-10-CM

## 2022-02-16 LAB
ALBUMIN SERPL-MCNC: 4.2 G/DL (ref 3.5–5.2)
ALBUMIN/GLOB SERPL: 1.1 G/DL
ALP SERPL-CCNC: 99 U/L (ref 39–117)
ALT SERPL W P-5'-P-CCNC: 7 U/L (ref 1–41)
ANION GAP SERPL CALCULATED.3IONS-SCNC: 11.1 MMOL/L (ref 5–15)
AST SERPL-CCNC: 15 U/L (ref 1–40)
BASOPHILS # BLD AUTO: 0.05 10*3/MM3 (ref 0–0.2)
BASOPHILS NFR BLD AUTO: 0.9 % (ref 0–1.5)
BILIRUB SERPL-MCNC: 0.6 MG/DL (ref 0–1.2)
BUN SERPL-MCNC: 14 MG/DL (ref 8–23)
BUN/CREAT SERPL: 10.5 (ref 7–25)
CALCIUM SPEC-SCNC: 9.6 MG/DL (ref 8.6–10.5)
CHLORIDE SERPL-SCNC: 102 MMOL/L (ref 98–107)
CO2 SERPL-SCNC: 25.9 MMOL/L (ref 22–29)
CREAT SERPL-MCNC: 1.33 MG/DL (ref 0.76–1.27)
DEPRECATED RDW RBC AUTO: 40.5 FL (ref 37–54)
EOSINOPHIL # BLD AUTO: 0.17 10*3/MM3 (ref 0–0.4)
EOSINOPHIL NFR BLD AUTO: 3.1 % (ref 0.3–6.2)
ERYTHROCYTE [DISTWIDTH] IN BLOOD BY AUTOMATED COUNT: 13.9 % (ref 12.3–15.4)
GFR SERPL CREATININE-BSD FRML MDRD: 63 ML/MIN/1.73
GLOBULIN UR ELPH-MCNC: 3.9 GM/DL
GLUCOSE SERPL-MCNC: 85 MG/DL (ref 65–99)
HCT VFR BLD AUTO: 42.9 % (ref 37.5–51)
HGB BLD-MCNC: 13.5 G/DL (ref 13–17.7)
IMM GRANULOCYTES # BLD AUTO: 0.02 10*3/MM3 (ref 0–0.05)
IMM GRANULOCYTES NFR BLD AUTO: 0.4 % (ref 0–0.5)
LYMPHOCYTES # BLD AUTO: 1.37 10*3/MM3 (ref 0.7–3.1)
LYMPHOCYTES NFR BLD AUTO: 24.6 % (ref 19.6–45.3)
MCH RBC QN AUTO: 25.7 PG (ref 26.6–33)
MCHC RBC AUTO-ENTMCNC: 31.5 G/DL (ref 31.5–35.7)
MCV RBC AUTO: 81.7 FL (ref 79–97)
MONOCYTES # BLD AUTO: 0.34 10*3/MM3 (ref 0.1–0.9)
MONOCYTES NFR BLD AUTO: 6.1 % (ref 5–12)
NEUTROPHILS NFR BLD AUTO: 3.62 10*3/MM3 (ref 1.7–7)
NEUTROPHILS NFR BLD AUTO: 64.9 % (ref 42.7–76)
NRBC BLD AUTO-RTO: 0 /100 WBC (ref 0–0.2)
PLATELET # BLD AUTO: 182 10*3/MM3 (ref 140–450)
PMV BLD AUTO: 8.9 FL (ref 6–12)
POTASSIUM SERPL-SCNC: 4.2 MMOL/L (ref 3.5–5.2)
PROT SERPL-MCNC: 8.1 G/DL (ref 6–8.5)
RBC # BLD AUTO: 5.25 10*6/MM3 (ref 4.14–5.8)
SODIUM SERPL-SCNC: 139 MMOL/L (ref 136–145)
WBC NRBC COR # BLD: 5.57 10*3/MM3 (ref 3.4–10.8)

## 2022-02-16 PROCEDURE — 36415 COLL VENOUS BLD VENIPUNCTURE: CPT

## 2022-02-16 PROCEDURE — 70491 CT SOFT TISSUE NECK W/DYE: CPT

## 2022-02-16 PROCEDURE — 85025 COMPLETE CBC W/AUTO DIFF WBC: CPT | Performed by: EMERGENCY MEDICINE

## 2022-02-16 PROCEDURE — 99283 EMERGENCY DEPT VISIT LOW MDM: CPT

## 2022-02-16 PROCEDURE — 96374 THER/PROPH/DIAG INJ IV PUSH: CPT

## 2022-02-16 PROCEDURE — 73110 X-RAY EXAM OF WRIST: CPT

## 2022-02-16 PROCEDURE — 80053 COMPREHEN METABOLIC PANEL: CPT | Performed by: EMERGENCY MEDICINE

## 2022-02-16 PROCEDURE — 73130 X-RAY EXAM OF HAND: CPT

## 2022-02-16 PROCEDURE — 0 IOPAMIDOL PER 1 ML: Performed by: EMERGENCY MEDICINE

## 2022-02-16 PROCEDURE — 25010000002 DEXAMETHASONE PER 1 MG: Performed by: NURSE PRACTITIONER

## 2022-02-16 PROCEDURE — 25010000002 HEPARIN LOCK FLUSH PER 10 UNITS: Performed by: INTERNAL MEDICINE

## 2022-02-16 PROCEDURE — 96523 IRRIG DRUG DELIVERY DEVICE: CPT

## 2022-02-16 RX ORDER — SODIUM CHLORIDE 0.9 % (FLUSH) 0.9 %
10 SYRINGE (ML) INJECTION AS NEEDED
Status: CANCELLED | OUTPATIENT
Start: 2022-02-16

## 2022-02-16 RX ORDER — ACETAMINOPHEN 325 MG/1
650 TABLET ORAL ONCE
Status: COMPLETED | OUTPATIENT
Start: 2022-02-16 | End: 2022-02-16

## 2022-02-16 RX ORDER — HEPARIN SODIUM (PORCINE) LOCK FLUSH IV SOLN 100 UNIT/ML 100 UNIT/ML
500 SOLUTION INTRAVENOUS AS NEEDED
Status: DISCONTINUED | OUTPATIENT
Start: 2022-02-16 | End: 2022-02-17 | Stop reason: HOSPADM

## 2022-02-16 RX ORDER — CEPHALEXIN 500 MG/1
500 CAPSULE ORAL 4 TIMES DAILY
Qty: 28 CAPSULE | Refills: 0 | Status: SHIPPED | OUTPATIENT
Start: 2022-02-16 | End: 2022-02-23

## 2022-02-16 RX ORDER — SODIUM CHLORIDE 0.9 % (FLUSH) 0.9 %
20 SYRINGE (ML) INJECTION AS NEEDED
Status: CANCELLED | OUTPATIENT
Start: 2022-02-16

## 2022-02-16 RX ORDER — CEPHALEXIN 250 MG/1
500 CAPSULE ORAL ONCE
Status: COMPLETED | OUTPATIENT
Start: 2022-02-16 | End: 2022-02-16

## 2022-02-16 RX ORDER — SODIUM CHLORIDE 0.9 % (FLUSH) 0.9 %
20 SYRINGE (ML) INJECTION AS NEEDED
Status: DISCONTINUED | OUTPATIENT
Start: 2022-02-16 | End: 2022-02-17 | Stop reason: HOSPADM

## 2022-02-16 RX ORDER — HEPARIN SODIUM (PORCINE) LOCK FLUSH IV SOLN 100 UNIT/ML 100 UNIT/ML
500 SOLUTION INTRAVENOUS AS NEEDED
Status: CANCELLED | OUTPATIENT
Start: 2022-02-16

## 2022-02-16 RX ORDER — DEXAMETHASONE SODIUM PHOSPHATE 10 MG/ML
10 INJECTION INTRAMUSCULAR; INTRAVENOUS ONCE
Status: COMPLETED | OUTPATIENT
Start: 2022-02-16 | End: 2022-02-16

## 2022-02-16 RX ADMIN — HEPARIN SODIUM (PORCINE) LOCK FLUSH IV SOLN 100 UNIT/ML 500 UNITS: 100 SOLUTION at 07:56

## 2022-02-16 RX ADMIN — CEPHALEXIN 500 MG: 250 CAPSULE ORAL at 23:15

## 2022-02-16 RX ADMIN — SODIUM CHLORIDE, PRESERVATIVE FREE 20 ML: 5 INJECTION INTRAVENOUS at 07:56

## 2022-02-16 RX ADMIN — DEXAMETHASONE SODIUM PHOSPHATE 10 MG: 10 INJECTION INTRAMUSCULAR; INTRAVENOUS at 21:34

## 2022-02-16 RX ADMIN — IOPAMIDOL 100 ML: 755 INJECTION, SOLUTION INTRAVENOUS at 21:28

## 2022-02-16 RX ADMIN — ACETAMINOPHEN 650 MG: 325 TABLET ORAL at 21:34

## 2022-02-16 NOTE — ED TRIAGE NOTES
R hand/wr injury/swelling r/t fall on 2/14.  Pt states he tripped on curb and lost his balance.  Pt also c/o L sided facial swelling and ear pain x 2 days

## 2022-02-17 NOTE — DISCHARGE INSTRUCTIONS
Your right hand x-ray was negative as we discussed.  Elevate.  Ice.  Tylenol or Motrin for pain    Your CT scan shows that the left neck swelling is due to a salivary gland stone with inflammation of the gland.  We will put you on antibiotic.    Suck on lemon drops to try to get the stone to express himself    If stone does not pass or symptoms do not improve see ENT as referred above and call for appointment    Return for new or worse symptoms

## 2022-02-17 NOTE — ED PROVIDER NOTES
Time: 22:54 EST  Arrived by: Vehicle  Chief Complaint: Right hand pain and left neck swelling  History provided by: Patient  History is limited by: N/A    History of Present Illness:  Patient is a 77 y.o. year old male that presents to the emergency department with right hand pain after fall 2 days ago.  Also having worsening neck swelling and pain since Friday      Facial Swelling  Location:  Left neck  Quality:  Sore  Severity:  Moderate  Onset quality:  Gradual  Duration:  5 days  Timing:  Constant  Progression:  Worsening  Chronicity:  New  Context:  Left neck swelling and pain gradually since Friday  Relieved by:  Nothing  Worsened by:  Hurts to touch  Ineffective treatments:  None tried  Associated symptoms: no abdominal pain, no chest pain, no congestion, no cough, no diarrhea, no ear pain, no fatigue, no fever, no headaches, no loss of consciousness, no myalgias, no nausea, no rash, no rhinorrhea, no shortness of breath, no sore throat, no vomiting and no wheezing    Hand Injury  Location:  Hand  Hand location:  R hand  Injury: yes    Time since incident:  2 days  Mechanism of injury: fall    Fall:     Impact surface:  Husser    Point of impact:  Hands    Entrapped after fall: no    Pain details:     Quality:  Dull    Radiates to:  Does not radiate    Severity:  Mild    Onset quality:  Sudden    Duration:  2 days    Timing:  Constant    Progression:  Unchanged  Handedness:  Right-handed  Dislocation: no    Foreign body present:  No foreign bodies  Tetanus status:  Up to date  Prior injury to area: Patient does not have a injury but he has chronic Dupuytren's contracture.  Relieved by:  Nothing  Worsened by:  Movement (Touch)  Ineffective treatments:  None tried  Associated symptoms: swelling    Associated symptoms: no decreased range of motion, no fatigue, no fever, no numbness, no stiffness and no tingling        Similar Symptoms Previously: Issues with the hand chronically.  Is going to have surgery for  Dupuytren's on Friday with Kleinert and Khl[facility]  Recently seen: By his hand surgeon only      Patient Care Team  Primary Care Provider: ray    Past Medical History:     No Known Allergies  Past Medical History:   Diagnosis Date   • Bladder outflow obstruction    • Cervical spinal stenosis 01/23/2018   • Colon polyp    • Emphysema lung (HCC)    • Emphysema of lung (HCC)    • Hand paresthesia 01/23/2018   • HTN (hypertension)    • Hypercholesteremia    • Lung cancer (HCC) 2016   • Muscle atrophy of upper extremity 01/23/2018   • Numbness and tingling in both hands    • OAB (overactive bladder) 12/09/2014   • Prostate cancer (HCC) 2012   • Ulnar neuropathy 01/23/2018     Past Surgical History:   Procedure Laterality Date   • COLONOSCOPY  2019    DR CUETO   • CRYOABLATION OF PROSTATE  01/17/2012   • CYSTOSCOPY     • LUNG BIOPSY     • TEETH EXTRACTION     • TURP / TRANSURETHRAL INCISION / DRAINAGE PROSTATE  06/16/2015     Family History   Problem Relation Age of Onset   • Diabetes Mother    • Cancer Sister    • Diabetes Sister    • Colon cancer Sister 60   • Hypertension Sister    • Diabetes Sister    • Coronary artery disease Brother        Home Medications:  Prior to Admission medications    Medication Sig Start Date End Date Taking? Authorizing Provider   albuterol (PROVENTIL) (2.5 MG/3ML) 0.083% nebulizer solution Take 2.5 mg by nebulization Every 4 (Four) Hours As Needed for Wheezing. 7/14/21   Roshan Hood MD   albuterol sulfate  (90 Base) MCG/ACT inhaler Inhale 2 puffs Every 4 (Four) Hours As Needed for Wheezing.    Provider, MD Therese   atorvastatin (Lipitor) 40 MG tablet Lipitor 40 mg oral tablet take 1 tablet (40 mg) by oral route once daily   Suspended    Provider, MD Therese   bicalutamide (CASODEX) 50 MG chemo tablet Take 1 tablet by mouth Daily for 90 days. 1/28/22 4/28/22  Merrill Ames MD   Cholecalciferol 50 MCG (2000 UT) capsule Take  by mouth.    Provider,  MD Therese   cyclobenzaprine (FLEXERIL) 10 MG tablet Take 1 tablet by mouth 3 (Three) Times a Day. 10/5/21   Yoan Ba DO   darifenacin (Enablex) 15 MG 24 hr tablet Enablex 15 mg oral tablet extended release 24 hr take 1 tablet (15 mg) by oral route once daily   Suspended    Therese Harrell MD   FeroSul 325 (65 Fe) MG tablet  6/8/21   Therese Harrell MD   fesoterodine fumarate (Toviaz) 8 MG tablet sustained-release 24 hour tablet Toviaz 8 mg oral tablet extended release 24 hr take 1 tablet (8 mg) by oral route once daily   Suspended    Therese Harrell MD   fluticasone (Flovent HFA) 220 MCG/ACT inhaler Inhale 1 puff 2 (Two) Times a Day. 7/14/21   Roshan Hood MD   folic acid (FOLVITE) 1 MG tablet folic acid 1 mg oral tablet take 1 tablet (1 mg) by oral route once daily   Active    Therese Harrell MD   ipratropium-albuterol (DUO-NEB) 0.5-2.5 mg/3 ml nebulizer Take 3 mL by nebulization Every 4 (Four) Hours As Needed for Wheezing. 7/14/21   Roshan Hood MD   leuprolide (Lupron Depot, 3-Month,) 22.5 MG injection 22.5 mg.    Therese Harrell MD   lisinopril (PRINIVIL,ZESTRIL) 10 MG tablet lisinopril 10 mg oral tablet take 1 tablet (10 mg) by oral route once daily   Active    Therese Harrell MD   meloxicam (Mobic) 15 MG tablet Take 1 tablet by mouth Daily. 11/19/21   Junie Graf MD   metFORMIN (GLUCOPHAGE) 500 MG tablet  7/6/21   Therese Harrell MD   metoprolol succinate XL (Toprol XL) 25 MG 24 hr tablet Toprol XL 25 mg oral tablet extended release 24 hr take 1 tablet (25 mg) by oral route once daily   Active    Therese Harrell MD   sildenafil (VIAGRA) 100 MG tablet  6/8/21   Therese Harrell MD   tamsulosin (Flomax) 0.4 MG capsule 24 hr capsule Flomax 0.4 mg oral capsule take 1 capsule (0.4 mg) by oral route once daily 1/2 hour following the same meal each day for 90 days 3/4/2021  Active 3/4/21   Provider, MD Therese   tiotropium  "bromide-olodaterol (Stiolto Respimat) 2.5-2.5 MCG/ACT aerosol solution inhaler Inhale 2 puffs Daily. Lot # [id number]  Expiration: June 2023 7/14/21   Roshan Hood MD   tolterodine LA (Detrol LA) 4 MG 24 hr capsule Detrol LA 4 mg oral capsule,extended release 24hr take 1 capsule (4 mg) by oral route once daily for 90 days 3/4/2021  Active 3/4/21   Provider, MD Therese   vitamin D (ERGOCALCIFEROL) 1.25 MG (27908 UT) capsule capsule  6/8/21   Provider, Therese, MD        Social History:   PT  reports that he quit smoking about 6 years ago. His smoking use included cigarettes. He has a 50.00 pack-year smoking history. He has never used smokeless tobacco. He reports previous alcohol use of about 1.0 standard drink of alcohol per week. He reports that he does not use drugs.    Record Review:  I have reviewed the patient's records in [facility].     Review of Systems  Review of Systems   Constitutional: Negative for fatigue and fever.   HENT: Positive for facial swelling. Negative for congestion, dental problem, drooling, ear pain, rhinorrhea and sore throat.    Respiratory: Negative for cough, shortness of breath and wheezing.    Cardiovascular: Negative for chest pain.   Gastrointestinal: Negative for abdominal pain, diarrhea, nausea and vomiting.   Musculoskeletal: Negative for myalgias and stiffness.   Skin: Negative for rash.   Neurological: Negative for loss of consciousness and headaches.   Hematological: Positive for adenopathy ( Left neck).   Psychiatric/Behavioral: Negative.         Physical Exam  /68   Pulse 65   Temp 98.2 °F (36.8 °C) (Oral)   Resp 20   Ht 180.3 cm (71\")   Wt 85 kg (187 lb 6.3 oz)   SpO2 96%   BMI 26.14 kg/m²     Physical Exam  Vitals and nursing note reviewed.   Constitutional:       General: He is not in acute distress.     Appearance: Normal appearance. He is not toxic-appearing.   HENT:      Head: Normocephalic and atraumatic.      Right Ear: Tympanic membrane, ear canal and " "external ear normal.      Left Ear: Tympanic membrane, ear canal and external ear normal.      Nose: Nose normal.      Mouth/Throat:      Mouth: Mucous membranes are moist.      Pharynx: No oropharyngeal exudate or posterior oropharyngeal erythema.   Eyes:      General: No scleral icterus.     Conjunctiva/sclera: Conjunctivae normal.      Pupils: Pupils are equal, round, and reactive to light.   Cardiovascular:      Rate and Rhythm: Normal rate and regular rhythm.      Pulses: Normal pulses.      Heart sounds: Normal heart sounds.   Pulmonary:      Effort: Pulmonary effort is normal. No respiratory distress.      Breath sounds: Normal breath sounds.   Abdominal:      Tenderness: There is no abdominal tenderness.   Musculoskeletal:         General: Swelling ( Mild right palm) and tenderness ( Right hand central) present. Normal range of motion.      Cervical back: Normal range of motion and neck supple.   Lymphadenopathy:      Cervical: Cervical adenopathy ( Left submandibular 1 large isolated lymph node) present.   Skin:     General: Skin is warm and dry.      Capillary Refill: Capillary refill takes less than 2 seconds.      Findings: Bruising ( Right palm) present.   Neurological:      Mental Status: He is alert and oriented to person, place, and time.      Sensory: No sensory deficit.      Motor: No weakness.   Psychiatric:         Mood and Affect: Mood normal.         Behavior: Behavior normal.          ED Course  /68   Pulse 65   Temp 98.2 °F (36.8 °C) (Oral)   Resp 20   Ht 180.3 cm (71\")   Wt 85 kg (187 lb 6.3 oz)   SpO2 96%   BMI 26.14 kg/m²   Results for orders placed or performed during the hospital encounter of 02/16/22   Comprehensive Metabolic Panel    Specimen: Blood   Result Value Ref Range    Glucose 85 65 - 99 mg/dL    BUN 14 8 - 23 mg/dL    Creatinine 1.33 (H) 0.76 - 1.27 mg/dL    Sodium 139 136 - 145 mmol/L    Potassium 4.2 3.5 - 5.2 mmol/L    Chloride 102 98 - 107 mmol/L    CO2 25.9 " 22.0 - 29.0 mmol/L    Calcium 9.6 8.6 - 10.5 mg/dL    Total Protein 8.1 6.0 - 8.5 g/dL    Albumin 4.20 3.50 - 5.20 g/dL    ALT (SGPT) 7 1 - 41 U/L    AST (SGOT) 15 1 - 40 U/L    Alkaline Phosphatase 99 39 - 117 U/L    Total Bilirubin 0.6 0.0 - 1.2 mg/dL    eGFR  African Amer 63 >60 mL/min/1.73    Globulin 3.9 gm/dL    A/G Ratio 1.1 g/dL    BUN/Creatinine Ratio 10.5 7.0 - 25.0    Anion Gap 11.1 5.0 - 15.0 mmol/L   CBC Auto Differential    Specimen: Blood   Result Value Ref Range    WBC 5.57 3.40 - 10.80 10*3/mm3    RBC 5.25 4.14 - 5.80 10*6/mm3    Hemoglobin 13.5 13.0 - 17.7 g/dL    Hematocrit 42.9 37.5 - 51.0 %    MCV 81.7 79.0 - 97.0 fL    MCH 25.7 (L) 26.6 - 33.0 pg    MCHC 31.5 31.5 - 35.7 g/dL    RDW 13.9 12.3 - 15.4 %    RDW-SD 40.5 37.0 - 54.0 fl    MPV 8.9 6.0 - 12.0 fL    Platelets 182 140 - 450 10*3/mm3    Neutrophil % 64.9 42.7 - 76.0 %    Lymphocyte % 24.6 19.6 - 45.3 %    Monocyte % 6.1 5.0 - 12.0 %    Eosinophil % 3.1 0.3 - 6.2 %    Basophil % 0.9 0.0 - 1.5 %    Immature Grans % 0.4 0.0 - 0.5 %    Neutrophils, Absolute 3.62 1.70 - 7.00 10*3/mm3    Lymphocytes, Absolute 1.37 0.70 - 3.10 10*3/mm3    Monocytes, Absolute 0.34 0.10 - 0.90 10*3/mm3    Eosinophils, Absolute 0.17 0.00 - 0.40 10*3/mm3    Basophils, Absolute 0.05 0.00 - 0.20 10*3/mm3    Immature Grans, Absolute 0.02 0.00 - 0.05 10*3/mm3    nRBC 0.0 0.0 - 0.2 /100 WBC     Medications   cephalexin (KEFLEX) capsule 500 mg (has no administration in time range)   acetaminophen (TYLENOL) tablet 650 mg (650 mg Oral Given 2/16/22 2134)   dexamethasone (DECADRON) injection 10 mg (10 mg Intravenous Given 2/16/22 2134)   iopamidol (ISOVUE-370) 76 % injection 100 mL (100 mL Intravenous Given 2/16/22 2128)     XR Wrist 3+ View Right    Result Date: 2/16/2022  Narrative: PROCEDURE: XR HAND 3+ VW RIGHT, 2/16/2022, 14:34 XR WRIST 3+ VW RIGHT, 2/16/2022, 14:36  COMPARISON: None  INDICATIONS: RIGHT HAND PAIN POST FALL/5TH DIGIT PAIN/UNABLE TO STRAIGHTEN   FINDINGS:  The 5th digit is flexed, and the proximal interphalangeal joint is obscured on all 3 images.  Moderate degenerative change consistent with osteoarthritis is seen in interphalangeal joints, MCP joints, and in the wrist and radiocarpal joint.  No lytic or sclerotic bone lesions are seen.  No foreign body is seen.      Impression:   Right hand and wrist series demonstrating moderate degenerative change consistent with osteoarthritis.  The little finger is flexed, and the proximal interphalangeal joint is obscured on all 3 images.      LAMBERT PETTIT MD       Electronically Signed and Approved By: LAMBERT PETTIT MD on 2/16/2022 at 15:05             XR Hand 3+ View Right    Result Date: 2/16/2022  Narrative: PROCEDURE: XR HAND 3+ VW RIGHT, 2/16/2022, 14:34 XR WRIST 3+ VW RIGHT, 2/16/2022, 14:36  COMPARISON: None  INDICATIONS: RIGHT HAND PAIN POST FALL/5TH DIGIT PAIN/UNABLE TO STRAIGHTEN  FINDINGS:  The 5th digit is flexed, and the proximal interphalangeal joint is obscured on all 3 images.  Moderate degenerative change consistent with osteoarthritis is seen in interphalangeal joints, MCP joints, and in the wrist and radiocarpal joint.  No lytic or sclerotic bone lesions are seen.  No foreign body is seen.      Impression:   Right hand and wrist series demonstrating moderate degenerative change consistent with osteoarthritis.  The little finger is flexed, and the proximal interphalangeal joint is obscured on all 3 images.      LAMBERT PETTIT MD       Electronically Signed and Approved By: LAMBERT PETTIT MD on 2/16/2022 at 15:05             CT Soft Tissue Neck With Contrast    Result Date: 2/16/2022  Narrative: PROCEDURE: CT SOFT TISSUE NECK W CONTRAST  COMPARISON: None  INDICATIONS: NECK SWELLING AND PAIN  PROTOCOL:   Standard imaging protocol performed    RADIATION:   DLP: 317.3 mGy*cm   Automated exposure control was utilized to minimize radiation dose. CONTRAST: 100 cc Isovue 370 I.V.  TECHNIQUE: After  obtaining the patient's consent, CT images were obtained with non-ionic intravenous contrast material.   FINDINGS:  There is a calcified density in the left aspect of the floor of the mouth in the regional left submandibular gland.  This calcification measures 2 x 1.2 x 2.1 cm.  There is surrounding inflammation involving the submandibular gland with increased vascularity.  The findings are consistent with sialoadenitis with sialolithiasis.  There is no pathologic adenopathy.  The pharyngeal and parapharyngeal structures are otherwise normal.      Impression:  Left-sided sialadenitis with sialolithiasis and a stone of the submandibular region measuring up to 2 x 1.2 x 2.1 cm.     JUANI JONES MD       Electronically Signed and Approved By: JUANI JONES MD on 2/16/2022 at 22:24             Medical Decision Making:                     MDM  Number of Diagnoses or Management Options  Contusion of right hand, initial encounter  Sialadenitis  Sialolithiasis of submandibular gland  Diagnosis management comments: I have spoken with the patient. I have explained the patient´s condition, diagnoses and treatment plan based on the information available to me at this time. I have answered the patient's questions and addressed any concerns. The patient has a good  understanding of the patient´s diagnosis, condition, and treatment plan as can be expected at this point. The vital signs have been stable. The patient´s condition is stable and appropriate for discharge from the emergency department.      The patient will pursue further outpatient evaluation with the primary care physician or other designated or consulting physician as outlined in the discharge instructions. They are agreeable to this plan of care and follow-up instructions have been explained in detail. The patient has received these instructions in written format and have expressed an understanding of the discharge instructions. The patient is aware that any  significant change in condition or worsening of symptoms should prompt an immediate return to this or the closest emergency department or call to 911.         Amount and/or Complexity of Data Reviewed  Clinical lab tests: reviewed and ordered  Tests in the radiology section of CPT®: reviewed and ordered  Tests in the medicine section of CPT®: ordered and reviewed    Risk of Complications, Morbidity, and/or Mortality  Presenting problems: moderate  Diagnostic procedures: moderate  Management options: low    Patient Progress  Patient progress: stable       Final diagnoses:   Sialadenitis   Sialolithiasis of submandibular gland   Contusion of right hand, initial encounter        Disposition:  ED Disposition     ED Disposition Condition Comment    Discharge Stable            Dolores Merritt, APRN  02/16/22 [id number]

## 2022-03-03 ENCOUNTER — TELEPHONE (OUTPATIENT)
Dept: ONCOLOGY | Facility: HOSPITAL | Age: 77
End: 2022-03-03

## 2022-03-03 DIAGNOSIS — C34.91 ADENOCARCINOMA OF BRONCHUS, RIGHT: Primary | ICD-10-CM

## 2022-03-04 ENCOUNTER — TRANSCRIBE ORDERS (OUTPATIENT)
Dept: PHYSICAL THERAPY | Facility: CLINIC | Age: 77
End: 2022-03-04

## 2022-03-04 ENCOUNTER — HOSPITAL ENCOUNTER (OUTPATIENT)
Dept: CT IMAGING | Facility: HOSPITAL | Age: 77
Discharge: HOME | End: 2022-03-04
Admitting: INTERNAL MEDICINE

## 2022-03-04 DIAGNOSIS — C34.90 MALIGNANT NEOPLASM OF LUNG, UNSPECIFIED LATERALITY, UNSPECIFIED PART OF LUNG: ICD-10-CM

## 2022-03-04 DIAGNOSIS — M24.541 CONTRACTURE OF JOINT OF RIGHT HAND: ICD-10-CM

## 2022-03-04 DIAGNOSIS — G56.01 CARPAL TUNNEL SYNDROME, RIGHT UPPER LIMB: Primary | ICD-10-CM

## 2022-03-04 DIAGNOSIS — G56.21 LESION OF RIGHT ULNAR NERVE: ICD-10-CM

## 2022-03-04 PROCEDURE — 71260 CT THORAX DX C+: CPT

## 2022-03-04 PROCEDURE — 0 IOPAMIDOL PER 1 ML: Performed by: INTERNAL MEDICINE

## 2022-03-04 RX ORDER — HEPARIN SODIUM (PORCINE) LOCK FLUSH IV SOLN 100 UNIT/ML 100 UNIT/ML
SOLUTION INTRAVENOUS
Status: DISCONTINUED
Start: 2022-03-04 | End: 2022-03-05 | Stop reason: HOSPADM

## 2022-03-04 RX ADMIN — IOPAMIDOL 100 ML: 755 INJECTION, SOLUTION INTRAVENOUS at 12:35

## 2022-03-08 ENCOUNTER — OFFICE VISIT (OUTPATIENT)
Dept: ONCOLOGY | Facility: HOSPITAL | Age: 77
End: 2022-03-08

## 2022-03-08 VITALS
HEART RATE: 98 BPM | TEMPERATURE: 97.9 F | WEIGHT: 183.64 LBS | BODY MASS INDEX: 25.61 KG/M2 | DIASTOLIC BLOOD PRESSURE: 67 MMHG | OXYGEN SATURATION: 95 % | SYSTOLIC BLOOD PRESSURE: 120 MMHG | RESPIRATION RATE: 16 BRPM

## 2022-03-08 DIAGNOSIS — Z87.891 PERSONAL HISTORY OF TOBACCO USE, PRESENTING HAZARDS TO HEALTH: ICD-10-CM

## 2022-03-08 DIAGNOSIS — C34.91 ADENOCARCINOMA OF BRONCHUS, RIGHT: ICD-10-CM

## 2022-03-08 DIAGNOSIS — Z12.2 SCREENING FOR LUNG CANCER: Primary | ICD-10-CM

## 2022-03-08 DIAGNOSIS — Z12.2 ENCOUNTER FOR SCREENING FOR LUNG CANCER: ICD-10-CM

## 2022-03-08 PROBLEM — Z92.29 HISTORY OF VACCINATION: Status: ACTIVE | Noted: 2022-03-08

## 2022-03-08 PROBLEM — N40.0 BENIGN PROSTATIC HYPERPLASIA: Status: ACTIVE | Noted: 2022-03-08

## 2022-03-08 PROBLEM — C34.90 CANCER OF LUNG: Status: RESOLVED | Noted: 2021-07-14 | Resolved: 2022-03-08

## 2022-03-08 PROBLEM — J45.909 ASTHMA: Status: ACTIVE | Noted: 2022-03-08

## 2022-03-08 PROBLEM — E11.9 DIABETES MELLITUS: Status: ACTIVE | Noted: 2022-03-08

## 2022-03-08 PROCEDURE — 99213 OFFICE O/P EST LOW 20 MIN: CPT | Performed by: INTERNAL MEDICINE

## 2022-03-08 PROCEDURE — G0463 HOSPITAL OUTPT CLINIC VISIT: HCPCS

## 2022-03-08 RX ORDER — TRAMADOL HYDROCHLORIDE 50 MG/1
TABLET ORAL SEE ADMIN INSTRUCTIONS
COMMUNITY
Start: 2022-02-28 | End: 2022-09-10

## 2022-03-08 NOTE — ASSESSMENT & PLAN NOTE
Patient doing well.  I see no evidence of disease recurrence by history, physical examination a recent CT scan of the chest.  I will see him back on a yearly basis with noncontrast CT chest prior.

## 2022-03-08 NOTE — PROGRESS NOTES
Chief Complaint  Lung Cancer    Fabio Malagon*  Manas, Angelic, APRN    Subjective          Daron Whitfield presents to [facility] for follow-up of lung cancer.  He is now 6 years out from his diagnosis and treatment.  On return today, he states that the lungs are doing well.  He reports a little bit of shortness of breath with heavy activity but now with normal day-to-day activities.  His activity is limited somewhat as he recently had right hand surgery and is recuperating from that.  He denies any masses or adenopathy, no unusual aches or pains.      Oncology/Hematology History Overview Note   2016 RLL poorly differentiated NSCLC adenocarcinoma            Clinical Staging      Stage IIIA ([id number])            Treatments      Chemotherapy      4/14/16 thru 6/14/16 completed 3C Cisplatin   Radiation Therapy      4/14/16 thru 6/2/16 received 6000cGy XRT to right hilum/mediastinum         Adenocarcinoma of bronchus, right (HCC)   6/3/2021 Initial Diagnosis    Adenocarcinoma of bronchus, right (CMS/HCC)     6/9/2021 -  Chemotherapy    OP CENTRAL VENOUS ACCESS DEVICE ACCESS, CARE, AND MAINTENANCE (CVAD)         Review of Systems   Constitutional: Positive for fatigue. Negative for appetite change, diaphoresis, fever, unexpected weight gain and unexpected weight loss.   HENT: Negative for hearing loss, mouth sores, sore throat, swollen glands, trouble swallowing and voice change.    Eyes: Negative for blurred vision.   Respiratory: Negative for cough, shortness of breath and wheezing.    Cardiovascular: Negative for chest pain and palpitations.   Gastrointestinal: Negative for abdominal pain, blood in stool, constipation, diarrhea, nausea and vomiting.   Endocrine: Negative for cold intolerance and heat intolerance.   Genitourinary: Negative for difficulty urinating, dysuria, frequency, hematuria and urinary incontinence.   Musculoskeletal: Negative for  arthralgias, back pain and myalgias.   Skin: Negative for rash, skin lesions and wound.   Neurological: Negative for dizziness, seizures, weakness, numbness and headache.   Hematological: Does not bruise/bleed easily.   Psychiatric/Behavioral: Negative for depressed mood. The patient is not nervous/anxious.      Current Outpatient Medications on File Prior to Visit   Medication Sig Dispense Refill   • albuterol (PROVENTIL) (2.5 MG/3ML) 0.083% nebulizer solution Take 2.5 mg by nebulization Every 4 (Four) Hours As Needed for Wheezing. 1 each 11   • albuterol sulfate  (90 Base) MCG/ACT inhaler Inhale 2 puffs Every 4 (Four) Hours As Needed for Wheezing.     • atorvastatin (LIPITOR) 40 MG tablet Lipitor 40 mg oral tablet take 1 tablet (40 mg) by oral route once daily   Suspended     • bicalutamide (CASODEX) 50 MG chemo tablet Take 1 tablet by mouth Daily for 90 days. 90 tablet 3   • Cholecalciferol 50 MCG (2000 UT) capsule Take  by mouth.     • cyclobenzaprine (FLEXERIL) 10 MG tablet Take 1 tablet by mouth 3 (Three) Times a Day. 20 tablet 0   • darifenacin (ENABLEX) 15 MG 24 hr tablet Enablex 15 mg oral tablet extended release 24 hr take 1 tablet (15 mg) by oral route once daily   Suspended     • FeroSul 325 (65 Fe) MG tablet      • fesoterodine fumarate (Toviaz) 8 MG tablet sustained-release 24 hour tablet Toviaz 8 mg oral tablet extended release 24 hr take 1 tablet (8 mg) by oral route once daily   Suspended     • fluticasone (Flovent HFA) 220 MCG/ACT inhaler Inhale 1 puff 2 (Two) Times a Day. 1 each 11   • folic acid (FOLVITE) 1 MG tablet folic acid 1 mg oral tablet take 1 tablet (1 mg) by oral route once daily   Active     • ipratropium-albuterol (DUO-NEB) 0.5-2.5 mg/3 ml nebulizer Take 3 mL by nebulization Every 4 (Four) Hours As Needed for Wheezing. 360 mL 4   • leuprolide (Lupron Depot, 3-Month,) 22.5 MG injection 22.5 mg.     • lisinopril (PRINIVIL,ZESTRIL) 10 MG tablet lisinopril 10 mg oral tablet take 1  tablet (10 mg) by oral route once daily   Active     • meloxicam (Mobic) 15 MG tablet Take 1 tablet by mouth Daily. 21 tablet 0   • metFORMIN (GLUCOPHAGE) 500 MG tablet      • metoprolol succinate XL (TOPROL-XL) 25 MG 24 hr tablet Toprol XL 25 mg oral tablet extended release 24 hr take 1 tablet (25 mg) by oral route once daily   Active     • sildenafil (VIAGRA) 100 MG tablet      • tamsulosin (FLOMAX) 0.4 MG capsule 24 hr capsule Flomax 0.4 mg oral capsule take 1 capsule (0.4 mg) by oral route once daily 1/2 hour following the same meal each day for 90 days 3/4/2021  Active     • tiotropium bromide-olodaterol (Stiolto Respimat) 2.5-2.5 MCG/ACT aerosol solution inhaler Inhale 2 puffs Daily. Lot # [id number]  Expiration: June 2023 1 each 11   • tolterodine LA (DETROL LA) 4 MG 24 hr capsule Detrol LA 4 mg oral capsule,extended release 24hr take 1 capsule (4 mg) by oral route once daily for 90 days 3/4/2021  Active     • traMADol (ULTRAM) 50 MG tablet Take  by mouth See Admin Instructions. Take 1 tablet by mouth every 6-8 hours as needed for pain     • vitamin D (ERGOCALCIFEROL) 1.25 MG (11570 UT) capsule capsule        No current facility-administered medications on file prior to visit.       No Known Allergies  Past Medical History:   Diagnosis Date   • Bladder outflow obstruction    • Cervical spinal stenosis 01/23/2018   • Colon polyp    • Emphysema lung (HCC)    • Emphysema of lung (HCC)    • Hand paresthesia 01/23/2018   • HTN (hypertension)    • Hypercholesteremia    • Lung cancer (HCC) 2016   • Muscle atrophy of upper extremity 01/23/2018   • Numbness and tingling in both hands    • OAB (overactive bladder) 12/09/2014   • Prostate cancer (HCC) 2012   • Ulnar neuropathy 01/23/2018     Past Surgical History:   Procedure Laterality Date   • COLONOSCOPY  2019    DR CUETO   • CRYOABLATION OF PROSTATE  01/17/2012   • CYSTOSCOPY     • LUNG BIOPSY     • TEETH EXTRACTION     • TURP / TRANSURETHRAL INCISION / DRAINAGE  PROSTATE  2015     Social History     Socioeconomic History   • Marital status:    Tobacco Use   • Smoking status: Former Smoker     Packs/day: 1.00     Years: 50.00     Pack years: 50.00     Types: Cigarettes     Quit date:      Years since quittin.1   • Smokeless tobacco: Never Used   • Tobacco comment: STARTED AGE 17  QUIT IN 2016   Vaping Use   • Vaping Use: Never used   Substance and Sexual Activity   • Alcohol use: Not Currently     Alcohol/week: 1.0 standard drink     Types: 1 Cans of beer per week     Comment: casual drinker   • Drug use: Never   • Sexual activity: Defer     Family History   Problem Relation Age of Onset   • Diabetes Mother    • Cancer Sister    • Diabetes Sister    • Colon cancer Sister 60   • Hypertension Sister    • Diabetes Sister    • Coronary artery disease Brother        Objective   Physical Exam  Vitals reviewed. Exam conducted with a chaperone present.   Constitutional:       Appearance: Normal appearance.   Cardiovascular:      Rate and Rhythm: Normal rate and regular rhythm.      Heart sounds: Normal heart sounds. No murmur heard.    No gallop.   Pulmonary:      Effort: Pulmonary effort is normal.      Breath sounds: Normal breath sounds.   Abdominal:      General: Abdomen is flat. Bowel sounds are normal. There is no distension.      Palpations: Abdomen is soft.      Tenderness: There is no abdominal tenderness.   Musculoskeletal:      Cervical back: Neck supple.      Right lower leg: No edema.      Left lower leg: No edema.   Lymphadenopathy:      Cervical: No cervical adenopathy.   Neurological:      Mental Status: He is alert and oriented to person, place, and time.   Psychiatric:         Mood and Affect: Mood normal.         Behavior: Behavior normal.         Vitals:    22 1056   BP: 120/67   Pulse: 98   Resp: 16   Temp: 97.9 °F (36.6 °C)   SpO2: 95%   Weight: 83.3 kg (183 lb 10.3 oz)   PainSc:   3   PainLoc: Hand     ECOG score: 0         PHQ-9  Total Score:                    Result Review :   The following data was reviewed by: Dayron Flores MD on 03/08/2022:  Lab Results   Component Value Date    HGB 13.5 02/16/2022    HCT 42.9 02/16/2022    MCV 81.7 02/16/2022     02/16/2022    WBC 5.57 02/16/2022    NEUTROABS 3.62 02/16/2022    LYMPHSABS 1.37 02/16/2022    MONOSABS 0.34 02/16/2022    EOSABS 0.17 02/16/2022    BASOSABS 0.05 02/16/2022     Lab Results   Component Value Date    GLUCOSE 85 02/16/2022    BUN 14 02/16/2022    CREATININE 1.33 (H) 02/16/2022     02/16/2022    K 4.2 02/16/2022     02/16/2022    CO2 25.9 02/16/2022    CALCIUM 9.6 02/16/2022    PROTEINTOT 8.1 02/16/2022    ALBUMIN 4.20 02/16/2022    BILITOT 0.6 02/16/2022    ALKPHOS 99 02/16/2022    AST 15 02/16/2022    ALT 7 02/16/2022     No results found for: MG, PHOS, FREET4, TSH          Assessment and Plan    Diagnoses and all orders for this visit:    1. Screening for lung cancer (Primary)    2. Encounter for screening for lung cancer    3. Personal history of tobacco use, presenting hazards to health    4. Adenocarcinoma of bronchus, right (HCC)  Assessment & Plan:  Patient doing well.  I see no evidence of disease recurrence by history, physical examination a recent CT scan of the chest.  I will see him back on a yearly basis with noncontrast CT chest prior.    Orders:  -     CT Chest Without Contrast; Future          Patient Follow Up: 1 year    Patient was given instructions and counseling regarding his condition or for health maintenance advice. Please see specific information pulled into the AVS if appropriate.     Daryon Flores MD    3/8/2022

## 2022-03-09 ENCOUNTER — TREATMENT (OUTPATIENT)
Dept: PHYSICAL THERAPY | Facility: CLINIC | Age: 77
End: 2022-03-09

## 2022-03-09 DIAGNOSIS — G56.01 CARPAL TUNNEL SYNDROME OF RIGHT WRIST: Primary | ICD-10-CM

## 2022-03-09 DIAGNOSIS — G56.21 LESION OF RIGHT ULNAR NERVE: ICD-10-CM

## 2022-03-09 DIAGNOSIS — M24.541 CONTRACTURE OF HAND JOINT, RIGHT: ICD-10-CM

## 2022-03-09 PROCEDURE — L3913 HFO W/O JOINTS CF: HCPCS | Performed by: OCCUPATIONAL THERAPIST

## 2022-03-09 PROCEDURE — 97167 OT EVAL HIGH COMPLEX 60 MIN: CPT | Performed by: OCCUPATIONAL THERAPIST

## 2022-03-09 PROCEDURE — 97112 NEUROMUSCULAR REEDUCATION: CPT | Performed by: OCCUPATIONAL THERAPIST

## 2022-03-09 NOTE — PROGRESS NOTES
Outpatient Occupational Therapy Ortho Initial Evaluation    Patient: Daron Whitfield   : 1945  Diagnosis/ICD-10 Code:  Carpal tunnel syndrome of right wrist [G56.01]  Referring practitioner: Ranjan Gatica, *  Date of Initial Visit: 3/9/2022  Today's Date: 3/9/2022  Patient seen for 1 sessions               Subjective Questionnaire: QuickDASH: 36      Subjective Evaluation    History of Present Illness  Date of surgery: 2022  Mechanism of injury: About a year progressively worsening contracture of R hand, numbness and tingling.       Patient Occupation: retired from  Quality of life: excellent    Pain  Current pain ratin  Location: tingling and numbness in incsions  Quality: tight  Relieving factors: change in position  Aggravating factors: repetitive movement and lifting  Progression: no change    Social Support  Lives in: one-story house  Lives with: spouse    Hand dominance: right    Diagnostic Tests  EMG: abnormal    Treatments  Previous treatment: medication  Patient Goals  Patient goals for therapy: increased strength, independence with ADLs/IADLs, increased motion, decreased pain and decreased edema           Past Medical hx: reports no significant medical hx    Objective          Neurological Testing     Sensation     Wrist/Hand     Right   Diminished: light touch    Comments   Right light touch: 2.83 IF and LF; 3.61 Th and RF; 4.31 SF    Active Range of Motion     Right Wrist   Wrist flexion: 60 degrees   Wrist extension: 50 degrees     Additional Active Range of Motion Details  15-65 0-90 0-35  10-65 10-90 0-40  15-65 25-75 10-40  10-85 50-75 10-35    Elbow   Sup 65  Pro 45      Strength/Myotome Testing     Left Wrist/Hand      (2nd hand position)   Left  strength (2nd hand position) 80 lbs    Right Wrist/Hand      (2nd hand position)   Right  strength (2nd hand position) 25 lbs    Swelling     Right Wrist/Hand   Circumference MCP: 22.8 cm  Circumference  wrist: 19 cm          Assessment & Plan     Assessment  Impairments: abnormal coordination, abnormal muscle firing, abnormal or restricted ROM, activity intolerance, impaired physical strength, lacks appropriate home exercise program, pain with function, safety issue and weight-bearing intolerance  Functional Limitations: carrying objects, lifting, pulling, pushing, reaching behind back, reaching overhead and unable to perform repetitive tasks  Assessment details: Pt has limited extension of PIP in RF and SF.  Moderate swelling, scar hypersensitivity, and stiffness.  Pt is highly motivated.  Pt has decreased full AROM of digits and wrist. Limiting functional tolerance of L UE.   Prognosis: good    Goals  Plan Goals: 1. The patient complains of pain in the L hand                  LTG 1: 12 weeks:  The patient will report a pain rating of 0/10 or better in order to improve sleep quality and tolerance to performance of activities of daily living.                                  STATUS:  New                  STG 1a: 4 weeks:  The patient will report a pain rating of 2/10 or better.                                   STATUS:  New  2. The patient has limited ROM of the L UE                  LTG 2: 12 weeks:  The patient will demonstrate 240 degrees of digital MCDONOUGH, 120 MCDONOUGH of L wrist to allow the patient to  and lift.                                  STATUS:  New                   STG 2a: 4 weeks:  The patient will demonstrate 200 degrees of MCDONOUGH of digits 90 at wrist.                                  STATUS:  New                             3. The patient has limited strength of the L UE.                  LTG 3: 12 weeks:  The patient will demonstrate 55# in order to return to lifting and gripping.                                  STATUS:  New                  STG 3a: 4 weeks:  The patient will demonstrate tolerance to light strengthening without adverse reaction.                                  STATUS:  New  4.  Carrying, Moving, and Handling Objects Functional Limitation                                   LTG 4: 12 weeks:  The patient will demonstrate 1-19% limitation by achieving a score of 15 on the Quick DASH.                                  STATUS:  New                  STG 4a: 4 weeks:  The patient will demonstrate 40-59% limitation by achieving a score of 30 on the Quick DASH.                                    STATUS:  New                    TREATMENT: Orthotic fabrication/fitting/management and training, Manual therapy, therapeutic exercise, home exercise instruction, and modalities as needed to include: electrical stimulation, ultrasound, moist heat, paraffin, fluidotherapy and ice.      Plan  Planned modality interventions: TENS, thermotherapy (hydrocollator packs), thermotherapy (paraffin bath), ultrasound and electrical stimulation/Russian stimulation  Other planned modality interventions: fluidotherapy  Planned therapy interventions: manual therapy, ADL retraining, motor coordination training, neuromuscular re-education, soft tissue mobilization, fine motor coordination training, body mechanics training, balance/weight-bearing training, functional ROM exercises, flexibility, spinal/joint mobilization, strengthening, stretching, therapeutic activities, IADL retraining, joint mobilization and home exercise program  Frequency: 2x week  Duration in weeks: 12  Treatment plan discussed with: patient        Splinting:  · Patient was measure and fit with a custom fabricated anticlaw splint for R UE.   · Patient was instructed in wearing schedule, precautions and care of the splint during this visit.   · Patient was instructed in proper donning/doffing of splint.   Assessment:  · Patient was fitted and appropriate splint was fabricated this date.  · Patient reported that splint was comfortable and had no complications with the fit of the splint.  · Patient was instructed and patient verbalized understanding of  precautions, wear and care of the splint.   · Patient demonstrated independent donning/doffing of splint during treatment today.  Goals:  · Patient was fitted properly with appropriate splint for diagnosis  · Patient was educated on precautions, wear schedule and care of splint  · Patient demonstrated independence with donning/doffing of the splint.  · Splint was provided to Protect Healing Structures, Restrict Mobility, Improve joint alignment.        OT SIGNATURE: SIERRA Washburn OTR/L, SHANA   DATE TREATMENT INITIATED: 3/9/2022    Physician Signature____________________________________ Date____________        Patient is indicated for skilled occupational therapy services.    History # of Personal Factors and/or Comorbidities: HIGH (3+)  Examination of Body System(s): # of elements: HIGH (4+)  Clinical Presentation: EVOLVING  Clinical Decision Making: HIGH     Evaluation:  Low Complexity:    0     mins  68640;  Mod Complexity:    0     mins  59089;  High Complexity:    15     mins  08994;    Timed:  Manual Therapy:    5    mins  52755;  Therapeutic Exercise:    0     mins  82515;  Therapeutic Activity:    0     mins  33241;     Neuromuscular Faraz:    10    mins  92203;    Ultrasound:     0     mins  22833;    Electrical Stimulation:    0     mins  38012;    Untimed:  HFO:                          30    mins      Timed Treatment:   30   mins   Total Treatment:     60   mins      OT SIGNATURE: SIERRA Washburn OTR/L, SHANA     Electronically signed    [place] LICENSE: [id number]   DATE TREATMENT INITIATED: 3/9/2022    Initial Certification  Certification Period: 3/9/2022 thru 6/6/2022  I certify that the therapy services are furnished while this patient is under my care.  The services outlined above are required by this patient, and will be reviewed every 90 days.     Signature:______________________________________________ PHYSICIAN:  Ranjan Wells MD   NPI: [id number]                                       DATE:    Please sign and return via fax to [phone]   Thank you, [facility].

## 2022-03-14 ENCOUNTER — TREATMENT (OUTPATIENT)
Dept: PHYSICAL THERAPY | Facility: CLINIC | Age: 77
End: 2022-03-14

## 2022-03-14 DIAGNOSIS — G56.01 CARPAL TUNNEL SYNDROME OF RIGHT WRIST: Primary | ICD-10-CM

## 2022-03-14 DIAGNOSIS — G56.21 LESION OF RIGHT ULNAR NERVE: ICD-10-CM

## 2022-03-14 DIAGNOSIS — M24.541 CONTRACTURE OF HAND JOINT, RIGHT: ICD-10-CM

## 2022-03-14 PROCEDURE — 97110 THERAPEUTIC EXERCISES: CPT | Performed by: OCCUPATIONAL THERAPIST

## 2022-03-14 PROCEDURE — 97140 MANUAL THERAPY 1/> REGIONS: CPT | Performed by: OCCUPATIONAL THERAPIST

## 2022-03-14 NOTE — PROGRESS NOTES
Occupational Therapy Daily Treatment Note      Patient: Daron Whitfield   : 1945  Referring practitioner: Ranjan Gatica, *  Date of Initial Visit: Type: THERAPY  Noted: 3/9/2022  Today's Date: 3/14/2022  Patient seen for 2 sessions    ICD-10-CM ICD-9-CM   1. Carpal tunnel syndrome of right wrist  G56.01 354.0   2. Lesion of right ulnar nerve  G56.21 354.2   3. Contracture of hand joint, right  M24.541 718.44          Daron Whitfield reports my brace is doing ok, one spot rubs a bit.  My elbow is sore.       Objective   See Exercise, Manual, and Modality Logs for complete treatment.   modification to add mole skin to brace to reduce rubbing on the  dorsum of the hand.    Assessment/Plan  Scar is healing, swelling improving, and pt is improving active extension of all digits.  Continued stiffness in full fist.  Digital ABD/ADD is very weak.      Cont per POC           Timed:  Manual Therapy:    10     mins  51266;  Therapeutic Exercise:    10     mins  36970;  Therapeutic Activity:    0     mins  54414;     Neuromuscular Faraz:    10    mins  69161;    Ultrasound:     0     mins  89420;    Electrical Stimulation:    0     mins  11967;    Untimed:  Electrical Stimulation:    0     mins  91105 ( );  Fluidotherapy:        0    mins  31671  Paraffin:                          0    mins  11951    Timed Treatment:   30   mins   Total Treatment:     30   mins    OT SIGNATURE: SIERRA Washburn, OTR/L, CHT     Electronically signed    [place] LICENSE: [id number]

## 2022-03-16 ENCOUNTER — TREATMENT (OUTPATIENT)
Dept: PHYSICAL THERAPY | Facility: CLINIC | Age: 77
End: 2022-03-16

## 2022-03-16 DIAGNOSIS — G56.21 LESION OF RIGHT ULNAR NERVE: ICD-10-CM

## 2022-03-16 DIAGNOSIS — M24.541 CONTRACTURE OF HAND JOINT, RIGHT: ICD-10-CM

## 2022-03-16 DIAGNOSIS — G56.01 CARPAL TUNNEL SYNDROME OF RIGHT WRIST: Primary | ICD-10-CM

## 2022-03-16 PROCEDURE — 97022 WHIRLPOOL THERAPY: CPT | Performed by: OCCUPATIONAL THERAPIST

## 2022-03-16 PROCEDURE — 97110 THERAPEUTIC EXERCISES: CPT | Performed by: OCCUPATIONAL THERAPIST

## 2022-03-16 PROCEDURE — 97140 MANUAL THERAPY 1/> REGIONS: CPT | Performed by: OCCUPATIONAL THERAPIST

## 2022-03-16 NOTE — PROGRESS NOTES
Occupational Therapy Daily Treatment Note      Patient: Daron Whitfield   : 1945  Referring practitioner: Ranjan Gatica, *  Date of Initial Visit: Type: THERAPY  Noted: 3/9/2022  Today's Date: 3/16/2022  Patient seen for 3 sessions    ICD-10-CM ICD-9-CM   1. Carpal tunnel syndrome of right wrist  G56.01 354.0   2. Lesion of right ulnar nerve  G56.21 354.2   3. Contracture of hand joint, right  M24.541 718.44          Daron Whitfield reports elbow pain continues to irritate him.  Pt reports swelling is coming and going.      Objective   See Exercise, Manual, and Modality Logs for complete treatment.   Pt progressing with functional use of R UE.    Assessment/Plan    RF and SF PIP extension is improving.  Continued tightness at end range, with MP flexed.    Cont per POC           Timed:  Manual Therapy:    10     mins  67383;  Therapeutic Exercise:    10     mins  00349;  Therapeutic Activity:    5     mins  10521;     Neuromuscular Faraz:    0    mins  05588;    Ultrasound:     0     mins  76500;    Electrical Stimulation:    0     mins  69676;    Untimed:  Electrical Stimulation:    0     mins  46614 ( );  Fluidotherapy:        10    mins  57023  Paraffin:                          0    mins  51556    Timed Treatment:   25   mins   Total Treatment:     35   mins    OT SIGNATURE: SIERRA Washburn, OTR/L, CHT     Electronically signed    [place] LICENSE: [id number]

## 2022-03-21 ENCOUNTER — TREATMENT (OUTPATIENT)
Dept: PHYSICAL THERAPY | Facility: CLINIC | Age: 77
End: 2022-03-21

## 2022-03-21 DIAGNOSIS — G56.01 CARPAL TUNNEL SYNDROME OF RIGHT WRIST: Primary | ICD-10-CM

## 2022-03-21 DIAGNOSIS — M24.541 CONTRACTURE OF HAND JOINT, RIGHT: ICD-10-CM

## 2022-03-21 DIAGNOSIS — G56.21 LESION OF RIGHT ULNAR NERVE: ICD-10-CM

## 2022-03-21 PROCEDURE — 97112 NEUROMUSCULAR REEDUCATION: CPT | Performed by: OCCUPATIONAL THERAPIST

## 2022-03-21 PROCEDURE — 97110 THERAPEUTIC EXERCISES: CPT | Performed by: OCCUPATIONAL THERAPIST

## 2022-03-21 PROCEDURE — 97022 WHIRLPOOL THERAPY: CPT | Performed by: OCCUPATIONAL THERAPIST

## 2022-03-21 NOTE — PROGRESS NOTES
Occupational Therapy Daily Treatment Note      Patient: Daron Whitfield   : 1945  Referring practitioner: Ranjan Gatica, *  Date of Initial Visit: Type: THERAPY  Noted: 3/9/2022  Today's Date: 3/21/2022  Patient seen for 4 sessions    ICD-10-CM ICD-9-CM   1. Carpal tunnel syndrome of right wrist  G56.01 354.0   2. Lesion of right ulnar nerve  G56.21 354.2   3. Contracture of hand joint, right  M24.541 718.44          Daron Whitfield reports my hand is starting to feel better.     Objective   See Exercise, Manual, and Modality Logs for complete treatment.   Pt improving Extension of PIP in RF and SF with place and holds.  Able to hold full fist this date.     Assessment/Plan  PIP joints very tender with increased pain with PROM into end range.       Cont per POC           Timed:  Manual Therapy:    10     mins  37943;  Therapeutic Exercise:    10    mins  87711;  Therapeutic Activity:      0  mins  92700;     Neuromuscular Faraz:   10    mins  74370;    Ultrasound:     0     mins  01173;    Electrical Stimulation:    0     mins  02078;    Untimed:  Electrical Stimulation:    0     mins  16888 ( );  Fluidotherapy:        8    mins  83582  Paraffin:                          0    mins  87172    Timed Treatment:   30   mins   Total Treatment:     38   mins    OT SIGNATURE: SIERRA Washburn, OTR/L, CHT     Electronically signed    [place] LICENSE: [id number]

## 2022-03-23 ENCOUNTER — TREATMENT (OUTPATIENT)
Dept: PHYSICAL THERAPY | Facility: CLINIC | Age: 77
End: 2022-03-23

## 2022-03-23 DIAGNOSIS — G56.01 CARPAL TUNNEL SYNDROME OF RIGHT WRIST: Primary | ICD-10-CM

## 2022-03-23 DIAGNOSIS — G56.21 LESION OF RIGHT ULNAR NERVE: ICD-10-CM

## 2022-03-23 DIAGNOSIS — M24.541 CONTRACTURE OF HAND JOINT, RIGHT: ICD-10-CM

## 2022-03-23 PROCEDURE — 97110 THERAPEUTIC EXERCISES: CPT | Performed by: OCCUPATIONAL THERAPIST

## 2022-03-23 PROCEDURE — 97022 WHIRLPOOL THERAPY: CPT | Performed by: OCCUPATIONAL THERAPIST

## 2022-03-23 PROCEDURE — 97112 NEUROMUSCULAR REEDUCATION: CPT | Performed by: OCCUPATIONAL THERAPIST

## 2022-03-23 NOTE — PROGRESS NOTES
Occupational Therapy Daily Treatment Note      Patient: Daron Whitfield   : 1945  Referring practitioner: Ranjan Gatica, *  Date of Initial Visit: Type: THERAPY  Noted: 3/9/2022  Today's Date: 3/23/2022  Patient seen for 5 sessions  No diagnosis found.       Daron Whitfield reports I'm doing better.    Objective   See Exercise, Manual, and Modality Logs for complete treatment.   Right light touch: 2.83 IF, LF, RF; TH 3.61 SF     Active Range of Motion      Right Wrist   Wrist flexion: 65 degrees   Wrist extension: 55 degrees     Additional Active Range of Motion Details  0-85   0-100     0-50  0-85      0-50  40-85      5-50  40-85      0-50    Elbow   Sup 85  Pro 90      (2nd hand position)   Right  strength (2nd hand position) 40 lbs    Swelling      Right Wrist/Hand   Circumference MCP: 22 cm  Circumference wrist: 18.4 cm          Assessment/Plan  Pt is progressing with AROM and functional use of R hand.  Sensation is slowly improving, with increased ability to feel at finger tips, and decreased scar hypersensitivity.       Cont per POC           Timed:  Manual Therapy:    10     mins  50253;  Therapeutic Exercise:    10     mins  13385;  Therapeutic Activity:    0     mins  66296;     Neuromuscular Faraz:    10    mins  16587;    Ultrasound:     0     mins  20288;    Electrical Stimulation:    0     mins  63392;    Untimed:  Electrical Stimulation:    0     mins  45614 ( );  Fluidotherapy:        10    mins  82162  Paraffin:                          0    mins  79614    Timed Treatment:   30   mins   Total Treatment:     40   mins    OT SIGNATURE: SIERRA Washburn, OTR/L, CHT     Electronically signed    [place] LICENSE: [id number]

## 2022-03-28 ENCOUNTER — TREATMENT (OUTPATIENT)
Dept: PHYSICAL THERAPY | Facility: CLINIC | Age: 77
End: 2022-03-28

## 2022-03-28 DIAGNOSIS — M24.541 CONTRACTURE OF HAND JOINT, RIGHT: ICD-10-CM

## 2022-03-28 DIAGNOSIS — G56.01 CARPAL TUNNEL SYNDROME OF RIGHT WRIST: Primary | ICD-10-CM

## 2022-03-28 DIAGNOSIS — G56.21 LESION OF RIGHT ULNAR NERVE: ICD-10-CM

## 2022-03-28 PROCEDURE — 97022 WHIRLPOOL THERAPY: CPT | Performed by: OCCUPATIONAL THERAPIST

## 2022-03-28 PROCEDURE — 97110 THERAPEUTIC EXERCISES: CPT | Performed by: OCCUPATIONAL THERAPIST

## 2022-03-28 PROCEDURE — L3925 FO PIP DIP JNT/SPRNG PRE OTS: HCPCS | Performed by: OCCUPATIONAL THERAPIST

## 2022-03-28 NOTE — PROGRESS NOTES
Occupational Therapy Daily Treatment Note      Patient: Daron Whitfield   : 1945  Referring practitioner: Ranjan Gatica, *  Date of Initial Visit: Type: THERAPY  Noted: 3/9/2022  Today's Date: 3/28/2022  Patient seen for 6 sessions    ICD-10-CM ICD-9-CM   1. Carpal tunnel syndrome of right wrist  G56.01 354.0   2. Lesion of right ulnar nerve  G56.21 354.2   3. Contracture of hand joint, right  M24.541 718.44          Daron Whitfield reports I went to the doctor, he was pleased and wants me in a brace for my little finger a few times a day.  Order for capenar to SF 3x 30 minutes daily and anti-claw when not in capenar/.    Objective   See Exercise, Manual, and Modality Logs for complete treatment.   Fitted today for capenar for R SF with education on wear, care, and wearing 3x/day 30 minutes each time.  Pt fit and verbalized understanding of use.       Assessment/Plan  Pt is doing well with scar care.  ROM and function is improving.       Cont per POC           Timed:  Manual Therapy:    5     mins  58738;  Therapeutic Exercise:    15     mins  18447;  Therapeutic Activity:    0     mins  62931;     Neuromuscular Faraz:    0    mins  53591;    Ultrasound:     0     mins  25697;    Electrical Stimulation:    0     mins  25309;    Untimed:  Electrical Stimulation:    0     mins  27687 ( );  Fluidotherapy:        10    mins  38634  FO with joints:                 10    mins      Timed Treatment:   20   mins   Total Treatment:     40   mins    OT SIGNATURE: SIERRA Washburn, OTR/L, CHT     Electronically signed    [place] LICENSE: [id number]

## 2022-03-29 ENCOUNTER — CLINICAL SUPPORT (OUTPATIENT)
Dept: UROLOGY | Facility: CLINIC | Age: 77
End: 2022-03-29

## 2022-03-29 DIAGNOSIS — C61 CANCER OF PROSTATE: Primary | ICD-10-CM

## 2022-03-29 PROCEDURE — 96402 CHEMO HORMON ANTINEOPL SQ/IM: CPT | Performed by: NURSE PRACTITIONER

## 2022-03-30 ENCOUNTER — TREATMENT (OUTPATIENT)
Dept: PHYSICAL THERAPY | Facility: CLINIC | Age: 77
End: 2022-03-30

## 2022-03-30 DIAGNOSIS — G56.21 LESION OF RIGHT ULNAR NERVE: ICD-10-CM

## 2022-03-30 DIAGNOSIS — M24.541 CONTRACTURE OF HAND JOINT, RIGHT: ICD-10-CM

## 2022-03-30 DIAGNOSIS — G56.01 CARPAL TUNNEL SYNDROME OF RIGHT WRIST: Primary | ICD-10-CM

## 2022-03-30 PROCEDURE — 97110 THERAPEUTIC EXERCISES: CPT | Performed by: OCCUPATIONAL THERAPIST

## 2022-03-30 PROCEDURE — 97022 WHIRLPOOL THERAPY: CPT | Performed by: OCCUPATIONAL THERAPIST

## 2022-03-30 PROCEDURE — 97112 NEUROMUSCULAR REEDUCATION: CPT | Performed by: OCCUPATIONAL THERAPIST

## 2022-03-30 NOTE — PROGRESS NOTES
Occupational Therapy Daily Treatment Note      Patient: Daron Whitfield   : 1945  Referring practitioner: Ranjan Gatica, *  Date of Initial Visit: Type: THERAPY  Noted: 3/9/2022  Today's Date: 3/30/2022  Patient seen for 7 sessions    ICD-10-CM ICD-9-CM   1. Carpal tunnel syndrome of right wrist  G56.01 354.0   2. Lesion of right ulnar nerve  G56.21 354.2   3. Contracture of hand joint, right  M24.541 718.44          Daron Whitfield reports no issues with R hand, Capenar splint is fitting ok.    Objective   See Exercise, Manual, and Modality Logs for complete treatment.   0-75 0-100 0-30  0-75 15-90 0-50  15-85 30-85 5-50  0-90 45-80 0-50    Assessment/Plan    Improving AROM in R hand in all digits.  Vibration continues to irritate R hand.  Cont per POC           Timed:  Manual Therapy:    10     mins  14856;  Therapeutic Exercise:    10     mins  96815;  Therapeutic Activity:    0     mins  49961;     Neuromuscular Faraz:    10    mins  80542;    Ultrasound:     0     mins  43273;    Electrical Stimulation:    0     mins  70341;    Untimed:  Electrical Stimulation:    0     mins  30945 ( );  Fluidotherapy:        10    mins  44667  Paraffin:                          0    mins  61707    Timed Treatment:   30   mins   Total Treatment:     40   mins    OT SIGNATURE: SIERRA Washburn, OTR/L, CHT     Electronically signed    [place] LICENSE: [id number]

## 2022-04-04 ENCOUNTER — TREATMENT (OUTPATIENT)
Dept: PHYSICAL THERAPY | Facility: CLINIC | Age: 77
End: 2022-04-04

## 2022-04-04 DIAGNOSIS — M24.541 CONTRACTURE OF HAND JOINT, RIGHT: ICD-10-CM

## 2022-04-04 DIAGNOSIS — G56.21 LESION OF RIGHT ULNAR NERVE: ICD-10-CM

## 2022-04-04 DIAGNOSIS — G56.01 CARPAL TUNNEL SYNDROME OF RIGHT WRIST: Primary | ICD-10-CM

## 2022-04-04 PROCEDURE — 97022 WHIRLPOOL THERAPY: CPT | Performed by: OCCUPATIONAL THERAPIST

## 2022-04-04 PROCEDURE — 97110 THERAPEUTIC EXERCISES: CPT | Performed by: OCCUPATIONAL THERAPIST

## 2022-04-04 PROCEDURE — 97112 NEUROMUSCULAR REEDUCATION: CPT | Performed by: OCCUPATIONAL THERAPIST

## 2022-04-04 NOTE — PROGRESS NOTES
Occupational Therapy Daily Treatment Note      Patient: Daron Whitfield   : 1945  Referring practitioner: Ranjan Gatica, *  Date of Initial Visit: Type: THERAPY  Noted: 3/9/2022  Today's Date: 2022  Patient seen for 8 sessions    ICD-10-CM ICD-9-CM   1. Carpal tunnel syndrome of right wrist  G56.01 354.0   2. Lesion of right ulnar nerve  G56.21 354.2   3. Contracture of hand joint, right  M24.541 718.44          Daron Whitfield reports it is getting moving better.       Objective   See Exercise, Manual, and Modality Logs for complete treatment.       Assessment/Plan  Pt progressing with AROM in extension and flexion of R hand.  Strength is improving and sensation extending distally.      Cont per POC           Timed:  Manual Therapy:    5     mins  44357;  Therapeutic Exercise:    10     mins  35116;  Therapeutic Activity:    0     mins  86781;     Neuromuscular Faraz:    10    mins  05597;    Ultrasound:     0     mins  25589;    Electrical Stimulation:    0     mins  36853;    Untimed:  Electrical Stimulation:    0     mins  18051 ( );  Fluidotherapy:        10    mins  34791  Paraffin:                          0    mins  06639    Timed Treatment:   25   mins   Total Treatment:     35   mins    OT SIGNATURE: SIERRA Washburn, OTR/L, CHT     Electronically signed    [place] LICENSE: [id number]

## 2022-04-05 DIAGNOSIS — C61 CANCER OF PROSTATE: Primary | ICD-10-CM

## 2022-04-05 RX ORDER — TAMSULOSIN HYDROCHLORIDE 0.4 MG/1
1 CAPSULE ORAL DAILY
Qty: 90 CAPSULE | Refills: 3 | Status: SHIPPED | OUTPATIENT
Start: 2022-04-05 | End: 2022-05-27 | Stop reason: SDUPTHER

## 2022-04-05 RX ORDER — TOLTERODINE 4 MG/1
4 CAPSULE, EXTENDED RELEASE ORAL DAILY
Qty: 90 CAPSULE | Refills: 3 | Status: SHIPPED | OUTPATIENT
Start: 2022-04-05 | End: 2022-06-20 | Stop reason: ALTCHOICE

## 2022-04-06 ENCOUNTER — TREATMENT (OUTPATIENT)
Dept: PHYSICAL THERAPY | Facility: CLINIC | Age: 77
End: 2022-04-06

## 2022-04-06 DIAGNOSIS — G56.21 LESION OF RIGHT ULNAR NERVE: ICD-10-CM

## 2022-04-06 DIAGNOSIS — M24.541 CONTRACTURE OF HAND JOINT, RIGHT: ICD-10-CM

## 2022-04-06 DIAGNOSIS — G56.01 CARPAL TUNNEL SYNDROME OF RIGHT WRIST: Primary | ICD-10-CM

## 2022-04-06 PROCEDURE — 97022 WHIRLPOOL THERAPY: CPT | Performed by: OCCUPATIONAL THERAPIST

## 2022-04-06 PROCEDURE — 97530 THERAPEUTIC ACTIVITIES: CPT | Performed by: OCCUPATIONAL THERAPIST

## 2022-04-06 PROCEDURE — 97110 THERAPEUTIC EXERCISES: CPT | Performed by: OCCUPATIONAL THERAPIST

## 2022-04-06 NOTE — PROGRESS NOTES
Occupational Therapy Daily Treatment Note      Patient: Daron Whitfield   : 1945  Referring practitioner: Ranjan Gatica, *  Date of Initial Visit: No linked episodes  Today's Date: 2022  Patient seen for Visit count could not be calculated. Make sure you are using a visit which is associated with an episode. sessions  No diagnosis found.       Daron Whitfield reports I am just having intermittent shooting pains      Objective   See Exercise, Manual, and Modality Logs for complete treatment.   Pt progressing with digital extension and strength    Assessment/Plan  Scar sensitivity is improving.       Cont per POC           Timed:  Manual Therapy:    10     mins  36985;  Therapeutic Exercise:    10     mins  07498;  Therapeutic Activity:    10     mins  80646;     Neuromuscular Faraz:    0    mins  60040;    Ultrasound:     0     mins  77489;    Electrical Stimulation:    0     mins  55490;    Untimed:  Electrical Stimulation:    0     mins  78485 ( );  Fluidotherapy:        10    mins  32583  Paraffin:                          0    mins  28002    Timed Treatment:   30   mins   Total Treatment:     40   mins    OT SIGNATURE: SIERRA Washburn, OTR/L, CHT     Electronically signed    [place] LICENSE: [id number]

## 2022-04-11 ENCOUNTER — TREATMENT (OUTPATIENT)
Dept: PHYSICAL THERAPY | Facility: CLINIC | Age: 77
End: 2022-04-11

## 2022-04-11 DIAGNOSIS — G56.01 CARPAL TUNNEL SYNDROME OF RIGHT WRIST: Primary | ICD-10-CM

## 2022-04-11 DIAGNOSIS — M24.541 CONTRACTURE OF HAND JOINT, RIGHT: ICD-10-CM

## 2022-04-11 DIAGNOSIS — G56.21 LESION OF RIGHT ULNAR NERVE: ICD-10-CM

## 2022-04-11 PROCEDURE — 97022 WHIRLPOOL THERAPY: CPT | Performed by: OCCUPATIONAL THERAPIST

## 2022-04-11 PROCEDURE — 97110 THERAPEUTIC EXERCISES: CPT | Performed by: OCCUPATIONAL THERAPIST

## 2022-04-11 PROCEDURE — 97112 NEUROMUSCULAR REEDUCATION: CPT | Performed by: OCCUPATIONAL THERAPIST

## 2022-04-11 NOTE — PROGRESS NOTES
Occupational Therapy Daily Treatment Note      Patient: Daron Whitfield   : 1945  Referring practitioner: Ranjan Gatica, *  Date of Initial Visit: Type: THERAPY  Noted: 3/9/2022  Today's Date: 2022  Patient seen for 10 sessions    ICD-10-CM ICD-9-CM   1. Carpal tunnel syndrome of right wrist  G56.01 354.0   2. Lesion of right ulnar nerve  G56.21 354.2   3. Contracture of hand joint, right  M24.541 718.44          Daron Whitfield reports I have lost some swelling so the brace is loose.        Objective   See Exercise, Manual, and Modality Logs for complete treatment.   Brace adjusted this date for fit. Pt is still having tenderness in incisions.       Assessment/Plan  Skin is improving with dryness.   Scar sensitivity continues to be an issue in the palm and elbow.  Pt progressing with strengthening.       Cont per POC           Timed:  Manual Therapy:    10     mins  70842;  Therapeutic Exercise:    10     mins  25259;  Therapeutic Activity:    0     mins  77479;     Neuromuscular Faraz:    10    mins  68476;    Ultrasound:     0     mins  79615;    Electrical Stimulation:    0     mins  09236;    Untimed:  Electrical Stimulation:    0     mins  26655 ( );  Fluidotherapy:        10    mins  97941  Paraffin:                          0    mins  24363    Timed Treatment:   30   mins   Total Treatment:     40   mins    OT SIGNATURE: SIERRA Washburn, OTR/L, CHT     Electronically signed    [place] LICENSE: [id number]

## 2022-04-13 ENCOUNTER — HOSPITAL ENCOUNTER (OUTPATIENT)
Dept: ONCOLOGY | Facility: HOSPITAL | Age: 77
Setting detail: INFUSION SERIES
Discharge: HOME | End: 2022-04-13

## 2022-04-13 ENCOUNTER — TELEPHONE (OUTPATIENT)
Dept: PHYSICAL THERAPY | Facility: CLINIC | Age: 77
End: 2022-04-13

## 2022-04-13 DIAGNOSIS — C34.91 ADENOCARCINOMA OF BRONCHUS, RIGHT: Primary | ICD-10-CM

## 2022-04-13 PROCEDURE — 96523 IRRIG DRUG DELIVERY DEVICE: CPT

## 2022-04-13 PROCEDURE — 25010000002 HEPARIN LOCK FLUSH PER 10 UNITS: Performed by: INTERNAL MEDICINE

## 2022-04-13 RX ORDER — SODIUM CHLORIDE 0.9 % (FLUSH) 0.9 %
10 SYRINGE (ML) INJECTION AS NEEDED
Status: CANCELLED | OUTPATIENT
Start: 2022-04-13

## 2022-04-13 RX ORDER — SODIUM CHLORIDE 0.9 % (FLUSH) 0.9 %
20 SYRINGE (ML) INJECTION AS NEEDED
Status: DISCONTINUED | OUTPATIENT
Start: 2022-04-13 | End: 2022-04-14 | Stop reason: HOSPADM

## 2022-04-13 RX ORDER — SODIUM CHLORIDE 0.9 % (FLUSH) 0.9 %
20 SYRINGE (ML) INJECTION AS NEEDED
Status: CANCELLED | OUTPATIENT
Start: 2022-04-13

## 2022-04-13 RX ORDER — HEPARIN SODIUM (PORCINE) LOCK FLUSH IV SOLN 100 UNIT/ML 100 UNIT/ML
500 SOLUTION INTRAVENOUS AS NEEDED
Status: DISCONTINUED | OUTPATIENT
Start: 2022-04-13 | End: 2022-04-14 | Stop reason: HOSPADM

## 2022-04-13 RX ORDER — HEPARIN SODIUM (PORCINE) LOCK FLUSH IV SOLN 100 UNIT/ML 100 UNIT/ML
500 SOLUTION INTRAVENOUS AS NEEDED
Status: CANCELLED | OUTPATIENT
Start: 2022-04-13

## 2022-04-13 RX ADMIN — Medication 20 ML: at 08:04

## 2022-04-13 RX ADMIN — HEPARIN SODIUM (PORCINE) LOCK FLUSH IV SOLN 100 UNIT/ML 500 UNITS: 100 SOLUTION at 08:04

## 2022-04-18 ENCOUNTER — TREATMENT (OUTPATIENT)
Dept: PHYSICAL THERAPY | Facility: CLINIC | Age: 77
End: 2022-04-18

## 2022-04-18 DIAGNOSIS — M24.541 CONTRACTURE OF HAND JOINT, RIGHT: ICD-10-CM

## 2022-04-18 DIAGNOSIS — G56.01 CARPAL TUNNEL SYNDROME OF RIGHT WRIST: Primary | ICD-10-CM

## 2022-04-18 DIAGNOSIS — G56.21 LESION OF RIGHT ULNAR NERVE: ICD-10-CM

## 2022-04-18 PROCEDURE — 97112 NEUROMUSCULAR REEDUCATION: CPT | Performed by: OCCUPATIONAL THERAPIST

## 2022-04-18 PROCEDURE — 97110 THERAPEUTIC EXERCISES: CPT | Performed by: OCCUPATIONAL THERAPIST

## 2022-04-18 NOTE — PROGRESS NOTES
Re-Assessment / Re-Certification      Patient: Daron Whitfield   : 1945  Diagnosis/ICD-10 Code:  Carpal tunnel syndrome of right wrist [G56.01]  Referring practitioner: Ranjan Gatica, *  Date of Initial Visit: Type: THERAPY  Noted: 3/9/2022  Today's Date: 2022  Patient seen for 11 sessions      Subjective:   Daron Whitfield reports: I feel like it is about the same.  Subjective Questionnaire: QuickDASH: 24  Clinical Progress: improved  Home Program Compliance: Yes  Treatment has included: therapeutic exercise, neuromuscular re-education, therapeutic activity, electrical stimulation and fluidotherapy    Subjective   Objective   Right light touch: 2.83 Th IF, LF and RF; 3.61 SF     Active Range of Motion      Right Wrist   Wrist flexion: 65 degrees   Wrist extension: 55 degrees     Additional Active Range of Motion Details  0-70   0-95     0-45  0-80   20-95   0-45  0-85   25-95   5-45  0-85   35-85   10-45    Elbow 0-140  Sup 70  Pro 65      Strength/Myotome Testing      Left Wrist/Hand       (2nd hand position)   Left  strength (2nd hand position) 80 lbs     Right Wrist/Hand       (2nd hand position)   Right  strength (2nd hand position) 25 lbs     Swelling      Right Wrist/Hand   Circumference MCP: 22 cm  Circumference wrist: 19 cm     35#  Assessment/Plan    Visit Diagnoses:    ICD-10-CM ICD-9-CM   1. Carpal tunnel syndrome of right wrist  G56.01 354.0   2. Lesion of right ulnar nerve  G56.21 354.2   3. Contracture of hand joint, right  M24.541 718.44       Progress toward previous goals: Partially Met    Plan Goals: 1. The patient complains of pain in the L hand                  LTG 1: 12 weeks:  The patient will report a pain rating of 0/10 or better in order to improve sleep quality and tolerance to performance of activities of daily living.                                  STATUS:  New                  STG 1a: 4 weeks:  The patient will report a pain rating of 2/10 or better.                                    STATUS:  New  2. The patient has limited ROM of the L UE                  LTG 2: 12 weeks:  The patient will demonstrate 240 degrees of digital MCDONOUGH, 120 MCDONOUGH of L wrist to allow the patient to  and lift.                                  STATUS:  NOT MET                  STG 2a: 4 weeks:  The patient will demonstrate 200 degrees of MCDONOUGH of digits 90 at wrist.                                  STATUS:  MET                         3. The patient has limited strength of the L UE.                  LTG 3: 12 weeks:  The patient will demonstrate 55# in order to return to lifting and gripping.                                  STATUS:  NOT MET                  STG 3a: 4 weeks:  The patient will demonstrate tolerance to light strengthening without adverse reaction.                                  STATUS:  MET  4. Carrying, Moving, and Handling Objects Functional Limitation                                   LTG 4: 12 weeks:  The patient will demonstrate 1-19% limitation by achieving a score of 15 on the Quick DASH.                                  STATUS:  NOT MET                  STG 4a: 4 weeks:  The patient will demonstrate 40-59% limitation by achieving a score of 30 on the Quick DASH.                                    STATUS:  MET      Recommendations: Continue as planned  Timeframe: 2 months  Prognosis to achieve goals: good      OT SIGNATURE: SIERRA Washburn, OTR/L, CHT     Electronically signed    [place] LICENSE: [id number]   DATE TREATMENT INITIATED: 4/18/2022          Timed:  Manual Therapy:    10     mins  77242;  Therapeutic Exercise:    10     mins  87435;  Therapeutic Activity:    0     mins  45412;     Neuromuscular Faraz:    10    mins  74757;    Ultrasound:     0     mins  15259;    Electrical Stimulation:    0     mins  44578;    Untimed:  Electrical Stimulation:    0     mins  93039 ( );  Fluidotherapy:        0    mins  31557  Paraffin:                          0    mins   52407    Timed Treatment:   30   mins   Total Treatment:     30   mins

## 2022-04-20 ENCOUNTER — TREATMENT (OUTPATIENT)
Dept: PHYSICAL THERAPY | Facility: CLINIC | Age: 77
End: 2022-04-20

## 2022-04-20 DIAGNOSIS — G56.21 LESION OF RIGHT ULNAR NERVE: ICD-10-CM

## 2022-04-20 DIAGNOSIS — M24.541 CONTRACTURE OF HAND JOINT, RIGHT: ICD-10-CM

## 2022-04-20 DIAGNOSIS — G56.01 CARPAL TUNNEL SYNDROME OF RIGHT WRIST: Primary | ICD-10-CM

## 2022-04-20 PROCEDURE — 97110 THERAPEUTIC EXERCISES: CPT | Performed by: OCCUPATIONAL THERAPIST

## 2022-04-20 PROCEDURE — 97112 NEUROMUSCULAR REEDUCATION: CPT | Performed by: OCCUPATIONAL THERAPIST

## 2022-04-20 NOTE — PROGRESS NOTES
Occupational Therapy Daily Treatment Note      Patient: Daron Whitfield   : 1945  Referring practitioner: Ranjan Gatica, *  Date of Initial Visit: Type: THERAPY  Noted: 3/9/2022  Today's Date: 2022  Patient seen for 12 sessions    ICD-10-CM ICD-9-CM   1. Carpal tunnel syndrome of right wrist  G56.01 354.0   2. Lesion of right ulnar nerve  G56.21 354.2   3. Contracture of hand joint, right  M24.541 718.44          Daron Whitfield reports my wrist is bothering me today and I feel swollen today.       Objective   See Exercise, Manual, and Modality Logs for complete treatment.   Scar was softer where kinesiotape was removed this date.     Assessment/Plan  Improved active extension of digits noted this date.       Cont per POC           Timed:  Manual Therapy:    10     mins  42729;  Therapeutic Exercise:    10     mins  63200;  Therapeutic Activity:    0     mins  04172;     Neuromuscular Faraz:    10    mins  71128;    Ultrasound:     0     mins  17255;    Electrical Stimulation:    0     mins  63651;    Untimed:  Electrical Stimulation:    0     mins  87711 ( );  Fluidotherapy:        0    mins  27804  Paraffin:                          0    mins  71690    Timed Treatment:   30   mins   Total Treatment:     30   mins    OT SIGNATURE: SIERRA Washburn, OTR/L, CHT     Electronically signed    [place] LICENSE: [id number]

## 2022-04-25 ENCOUNTER — TREATMENT (OUTPATIENT)
Dept: PHYSICAL THERAPY | Facility: CLINIC | Age: 77
End: 2022-04-25

## 2022-04-25 DIAGNOSIS — M24.541 CONTRACTURE OF HAND JOINT, RIGHT: ICD-10-CM

## 2022-04-25 DIAGNOSIS — G56.01 CARPAL TUNNEL SYNDROME OF RIGHT WRIST: Primary | ICD-10-CM

## 2022-04-25 DIAGNOSIS — G56.21 LESION OF RIGHT ULNAR NERVE: ICD-10-CM

## 2022-04-25 PROCEDURE — 97110 THERAPEUTIC EXERCISES: CPT | Performed by: OCCUPATIONAL THERAPIST

## 2022-04-25 PROCEDURE — 97112 NEUROMUSCULAR REEDUCATION: CPT | Performed by: OCCUPATIONAL THERAPIST

## 2022-04-25 NOTE — PROGRESS NOTES
Occupational Therapy Daily Treatment Note      Patient: Daron Whitfield   : 1945  Referring practitioner: Ranjan Gatica, *  Date of Initial Visit: Type: THERAPY  Noted: 3/9/2022  Today's Date: 2022  Patient seen for 13 sessions    ICD-10-CM ICD-9-CM   1. Carpal tunnel syndrome of right wrist  G56.01 354.0   2. Lesion of right ulnar nerve  G56.21 354.2   3. Contracture of hand joint, right  M24.541 718.44          Daron Whitfield reports I am still having intermittent shooting pains in the scars.       Objective   See Exercise, Manual, and Modality Logs for complete treatment.   Pt progressing with  and strength.  AROM is improving.    Assessment/Plan    Pt tolerating progression of strengthening well.  Continues to have intermittent shooting pains along neural path.     Cont per POC           Timed:  Manual Therapy:    10     mins  39148;  Therapeutic Exercise:    10     mins  76677;  Therapeutic Activity:    0     mins  72104;     Neuromuscular Faraz:    10    mins  06568;    Ultrasound:     0     mins  97867;    Electrical Stimulation:    0     mins  49263;    Untimed:  Electrical Stimulation:    0     mins  53249 ( );  Fluidotherapy:        0    mins  00305  Paraffin:                          0    mins  87748    Timed Treatment:   30   mins   Total Treatment:     30   mins    OT SIGNATURE: SIERRA Washburn, OTR/L, CHT     Electronically signed    [place] LICENSE: [id number]

## 2022-04-27 ENCOUNTER — TREATMENT (OUTPATIENT)
Dept: PHYSICAL THERAPY | Facility: CLINIC | Age: 77
End: 2022-04-27

## 2022-04-27 DIAGNOSIS — G56.01 CARPAL TUNNEL SYNDROME OF RIGHT WRIST: Primary | ICD-10-CM

## 2022-04-27 DIAGNOSIS — M24.541 CONTRACTURE OF HAND JOINT, RIGHT: ICD-10-CM

## 2022-04-27 DIAGNOSIS — G56.21 LESION OF RIGHT ULNAR NERVE: ICD-10-CM

## 2022-04-27 PROCEDURE — 97112 NEUROMUSCULAR REEDUCATION: CPT | Performed by: OCCUPATIONAL THERAPIST

## 2022-04-27 PROCEDURE — 97530 THERAPEUTIC ACTIVITIES: CPT | Performed by: OCCUPATIONAL THERAPIST

## 2022-04-27 NOTE — PROGRESS NOTES
Occupational Therapy Daily Treatment Note      Patient: Daron Whitfield   : 1945  Referring practitioner: Ranjan Gatica, *  Date of Initial Visit: No linked episodes  Today's Date: 2022  Patient seen for Visit count could not be calculated. Make sure you are using a visit which is associated with an episode. sessions  No diagnosis found.       Daron Whitfield reports I am feeling about the same.      Objective   See Exercise, Manual, and Modality Logs for complete treatment.   Pt reports progression of strength is slowly coming along. kinesiotape applied to scar tissue     Assessment/Plan  Increased resistance this date and worked on building endurance with functional gripping tasks.       Cont per POC           Timed:  Manual Therapy:    0     mins  53778;  Therapeutic Exercise:    10     mins  58508;  Therapeutic Activity:    10     mins  91226;     Neuromuscular Faraz:    10    mins  24358;    Ultrasound:     0     mins  67854;    Electrical Stimulation:    0     mins  43739;    Untimed:  Electrical Stimulation:    0     mins  73865 ( );  Fluidotherapy:        0    mins  50017  Paraffin:                          0    mins  85622    Timed Treatment:   30   mins   Total Treatment:     30   mins    OT SIGNATURE: SIERRA Washburn, OTR/L, CHT     Electronically signed    [place] LICENSE: [id number]

## 2022-05-02 ENCOUNTER — TREATMENT (OUTPATIENT)
Dept: PHYSICAL THERAPY | Facility: CLINIC | Age: 77
End: 2022-05-02

## 2022-05-02 DIAGNOSIS — M24.541 CONTRACTURE OF HAND JOINT, RIGHT: ICD-10-CM

## 2022-05-02 DIAGNOSIS — G56.21 LESION OF RIGHT ULNAR NERVE: ICD-10-CM

## 2022-05-02 DIAGNOSIS — G56.01 CARPAL TUNNEL SYNDROME OF RIGHT WRIST: Primary | ICD-10-CM

## 2022-05-02 PROCEDURE — 97112 NEUROMUSCULAR REEDUCATION: CPT | Performed by: OCCUPATIONAL THERAPIST

## 2022-05-02 PROCEDURE — 97110 THERAPEUTIC EXERCISES: CPT | Performed by: OCCUPATIONAL THERAPIST

## 2022-05-02 NOTE — PROGRESS NOTES
Occupational Therapy Daily Treatment Note      Patient: Daron Whitfield   : 1945  Referring practitioner: Ranjan Gatica, *  Date of Initial Visit: Type: THERAPY  Noted: 3/9/2022  Today's Date: 2022  Patient seen for 15 sessions    ICD-10-CM ICD-9-CM   1. Carpal tunnel syndrome of right wrist  G56.01 354.0   2. Lesion of right ulnar nerve  G56.21 354.2   3. Contracture of hand joint, right  M24.541 718.44          Daron Whitfield reports It is about the same, still getting those shooting pains.     Objective   See Exercise, Manual, and Modality Logs for complete treatment.   Strength is improving overall for functional gripping.     Assessment/Plan  Continues to have intermittent tingling, neural shooting pains.       Cont per POC           Timed:  Manual Therapy:    10     mins  30079;  Therapeutic Exercise:    10     mins  07068;  Therapeutic Activity:    0     mins  21493;     Neuromuscular Faraz:    10    mins  65110;    Ultrasound:     0     mins  81984;    Electrical Stimulation:    0     mins  12911;    Untimed:  Electrical Stimulation:    0     mins  03309 ( );  Fluidotherapy:        0    mins  63225  Paraffin:                          0    mins  63616    Timed Treatment:   30   mins   Total Treatment:     30   mins    OT SIGNATURE: SIERRA Washburn, OTR/L, CHT     Electronically signed    [place] LICENSE: [id number]

## 2022-05-04 ENCOUNTER — TREATMENT (OUTPATIENT)
Dept: PHYSICAL THERAPY | Facility: CLINIC | Age: 77
End: 2022-05-04

## 2022-05-04 DIAGNOSIS — G56.21 LESION OF RIGHT ULNAR NERVE: ICD-10-CM

## 2022-05-04 DIAGNOSIS — G56.01 CARPAL TUNNEL SYNDROME OF RIGHT WRIST: Primary | ICD-10-CM

## 2022-05-04 DIAGNOSIS — M24.541 CONTRACTURE OF HAND JOINT, RIGHT: ICD-10-CM

## 2022-05-04 PROCEDURE — 97530 THERAPEUTIC ACTIVITIES: CPT | Performed by: OCCUPATIONAL THERAPIST

## 2022-05-04 PROCEDURE — 97112 NEUROMUSCULAR REEDUCATION: CPT | Performed by: OCCUPATIONAL THERAPIST

## 2022-05-04 NOTE — PROGRESS NOTES
Occupational Therapy Daily Treatment Note      Patient: Daron Whitfield   : 1945  Referring practitioner: Ranjan Gatica, *  Date of Initial Visit: Type: THERAPY  Noted: 3/9/2022  Today's Date: 2022  Patient seen for 16 sessions    ICD-10-CM ICD-9-CM   1. Carpal tunnel syndrome of right wrist  G56.01 354.0   2. Lesion of right ulnar nerve  G56.21 354.2   3. Contracture of hand joint, right  M24.541 718.44          Daron Whitfield reports it is slowly progressing.     Objective   See Exercise, Manual, and Modality Logs for complete treatment.     Pro 70  Sup 70  0-80 0-100 0-45  0-85 20-95 0-55  0-90 30-90 0-55  0-95 45-90 0-55    50#    Assessment/Plan    Continue with strengthening and functional re-ed    Cont per POC           Timed:  Manual Therapy:    0     mins  92569;  Therapeutic Exercise:    10     mins  67311;  Therapeutic Activity:    10     mins  63684;     Neuromuscular Faraz:    10    mins  50585;    Ultrasound:     0     mins  67544;    Electrical Stimulation:    0     mins  53682;    Untimed:  Electrical Stimulation:    0     mins  70001 ( );  Fluidotherapy:        0    mins  27650  Paraffin:                          0    mins  07962    Timed Treatment:   30   mins   Total Treatment:     30   mins    OT SIGNATURE: SIERRA Washburn, OTR/L, CHT     Electronically signed    [place] LICENSE: [id number]

## 2022-05-11 ENCOUNTER — TELEPHONE (OUTPATIENT)
Dept: PHYSICAL THERAPY | Facility: CLINIC | Age: 77
End: 2022-05-11

## 2022-05-16 ENCOUNTER — TELEPHONE (OUTPATIENT)
Dept: PHYSICAL THERAPY | Facility: CLINIC | Age: 77
End: 2022-05-16

## 2022-05-16 NOTE — TELEPHONE ENCOUNTER
TESTED POSITIVE FOR COVID - ASKED TO CANCEL ALL REMAINING APPTS AND HE WILL CALL BACK IF HE DECIDES TO RESCHEDULE

## 2022-05-19 ENCOUNTER — OFFICE VISIT (OUTPATIENT)
Dept: PULMONOLOGY | Facility: CLINIC | Age: 77
End: 2022-05-19

## 2022-05-19 DIAGNOSIS — J43.9 PULMONARY EMPHYSEMA, UNSPECIFIED EMPHYSEMA TYPE: ICD-10-CM

## 2022-05-19 DIAGNOSIS — Z92.29 HISTORY OF VACCINATION: ICD-10-CM

## 2022-05-19 DIAGNOSIS — U07.1 COVID-19 VIRUS INFECTION: ICD-10-CM

## 2022-05-19 DIAGNOSIS — C34.91 ADENOCARCINOMA OF BRONCHUS, RIGHT: Primary | ICD-10-CM

## 2022-05-19 DIAGNOSIS — J45.40 MODERATE PERSISTENT ASTHMA, UNSPECIFIED WHETHER COMPLICATED: ICD-10-CM

## 2022-05-19 PROCEDURE — 99214 OFFICE O/P EST MOD 30 MIN: CPT | Performed by: INTERNAL MEDICINE

## 2022-05-19 NOTE — PROGRESS NOTES
Primary Care Provider  Angelic Malagon APRN     Referring Provider  No ref. provider found     Chief Complaint  Emphysema, Shortness of Breath, Wheezing, Cough, and Follow-up (6 month follow up/Pt stated this past Monday he tested positive for Covid)    Subjective          Daron Whitfield presents to [facility]  History of Present Illness  Daron Whitfield is a 77 y.o. 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  This is a televisit.  Patient was diagnosed with COVID-19 infection earlier this week and it was switched to televisit.  He started having lots of incessant cough and nose running all the time.  COVID-19 was tested and it came back +3 days ago.  He does not have any fever or chills.  He has shortness of breath with heavy exertion.  He has wheezing which was there at baseline even prior to the infection.  He has had COVID-19 vaccines including booster dose.  He does have Stiolto and Flovent but has not been using them regularly.  He has ProAir which he uses sparingly.  He does have nebulizer machine with DuoNeb prescribed.  He uses DuoNeb up to 2-3 times a day.  He has not needed any antibiotics or steroids since his last office visit the last CT scan of the chest done in March 2022 shows a stable findings from previous lung cancer status post chemo and radiation therapy.  He has no changes in weight or appetite.  He does have exertional shortness of breath and intermittent cough at baseline.  Overall his COPD has been stable.  His lung cancer has been stable.  His COVID-19 infection caused him to have some worsening respiratory symptoms recently.      Review of Systems     General:  No Fatigue, No Fever No weight loss or loss of appetite  HEENT: No dysphagia, No Visual Changes, +rhinorrhea, significant sneezing and runny eyes  Respiratory:  + Incessant cough,+Dyspnea, mucoid phlegm, No  Pleuritic Pain, ++wheezing, no hemoptysis.  Cardiovascular: Denies chest pain, denies palpitations,+SILVERIO, No Chest Pressure  Gastrointestinal:  No Abdominal Pain, No Nausea, No Vomiting, No Diarrhea  Genitourinary:  No Dysuria, No Frequency, No Hesitancy  Musculoskeletal: No muscle pain or swelling  Endocrine:  No Heat Intolerance, No Cold Intolerance  Hematologic:  No Bleeding, No Bruising  Psychiatric:  No Anxiety, No Depression  Neurologic:  No Confusion, no Dysarthria, No Headaches  Skin:  No Rash, No Open Wounds    Family History   Problem Relation Age of Onset   • Diabetes Mother    • Cancer Sister    • Diabetes Sister    • Colon cancer Sister 60   • Hypertension Sister    • Diabetes Sister    • Coronary artery disease Brother         Social History     Socioeconomic History   • Marital status:    Tobacco Use   • Smoking status: Former Smoker     Packs/day: 1.00     Years: 50.00     Pack years: 50.00     Types: Cigarettes     Quit date:      Years since quittin.3   • Smokeless tobacco: Never Used   • Tobacco comment: STARTED AGE 17  QUIT IN    Vaping Use   • Vaping Use: Never used   Substance and Sexual Activity   • Alcohol use: Not Currently     Alcohol/week: 1.0 standard drink     Types: 1 Cans of beer per week     Comment: casual drinker   • Drug use: Never   • Sexual activity: Defer        Past Medical History:   Diagnosis Date   • Bladder outflow obstruction    • Cervical spinal stenosis 2018   • Colon polyp    • Emphysema lung (HCC)    • Emphysema of lung (HCC)    • Hand paresthesia 2018   • HTN (hypertension)    • Hypercholesteremia    • Lung cancer (HCC)    • Muscle atrophy of upper extremity 2018   • Numbness and tingling in both hands    • OAB (overactive bladder) 2014   • Prostate cancer (HCC)    • Ulnar neuropathy 2018        Immunization History   Administered Date(s) Administered   • COVID-19 (MODERNA) 1st, 2nd, 3rd Dose Only 2021,  03/09/2021   • COVID-19 (PFIZER) PURPLE CAP 11/17/2021   • Fluzone High Dose =>65 Years ([facility] ONLY) 12/11/2019   • Fluzone High-Dose 65+yrs 11/13/2021         No Known Allergies       Current Outpatient Medications:   •  albuterol sulfate  (90 Base) MCG/ACT inhaler, Inhale 2 puffs Every 4 (Four) Hours As Needed for Wheezing., Disp: , Rfl:   •  atorvastatin (LIPITOR) 40 MG tablet, Lipitor 40 mg oral tablet take 1 tablet (40 mg) by oral route once daily   Suspended, Disp: , Rfl:   •  Cholecalciferol 50 MCG (2000 UT) capsule, Take  by mouth., Disp: , Rfl:   •  cyclobenzaprine (FLEXERIL) 10 MG tablet, Take 1 tablet by mouth 3 (Three) Times a Day., Disp: 20 tablet, Rfl: 0  •  darifenacin (ENABLEX) 15 MG 24 hr tablet, Enablex 15 mg oral tablet extended release 24 hr take 1 tablet (15 mg) by oral route once daily   Suspended, Disp: , Rfl:   •  FeroSul 325 (65 Fe) MG tablet, , Disp: , Rfl:   •  fesoterodine fumarate (Toviaz) 8 MG tablet sustained-release 24 hour tablet, Toviaz 8 mg oral tablet extended release 24 hr take 1 tablet (8 mg) by oral route once daily   Suspended, Disp: , Rfl:   •  fluticasone (Flovent HFA) 220 MCG/ACT inhaler, Inhale 1 puff 2 (Two) Times a Day., Disp: 1 each, Rfl: 11  •  folic acid (FOLVITE) 1 MG tablet, folic acid 1 mg oral tablet take 1 tablet (1 mg) by oral route once daily   Active, Disp: , Rfl:   •  ipratropium-albuterol (DUO-NEB) 0.5-2.5 mg/3 ml nebulizer, Take 3 mL by nebulization Every 4 (Four) Hours As Needed for Wheezing., Disp: 360 mL, Rfl: 4  •  leuprolide (Lupron Depot, 3-Month,) 22.5 MG injection, 22.5 mg., Disp: , Rfl:   •  lisinopril (PRINIVIL,ZESTRIL) 10 MG tablet, lisinopril 10 mg oral tablet take 1 tablet (10 mg) by oral route once daily   Active, Disp: , Rfl:   •  meloxicam (Mobic) 15 MG tablet, Take 1 tablet by mouth Daily., Disp: 21 tablet, Rfl: 0  •  metFORMIN (GLUCOPHAGE) 500 MG tablet, , Disp: , Rfl:   •  metoprolol succinate XL (TOPROL-XL) 25 MG 24 hr tablet,  Toprol XL 25 mg oral tablet extended release 24 hr take 1 tablet (25 mg) by oral route once daily   Active, Disp: , Rfl:   •  sildenafil (VIAGRA) 100 MG tablet, , Disp: , Rfl:   •  tamsulosin (FLOMAX) 0.4 MG capsule 24 hr capsule, Take 1 capsule by mouth Daily for 360 days., Disp: 90 capsule, Rfl: 3  •  tiotropium bromide-olodaterol (Stiolto Respimat) 2.5-2.5 MCG/ACT aerosol solution inhaler, Inhale 2 puffs Daily. Lot # [id number] Expiration: June 2023, Disp: 1 each, Rfl: 11  •  tolterodine LA (DETROL LA) 4 MG 24 hr capsule, Take 1 capsule by mouth Daily for 360 days., Disp: 90 capsule, Rfl: 3  •  traMADol (ULTRAM) 50 MG tablet, Take  by mouth See Admin Instructions. Take 1 tablet by mouth every 6-8 hours as needed for pain, Disp: , Rfl:   •  vitamin D (ERGOCALCIFEROL) 1.25 MG (25862 UT) capsule capsule, , Disp: , Rfl:      Objective   Vital Signs:   There were no vitals taken for this visit.    Mallampatti classification : 1  Physical Exam  Could not be done given telehealth.  Patient is normal conversational over phone.  Has slight hoarseness.     Result Review :   The following data was reviewed by: Roshan Hood MD on 05/19/2022:  Common labs    Common Labsle 2/16/22 2/16/22    1926 1926   Glucose  85   BUN  14   Creatinine  1.33 (A)   eGFR African Am  63   Sodium  139   Potassium  4.2   Chloride  102   Calcium  9.6   Albumin  4.20   Total Bilirubin  0.6   Alkaline Phosphatase  99   AST (SGOT)  15   ALT (SGPT)  7   WBC 5.57    Hemoglobin 13.5    Hematocrit 42.9    Platelets 182    (A) Abnormal value            CMP    CMP 2/16/22   Glucose 85   BUN 14   Creatinine 1.33 (A)   eGFR African Am 63   Sodium 139   Potassium 4.2   Chloride 102   Calcium 9.6   Albumin 4.20   Total Bilirubin 0.6   Alkaline Phosphatase 99   AST (SGOT) 15   ALT (SGPT) 7   (A) Abnormal value            CBC    CBC 2/16/22   WBC 5.57   RBC 5.25   Hemoglobin 13.5   Hematocrit 42.9   MCV 81.7   MCH 25.7 (A)   MCHC 31.5   RDW 13.9   Platelets 182    (A) Abnormal value            CBC w/diff    CBC w/Diff 2/16/22   WBC 5.57   RBC 5.25   Hemoglobin 13.5   Hematocrit 42.9   MCV 81.7   MCH 25.7 (A)   MCHC 31.5   RDW 13.9   Platelets 182   Neutrophil Rel % 64.9   Immature Granulocyte Rel % 0.4   Lymphocyte Rel % 24.6   Monocyte Rel % 6.1   Eosinophil Rel % 3.1   Basophil Rel % 0.9   (A) Abnormal value            Data reviewed: Radiologic studies CT scan of the chest from March 2022 was reviewed.  Shows a stable finding with mild right hilar fullness which has been stable and minimal scarring.            Assessment and Plan    Diagnoses and all orders for this visit:    1. Adenocarcinoma of bronchus, right (HCC) (Primary)    2. Pulmonary emphysema, unspecified emphysema type (HCC)    3. Moderate persistent asthma, unspecified whether complicated    4. History of vaccination    5. COVID-19 virus infection      COPD: Given his persistent symptoms with wheezing and cough, I advised him to go back on Stiolto 2 puffs once daily and Flovent 2 puffs twice daily.  Use albuterol as needed.  He has DuoNeb nebulizer I advised him to continue with DuoNeb nebulizer for airway clearance 2-3 times a day.  COPD exacerbation next plan was discussed in detail.  He has not needed any antibiotics or steroids since last office visit.    Recent COVID-19 infection: Patient is recuperating from it.  He is already been 10 days of symptoms and he has had test last week which came back positive earlier this week.  He would have qualified for Paxlovid if the symptoms was earlier.  Patient is doing well.  I would hold off on any medications at this time.    Lung cancer: Poorly differentiated stage IIIa adenocarcinoma, status post chemotherapy and radiation therapy.  Patient has been doing well.  No recurrence of the disease now.    Up-to-date on COVID, pneumonia and flu vaccines.    We will refill his medications in next 2 months.  He has refills for 2 months    Follow Up   Return in about 6  months (around 11/19/2022).  Patient was given instructions and counseling regarding his condition or for health maintenance advice. Please see specific information pulled into the AVS if appropriate.       Electronically signed by Roshan Hood MD, 5/19/2022, 08:38 EDT.

## 2022-05-27 ENCOUNTER — OFFICE VISIT (OUTPATIENT)
Dept: UROLOGY | Facility: CLINIC | Age: 77
End: 2022-05-27

## 2022-05-27 VITALS
DIASTOLIC BLOOD PRESSURE: 71 MMHG | SYSTOLIC BLOOD PRESSURE: 118 MMHG | TEMPERATURE: 97.7 F | WEIGHT: 183 LBS | BODY MASS INDEX: 25.62 KG/M2 | HEIGHT: 71 IN | HEART RATE: 100 BPM

## 2022-05-27 DIAGNOSIS — R97.21 INCREASING PSA LEVEL AFTER TREATMENT FOR PROSTATE CANCER: Primary | ICD-10-CM

## 2022-05-27 DIAGNOSIS — C61 CANCER OF PROSTATE: ICD-10-CM

## 2022-05-27 LAB
BACTERIA UR QL AUTO: NORMAL /HPF
BILIRUB BLD-MCNC: NEGATIVE MG/DL
CLARITY, POC: CLEAR
COLOR UR: YELLOW
EPI CELLS #/AREA URNS HPF: 0 /[HPF]
EXPIRATION DATE: ABNORMAL
GLUCOSE UR STRIP-MCNC: NEGATIVE MG/DL
KETONES UR QL: NEGATIVE
LEUKOCYTE EST, POC: NEGATIVE
Lab: ABNORMAL
NITRITE UR-MCNC: NEGATIVE MG/ML
PH UR: 5 [PH] (ref 5–8)
PROT UR STRIP-MCNC: NEGATIVE MG/DL
RBC # UR STRIP: ABNORMAL /UL
RBC # UR STRIP: NORMAL /HPF
RENAL EPITHELIAL, POC: 0
SP GR UR: 1.02 (ref 1–1.03)
UNIDENT CRYS URNS QL MICRO: NORMAL /HPF
UROBILINOGEN UR QL: NORMAL
WBC # UR STRIP: NORMAL /HPF

## 2022-05-27 PROCEDURE — 81003 URINALYSIS AUTO W/O SCOPE: CPT | Performed by: UROLOGY

## 2022-05-27 PROCEDURE — 99214 OFFICE O/P EST MOD 30 MIN: CPT | Performed by: UROLOGY

## 2022-05-27 RX ORDER — DARIFENACIN HYDROBROMIDE 15 MG/1
15 TABLET, EXTENDED RELEASE ORAL DAILY
Qty: 90 TABLET | Refills: 3 | Status: SHIPPED | OUTPATIENT
Start: 2022-05-27 | End: 2022-06-20

## 2022-05-27 RX ORDER — BICALUTAMIDE 50 MG/1
50 TABLET, FILM COATED ORAL DAILY
Qty: 90 TABLET | Refills: 3 | Status: SHIPPED | OUTPATIENT
Start: 2022-05-27 | End: 2022-08-25

## 2022-05-27 RX ORDER — TAMSULOSIN HYDROCHLORIDE 0.4 MG/1
1 CAPSULE ORAL DAILY
Qty: 90 CAPSULE | Refills: 3 | Status: SHIPPED | OUTPATIENT
Start: 2022-05-27 | End: 2023-05-22

## 2022-05-27 NOTE — PROGRESS NOTES
"Chief Complaint  Prostate Cancer    Subjective          Daron Whitfield presents to [facility] UROLOGY  History of Present Illness    Patient has prostate cancer diagnosed in 2011 and underwent cryoablation of prostate in 2012.  His PSA started going up and has been on antiandrogen therapy.  Patient is doing very well at this time as far as urination is concerned.  No dysuria or gross hematuria.  No nausea vomiting and is not losing weight.  No symptoms suggestive of recurrent or metastatic disease from prostate carcinoma.  PSA 6 months ago was 0.1    Objective No acute distress  Vital Signs:   /71   Pulse 100   Temp 97.7 °F (36.5 °C) (Infrared)   Ht 180.3 cm (71\")   Wt 83 kg (183 lb)   BMI 25.52 kg/m²     No Known Allergies   Past medical history:  has a past medical history of Bladder outflow obstruction, Cervical spinal stenosis (01/23/2018), Colon polyp, Emphysema lung (HCC), Emphysema of lung (HCC), Hand paresthesia (01/23/2018), HTN (hypertension), Hypercholesteremia, Lung cancer (HCC) (2016), Muscle atrophy of upper extremity (01/23/2018), Numbness and tingling in both hands, OAB (overactive bladder) (12/09/2014), Prostate cancer (HCC) (2012), and Ulnar neuropathy (01/23/2018).   Past surgical history:  has a past surgical history that includes Colonoscopy (2019); Cystoscopy; Lung biopsy; Cryoablation of Prostate (01/17/2012); Teeth Extraction; and TURP / transurethral incision / drainage prostate (06/16/2015).  Personal history: family history includes Cancer in his sister; Colon cancer (age of onset: 60) in his sister; Coronary artery disease in his brother; Diabetes in his mother, sister, and sister; Hypertension in his sister.  Social history:  reports that he quit smoking about 6 years ago. His smoking use included cigarettes. He has a 50.00 pack-year smoking history. He has never used smokeless tobacco. He reports previous alcohol use of about 1.0 standard drink of alcohol " per week. He reports that he does not use drugs.    Review of Systems    Please see past medical and surgical history and rest of the system is negative    Physical Exam  Constitutional:       General: He is not in acute distress.     Appearance: Normal appearance. He is obese. He is not ill-appearing or toxic-appearing.   HENT:      Head: Normocephalic and atraumatic.      Ears:      Comments: No hearing loss  Cardiovascular:      Rate and Rhythm: Normal rate and regular rhythm.      Pulses: Normal pulses.      Heart sounds: Normal heart sounds. No murmur heard.  Pulmonary:      Effort: Pulmonary effort is normal.      Breath sounds: Normal breath sounds. No rhonchi or rales.   Abdominal:      Palpations: Abdomen is soft.      Tenderness: There is no abdominal tenderness. There is no right CVA tenderness or left CVA tenderness.      Hernia: No hernia is present.   Genitourinary:     Penis: Normal.       Testes: Normal.      Prostate: Normal.      Rectum: Normal.   Musculoskeletal:         General: Normal range of motion.      Cervical back: Normal range of motion and neck supple. No rigidity or tenderness.      Comments: Patient has bilateral Dupuytren contractures   Lymphadenopathy:      Cervical: No cervical adenopathy.   Skin:     General: Skin is warm.      Coloration: Skin is jaundiced.   Neurological:      General: No focal deficit present.      Mental Status: He is alert and oriented to person, place, and time.      Motor: No weakness.      Gait: Gait normal.   Psychiatric:         Mood and Affect: Mood normal.         Behavior: Behavior normal.         Thought Content: Thought content normal.         Judgment: Judgment normal.        Result Review :                 Assessment and Plan    Diagnoses and all orders for this visit:    1. Increasing PSA level after treatment for prostate cancer (Primary)  -     bicalutamide (CASODEX) 50 MG chemo tablet; Take 1 tablet by mouth Daily for 90 days.  Dispense: 90  tablet; Refill: 3  -     darifenacin (ENABLEX) 15 MG 24 hr tablet; Take 1 tablet by mouth Daily for 90 days.  Dispense: 90 tablet; Refill: 3    2. Cancer of prostate (HCC)  -     PSA Diagnostic  -     POC Urinalysis Dipstick, Automated  -     bicalutamide (CASODEX) 50 MG chemo tablet; Take 1 tablet by mouth Daily for 90 days.  Dispense: 90 tablet; Refill: 3  -     darifenacin (ENABLEX) 15 MG 24 hr tablet; Take 1 tablet by mouth Daily for 90 days.  Dispense: 90 tablet; Refill: 3  -     tamsulosin (FLOMAX) 0.4 MG capsule 24 hr capsule; Take 1 capsule by mouth Daily for 360 days.  Dispense: 90 capsule; Refill: 3      Patient is in remission and we will continue present treatment and let him come back in 6 months for LUL  Brief Urine Lab Results  (Last result in the past 365 days)      Color   Clarity   Blood   Leuk Est   Nitrite   Protein   CREAT   Urine HCG        05/27/22 1008 Yellow   Clear   Trace   Negative   Negative   Negative                  Follow Up   No follow-ups on file.  Patient was given instructions and counseling regarding his condition or for health maintenance advice. Please see specific information pulled into the AVS if appropriate.     Merrill Ames MD

## 2022-06-14 ENCOUNTER — DOCUMENTATION (OUTPATIENT)
Dept: PHYSICAL THERAPY | Facility: CLINIC | Age: 77
End: 2022-06-14

## 2022-06-14 DIAGNOSIS — G56.21 LESION OF RIGHT ULNAR NERVE: ICD-10-CM

## 2022-06-14 DIAGNOSIS — G56.01 CARPAL TUNNEL SYNDROME OF RIGHT WRIST: Primary | ICD-10-CM

## 2022-06-14 DIAGNOSIS — M24.541 CONTRACTURE OF HAND JOINT, RIGHT: ICD-10-CM

## 2022-06-14 NOTE — PROGRESS NOTES
Discharge Summary  Discharge Summary from Occupational Therapy Report    Patient Information  Daron Whitfield  1945    Dates OT visit: 3/9/22-5/4/22  Number of Visits: 16     Discharge Status of Patient: See MD Note dated 5/4/22    Goals: Partially Met    Visit Diagnoses:    ICD-10-CM ICD-9-CM   1. Carpal tunnel syndrome of right wrist  G56.01 354.0   2. Lesion of right ulnar nerve  G56.21 354.2   3. Contracture of hand joint, right  M24.541 718.44       Discharge Plan: Continue with current home exercise program as instructed  Patient to return to referring/providing physician    Comments pt had covid and cancelled the rest of appointments.  Came back in and discussed return with patient but he felt like he wanted to be done with therapy.    Date of Discharge 5/16/22        SIERRA Washburn, OTR/L, CHT  Occupational Therapist, Certified Hand therapist    Electronically Signed   [place] LICENSE: [id number]

## 2022-06-20 DIAGNOSIS — N32.81 OAB (OVERACTIVE BLADDER): Primary | ICD-10-CM

## 2022-06-29 ENCOUNTER — CLINICAL SUPPORT (OUTPATIENT)
Dept: UROLOGY | Facility: CLINIC | Age: 77
End: 2022-06-29

## 2022-06-29 DIAGNOSIS — C61 CANCER OF PROSTATE: Primary | ICD-10-CM

## 2022-06-29 PROCEDURE — 96402 CHEMO HORMON ANTINEOPL SQ/IM: CPT | Performed by: UROLOGY

## 2022-07-06 ENCOUNTER — HOSPITAL ENCOUNTER (OUTPATIENT)
Dept: ONCOLOGY | Facility: HOSPITAL | Age: 77
Setting detail: INFUSION SERIES
Discharge: HOME | End: 2022-07-06

## 2022-07-06 DIAGNOSIS — C34.91 ADENOCARCINOMA OF BRONCHUS, RIGHT: Primary | ICD-10-CM

## 2022-07-06 PROCEDURE — 96523 IRRIG DRUG DELIVERY DEVICE: CPT

## 2022-07-06 PROCEDURE — 25010000002 HEPARIN LOCK FLUSH PER 10 UNITS: Performed by: INTERNAL MEDICINE

## 2022-07-06 RX ORDER — SODIUM CHLORIDE 0.9 % (FLUSH) 0.9 %
20 SYRINGE (ML) INJECTION AS NEEDED
Status: DISCONTINUED | OUTPATIENT
Start: 2022-07-06 | End: 2022-07-07 | Stop reason: HOSPADM

## 2022-07-06 RX ORDER — HEPARIN SODIUM (PORCINE) LOCK FLUSH IV SOLN 100 UNIT/ML 100 UNIT/ML
500 SOLUTION INTRAVENOUS AS NEEDED
Status: DISCONTINUED | OUTPATIENT
Start: 2022-07-06 | End: 2022-07-07 | Stop reason: HOSPADM

## 2022-07-06 RX ADMIN — HEPARIN SODIUM (PORCINE) LOCK FLUSH IV SOLN 100 UNIT/ML 500 UNITS: 100 SOLUTION at 09:35

## 2022-07-06 RX ADMIN — Medication 20 ML: at 09:34

## 2022-08-03 ENCOUNTER — TELEPHONE (OUTPATIENT)
Dept: UROLOGY | Facility: CLINIC | Age: 77
End: 2022-08-03

## 2022-08-03 NOTE — TELEPHONE ENCOUNTER
Provider: AKANKSHA CANDELARIA  Caller: SELENA WU  Relationship to Patient: SELF  Phone Number: [phone]  Reason for Call: PATIENT CALLED TO SAME DAY CANCEL APPT. AFTER SCHEDULING TODAY, PATIENT WAS ABLE TO SPEAK WITH PCP AND SEE THEM TO GET PRESCRIPTIONS. PATIENT APPRECIATES THIS OFFICES ASSISTANCE.

## 2022-08-31 ENCOUNTER — HOSPITAL ENCOUNTER (OUTPATIENT)
Dept: ONCOLOGY | Facility: HOSPITAL | Age: 77
Setting detail: INFUSION SERIES
Discharge: HOME | End: 2022-08-31

## 2022-08-31 DIAGNOSIS — C34.91 ADENOCARCINOMA OF BRONCHUS, RIGHT: Primary | ICD-10-CM

## 2022-08-31 PROCEDURE — 25010000002 HEPARIN LOCK FLUSH PER 10 UNITS: Performed by: INTERNAL MEDICINE

## 2022-08-31 PROCEDURE — 96523 IRRIG DRUG DELIVERY DEVICE: CPT

## 2022-08-31 RX ORDER — SODIUM CHLORIDE 0.9 % (FLUSH) 0.9 %
10 SYRINGE (ML) INJECTION AS NEEDED
Status: DISCONTINUED | OUTPATIENT
Start: 2022-08-31 | End: 2022-09-01 | Stop reason: HOSPADM

## 2022-08-31 RX ORDER — HEPARIN SODIUM (PORCINE) LOCK FLUSH IV SOLN 100 UNIT/ML 100 UNIT/ML
500 SOLUTION INTRAVENOUS AS NEEDED
Status: DISCONTINUED | OUTPATIENT
Start: 2022-08-31 | End: 2022-09-01 | Stop reason: HOSPADM

## 2022-08-31 RX ORDER — SODIUM CHLORIDE 0.9 % (FLUSH) 0.9 %
20 SYRINGE (ML) INJECTION AS NEEDED
Status: DISCONTINUED | OUTPATIENT
Start: 2022-08-31 | End: 2022-09-01 | Stop reason: HOSPADM

## 2022-08-31 RX ORDER — SODIUM CHLORIDE 0.9 % (FLUSH) 0.9 %
10 SYRINGE (ML) INJECTION AS NEEDED
Status: CANCELLED | OUTPATIENT
Start: 2022-08-31

## 2022-08-31 RX ORDER — HEPARIN SODIUM (PORCINE) LOCK FLUSH IV SOLN 100 UNIT/ML 100 UNIT/ML
500 SOLUTION INTRAVENOUS AS NEEDED
Status: CANCELLED | OUTPATIENT
Start: 2022-08-31

## 2022-08-31 RX ORDER — SODIUM CHLORIDE 0.9 % (FLUSH) 0.9 %
20 SYRINGE (ML) INJECTION AS NEEDED
Status: CANCELLED | OUTPATIENT
Start: 2022-08-31

## 2022-08-31 RX ADMIN — Medication 20 ML: at 08:07

## 2022-08-31 RX ADMIN — HEPARIN 500 UNITS: 100 SYRINGE at 08:07

## 2022-09-29 ENCOUNTER — CLINICAL SUPPORT (OUTPATIENT)
Dept: UROLOGY | Facility: CLINIC | Age: 77
End: 2022-09-29

## 2022-09-29 DIAGNOSIS — C61 CANCER OF PROSTATE: Primary | ICD-10-CM

## 2022-09-29 PROCEDURE — 96402 CHEMO HORMON ANTINEOPL SQ/IM: CPT | Performed by: UROLOGY

## 2022-10-12 ENCOUNTER — HOSPITAL ENCOUNTER (OUTPATIENT)
Dept: ONCOLOGY | Facility: HOSPITAL | Age: 77
Setting detail: INFUSION SERIES
Discharge: HOME | End: 2022-10-12

## 2022-10-12 DIAGNOSIS — C34.91 ADENOCARCINOMA OF BRONCHUS, RIGHT: Primary | ICD-10-CM

## 2022-10-12 PROCEDURE — 96523 IRRIG DRUG DELIVERY DEVICE: CPT

## 2022-10-12 PROCEDURE — 25010000002 HEPARIN LOCK FLUSH PER 10 UNITS: Performed by: INTERNAL MEDICINE

## 2022-10-12 RX ORDER — SODIUM CHLORIDE 0.9 % (FLUSH) 0.9 %
20 SYRINGE (ML) INJECTION AS NEEDED
Status: CANCELLED | OUTPATIENT
Start: 2022-10-12

## 2022-10-12 RX ORDER — SODIUM CHLORIDE 0.9 % (FLUSH) 0.9 %
20 SYRINGE (ML) INJECTION AS NEEDED
Status: DISCONTINUED | OUTPATIENT
Start: 2022-10-12 | End: 2022-10-13 | Stop reason: HOSPADM

## 2022-10-12 RX ORDER — HEPARIN SODIUM (PORCINE) LOCK FLUSH IV SOLN 100 UNIT/ML 100 UNIT/ML
500 SOLUTION INTRAVENOUS AS NEEDED
Status: DISCONTINUED | OUTPATIENT
Start: 2022-10-12 | End: 2022-10-13 | Stop reason: HOSPADM

## 2022-10-12 RX ORDER — SODIUM CHLORIDE 0.9 % (FLUSH) 0.9 %
10 SYRINGE (ML) INJECTION AS NEEDED
Status: CANCELLED | OUTPATIENT
Start: 2022-10-12

## 2022-10-12 RX ORDER — HEPARIN SODIUM (PORCINE) LOCK FLUSH IV SOLN 100 UNIT/ML 100 UNIT/ML
500 SOLUTION INTRAVENOUS AS NEEDED
Status: CANCELLED | OUTPATIENT
Start: 2022-10-12

## 2022-10-12 RX ADMIN — HEPARIN 500 UNITS: 100 SYRINGE at 08:37

## 2022-10-12 RX ADMIN — Medication 20 ML: at 08:37

## 2022-11-23 ENCOUNTER — HOSPITAL ENCOUNTER (OUTPATIENT)
Dept: ONCOLOGY | Facility: HOSPITAL | Age: 77
Setting detail: INFUSION SERIES
Discharge: HOME | End: 2022-11-23

## 2022-11-23 DIAGNOSIS — C34.91 ADENOCARCINOMA OF BRONCHUS, RIGHT: Primary | ICD-10-CM

## 2022-11-23 PROCEDURE — 25010000002 HEPARIN LOCK FLUSH PER 10 UNITS: Performed by: INTERNAL MEDICINE

## 2022-11-23 PROCEDURE — 96523 IRRIG DRUG DELIVERY DEVICE: CPT

## 2022-11-23 RX ORDER — HEPARIN SODIUM (PORCINE) LOCK FLUSH IV SOLN 100 UNIT/ML 100 UNIT/ML
500 SOLUTION INTRAVENOUS AS NEEDED
Status: CANCELLED | OUTPATIENT
Start: 2022-11-23

## 2022-11-23 RX ORDER — SODIUM CHLORIDE 0.9 % (FLUSH) 0.9 %
20 SYRINGE (ML) INJECTION AS NEEDED
Status: CANCELLED | OUTPATIENT
Start: 2022-11-23

## 2022-11-23 RX ORDER — SODIUM CHLORIDE 0.9 % (FLUSH) 0.9 %
10 SYRINGE (ML) INJECTION AS NEEDED
Status: DISCONTINUED | OUTPATIENT
Start: 2022-11-23 | End: 2022-11-24 | Stop reason: HOSPADM

## 2022-11-23 RX ORDER — SODIUM CHLORIDE 0.9 % (FLUSH) 0.9 %
10 SYRINGE (ML) INJECTION AS NEEDED
Status: CANCELLED | OUTPATIENT
Start: 2022-11-23

## 2022-11-23 RX ORDER — HEPARIN SODIUM (PORCINE) LOCK FLUSH IV SOLN 100 UNIT/ML 100 UNIT/ML
500 SOLUTION INTRAVENOUS AS NEEDED
Status: DISCONTINUED | OUTPATIENT
Start: 2022-11-23 | End: 2022-11-24 | Stop reason: HOSPADM

## 2022-11-23 RX ORDER — SODIUM CHLORIDE 0.9 % (FLUSH) 0.9 %
20 SYRINGE (ML) INJECTION AS NEEDED
Status: DISCONTINUED | OUTPATIENT
Start: 2022-11-23 | End: 2022-11-24 | Stop reason: HOSPADM

## 2022-11-23 RX ADMIN — HEPARIN SODIUM (PORCINE) LOCK FLUSH IV SOLN 100 UNIT/ML 500 UNITS: 100 SOLUTION at 08:43

## 2022-11-23 RX ADMIN — Medication 20 ML: at 08:43

## 2022-11-28 ENCOUNTER — OFFICE VISIT (OUTPATIENT)
Dept: UROLOGY | Facility: CLINIC | Age: 77
End: 2022-11-28

## 2022-11-28 VITALS
SYSTOLIC BLOOD PRESSURE: 101 MMHG | HEIGHT: 71 IN | WEIGHT: 192 LBS | BODY MASS INDEX: 26.88 KG/M2 | TEMPERATURE: 98.2 F | HEART RATE: 90 BPM | DIASTOLIC BLOOD PRESSURE: 58 MMHG

## 2022-11-28 DIAGNOSIS — Q64.33 CONGENITAL MEATAL STENOSIS: ICD-10-CM

## 2022-11-28 DIAGNOSIS — R35.0 URINARY FREQUENCY: ICD-10-CM

## 2022-11-28 DIAGNOSIS — R97.21 INCREASING PSA LEVEL AFTER TREATMENT FOR PROSTATE CANCER: ICD-10-CM

## 2022-11-28 DIAGNOSIS — R39.15 URINARY URGENCY: ICD-10-CM

## 2022-11-28 DIAGNOSIS — C61 CANCER OF PROSTATE: Primary | ICD-10-CM

## 2022-11-28 LAB
BACTERIA UR QL AUTO: NORMAL /HPF
BILIRUB BLD-MCNC: NEGATIVE MG/DL
CLARITY, POC: CLEAR
COLOR UR: YELLOW
EPI CELLS #/AREA URNS HPF: 0 /[HPF]
EXPIRATION DATE: ABNORMAL
GLUCOSE UR STRIP-MCNC: NEGATIVE MG/DL
KETONES UR QL: NEGATIVE
LEUKOCYTE EST, POC: NEGATIVE
Lab: ABNORMAL
NITRITE UR-MCNC: NEGATIVE MG/ML
PH UR: 5 [PH] (ref 5–8)
PROT UR STRIP-MCNC: NEGATIVE MG/DL
RBC # UR STRIP: ABNORMAL /UL
RBC # UR STRIP: NORMAL /HPF
RENAL EPITHELIAL, POC: 0
SP GR UR: 1.02 (ref 1–1.03)
UNIDENT CRYS URNS QL MICRO: NORMAL /HPF
UROBILINOGEN UR QL: ABNORMAL
WBC # UR STRIP: NORMAL /HPF

## 2022-11-28 PROCEDURE — 81003 URINALYSIS AUTO W/O SCOPE: CPT | Performed by: UROLOGY

## 2022-11-28 PROCEDURE — 99213 OFFICE O/P EST LOW 20 MIN: CPT | Performed by: UROLOGY

## 2022-11-28 RX ORDER — VIBEGRON 75 MG/1
TABLET, FILM COATED ORAL
COMMUNITY
Start: 2022-11-14

## 2022-11-28 RX ORDER — LISINOPRIL 5 MG/1
TABLET ORAL
COMMUNITY
Start: 2022-11-13 | End: 2022-11-28 | Stop reason: DRUGHIGH

## 2022-11-28 RX ORDER — BICALUTAMIDE 50 MG/1
TABLET, FILM COATED ORAL
COMMUNITY
Start: 2022-10-01

## 2022-11-28 NOTE — PROGRESS NOTES
"Chief Complaint  Follow-up and Prostate Cancer    Patient was on antiandrogen therapy    Meatal stenosis    Subjective  No acute distress        Daron Whitfield presents to [facility] UROLOGY  History of Present Illness    77-year-old -American male has prostate carcinoma in 2011 and underwent cryoablation of prostate.  His PSA started going up and he is on antiandrogen therapy at this time.    Patient gets up twice at night to urinate and his stream is weak 50% of the time.  He does have some urgency.  He is not sure if Gemtesa is helping him or not.    No dysuria or gross hematuria.    Objective No acute distress  Vital Signs:   /58   Pulse 90   Temp 98.2 °F (36.8 °C)   Ht 180.3 cm (71\")   Wt 87.1 kg (192 lb)   BMI 26.78 kg/m²     No Known Allergies   Past medical history:  has a past medical history of Bladder outflow obstruction, Cervical spinal stenosis (01/23/2018), Colon polyp, COVID, Emphysema lung ([facility]), Emphysema of lung ([facility]), Hand paresthesia (01/23/2018), HTN (hypertension), Hypercholesteremia, Lung cancer ([facility]) (2016), Muscle atrophy of upper extremity (01/23/2018), Numbness and tingling in both hands, OAB (overactive bladder) (12/09/2014), Prostate cancer ([facility]) (2012), and Ulnar neuropathy (01/23/2018).   Past surgical history:  has a past surgical history that includes Colonoscopy (2019); Cystoscopy; Lung biopsy; Cryoablation of Prostate (01/17/2012); Teeth Extraction; and TURP / transurethral incision / drainage prostate (06/16/2015).  Personal history: family history includes Cancer in his sister; Colon cancer (age of onset: 60) in his sister; Coronary artery disease in his brother; Diabetes in his mother, sister, and sister; Hypertension in his sister.  Social history:  reports that he quit smoking about 6 years ago. His smoking use included cigarettes. He has a 50.00 pack-year smoking history. He has never used smokeless tobacco. He reports that he does not " currently use alcohol after a past usage of about 1.0 standard drink per week. He reports that he does not use drugs.    Review of Systems    Please see past medical surgical history rest of the system is negative    Physical Exam  Constitutional:       General: He is not in acute distress.     Appearance: Normal appearance. He is normal weight. He is not ill-appearing or toxic-appearing.   HENT:      Head: Normocephalic and atraumatic.      Ears:      Comments: No hearing loss  Cardiovascular:      Rate and Rhythm: Normal rate and regular rhythm.      Pulses: Normal pulses.      Heart sounds: Normal heart sounds. No murmur heard.  Pulmonary:      Effort: Pulmonary effort is normal. No respiratory distress.      Breath sounds: Normal breath sounds. No rhonchi or rales.   Abdominal:      General: Abdomen is flat. There is no distension.      Palpations: Abdomen is soft. There is no mass.      Tenderness: There is no abdominal tenderness. There is no right CVA tenderness or left CVA tenderness.      Comments: Divarication of recti   Genitourinary:     Comments: Meatal stenosis.    Penis is normal.    Right and left scrotum is normal.    Right and left testes and epididymis is normal.    LUL.  Prostate gland is less than 15 g and benign  Musculoskeletal:         General: No swelling. Normal range of motion.      Cervical back: Normal range of motion and neck supple. No rigidity or tenderness.   Lymphadenopathy:      Cervical: No cervical adenopathy.   Skin:     General: Skin is warm.      Coloration: Skin is not jaundiced.   Neurological:      General: No focal deficit present.      Mental Status: He is alert and oriented to person, place, and time.      Motor: No weakness.      Gait: Gait normal.   Psychiatric:         Mood and Affect: Mood normal.         Behavior: Behavior normal.         Thought Content: Thought content normal.         Judgment: Judgment normal.        Result Review :                 Assessment and  Plan    Diagnoses and all orders for this visit:    1. Cancer of prostate (HCC) (Primary)  -     POC Urinalysis Dipstick, Automated    2. Urinary frequency    3. Urinary urgency    4. Congenital meatal stenosis    5. Increasing PSA level after treatment for prostate cancer      Will continue total androgen ablation.  Patient has pretty bad meatal stenosis and will do meatotomy and cystoscopy in the office.  Procedure explained to the patient  Brief Urine Lab Results  (Last result in the past 365 days)      Color   Clarity   Blood   Leuk Est   Nitrite   Protein   CREAT   Urine HCG        11/28/22 1124 Yellow   Clear   Small   Negative   Negative   Negative                  Follow Up   No follow-ups on file.  Patient was given instructions and counseling regarding his condition or for health maintenance advice. Please see specific information pulled into the AVS if appropriate.     Merrill Ames MD

## 2022-11-30 ENCOUNTER — OFFICE VISIT (OUTPATIENT)
Dept: PULMONOLOGY | Facility: CLINIC | Age: 77
End: 2022-11-30

## 2022-11-30 VITALS
RESPIRATION RATE: 18 BRPM | BODY MASS INDEX: 26.67 KG/M2 | HEIGHT: 71 IN | WEIGHT: 190.5 LBS | DIASTOLIC BLOOD PRESSURE: 66 MMHG | HEART RATE: 88 BPM | TEMPERATURE: 97.5 F | OXYGEN SATURATION: 97 % | SYSTOLIC BLOOD PRESSURE: 95 MMHG

## 2022-11-30 DIAGNOSIS — U07.1 COVID-19 VIRUS INFECTION: ICD-10-CM

## 2022-11-30 DIAGNOSIS — C34.91 ADENOCARCINOMA OF BRONCHUS, RIGHT: Primary | ICD-10-CM

## 2022-11-30 DIAGNOSIS — J43.9 PULMONARY EMPHYSEMA, UNSPECIFIED EMPHYSEMA TYPE: ICD-10-CM

## 2022-11-30 DIAGNOSIS — Z92.29 HISTORY OF VACCINATION: ICD-10-CM

## 2022-11-30 DIAGNOSIS — J44.1 COPD WITH ACUTE EXACERBATION: ICD-10-CM

## 2022-11-30 DIAGNOSIS — J45.40 MODERATE PERSISTENT ASTHMA, UNSPECIFIED WHETHER COMPLICATED: ICD-10-CM

## 2022-11-30 PROCEDURE — 99214 OFFICE O/P EST MOD 30 MIN: CPT | Performed by: INTERNAL MEDICINE

## 2022-11-30 RX ORDER — PREDNISONE 10 MG/1
TABLET ORAL
Qty: 45 TABLET | Refills: 0 | Status: SHIPPED | OUTPATIENT
Start: 2022-11-30 | End: 2023-02-01

## 2022-11-30 RX ORDER — FLUTICASONE PROPIONATE 220 UG/1
1 AEROSOL, METERED RESPIRATORY (INHALATION)
Qty: 1 EACH | Refills: 11 | Status: SHIPPED | OUTPATIENT
Start: 2022-11-30

## 2022-11-30 RX ORDER — TIOTROPIUM BROMIDE AND OLODATEROL 3.124; 2.736 UG/1; UG/1
2 SPRAY, METERED RESPIRATORY (INHALATION)
Qty: 1 EACH | Refills: 11 | COMMUNITY
Start: 2022-11-30

## 2022-11-30 RX ORDER — CEFDINIR 300 MG/1
300 CAPSULE ORAL 2 TIMES DAILY
Qty: 20 CAPSULE | Refills: 0 | Status: SHIPPED | OUTPATIENT
Start: 2022-11-30 | End: 2023-02-01

## 2022-11-30 RX ORDER — TIOTROPIUM BROMIDE AND OLODATEROL 3.124; 2.736 UG/1; UG/1
2 SPRAY, METERED RESPIRATORY (INHALATION)
Qty: 2 EACH | Refills: 0 | COMMUNITY
Start: 2022-11-30 | End: 2022-12-01

## 2022-11-30 RX ORDER — IPRATROPIUM BROMIDE AND ALBUTEROL SULFATE 2.5; .5 MG/3ML; MG/3ML
3 SOLUTION RESPIRATORY (INHALATION) EVERY 4 HOURS PRN
Qty: 360 ML | Refills: 4 | Status: SHIPPED | OUTPATIENT
Start: 2022-11-30

## 2022-11-30 NOTE — PROGRESS NOTES
Primary Care Provider  Alejandra Xavier APRN     Referring Provider  No ref. provider found     Chief Complaint  Emphysema, Asthma, Shortness of Breath, Wheezing, Cough, and Follow-up    Subjective          Daron Whitfield presents to [facility]  History of Present Illness  Daron Whitfield is a 77 y.o. 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  He is here for follow-up.  Since his last office visit, he has been on Stiolto 2 puffs once daily and Flovent twice daily and albuterol as needed.  He also uses DuoNeb nebulizer several times a day but does not feel better.  He has been having cough, wheezing, shortness of breath with minimal exertion.  He tries to move around some in room but is limited with activities.  His wife smokes around him.  He does not smoke anymore.  He has been having wheezing and having worsening symptoms.  He feels like nothing has been helping so far he had CT scan of the chest earlier this year which was stable from his lung cancer standpoint.  He has not had any antibiotics or steroids since his last office visit.  The last CT scan of the chest done in March 2022 shows a stable findings from previous lung cancer status post chemo and radiation therapy.  He has no changes in weight or appetite.   His lung cancer has been stable.     Review of Systems   Respiratory: Positive for cough, shortness of breath and wheezing.         General:  No Fatigue, No Fever No weight loss or loss of appetite  HEENT: No dysphagia, No Visual Changes, +rhinorrhea, significant sneezing and runny eyes  Respiratory:  + Incessant cough,+Dyspnea, mucoid phlegm, No Pleuritic Pain, ++wheezing, no hemoptysis.  Cardiovascular: Denies chest pain, denies palpitations,+SILVERIO, No Chest Pressure  Gastrointestinal:  No Abdominal Pain, No Nausea, No Vomiting, No Diarrhea  Genitourinary:  No Dysuria, No Frequency, No  Hesitancy  Musculoskeletal: No muscle pain or swelling  Endocrine:  No Heat Intolerance, No Cold Intolerance  Hematologic:  No Bleeding, No Bruising  Psychiatric:  No Anxiety, No Depression  Neurologic:  No Confusion, no Dysarthria, No Headaches  Skin:  No Rash, No Open Wounds    Family History   Problem Relation Age of Onset   • Diabetes Mother    • Cancer Sister    • Diabetes Sister    • Colon cancer Sister 60   • Hypertension Sister    • Diabetes Sister    • Coronary artery disease Brother         Social History     Socioeconomic History   • Marital status:    Tobacco Use   • Smoking status: Former     Packs/day: 1.00     Years: 50.00     Pack years: 50.00     Types: Cigarettes     Quit date:      Years since quittin.9   • Smokeless tobacco: Never   • Tobacco comments:     STARTED AGE 17  QUIT IN    Vaping Use   • Vaping Use: Never used   Substance and Sexual Activity   • Alcohol use: Not Currently     Alcohol/week: 1.0 standard drink     Types: 1 Cans of beer per week     Comment: casual drinker   • Drug use: Never   • Sexual activity: Defer        Past Medical History:   Diagnosis Date   • Bladder outflow obstruction    • Cervical spinal stenosis 2018   • Colon polyp    • COVID    • Emphysema lung (HCC)    • Emphysema of lung (HCC)    • Hand paresthesia 2018   • HTN (hypertension)    • Hypercholesteremia    • Lung cancer (HCC)    • Muscle atrophy of upper extremity 2018   • Numbness and tingling in both hands    • OAB (overactive bladder) 2014   • Prostate cancer (HCC)    • Ulnar neuropathy 2018        Immunization History   Administered Date(s) Administered   • COVID-19 (MODERNA) 1st, 2nd, 3rd Dose Only 2021, 2021   • COVID-19 (PFIZER) BIVALENT BOOSTER 12+YRS 2022   • COVID-19 (PFIZER) PURPLE CAP 2021   • Fluad Quad 65+ 10/24/2022   • Fluzone High Dose =>65 Years (Vaxcare ONLY) 2019   • Fluzone High-Dose 65+yrs 2021    • Pneumococcal Polysaccharide (PPSV23) 10/24/2022         No Known Allergies       Current Outpatient Medications:   •  albuterol (PROVENTIL) (2.5 MG/3ML) 0.083% nebulizer solution, , Disp: , Rfl:   •  albuterol sulfate  (90 Base) MCG/ACT inhaler, Inhale 2 puffs Every 4 (Four) Hours As Needed for Wheezing., Disp: , Rfl:   •  bicalutamide (CASODEX) 50 MG chemo tablet, , Disp: , Rfl:   •  FeroSul 325 (65 Fe) MG tablet, , Disp: , Rfl:   •  fluticasone (Flovent HFA) 220 MCG/ACT inhaler, Inhale 1 puff 2 (Two) Times a Day., Disp: 1 each, Rfl: 11  •  Gemtesa 75 MG tablet, , Disp: , Rfl:   •  ipratropium-albuterol (DUO-NEB) 0.5-2.5 mg/3 ml nebulizer, Take 3 mL by nebulization Every 4 (Four) Hours As Needed for Wheezing., Disp: 360 mL, Rfl: 4  •  leuprolide (Lupron Depot, 3-Month,) 22.5 MG injection, 22.5 mg., Disp: , Rfl:   •  lisinopril (PRINIVIL,ZESTRIL) 10 MG tablet, lisinopril 10 mg oral tablet take 1 tablet (10 mg) by oral route once daily   Active, Disp: , Rfl:   •  metFORMIN (GLUCOPHAGE) 500 MG tablet, , Disp: , Rfl:   •  metoprolol succinate XL (TOPROL-XL) 25 MG 24 hr tablet, Toprol XL 25 mg oral tablet extended release 24 hr take 1 tablet (25 mg) by oral route once daily   Active, Disp: , Rfl:   •  tamsulosin (FLOMAX) 0.4 MG capsule 24 hr capsule, Take 1 capsule by mouth Daily for 360 days., Disp: 90 capsule, Rfl: 3  •  tiotropium bromide-olodaterol (Stiolto Respimat) 2.5-2.5 MCG/ACT aerosol solution inhaler, Inhale 2 puffs Daily. Lot # [id number] Expiration: June 2023, Disp: 1 each, Rfl: 11  •  atorvastatin (LIPITOR) 40 MG tablet, Lipitor 40 mg oral tablet take 1 tablet (40 mg) by oral route once daily   Suspended, Disp: , Rfl:   •  cefdinir (OMNICEF) 300 MG capsule, Take 1 capsule by mouth 2 (Two) Times a Day., Disp: 20 capsule, Rfl: 0  •  folic acid (FOLVITE) 1 MG tablet, folic acid 1 mg oral tablet take 1 tablet (1 mg) by oral route once daily   Active, Disp: , Rfl:   •  predniSONE (DELTASONE) 10 MG  "tablet, 50mg qday x 3 days, 40mg qday x 3 days, 30mg qday x 3 days, 20mg qday x 3 days, 10mg qday x 3 days, then stop, Disp: 45 tablet, Rfl: 0  •  sildenafil (VIAGRA) 100 MG tablet, , Disp: , Rfl:      Objective   Vital Signs:   BP 95/66 (BP Location: Left arm, Patient Position: Sitting, Cuff Size: Adult)   Pulse 88   Temp 97.5 °F (36.4 °C) (Tympanic)   Resp 18   Ht 180.3 cm (71\")   Wt 86.4 kg (190 lb 8 oz)   SpO2 97% Comment: room air  BMI 26.57 kg/m²     Mallampatti classification : 1  Physical Exam  Vital Signs Reviewed  Pleasant male, in mild distress, has conversational dyspnea, audible  HEENT:  PERRL, EOMI.  OP, nares clear, no sinus tenderness  Neck:  Supple, No JVD, no thyromegaly  Lymph: no axillary, cervical, supraclavicular lymphadenopathy noted bilaterally  Chest: Bilateral diminished breath sounds, expiratory wheezing bilaterally, no rhonchi or crackles, resonant to percussion bilaterally  CV: RRR, no MGR, pulses 2+, equal  Abd:  Soft, NT, ND, + BS, no HSM  EXT:  no clubbing, no cyanosis, No BLE edema  Neuro:  A&Ox3, CN grossly intact, no focal deficits  Skin: No rashes or lesions noted       Result Review :   The following data was reviewed by: Roshan Hood MD on 05/19/2022:  Common labs    Common Labs 2/16/22 2/16/22    1926 1926   Glucose  85   BUN  14   Creatinine  1.33 (A)   eGFR African Am  63   Sodium  139   Potassium  4.2   Chloride  102   Calcium  9.6   Albumin  4.20   Total Bilirubin  0.6   Alkaline Phosphatase  99   AST (SGOT)  15   ALT (SGPT)  7   WBC 5.57    Hemoglobin 13.5    Hematocrit 42.9    Platelets 182    (A) Abnormal value            CMP    CMP 2/16/22   Glucose 85   BUN 14   Creatinine 1.33 (A)   eGFR African Am 63   Sodium 139   Potassium 4.2   Chloride 102   Calcium 9.6   Albumin 4.20   Total Bilirubin 0.6   Alkaline Phosphatase 99   AST (SGOT) 15   ALT (SGPT) 7   (A) Abnormal value            CBC    CBC 2/16/22   WBC 5.57   RBC 5.25   Hemoglobin 13.5   Hematocrit 42.9 "   MCV 81.7   MCH 25.7 (A)   MCHC 31.5   RDW 13.9   Platelets 182   (A) Abnormal value            CBC w/diff    CBC w/Diff 2/16/22   WBC 5.57   RBC 5.25   Hemoglobin 13.5   Hematocrit 42.9   MCV 81.7   MCH 25.7 (A)   MCHC 31.5   RDW 13.9   Platelets 182   Neutrophil Rel % 64.9   Immature Granulocyte Rel % 0.4   Lymphocyte Rel % 24.6   Monocyte Rel % 6.1   Eosinophil Rel % 3.1   Basophil Rel % 0.9   (A) Abnormal value            Data reviewed: Radiologic studies CT scan of the chest from March 2022 was reviewed.  Shows a stable finding with mild right hilar fullness which has been stable and minimal scarring.            Assessment and Plan    Diagnoses and all orders for this visit:    1. Adenocarcinoma of bronchus, right (HCC) (Primary)  -     tiotropium bromide-olodaterol (Stiolto Respimat) 2.5-2.5 MCG/ACT aerosol solution inhaler; Inhale 2 puffs Daily. Lot # [id number]  Expiration: June 2023  Dispense: 1 each; Refill: 11  -     predniSONE (DELTASONE) 10 MG tablet; 50mg qday x 3 days, 40mg qday x 3 days, 30mg qday x 3 days, 20mg qday x 3 days, 10mg qday x 3 days, then stop  Dispense: 45 tablet; Refill: 0  -     cefdinir (OMNICEF) 300 MG capsule; Take 1 capsule by mouth 2 (Two) Times a Day.  Dispense: 20 capsule; Refill: 0  -     fluticasone (Flovent HFA) 220 MCG/ACT inhaler; Inhale 1 puff 2 (Two) Times a Day.  Dispense: 1 each; Refill: 11  -     ipratropium-albuterol (DUO-NEB) 0.5-2.5 mg/3 ml nebulizer; Take 3 mL by nebulization Every 4 (Four) Hours As Needed for Wheezing.  Dispense: 360 mL; Refill: 4    2. Pulmonary emphysema, unspecified emphysema type (HCC)  -     tiotropium bromide-olodaterol (Stiolto Respimat) 2.5-2.5 MCG/ACT aerosol solution inhaler; Inhale 2 puffs Daily. Lot # [id number]  Expiration: June 2023  Dispense: 1 each; Refill: 11  -     predniSONE (DELTASONE) 10 MG tablet; 50mg qday x 3 days, 40mg qday x 3 days, 30mg qday x 3 days, 20mg qday x 3 days, 10mg qday x 3 days, then stop  Dispense: 45 tablet;  Refill: 0  -     cefdinir (OMNICEF) 300 MG capsule; Take 1 capsule by mouth 2 (Two) Times a Day.  Dispense: 20 capsule; Refill: 0  -     fluticasone (Flovent HFA) 220 MCG/ACT inhaler; Inhale 1 puff 2 (Two) Times a Day.  Dispense: 1 each; Refill: 11  -     ipratropium-albuterol (DUO-NEB) 0.5-2.5 mg/3 ml nebulizer; Take 3 mL by nebulization Every 4 (Four) Hours As Needed for Wheezing.  Dispense: 360 mL; Refill: 4    3. Moderate persistent asthma, unspecified whether complicated  -     tiotropium bromide-olodaterol (Stiolto Respimat) 2.5-2.5 MCG/ACT aerosol solution inhaler; Inhale 2 puffs Daily. Lot # [id number]  Expiration: June 2023  Dispense: 1 each; Refill: 11  -     predniSONE (DELTASONE) 10 MG tablet; 50mg qday x 3 days, 40mg qday x 3 days, 30mg qday x 3 days, 20mg qday x 3 days, 10mg qday x 3 days, then stop  Dispense: 45 tablet; Refill: 0  -     cefdinir (OMNICEF) 300 MG capsule; Take 1 capsule by mouth 2 (Two) Times a Day.  Dispense: 20 capsule; Refill: 0  -     fluticasone (Flovent HFA) 220 MCG/ACT inhaler; Inhale 1 puff 2 (Two) Times a Day.  Dispense: 1 each; Refill: 11  -     ipratropium-albuterol (DUO-NEB) 0.5-2.5 mg/3 ml nebulizer; Take 3 mL by nebulization Every 4 (Four) Hours As Needed for Wheezing.  Dispense: 360 mL; Refill: 4    4. COVID-19 virus infection  -     tiotropium bromide-olodaterol (Stiolto Respimat) 2.5-2.5 MCG/ACT aerosol solution inhaler; Inhale 2 puffs Daily. Lot # [id number]  Expiration: June 2023  Dispense: 1 each; Refill: 11  -     predniSONE (DELTASONE) 10 MG tablet; 50mg qday x 3 days, 40mg qday x 3 days, 30mg qday x 3 days, 20mg qday x 3 days, 10mg qday x 3 days, then stop  Dispense: 45 tablet; Refill: 0  -     cefdinir (OMNICEF) 300 MG capsule; Take 1 capsule by mouth 2 (Two) Times a Day.  Dispense: 20 capsule; Refill: 0  -     fluticasone (Flovent HFA) 220 MCG/ACT inhaler; Inhale 1 puff 2 (Two) Times a Day.  Dispense: 1 each; Refill: 11  -     ipratropium-albuterol (DUO-NEB) 0.5-2.5  mg/3 ml nebulizer; Take 3 mL by nebulization Every 4 (Four) Hours As Needed for Wheezing.  Dispense: 360 mL; Refill: 4    5. History of vaccination  -     tiotropium bromide-olodaterol (Stiolto Respimat) 2.5-2.5 MCG/ACT aerosol solution inhaler; Inhale 2 puffs Daily. Lot # [id number]  Expiration: June 2023  Dispense: 1 each; Refill: 11  -     predniSONE (DELTASONE) 10 MG tablet; 50mg qday x 3 days, 40mg qday x 3 days, 30mg qday x 3 days, 20mg qday x 3 days, 10mg qday x 3 days, then stop  Dispense: 45 tablet; Refill: 0  -     cefdinir (OMNICEF) 300 MG capsule; Take 1 capsule by mouth 2 (Two) Times a Day.  Dispense: 20 capsule; Refill: 0  -     fluticasone (Flovent HFA) 220 MCG/ACT inhaler; Inhale 1 puff 2 (Two) Times a Day.  Dispense: 1 each; Refill: 11  -     ipratropium-albuterol (DUO-NEB) 0.5-2.5 mg/3 ml nebulizer; Take 3 mL by nebulization Every 4 (Four) Hours As Needed for Wheezing.  Dispense: 360 mL; Refill: 4    6. COPD with acute exacerbation (HCC)  -     tiotropium bromide-olodaterol (Stiolto Respimat) 2.5-2.5 MCG/ACT aerosol solution inhaler; Inhale 2 puffs Daily. Lot # [id number]  Expiration: June 2023  Dispense: 1 each; Refill: 11  -     predniSONE (DELTASONE) 10 MG tablet; 50mg qday x 3 days, 40mg qday x 3 days, 30mg qday x 3 days, 20mg qday x 3 days, 10mg qday x 3 days, then stop  Dispense: 45 tablet; Refill: 0  -     cefdinir (OMNICEF) 300 MG capsule; Take 1 capsule by mouth 2 (Two) Times a Day.  Dispense: 20 capsule; Refill: 0  -     fluticasone (Flovent HFA) 220 MCG/ACT inhaler; Inhale 1 puff 2 (Two) Times a Day.  Dispense: 1 each; Refill: 11  -     ipratropium-albuterol (DUO-NEB) 0.5-2.5 mg/3 ml nebulizer; Take 3 mL by nebulization Every 4 (Four) Hours As Needed for Wheezing.  Dispense: 360 mL; Refill: 4      COPD: Continue with Stiolto 2 puffs once daily and Flovent 2 puffs twice daily.  Use albuterol as needed.  He has DuoNeb nebulizer I advised him to continue with DuoNeb nebulizer for airway clearance  2-3 times a day.  COPD exacerbation next plan was discussed in detail.  He is having acute exacerbation now.  Given his worsening symptoms, I have given him prednisone taper for 15 years and cefdinir for 10 days.  If his symptoms are not improved on current treatment, will need repeat CT scan of chest, may need bronchoscopy.  May need sputum culture to guide us with antibiotic treatment.    Lung cancer: Poorly differentiated stage IIIa adenocarcinoma, status post chemotherapy and radiation therapy.  Patient has been doing well.  No recurrence of the disease now.    Up-to-date on COVID, pneumonia and flu vaccines.  He had flu vaccine in October 24, 2022.    Will follow-up in 3 weeks, and if symptoms are worsening, may need: Consider CT chest and bronchoscopy.    Follow Up   Return in about 3 weeks (around 12/21/2022).  Patient was given instructions and counseling regarding his condition or for health maintenance advice. Please see specific information pulled into the AVS if appropriate.       Electronically signed by Roshan Hood MD, 11/30/2022, 10:56 EST.

## 2022-12-08 ENCOUNTER — PROCEDURE VISIT (OUTPATIENT)
Dept: UROLOGY | Facility: CLINIC | Age: 77
End: 2022-12-08

## 2022-12-08 DIAGNOSIS — C61 PROSTATE CANCER: Primary | ICD-10-CM

## 2022-12-08 DIAGNOSIS — N32.9 URINARY BLADDER DISORDER: ICD-10-CM

## 2022-12-08 DIAGNOSIS — N35.811 OTHER STRICTURE OF URETHRAL MEATUS IN MALE: ICD-10-CM

## 2022-12-08 PROCEDURE — 51798 US URINE CAPACITY MEASURE: CPT | Performed by: UROLOGY

## 2022-12-08 PROCEDURE — 52000 CYSTOURETHROSCOPY: CPT | Performed by: UROLOGY

## 2022-12-08 PROCEDURE — 51741 ELECTRO-UROFLOWMETRY FIRST: CPT | Performed by: UROLOGY

## 2022-12-08 NOTE — PROGRESS NOTES
Cystoscopy    Date/Time: 12/8/2022 3:20 PM  Performed by: Merrill Ames MD  Authorized by: Merrill Ames MD   Preparation: Patient was prepped and draped in the usual sterile fashion.  Local anesthesia used: yes    Anesthesia:  Local anesthesia used: yes  Local Anesthetic: topical anesthetic  Anesthetic total: 12 mL    Sedation:  Patient sedated: no        Operation performed.  Meatotomy and cystoscopy.    Indication.  Meatal stenosis.    Voiding difficulties.    Prostate cancer    Patient was placed in lithotomy position.  Thorough scrubbing of lower abdomen and external genitalia was performed with Hibiclens.    I did meatotomy first.  1% Xylocaine was injected between the meatus and corona at 6 o'clock position.  Hemostat was applied on each side of the meatus.  Meatus was extremely tight like a pinhole.  I made incision between the meatus and corona to increase the size of the meatus.  There was a lot of scar tissue which had to cut to make sure that meatus does not get tight again.  Was meatus is large enough that it can lead 20 Bengali scope go ahead and I stopped at that point.  I went ahead and use 4-0 chromic and the first which was made at the bottom of the meatus right and midline between the urethral mucosa and glandular epithelium.  I used 3 stitches on the patient's right and 3 stitches on the left between the urethral mucosa and glandular epithelium.  There was no bleeding at this point    18 Olympus flexible cystoscope was inserted into the urethra, which was normal.  Prostate gland has slight enlargement of lateral lobes but not bad.  Bladder neck is little bit tight but I can introduce cystoscope in without difficulty.  Both ureteral orifices are normal.  Bladder is trabeculated and there was no bladder tumor present.    .  There was no evidence of vesicocolic fistula.  Flexible cystoscope was removed and patient tolerated the procedure very well.    Uroflow.    Q-Brandon.  21.7  mL/s    Average flow.  5.4 mL percent    Voided volume.  237 mL    Bladder scan for ultrasound residual.  3.5 MHz transducer was applied in the suprapubic area and the volume of urine calculated in the urinary bladder which is 0 .  Bladder scan was done by Mr. Santo zhang

## 2022-12-22 ENCOUNTER — OFFICE VISIT (OUTPATIENT)
Dept: PULMONOLOGY | Facility: CLINIC | Age: 77
End: 2022-12-22

## 2022-12-22 VITALS
HEART RATE: 103 BPM | WEIGHT: 190.8 LBS | OXYGEN SATURATION: 94 % | HEIGHT: 71 IN | SYSTOLIC BLOOD PRESSURE: 105 MMHG | RESPIRATION RATE: 20 BRPM | BODY MASS INDEX: 26.71 KG/M2 | TEMPERATURE: 98.2 F | DIASTOLIC BLOOD PRESSURE: 68 MMHG

## 2022-12-22 DIAGNOSIS — R06.09 DYSPNEA ON EXERTION: ICD-10-CM

## 2022-12-22 DIAGNOSIS — J43.9 PULMONARY EMPHYSEMA, UNSPECIFIED EMPHYSEMA TYPE: ICD-10-CM

## 2022-12-22 DIAGNOSIS — F17.211 NICOTINE DEPENDENCE, CIGARETTES, IN REMISSION: ICD-10-CM

## 2022-12-22 DIAGNOSIS — C34.91 ADENOCARCINOMA OF BRONCHUS, RIGHT: Primary | ICD-10-CM

## 2022-12-22 PROCEDURE — 99213 OFFICE O/P EST LOW 20 MIN: CPT | Performed by: NURSE PRACTITIONER

## 2022-12-22 NOTE — PROGRESS NOTES
Primary Care Provider  Alejandra Xavier APRN   Referring Provider  No ref. provider found    Patient Complaint  Follow-up, Shortness of Breath, Emphysema, Wheezing, Cough, COPD, and Asthma      SUBJECTIVE    History of Presenting Illness  Daron Whitfield is a pleasant 77 y.o. male who presents to [facility] for follow-up appointment.  Patient last saw Dr. Hood 11/30/2022.  Patient has a diagnosis of stage IIIa lung cancer adenocarcinoma status post chemoradiation.  He is under the care of Dr. Flores.  Patient also has a diagnosis of prostate cancer and is under the care of Dr. Ames.  At last visit Dr. Hood had given the patient cefdinir and steroid Dosepak which patient has completed.  Patient does continue to be on albuterol inhaler and nebulizer, duo nebulizer and Stiolto. Patient does have shortness of air with exertion of moderate severity.  He is not on O2 at this time.  Patient does once know why he seems to be more short of air.  He is scheduled for CT with and without contrast in March by Dr. Flores.  The patient would like to have a CT scheduled now. He denies having any coughing, wheezing, headaches, chest pain, weight loss or hemoptysis. Denies fevers, chills and night sweats. Daron Whitfield is able to perform ADLs without difficulties and denies any swollen glands/lymph nodes in the head or neck.    I have personally reviewed the review of systems, past family, social, medical and surgical histories; and agree with their findings.    Review of Systems   Constitutional: Negative.    HENT: Negative.    Eyes: Negative.    Respiratory: Positive for shortness of breath.    Cardiovascular: Negative.    Musculoskeletal: Negative.    Skin: Negative.         Family History   Problem Relation Age of Onset   • Diabetes Mother    • Cancer Sister    • Diabetes Sister    • Colon cancer Sister 60   • Hypertension Sister    • Diabetes Sister    • Coronary artery  disease Brother         Social History     Socioeconomic History   • Marital status:    Tobacco Use   • Smoking status: Former     Packs/day: 1.00     Years: 50.00     Pack years: 50.00     Types: Cigarettes     Quit date: 2016     Years since quittin.9   • Smokeless tobacco: Never   • Tobacco comments:     STARTED AGE 17  QUIT IN 2016   Vaping Use   • Vaping Use: Never used   Substance and Sexual Activity   • Alcohol use: Not Currently     Alcohol/week: 1.0 standard drink     Types: 1 Cans of beer per week     Comment: casual drinker   • Drug use: Never   • Sexual activity: Defer        Past Medical History:   Diagnosis Date   • Bladder outflow obstruction    • Cervical spinal stenosis 2018   • Colon polyp    • COVID    • Emphysema lung (HCC)    • Emphysema of lung (HCC)    • Hand paresthesia 2018   • HTN (hypertension)    • Hypercholesteremia    • Lung cancer (HCC)    • Muscle atrophy of upper extremity 2018   • Numbness and tingling in both hands    • OAB (overactive bladder) 2014   • Prostate cancer ([facility])    • Ulnar neuropathy 2018        Immunization History   Administered Date(s) Administered   • COVID-19 (MODERNA) 1st, 2nd, 3rd Dose Only 2021, 2021   • COVID-19 (PFIZER) BIVALENT BOOSTER 12+YRS 2022   • COVID-19 (PFIZER) PURPLE CAP 2021   • Fluad Quad 65+ 10/24/2022   • Fluzone High Dose =>65 Years (Vaxcare ONLY) 2019   • Fluzone High-Dose 65+yrs 2021   • Pneumococcal Polysaccharide (PPSV23) 10/24/2022       No Known Allergies       Current Outpatient Medications:   •  albuterol (PROVENTIL) (2.5 MG/3ML) 0.083% nebulizer solution, , Disp: , Rfl:   •  albuterol sulfate  (90 Base) MCG/ACT inhaler, Inhale 2 puffs Every 4 (Four) Hours As Needed for Wheezing., Disp: , Rfl:   •  atorvastatin (LIPITOR) 40 MG tablet, Lipitor 40 mg oral tablet take 1 tablet (40 mg) by oral route once daily   Suspended, Disp: , Rfl:   •   "bicalutamide (CASODEX) 50 MG chemo tablet, , Disp: , Rfl:   •  FeroSul 325 (65 Fe) MG tablet, , Disp: , Rfl:   •  fluticasone (Flovent HFA) 220 MCG/ACT inhaler, Inhale 1 puff 2 (Two) Times a Day., Disp: 1 each, Rfl: 11  •  folic acid (FOLVITE) 1 MG tablet, folic acid 1 mg oral tablet take 1 tablet (1 mg) by oral route once daily   Active, Disp: , Rfl:   •  Gemtesa 75 MG tablet, , Disp: , Rfl:   •  ipratropium-albuterol (DUO-NEB) 0.5-2.5 mg/3 ml nebulizer, Take 3 mL by nebulization Every 4 (Four) Hours As Needed for Wheezing., Disp: 360 mL, Rfl: 4  •  leuprolide (Lupron Depot, 3-Month,) 22.5 MG injection, 22.5 mg., Disp: , Rfl:   •  lisinopril (PRINIVIL,ZESTRIL) 10 MG tablet, lisinopril 10 mg oral tablet take 1 tablet (10 mg) by oral route once daily   Active, Disp: , Rfl:   •  metFORMIN (GLUCOPHAGE) 500 MG tablet, , Disp: , Rfl:   •  metoprolol succinate XL (TOPROL-XL) 25 MG 24 hr tablet, Toprol XL 25 mg oral tablet extended release 24 hr take 1 tablet (25 mg) by oral route once daily   Active, Disp: , Rfl:   •  sildenafil (VIAGRA) 100 MG tablet, , Disp: , Rfl:   •  tamsulosin (FLOMAX) 0.4 MG capsule 24 hr capsule, Take 1 capsule by mouth Daily for 360 days., Disp: 90 capsule, Rfl: 3  •  tiotropium bromide-olodaterol (Stiolto Respimat) 2.5-2.5 MCG/ACT aerosol solution inhaler, Inhale 2 puffs Daily. Lot # [id number] Expiration: June 2023, Disp: 1 each, Rfl: 11  •  cefdinir (OMNICEF) 300 MG capsule, Take 1 capsule by mouth 2 (Two) Times a Day., Disp: 20 capsule, Rfl: 0  •  predniSONE (DELTASONE) 10 MG tablet, 50mg qday x 3 days, 40mg qday x 3 days, 30mg qday x 3 days, 20mg qday x 3 days, 10mg qday x 3 days, then stop, Disp: 45 tablet, Rfl: 0       OBJECTIVE    Vital Signs   /68 (BP Location: Right arm, Patient Position: Sitting, Cuff Size: Adult)   Pulse 103   Temp 98.2 °F (36.8 °C) (Tympanic)   Resp 20   Ht 180.3 cm (71\")   Wt 86.5 kg (190 lb 12.8 oz)   SpO2 94% Comment: room air  BMI 26.61 kg/m² "     Physical Exam  Vitals reviewed.   Constitutional:       Appearance: Normal appearance.   HENT:      Head: Normocephalic and atraumatic.      Nose: Nose normal.      Mouth/Throat:      Mouth: Mucous membranes are moist.      Pharynx: Oropharynx is clear.   Eyes:      Extraocular Movements: Extraocular movements intact.      Conjunctiva/sclera: Conjunctivae normal.      Pupils: Pupils are equal, round, and reactive to light.   Cardiovascular:      Rate and Rhythm: Normal rate and regular rhythm.      Pulses: Normal pulses.      Heart sounds: Normal heart sounds.   Pulmonary:      Effort: Pulmonary effort is normal.      Breath sounds: Normal breath sounds.   Abdominal:      General: Bowel sounds are normal.   Musculoskeletal:         General: Normal range of motion.      Cervical back: Normal range of motion and neck supple.   Skin:     General: Skin is warm and dry.   Neurological:      Mental Status: He is alert and oriented to person, place, and time.   Psychiatric:         Behavior: Behavior normal.          Results Review  I have personally reviewed the prior office notes and diagnostics.  XR Chest 2 View [[id number]] (Order [id number])  Order  Status: Final result     Appointment Information    PACS Images     Radiology Images  Study Result    Narrative & Impression   PROCEDURE:  XR CHEST 2 VW     COMPARISON: None     INDICATIONS:  cough     FINDINGS:          LUNGS:             There is a calcified granuloma in the left mid lung.  No significant pulmonary parenchymal   abnormalities.    VASCULATURE:            Normal.  Unremarkable pulmonary vasculature.    CARDIAC:         Normal.  No cardiac silhouette abnormality or cardiomegaly.    MEDIASTINUM:             Normal.  No visible mass or adenopathy.    PLEURA:           Normal.  No effusion or pleural thickening.    BONES:             Normal.  No fracture or visible bony lesion.    OTHER:             A right internal jugular port has its tip at the upper SVC.      IMPRESSION:               No acute cardiopulmonary process.            ZIA KEE MD         Electronically Signed and Approved By: ZIA KEE MD on 9/10/2022 at 12:04        CT Chest With Contrast Diagnostic [[id number]] (Order [id number])  Order  Status: Final result     Appointment Information    Display Notes    v-                   PACS Images     Radiology Images    Study Result    Narrative & Impression   PROCEDURE:  CT CHEST W CONTRAST DIAGNOSTIC     COMPARISON:  [facility], CT, CHEST W/ CONTRAST, 3/04/2021, 10:12.  INDICATIONS:  MALIGNANT NEOPLASM OF LEFT LUNG 2016     TECHNIQUE:    After obtaining the patient's consent, CT images were obtained with non-ionic   intravenous contrast material.       PROTOCOL:     Standard imaging protocol performed                 RADIATION:      DLP: 493mGy*cm               Automated exposure control was utilized to minimize radiation dose.   CONTRAST:      100cc Isovue 370 I.V.     FINDINGS:  Severe changes of emphysema are again noted.  No well-defined consolidations or significant pleural   effusions are observed.  A calcified granuloma is again seen within the left upper lobe.  There   remains abnormal attenuation/scarring along the medial aspect of the right lung surrounding the   right hilar region.  These changes are stable.  No new dominant pulmonary nodules or abnormal   pulmonary masses are seen.      There is stable soft tissue thickening associated with the right hilum.  Otherwise stable left   hilar, mediastinal, and axillary lymph nodes are noted.  There is a separate origin of the left   vertebral artery at the aortic arch.  Moderate atherosclerosis is noted.  Moderate to severe   coronary artery calcifications are observed.     No significant hilar, mediastinal, or axillary lymphadenopathy is seen. A normal aortic arch   branching pattern is noted. The thyroid gland is unremarkable. The esophagus is unremarkable.     The limited  evaluation of the upper abdomen demonstrates no definitive acute abnormalities.     No acute osseous abnormalities are seen.                IMPRESSION:                 1. Stable CT of the chest.    2. No evidence for acute intrathoracic abnormality.  3. Moderate to severe coronary artery calcifications are noted.         ZIA SANTIZO MD         Electronically Signed and Approved By: ZIA SANTIZO MD on 3/04/2022 at 15:37                ASSESSMENT & PLAN    Patient Active Problem List   Diagnosis   • Adenocarcinoma of bronchus, right (HCC)   • Bladder outflow obstruction   • Cancer of prostate (HCC)   • Cervical spinal stenosis   • Colon polyp   • Diabetic nephropathy (HCC)   • Hand paresthesia   • HTN (hypertension)   • Hypercholesterolemia   • Hypertonicity of bladder   • Atrophy of muscle of right hand   • Pulmonary emphysema (HCC)   • Stage 3a chronic kidney disease (HCC)   • Primary osteoarthritis of left shoulder   • Injury of left shoulder   • Neuropathy of right ulnar nerve at wrist   • Bilateral carpal tunnel syndrome   • Polyneuropathy   • Asthma   • Benign prostatic hyperplasia   • Diabetes mellitus (HCC)   • History of vaccination   • COVID-19 virus infection   • COPD with acute exacerbation (HCC)       Diagnoses and all orders for this visit:    1. Adenocarcinoma of bronchus, right (HCC) (Primary)  -     Full Pulmonary Function Test With Bronchodilator; Future  -     CT Chest Hi Resolution Diagnostic; Future    2. Pulmonary emphysema, unspecified emphysema type (HCC)  -     Full Pulmonary Function Test With Bronchodilator; Future  -     CT Chest Hi Resolution Diagnostic; Future    3. Dyspnea on exertion  -     Full Pulmonary Function Test With Bronchodilator; Future  -     CT Chest Hi Resolution Diagnostic; Future    4. Nicotine dependence, cigarettes, in remission  -     Full Pulmonary Function Test With Bronchodilator; Future  -     CT Chest Hi Resolution Diagnostic; Future           Plan:  Patient  is scheduled to have a CT with and without contrast March 2023 by Dr. Flores.  I discussed with patient possibly repeating a CT earlier and bronchoscopy.  Patient states his last pulmonary function test was in 2017 or 2018 he cannot remember.  I discussed with patient plan to get a CT at this time per his request due to increased shortness of air as well as pulmonary function test at this time.  Patient will follow back up after CT to review results.  Patient to continue with albuterol nebulizer inhaler DuoNeb and Stiolto.  Discussed with patient possible need for O2.  Patient declines at this time we will revisit at next follow-up appointment.    Smoking status: Former quit in 2017  Vaccination status: Up to date  Medications personally reviewed    Follow Up  Return in about 5 weeks (around 1/26/2023) for Next scheduled follow up.    Patient was given instructions and counseling regarding his condition or for health maintenance advice. Please see specific information pulled into the AVS if appropriate.

## 2022-12-28 ENCOUNTER — HOSPITAL ENCOUNTER (OUTPATIENT)
Dept: RESPIRATORY THERAPY | Facility: HOSPITAL | Age: 77
Discharge: HOME | End: 2022-12-28

## 2022-12-28 ENCOUNTER — HOSPITAL ENCOUNTER (OUTPATIENT)
Dept: CT IMAGING | Facility: HOSPITAL | Age: 77
Discharge: HOME | End: 2022-12-28

## 2022-12-28 DIAGNOSIS — R06.09 DYSPNEA ON EXERTION: ICD-10-CM

## 2022-12-28 DIAGNOSIS — J43.9 PULMONARY EMPHYSEMA, UNSPECIFIED EMPHYSEMA TYPE: ICD-10-CM

## 2022-12-28 DIAGNOSIS — F17.211 NICOTINE DEPENDENCE, CIGARETTES, IN REMISSION: ICD-10-CM

## 2022-12-28 DIAGNOSIS — C34.91 ADENOCARCINOMA OF BRONCHUS, RIGHT: ICD-10-CM

## 2022-12-28 PROCEDURE — 94060 EVALUATION OF WHEEZING: CPT

## 2022-12-28 PROCEDURE — 94726 PLETHYSMOGRAPHY LUNG VOLUMES: CPT | Performed by: INTERNAL MEDICINE

## 2022-12-28 PROCEDURE — 71250 CT THORAX DX C-: CPT

## 2022-12-28 PROCEDURE — 94726 PLETHYSMOGRAPHY LUNG VOLUMES: CPT

## 2022-12-28 PROCEDURE — 94729 DIFFUSING CAPACITY: CPT

## 2022-12-28 PROCEDURE — 94060 EVALUATION OF WHEEZING: CPT | Performed by: INTERNAL MEDICINE

## 2022-12-28 PROCEDURE — 94729 DIFFUSING CAPACITY: CPT | Performed by: INTERNAL MEDICINE

## 2022-12-28 RX ORDER — ALBUTEROL SULFATE 2.5 MG/3ML
2.5 SOLUTION RESPIRATORY (INHALATION) ONCE
Status: COMPLETED | OUTPATIENT
Start: 2022-12-28 | End: 2022-12-28

## 2022-12-28 RX ADMIN — ALBUTEROL SULFATE 2.5 MG: 2.5 SOLUTION RESPIRATORY (INHALATION) at 15:56

## 2023-01-04 ENCOUNTER — CLINICAL SUPPORT (OUTPATIENT)
Dept: UROLOGY | Facility: CLINIC | Age: 78
End: 2023-01-04
Payer: MEDICARE

## 2023-01-04 ENCOUNTER — LAB (OUTPATIENT)
Dept: LAB | Facility: HOSPITAL | Age: 78
End: 2023-01-04
Payer: MEDICARE

## 2023-01-04 ENCOUNTER — HOSPITAL ENCOUNTER (OUTPATIENT)
Dept: ONCOLOGY | Facility: HOSPITAL | Age: 78
Setting detail: INFUSION SERIES
Discharge: HOME | End: 2023-01-04
Payer: MEDICARE

## 2023-01-04 DIAGNOSIS — C34.91 ADENOCARCINOMA OF BRONCHUS, RIGHT: Primary | ICD-10-CM

## 2023-01-04 DIAGNOSIS — C61 PROSTATE CANCER: ICD-10-CM

## 2023-01-04 DIAGNOSIS — C61 PROSTATE CANCER: Primary | ICD-10-CM

## 2023-01-04 LAB — PSA SERPL-MCNC: <0.014 NG/ML (ref 0–4)

## 2023-01-04 PROCEDURE — 84153 ASSAY OF PSA TOTAL: CPT | Performed by: UROLOGY

## 2023-01-04 PROCEDURE — 25010000002 HEPARIN LOCK FLUSH PER 10 UNITS: Performed by: INTERNAL MEDICINE

## 2023-01-04 PROCEDURE — 96402 CHEMO HORMON ANTINEOPL SQ/IM: CPT | Performed by: UROLOGY

## 2023-01-04 PROCEDURE — 96523 IRRIG DRUG DELIVERY DEVICE: CPT

## 2023-01-04 PROCEDURE — 36415 COLL VENOUS BLD VENIPUNCTURE: CPT | Performed by: UROLOGY

## 2023-01-04 RX ORDER — HEPARIN SODIUM (PORCINE) LOCK FLUSH IV SOLN 100 UNIT/ML 100 UNIT/ML
500 SOLUTION INTRAVENOUS AS NEEDED
Status: CANCELLED | OUTPATIENT
Start: 2023-01-04

## 2023-01-04 RX ORDER — SODIUM CHLORIDE 0.9 % (FLUSH) 0.9 %
20 SYRINGE (ML) INJECTION AS NEEDED
Status: DISCONTINUED | OUTPATIENT
Start: 2023-01-04 | End: 2023-01-05 | Stop reason: HOSPADM

## 2023-01-04 RX ORDER — SODIUM CHLORIDE 0.9 % (FLUSH) 0.9 %
10 SYRINGE (ML) INJECTION AS NEEDED
Status: CANCELLED | OUTPATIENT
Start: 2023-01-04

## 2023-01-04 RX ORDER — HEPARIN SODIUM (PORCINE) LOCK FLUSH IV SOLN 100 UNIT/ML 100 UNIT/ML
500 SOLUTION INTRAVENOUS AS NEEDED
Status: DISCONTINUED | OUTPATIENT
Start: 2023-01-04 | End: 2023-01-05 | Stop reason: HOSPADM

## 2023-01-04 RX ORDER — SODIUM CHLORIDE 0.9 % (FLUSH) 0.9 %
20 SYRINGE (ML) INJECTION AS NEEDED
Status: CANCELLED | OUTPATIENT
Start: 2023-01-04

## 2023-01-04 RX ADMIN — HEPARIN SODIUM (PORCINE) LOCK FLUSH IV SOLN 100 UNIT/ML 500 UNITS: 100 SOLUTION at 08:52

## 2023-01-04 RX ADMIN — Medication 20 ML: at 08:52

## 2023-01-24 ENCOUNTER — OFFICE VISIT (OUTPATIENT)
Dept: PULMONOLOGY | Facility: CLINIC | Age: 78
End: 2023-01-24
Payer: MEDICARE

## 2023-01-24 VITALS
WEIGHT: 191 LBS | DIASTOLIC BLOOD PRESSURE: 61 MMHG | RESPIRATION RATE: 18 BRPM | SYSTOLIC BLOOD PRESSURE: 112 MMHG | OXYGEN SATURATION: 91 % | BODY MASS INDEX: 26.74 KG/M2 | HEIGHT: 71 IN | TEMPERATURE: 97.8 F | HEART RATE: 95 BPM

## 2023-01-24 DIAGNOSIS — J43.9 PULMONARY EMPHYSEMA, UNSPECIFIED EMPHYSEMA TYPE: ICD-10-CM

## 2023-01-24 DIAGNOSIS — C34.91 ADENOCARCINOMA OF BRONCHUS, RIGHT: Primary | ICD-10-CM

## 2023-01-24 DIAGNOSIS — J45.40 MODERATE PERSISTENT ASTHMA, UNSPECIFIED WHETHER COMPLICATED: ICD-10-CM

## 2023-01-24 PROCEDURE — 99214 OFFICE O/P EST MOD 30 MIN: CPT | Performed by: STUDENT IN AN ORGANIZED HEALTH CARE EDUCATION/TRAINING PROGRAM

## 2023-01-24 RX ORDER — FLUTICASONE FUROATE, UMECLIDINIUM BROMIDE AND VILANTEROL TRIFENATATE 200; 62.5; 25 UG/1; UG/1; UG/1
25 POWDER RESPIRATORY (INHALATION) DAILY
Qty: 2 EACH | Refills: 0 | COMMUNITY
Start: 2023-01-24 | End: 2023-01-25

## 2023-01-30 PROBLEM — R06.02 SOB (SHORTNESS OF BREATH): Status: ACTIVE | Noted: 2023-01-30

## 2023-01-30 PROBLEM — R07.2 CHEST PAIN, PRECORDIAL: Status: ACTIVE | Noted: 2023-01-30

## 2023-02-01 ENCOUNTER — LAB (OUTPATIENT)
Dept: LAB | Facility: HOSPITAL | Age: 78
End: 2023-02-01
Payer: MEDICARE

## 2023-02-01 ENCOUNTER — OFFICE VISIT (OUTPATIENT)
Dept: CARDIOLOGY | Facility: CLINIC | Age: 78
End: 2023-02-01
Payer: MEDICARE

## 2023-02-01 VITALS
DIASTOLIC BLOOD PRESSURE: 61 MMHG | WEIGHT: 190 LBS | HEIGHT: 71 IN | HEART RATE: 79 BPM | SYSTOLIC BLOOD PRESSURE: 111 MMHG | BODY MASS INDEX: 26.6 KG/M2

## 2023-02-01 DIAGNOSIS — R06.02 SOB (SHORTNESS OF BREATH): ICD-10-CM

## 2023-02-01 DIAGNOSIS — R06.02 SOB (SHORTNESS OF BREATH): Primary | ICD-10-CM

## 2023-02-01 PROCEDURE — 93000 ELECTROCARDIOGRAM COMPLETE: CPT | Performed by: INTERNAL MEDICINE

## 2023-02-01 PROCEDURE — 99204 OFFICE O/P NEW MOD 45 MIN: CPT | Performed by: INTERNAL MEDICINE

## 2023-02-01 RX ORDER — FLUTICASONE FUROATE, UMECLIDINIUM BROMIDE AND VILANTEROL TRIFENATATE 200; 62.5; 25 UG/1; UG/1; UG/1
POWDER RESPIRATORY (INHALATION)
COMMUNITY

## 2023-02-01 NOTE — ASSESSMENT & PLAN NOTE
Patient with longstanding but worsening dyspnea on exertion no significant clinical evidence of CHF and recent chest ray does not show any obvious pulmonary edema.  CT done last year did show moderate emphysematous changes as well as PFT consistent with moderate to severe obstructive disease.  Favor most likely pulmonary cause for his symptoms we will get a proBNP level and echocardiogram to assess for any cardiac involvement

## 2023-02-02 ENCOUNTER — LAB (OUTPATIENT)
Dept: LAB | Facility: HOSPITAL | Age: 78
End: 2023-02-02
Payer: MEDICARE

## 2023-02-02 LAB — NT-PROBNP SERPL-MCNC: 171 PG/ML (ref 0–1800)

## 2023-02-02 PROCEDURE — 36415 COLL VENOUS BLD VENIPUNCTURE: CPT

## 2023-02-02 PROCEDURE — 83880 ASSAY OF NATRIURETIC PEPTIDE: CPT

## 2023-02-03 ENCOUNTER — TELEPHONE (OUTPATIENT)
Dept: CARDIOLOGY | Facility: CLINIC | Age: 78
End: 2023-02-03
Payer: MEDICARE

## 2023-02-03 NOTE — TELEPHONE ENCOUNTER
----- Message from LALY Bowman sent at 2/3/2023  9:29 AM EST -----  Notify pt BNP level in normal range, continue current meds

## 2023-02-15 ENCOUNTER — HOSPITAL ENCOUNTER (OUTPATIENT)
Dept: ONCOLOGY | Facility: HOSPITAL | Age: 78
Discharge: HOME | End: 2023-02-15
Admitting: INTERNAL MEDICINE
Payer: MEDICARE

## 2023-02-15 DIAGNOSIS — C34.91 ADENOCARCINOMA OF BRONCHUS, RIGHT: Primary | ICD-10-CM

## 2023-02-15 PROCEDURE — 96523 IRRIG DRUG DELIVERY DEVICE: CPT

## 2023-02-15 PROCEDURE — 25010000002 HEPARIN LOCK FLUSH PER 10 UNITS: Performed by: INTERNAL MEDICINE

## 2023-02-15 RX ORDER — SODIUM CHLORIDE 0.9 % (FLUSH) 0.9 %
20 SYRINGE (ML) INJECTION AS NEEDED
Status: DISCONTINUED | OUTPATIENT
Start: 2023-02-15 | End: 2023-02-16 | Stop reason: HOSPADM

## 2023-02-15 RX ORDER — SODIUM CHLORIDE 0.9 % (FLUSH) 0.9 %
10 SYRINGE (ML) INJECTION AS NEEDED
Status: DISCONTINUED | OUTPATIENT
Start: 2023-02-15 | End: 2023-02-16 | Stop reason: HOSPADM

## 2023-02-15 RX ORDER — SODIUM CHLORIDE 0.9 % (FLUSH) 0.9 %
20 SYRINGE (ML) INJECTION AS NEEDED
Status: CANCELLED | OUTPATIENT
Start: 2023-02-15

## 2023-02-15 RX ORDER — SODIUM CHLORIDE 0.9 % (FLUSH) 0.9 %
10 SYRINGE (ML) INJECTION AS NEEDED
Status: CANCELLED | OUTPATIENT
Start: 2023-02-15

## 2023-02-15 RX ORDER — HEPARIN SODIUM (PORCINE) LOCK FLUSH IV SOLN 100 UNIT/ML 100 UNIT/ML
500 SOLUTION INTRAVENOUS AS NEEDED
Status: CANCELLED | OUTPATIENT
Start: 2023-02-15

## 2023-02-15 RX ORDER — HEPARIN SODIUM (PORCINE) LOCK FLUSH IV SOLN 100 UNIT/ML 100 UNIT/ML
500 SOLUTION INTRAVENOUS AS NEEDED
Status: DISCONTINUED | OUTPATIENT
Start: 2023-02-15 | End: 2023-02-16 | Stop reason: HOSPADM

## 2023-02-15 RX ADMIN — HEPARIN SODIUM (PORCINE) LOCK FLUSH IV SOLN 100 UNIT/ML 500 UNITS: 100 SOLUTION at 08:28

## 2023-02-15 RX ADMIN — Medication 20 ML: at 08:28

## 2023-02-20 ENCOUNTER — TELEPHONE (OUTPATIENT)
Dept: CARDIOLOGY | Facility: CLINIC | Age: 78
End: 2023-02-20
Payer: MEDICARE

## 2023-02-20 NOTE — TELEPHONE ENCOUNTER
----- Message from LALY Bowman sent at 2/20/2023  8:05 AM EST -----  Notify pt echo result: ·  Left ventricular systolic function is normal. Estimated left ventricular EF = 55%  ·  Left ventricular diastolic function was normal. No significant valve disease noted. Follow up as needed or if symptoms persist/worsen

## 2023-03-01 ENCOUNTER — TELEPHONE (OUTPATIENT)
Dept: ONCOLOGY | Facility: HOSPITAL | Age: 78
End: 2023-03-01

## 2023-03-01 NOTE — TELEPHONE ENCOUNTER
Caller: Daron Whitfield    Relationship: Self    Best call back number: [phone]    What is the best time to reach you: ANYTIME    Who are you requesting to speak with (clinical staff, provider, specific staff member): SCHEDULING    What was the call regarding: PLEASE CALL PT TO R/S HIS 3/8 F/U APPT - CAN LEAVE DETAILED VM IF NO ANSWER.      Do you require a callback: YES

## 2023-03-07 ENCOUNTER — HOSPITAL ENCOUNTER (OUTPATIENT)
Dept: CT IMAGING | Facility: HOSPITAL | Age: 78
Discharge: HOME | End: 2023-03-07
Admitting: INTERNAL MEDICINE
Payer: MEDICARE

## 2023-03-07 DIAGNOSIS — C34.91 ADENOCARCINOMA OF BRONCHUS, RIGHT: ICD-10-CM

## 2023-03-07 PROCEDURE — 71250 CT THORAX DX C-: CPT

## 2023-03-08 RX ORDER — LISINOPRIL 5 MG/1
TABLET ORAL
COMMUNITY
Start: 2023-03-01

## 2023-03-09 ENCOUNTER — OFFICE VISIT (OUTPATIENT)
Dept: ONCOLOGY | Facility: HOSPITAL | Age: 78
End: 2023-03-09
Payer: MEDICARE

## 2023-03-09 VITALS
BODY MASS INDEX: 26.6 KG/M2 | TEMPERATURE: 97.1 F | DIASTOLIC BLOOD PRESSURE: 62 MMHG | OXYGEN SATURATION: 92 % | RESPIRATION RATE: 16 BRPM | HEART RATE: 84 BPM | WEIGHT: 190.7 LBS | SYSTOLIC BLOOD PRESSURE: 121 MMHG

## 2023-03-09 DIAGNOSIS — C34.91 ADENOCARCINOMA OF BRONCHUS, RIGHT: Primary | ICD-10-CM

## 2023-03-09 PROCEDURE — 99213 OFFICE O/P EST LOW 20 MIN: CPT | Performed by: INTERNAL MEDICINE

## 2023-03-09 PROCEDURE — G0463 HOSPITAL OUTPT CLINIC VISIT: HCPCS | Performed by: INTERNAL MEDICINE

## 2023-03-09 NOTE — ASSESSMENT & PLAN NOTE
Status post treatments as outlined.  Patient is doing well.  I see no evidence of disease recurrence by his history, physical examination or recent CT of the chest.  He does have underlying emphysema which is limiting his activities.  He will follow-up with primary care and pulmonology next month.  We discussed considering pulmonary rehab again since he felt like it helped in the past.  He also has incidental note of coronary artery calcifications.  He reports a recent cardiology evaluation which was benign.  He should continue to follow-up with them as scheduled.  From a cancer standpoint, I think he is doing well.  I will plan to see him back in 1 year for ongoing surveillance with CT chest prior.

## 2023-03-09 NOTE — PROGRESS NOTES
Chief Complaint  Lung Cancer    [facility]*  Provider, No Known    Subjective          Daron Whitfield presents to [facility] HEMATOLOGY [facility] ONCOLOGY for follow-up lung cancer.  He status post treatments as outlined below.  On return today, he states he is feeling well.  He has underlying emphysema/COPD and reports that his biggest issue to his shortness of breath especially with exertion.  He has been using his inhalers and pulmonary medications as prescribed.  He reports that this frustrates him because he cannot do what he wants to do.  He had pulmonary rehab in the past which he did feel was helpful.  He denies productive cough, hemoptysis, chest pain.  He denies any masses or adenopathy.  He reports adequate appetite.    Oncology/Hematology History Overview Note   2016 RLL poorly differentiated NSCLC adenocarcinoma            Clinical Staging      Stage IIIA ([id number])            Treatments      Chemotherapy      4/14/16 thru 6/14/16 completed 3C Cisplatin   Radiation Therapy      4/14/16 thru 6/2/16 received 6000cGy XRT to right hilum/mediastinum         Adenocarcinoma of bronchus, right (HCC)   6/3/2021 Initial Diagnosis    Adenocarcinoma of bronchus, right (CMS/HCC)     6/9/2021 -  Chemotherapy    OP CENTRAL VENOUS ACCESS DEVICE ACCESS, CARE, AND MAINTENANCE (CVAD)         Review of Systems   Constitutional: Positive for fatigue. Negative for appetite change, diaphoresis, fever, unexpected weight gain and unexpected weight loss.   HENT: Negative for hearing loss, mouth sores, sore throat, swollen glands, trouble swallowing and voice change.    Eyes: Negative for blurred vision.   Respiratory: Positive for shortness of breath. Negative for cough and wheezing.    Cardiovascular: Negative for chest pain and palpitations.   Gastrointestinal: Negative for abdominal pain, blood in stool, constipation, diarrhea, nausea and vomiting.   Endocrine: Negative for cold intolerance and heat  intolerance.   Genitourinary: Negative for difficulty urinating, dysuria, frequency, hematuria and urinary incontinence.   Musculoskeletal: Negative for arthralgias, back pain and myalgias.   Skin: Negative for rash, skin lesions and wound.   Neurological: Negative for dizziness, seizures, weakness, numbness and headache.   Hematological: Does not bruise/bleed easily.   Psychiatric/Behavioral: Negative for depressed mood. The patient is not nervous/anxious.      Current Outpatient Medications on File Prior to Visit   Medication Sig Dispense Refill   • albuterol (PROVENTIL) (2.5 MG/3ML) 0.083% nebulizer solution      • albuterol sulfate  (90 Base) MCG/ACT inhaler Inhale 2 puffs Every 4 (Four) Hours As Needed for Wheezing.     • atorvastatin (LIPITOR) 40 MG tablet Lipitor 40 mg oral tablet take 1 tablet (40 mg) by oral route once daily   Suspended     • bicalutamide (CASODEX) 50 MG chemo tablet      • FeroSul 325 (65 Fe) MG tablet      • fluticasone (Flovent HFA) 220 MCG/ACT inhaler Inhale 1 puff 2 (Two) Times a Day. 1 each 11   • Fluticasone-Umeclidin-Vilant (Trelegy Ellipta) 200-62.5-25 MCG/ACT aerosol powder  Inhale.     • Fluticasone-Umeclidin-Vilant 200-62.5-25 MCG/ACT aerosol powder  Inhale 1 puff Daily. 1 each 7   • folic acid (FOLVITE) 1 MG tablet folic acid 1 mg oral tablet take 1 tablet (1 mg) by oral route once daily   Active     • Gemtesa 75 MG tablet      • ipratropium-albuterol (DUO-NEB) 0.5-2.5 mg/3 ml nebulizer Take 3 mL by nebulization Every 4 (Four) Hours As Needed for Wheezing. 360 mL 4   • leuprolide (Lupron Depot, 3-Month,) 22.5 MG injection 22.5 mg.     • lisinopril (PRINIVIL,ZESTRIL) 5 MG tablet      • metFORMIN (GLUCOPHAGE) 500 MG tablet      • metoprolol succinate XL (TOPROL-XL) 25 MG 24 hr tablet Toprol XL 25 mg oral tablet extended release 24 hr take 1 tablet (25 mg) by oral route once daily   Active     • sildenafil (VIAGRA) 100 MG tablet      • tamsulosin (FLOMAX) 0.4 MG capsule 24  hr capsule Take 1 capsule by mouth Daily for 360 days. 90 capsule 3   • tiotropium bromide-olodaterol (Stiolto Respimat) 2.5-2.5 MCG/ACT aerosol solution inhaler Inhale 2 puffs Daily. Lot # [id number]  Expiration:  each 11     No current facility-administered medications on file prior to visit.       No Known Allergies  Past Medical History:   Diagnosis Date   • Bladder outflow obstruction    • Cervical spinal stenosis 2018   • Colon polyp    • COVID    • Emphysema lung (HCC)    • Emphysema of lung (HCC)    • Hand paresthesia 2018   • HTN (hypertension)    • Hypercholesteremia    • Lung cancer ([facility])    • Muscle atrophy of upper extremity 2018   • Numbness and tingling in both hands    • OAB (overactive bladder) 2014   • Prostate cancer ([facility])    • Ulnar neuropathy 2018     Past Surgical History:   Procedure Laterality Date   • CARDIAC CATHETERIZATION     • COLONOSCOPY      DR CUETO   • CRYOABLATION OF PROSTATE  2012   • CYSTOSCOPY     • LUNG BIOPSY     • TEETH EXTRACTION     • TURP / TRANSURETHRAL INCISION / DRAINAGE PROSTATE  2015     Social History     Socioeconomic History   • Marital status:    Tobacco Use   • Smoking status: Former     Packs/day: 1.00     Years: 50.00     Pack years: 50.00     Types: Cigarettes     Quit date:      Years since quittin.1   • Smokeless tobacco: Never   • Tobacco comments:     STARTED AGE 17  QUIT IN 2016   Vaping Use   • Vaping Use: Never used   Substance and Sexual Activity   • Alcohol use: Not Currently     Alcohol/week: 1.0 standard drink     Types: 1 Cans of beer per week     Comment: casual drinker   • Drug use: Never   • Sexual activity: Defer     Family History   Problem Relation Age of Onset   • Diabetes Mother    • Cancer Sister    • Diabetes Sister    • Colon cancer Sister 60   • Hypertension Sister    • Diabetes Sister    • Coronary artery disease Brother        Objective   Physical  Exam  Vitals reviewed. Exam conducted with a chaperone present.   Constitutional:       General: He is not in acute distress.     Appearance: Normal appearance.   Cardiovascular:      Rate and Rhythm: Normal rate and regular rhythm.      Heart sounds: Normal heart sounds. No murmur heard.    No gallop.   Pulmonary:      Effort: Pulmonary effort is normal.      Breath sounds: Normal breath sounds.      Comments: Distant but clear breath sounds  Abdominal:      General: Abdomen is flat. Bowel sounds are normal.      Palpations: Abdomen is soft.   Musculoskeletal:      Cervical back: Neck supple.      Right lower leg: No edema.      Left lower leg: No edema.   Lymphadenopathy:      Cervical: No cervical adenopathy.   Neurological:      Mental Status: He is alert and oriented to person, place, and time.   Psychiatric:         Mood and Affect: Mood normal.         Behavior: Behavior normal.         Vitals:    03/09/23 0810   BP: 121/62   Pulse: 84   Resp: 16   Temp: 97.1 °F (36.2 °C)   TempSrc: Temporal   SpO2: 92%   Weight: 86.5 kg (190 lb 11.2 oz)   PainSc: 0-No pain     ECOG score: 0         PHQ-9 Total Score:                    Result Review :   The following data was reviewed by: Dayron Flores MD on 03/09/2023:  Lab Results   Component Value Date    HGB 13.5 02/16/2022    HCT 42.9 02/16/2022    MCV 81.7 02/16/2022     02/16/2022    WBC 5.57 02/16/2022    NEUTROABS 3.62 02/16/2022    LYMPHSABS 1.37 02/16/2022    MONOSABS 0.34 02/16/2022    EOSABS 0.17 02/16/2022    BASOSABS 0.05 02/16/2022     Lab Results   Component Value Date    GLUCOSE 85 02/16/2022    BUN 14 02/16/2022    CREATININE 1.33 (H) 02/16/2022     02/16/2022    K 4.2 02/16/2022     02/16/2022    CO2 25.9 02/16/2022    CALCIUM 9.6 02/16/2022    PROTEINTOT 8.1 02/16/2022    ALBUMIN 4.20 02/16/2022    BILITOT 0.6 02/16/2022    ALKPHOS 99 02/16/2022    AST 15 02/16/2022    ALT 7 02/16/2022     No results found for: MG, PHOS, FREET4,  TSH  No results found for: IRON, LABIRON, TRANSFERRIN, TIBC  No results found for: LDH, FERRITIN, [id number], FOLATE  Lab Results   Component Value Date    PSA <0.014 01/04/2023       Data reviewed: Radiologic studies CT chest reviewed demonstrating posttreatment changes.  Marked emphysema.  Coronary artery calcifications.      Assessment and Plan    Diagnoses and all orders for this visit:    1. Adenocarcinoma of bronchus, right (HCC) (Primary)  Assessment & Plan:  Status post treatments as outlined.  Patient is doing well.  I see no evidence of disease recurrence by his history, physical examination or recent CT of the chest.  He does have underlying emphysema which is limiting his activities.  He will follow-up with primary care and pulmonology next month.  We discussed considering pulmonary rehab again since he felt like it helped in the past.  He also has incidental note of coronary artery calcifications.  He reports a recent cardiology evaluation which was benign.  He should continue to follow-up with them as scheduled.  From a cancer standpoint, I think he is doing well.  I will plan to see him back in 1 year for ongoing surveillance with CT chest prior.      Orders:  -     CT Chest With Contrast; Future          Patient Follow Up: 1 year with CT chest prior    Patient was given instructions and counseling regarding his condition or for health maintenance advice. Please see specific information pulled into the AVS if appropriate.     Dayron Flores MD    3/9/2023

## 2023-03-29 ENCOUNTER — HOSPITAL ENCOUNTER (OUTPATIENT)
Dept: ONCOLOGY | Facility: HOSPITAL | Age: 78
Discharge: HOME | End: 2023-03-29
Admitting: INTERNAL MEDICINE
Payer: MEDICARE

## 2023-03-29 VITALS — RESPIRATION RATE: 18 BRPM | HEART RATE: 87 BPM | OXYGEN SATURATION: 93 % | TEMPERATURE: 98.2 F

## 2023-03-29 DIAGNOSIS — C34.91 ADENOCARCINOMA OF BRONCHUS, RIGHT: Primary | ICD-10-CM

## 2023-03-29 PROCEDURE — 25010000002 HEPARIN LOCK FLUSH PER 10 UNITS: Performed by: INTERNAL MEDICINE

## 2023-03-29 PROCEDURE — 96523 IRRIG DRUG DELIVERY DEVICE: CPT

## 2023-03-29 RX ORDER — HEPARIN SODIUM (PORCINE) LOCK FLUSH IV SOLN 100 UNIT/ML 100 UNIT/ML
500 SOLUTION INTRAVENOUS AS NEEDED
OUTPATIENT
Start: 2023-03-29

## 2023-03-29 RX ORDER — SODIUM CHLORIDE 0.9 % (FLUSH) 0.9 %
20 SYRINGE (ML) INJECTION AS NEEDED
Status: DISCONTINUED | OUTPATIENT
Start: 2023-03-29 | End: 2023-03-30 | Stop reason: HOSPADM

## 2023-03-29 RX ORDER — HEPARIN SODIUM (PORCINE) LOCK FLUSH IV SOLN 100 UNIT/ML 100 UNIT/ML
500 SOLUTION INTRAVENOUS AS NEEDED
Status: DISCONTINUED | OUTPATIENT
Start: 2023-03-29 | End: 2023-03-30 | Stop reason: HOSPADM

## 2023-03-29 RX ORDER — SODIUM CHLORIDE 0.9 % (FLUSH) 0.9 %
20 SYRINGE (ML) INJECTION AS NEEDED
OUTPATIENT
Start: 2023-03-29

## 2023-03-29 RX ORDER — SODIUM CHLORIDE 0.9 % (FLUSH) 0.9 %
10 SYRINGE (ML) INJECTION AS NEEDED
OUTPATIENT
Start: 2023-03-29

## 2023-03-29 RX ADMIN — Medication 20 ML: at 08:32

## 2023-03-29 RX ADMIN — HEPARIN SODIUM (PORCINE) LOCK FLUSH IV SOLN 100 UNIT/ML 500 UNITS: 100 SOLUTION at 08:32

## 2023-04-04 ENCOUNTER — CLINICAL SUPPORT (OUTPATIENT)
Dept: UROLOGY | Facility: CLINIC | Age: 78
End: 2023-04-04
Payer: MEDICARE

## 2023-04-04 DIAGNOSIS — C61 PROSTATE CANCER: Primary | ICD-10-CM

## 2023-04-04 PROCEDURE — 96402 CHEMO HORMON ANTINEOPL SQ/IM: CPT | Performed by: UROLOGY

## 2023-04-10 ENCOUNTER — OFFICE VISIT (OUTPATIENT)
Dept: UROLOGY | Facility: CLINIC | Age: 78
End: 2023-04-10
Payer: MEDICARE

## 2023-04-10 VITALS
HEART RATE: 83 BPM | WEIGHT: 194 LBS | HEIGHT: 71 IN | SYSTOLIC BLOOD PRESSURE: 104 MMHG | TEMPERATURE: 98.4 F | BODY MASS INDEX: 27.16 KG/M2 | DIASTOLIC BLOOD PRESSURE: 61 MMHG

## 2023-04-10 DIAGNOSIS — R97.21 INCREASING PSA LEVEL AFTER TREATMENT FOR PROSTATE CANCER: Primary | ICD-10-CM

## 2023-04-10 LAB
BACTERIA UR QL AUTO: NORMAL /HPF
EPI CELLS #/AREA URNS HPF: 0 /[HPF]
RBC # UR STRIP: NORMAL /HPF
RENAL EPITHELIAL, POC: 0
UNIDENT CRYS URNS QL MICRO: NORMAL /HPF
WBC # UR STRIP: NORMAL /HPF

## 2023-04-10 PROCEDURE — 81015 MICROSCOPIC EXAM OF URINE: CPT | Performed by: UROLOGY

## 2023-04-10 PROCEDURE — 99212 OFFICE O/P EST SF 10 MIN: CPT | Performed by: UROLOGY

## 2023-04-10 PROCEDURE — 3078F DIAST BP <80 MM HG: CPT | Performed by: UROLOGY

## 2023-04-10 PROCEDURE — 3074F SYST BP LT 130 MM HG: CPT | Performed by: UROLOGY

## 2023-04-10 PROCEDURE — 1159F MED LIST DOCD IN RCRD: CPT | Performed by: UROLOGY

## 2023-04-10 PROCEDURE — 1160F RVW MEDS BY RX/DR IN RCRD: CPT | Performed by: UROLOGY

## 2023-04-10 RX ORDER — LISINOPRIL 5 MG/1
TABLET ORAL EVERY 24 HOURS
COMMUNITY

## 2023-04-10 RX ORDER — ATORVASTATIN CALCIUM 40 MG/1
TABLET, FILM COATED ORAL EVERY 24 HOURS
COMMUNITY
End: 2023-04-10 | Stop reason: SDUPTHER

## 2023-04-10 NOTE — PROGRESS NOTES
"Chief Complaint  Prostate cancer    Subjective          Daron Whitfield presents to [facility] UROLOGY  History of Present Illness    78-year-old -American male had prostate carcinoma diagnosed in 2011.  He had cryoablation of prostate.  His PSA started going up and he was started on antiandrogen therapy.  Patient had meatotomy in December 2022.  Patient also had lung cancer and is doing well.    Patient has no back pain no nausea vomiting.  He is considering intermittent androgen deprivation therapy.  He is going to finish his Casodex and then will not take it anymore and also no Depo-Lupron.    It is pretty acceptable to me.  We will recheck his PSA in 6 months.  Objective No acute distress  Vital Signs:   /61   Pulse 83   Temp 98.4 °F (36.9 °C)   Ht 180.3 cm (71\")   Wt 88 kg (194 lb)   BMI 27.06 kg/m²     No Known Allergies   Past medical history:  has a past medical history of Bladder outflow obstruction, Cervical spinal stenosis (01/23/2018), Colon polyp, COVID, Emphysema lung, Emphysema of lung, Hand paresthesia (01/23/2018), HTN (hypertension), Hypercholesteremia, Lung cancer (2016), Muscle atrophy of upper extremity (01/23/2018), Numbness and tingling in both hands, OAB (overactive bladder) (12/09/2014), Prostate cancer (2012), and Ulnar neuropathy (01/23/2018).   Past surgical history:  has a past surgical history that includes Colonoscopy (2019); Cystoscopy; Lung biopsy; Cryoablation of Prostate (01/17/2012); Teeth Extraction; TURP / transurethral incision / drainage prostate (06/16/2015); and Cardiac catheterization.  Personal history: family history includes Cancer in his sister; Colon cancer (age of onset: 60) in his sister; Coronary artery disease in his brother; Diabetes in his mother, sister, and sister; Hypertension in his sister.  Social history:  reports that he quit smoking about 7 years ago. His smoking use included cigarettes. He has a 50.00 pack-year smoking " history. He has never used smokeless tobacco. He reports that he does not currently use alcohol after a past usage of about 1.0 standard drink per week. He reports that he does not use drugs.    Review of Systems     Physical Exam  Constitutional:       General: He is not in acute distress.     Appearance: Normal appearance. He is normal weight. He is not ill-appearing or toxic-appearing.   HENT:      Head: Normocephalic and atraumatic.      Ears:      Comments: No hearing loss  Cardiovascular:      Rate and Rhythm: Normal rate and regular rhythm.      Pulses: Normal pulses.      Heart sounds: Normal heart sounds. No murmur heard.  Pulmonary:      Effort: Pulmonary effort is normal.      Breath sounds: Normal breath sounds. No rhonchi or rales.   Abdominal:      Palpations: Abdomen is soft. There is no mass.      Tenderness: There is no abdominal tenderness. There is no right CVA tenderness or left CVA tenderness.      Comments: Divarication of recti   Genitourinary:     Comments: Uncircumcised normal penis.    Right and left scrotum is normal.    Right and left testes and epididymis is normal.  They are smaller because of androgen deprivation therapy.    LUL.  Prostate gland is just about 15 g and benign  Musculoskeletal:         General: Normal range of motion.      Cervical back: Normal range of motion and neck supple. No rigidity or tenderness.   Lymphadenopathy:      Cervical: No cervical adenopathy.   Skin:     General: Skin is warm.      Coloration: Skin is not jaundiced.   Neurological:      General: No focal deficit present.      Mental Status: He is alert and oriented to person, place, and time.      Motor: No weakness.      Gait: Gait normal.   Psychiatric:         Mood and Affect: Mood normal.         Behavior: Behavior normal.         Thought Content: Thought content normal.         Judgment: Judgment normal.        Result Review :                 Assessment and Plan    Diagnoses and all orders for this  visit:    1. Increasing PSA level after treatment for prostate cancer (Primary)  -     POC Urine Microscopic Only      Patient wants to have intermittent androgen deprivation therapy.  We are going to stop his Depo-Lupron and Casodex at this time.  We will check him in 6 months with PSA.  Brief Urine Lab Results  (Last result in the past 365 days)      Color   Clarity   Blood   Leuk Est   Nitrite   Protein   CREAT   Urine HCG        11/28/22 1124 Yellow   Clear   Small   Negative   Negative   Negative                  Follow Up   Return in about 6 months (around 10/10/2023).  Patient was given instructions and counseling regarding his condition or for health maintenance advice. Please see specific information pulled into the AVS if appropriate.     Merrill Ames MD

## 2023-04-26 ENCOUNTER — OFFICE VISIT (OUTPATIENT)
Dept: PULMONOLOGY | Facility: CLINIC | Age: 78
End: 2023-04-26
Payer: MEDICARE

## 2023-04-26 VITALS
HEART RATE: 101 BPM | BODY MASS INDEX: 26.53 KG/M2 | SYSTOLIC BLOOD PRESSURE: 98 MMHG | DIASTOLIC BLOOD PRESSURE: 53 MMHG | WEIGHT: 189.5 LBS | TEMPERATURE: 97.5 F | OXYGEN SATURATION: 93 % | RESPIRATION RATE: 20 BRPM | HEIGHT: 71 IN

## 2023-04-26 DIAGNOSIS — J44.1 COPD WITH ACUTE EXACERBATION: Primary | ICD-10-CM

## 2023-04-26 DIAGNOSIS — J45.40 MODERATE PERSISTENT ASTHMA, UNSPECIFIED WHETHER COMPLICATED: ICD-10-CM

## 2023-04-26 DIAGNOSIS — J43.9 PULMONARY EMPHYSEMA, UNSPECIFIED EMPHYSEMA TYPE: ICD-10-CM

## 2023-04-26 DIAGNOSIS — C34.91 ADENOCARCINOMA OF BRONCHUS, RIGHT: ICD-10-CM

## 2023-04-26 RX ORDER — PREDNISONE 10 MG/1
TABLET ORAL
Qty: 45 TABLET | Refills: 0 | Status: SHIPPED | OUTPATIENT
Start: 2023-04-26

## 2023-04-26 NOTE — H&P (VIEW-ONLY)
Primary Care Provider  Provider, No Known     Referring Provider  No ref. provider found     Chief Complaint  COPD, Asthma, Shortness of Breath (More than normal ), Wheezing (More then normal ), and Follow-up (3 month f/up )    Subjective          Daron Whitfield presents to [facility]  Emphysema  Associated symptoms include coughing.   Asthma  He complains of cough, shortness of breath and wheezing. His past medical history is significant for asthma and emphysema.   Shortness of Breath  Associated symptoms include wheezing. His past medical history is significant for asthma.   Wheezing   Associated symptoms include coughing and shortness of breath. His past medical history is significant for asthma.   Cough  Associated symptoms include shortness of breath and wheezing. His past medical history is significant for asthma and emphysema.   COPD  He complains of cough, shortness of breath and wheezing. His past medical history is significant for asthma and emphysema.     Daron Whitfield is a 78 y.o. 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  He is here for follow-up.  Since his last office visit, he has been on Stiolto 2 puffs once daily and Flovent twice daily and albuterol as needed.  He also uses DuoNeb nebulizer several times a day but does not feel better.  He had repeat CT scan of the chest which did not show any acute abnormality apart from his emphysema and no recurrence of cancer.  He has been having cough, wheezing, shortness of breath with minimal exertion.  He tries to move around some in room but is limited with activities.  His wife smokes around him.  He does not smoke anymore.  He has been having wheezing and having worsening symptoms.  He feels like nothing has been helping so far he had CT scan of the chest earlier this year which was stable from his lung cancer standpoint.  Last week, he felt  like he needed to go to the hospital with worsening shortness of breath and chest tightness.  He is barely able to do any activities he also wants DME placard for handicap.        Review of Systems   Respiratory: Positive for cough, shortness of breath and wheezing.    General:  Fatigue, No Fever No weight loss or loss of appetite  HEENT: No dysphagia, No Visual Changes, +rhinorrhea, significant sneezing and runny eyes  Respiratory:  + Incessant cough,+Dyspnea, mucoid phlegm, No Pleuritic Pain, ++wheezing, no hemoptysis.  Cardiovascular: Denies chest pain, denies palpitations,+SILVERIO, No Chest Pressure  Gastrointestinal:  No Abdominal Pain, No Nausea, No Vomiting, No Diarrhea  Genitourinary:  No Dysuria, No Frequency, No Hesitancy  Musculoskeletal: No muscle pain or swelling  Endocrine:  No Heat Intolerance, No Cold Intolerance  Hematologic:  No Bleeding, No Bruising  Psychiatric:  No Anxiety, No Depression  Neurologic:  No Confusion, no Dysarthria, No Headaches  Skin:  No Rash, No Open Wounds    Family History   Problem Relation Age of Onset   • Diabetes Mother    • Cancer Sister    • Diabetes Sister    • Colon cancer Sister 60   • Hypertension Sister    • Diabetes Sister    • Coronary artery disease Brother         Social History     Socioeconomic History   • Marital status:    Tobacco Use   • Smoking status: Former     Packs/day: 1.00     Years: 50.00     Pack years: 50.00     Types: Cigarettes     Start date:      Quit date: 2016     Years since quittin.3     Passive exposure: Past   • Smokeless tobacco: Never   • Tobacco comments:     STARTED AGE 17  QUIT IN 2016   Vaping Use   • Vaping Use: Never used   • Passive vaping exposure: Yes   Substance and Sexual Activity   • Alcohol use: Not Currently     Alcohol/week: 1.0 standard drink     Types: 1 Cans of beer per week     Comment: casual drinker   • Drug use: Never   • Sexual activity: Defer        Past Medical History:   Diagnosis Date   • Bladder  outflow obstruction    • Cervical spinal stenosis 01/23/2018   • Colon polyp    • COVID    • Emphysema lung    • Emphysema of lung    • Hand paresthesia 01/23/2018   • HTN (hypertension)    • Hypercholesteremia    • Lung cancer 2016   • Muscle atrophy of upper extremity 01/23/2018   • Numbness and tingling in both hands    • OAB (overactive bladder) 12/09/2014   • Prostate cancer 2012   • Ulnar neuropathy 01/23/2018        Immunization History   Administered Date(s) Administered   • COVID-19 (MODERNA) 1st,2nd,3rd Dose Monovalent 02/05/2021, 03/09/2021   • COVID-19 (PFIZER) BIVALENT 12+YRS 09/21/2022   • COVID-19 (PFIZER) Purple Cap Monovalent 11/17/2021   • Fluad Quad 65+ 10/24/2022   • Fluzone High Dose =>65 Years ([employer] ONLY) 12/11/2019   • Fluzone High-Dose 65+yrs 11/13/2021   • Pneumococcal Polysaccharide (PPSV23) 10/24/2022         No Known Allergies       Current Outpatient Medications:   •  albuterol (PROVENTIL) (2.5 MG/3ML) 0.083% nebulizer solution, , Disp: , Rfl:   •  albuterol sulfate  (90 Base) MCG/ACT inhaler, Inhale 2 puffs Every 4 (Four) Hours As Needed for Wheezing., Disp: , Rfl:   •  atorvastatin (LIPITOR) 40 MG tablet, Lipitor 40 mg oral tablet take 1 tablet (40 mg) by oral route once daily   Suspended, Disp: , Rfl:   •  bicalutamide (CASODEX) 50 MG chemo tablet, , Disp: , Rfl:   •  FeroSul 325 (65 Fe) MG tablet, , Disp: , Rfl:   •  fluticasone (Flovent HFA) 220 MCG/ACT inhaler, Inhale 1 puff 2 (Two) Times a Day., Disp: 1 each, Rfl: 11  •  Fluticasone-Umeclidin-Vilant 200-62.5-25 MCG/ACT aerosol powder , Inhale 1 puff Daily., Disp: 1 each, Rfl: 7  •  folic acid (FOLVITE) 1 MG tablet, folic acid 1 mg oral tablet take 1 tablet (1 mg) by oral route once daily   Active, Disp: , Rfl:   •  Gemtesa 75 MG tablet, , Disp: , Rfl:   •  ipratropium-albuterol (DUO-NEB) 0.5-2.5 mg/3 ml nebulizer, Take 3 mL by nebulization Every 4 (Four) Hours As Needed for Wheezing., Disp: 360 mL, Rfl: 4  •  leuprolide  "(Lupron Depot, 3-Month,) 22.5 MG injection, 22.5 mg., Disp: , Rfl:   •  lisinopril (PRINIVIL,ZESTRIL) 5 MG tablet, Daily., Disp: , Rfl:   •  metFORMIN (GLUCOPHAGE) 500 MG tablet, , Disp: , Rfl:   •  metoprolol succinate XL (TOPROL-XL) 25 MG 24 hr tablet, Toprol XL 25 mg oral tablet extended release 24 hr take 1 tablet (25 mg) by oral route once daily   Active, Disp: , Rfl:   •  sildenafil (VIAGRA) 100 MG tablet, , Disp: , Rfl:   •  tamsulosin (FLOMAX) 0.4 MG capsule 24 hr capsule, Take 1 capsule by mouth Daily for 360 days., Disp: 90 capsule, Rfl: 3  •  predniSONE (DELTASONE) 10 MG tablet, 50mg qday x 3 days, 40mg qday x 3 days, 30mg qday x 3 days, 20mg qday x 3 days, 10mg qday x 3 days, then stop, Disp: 45 tablet, Rfl: 0  •  tiotropium bromide-olodaterol (Stiolto Respimat) 2.5-2.5 MCG/ACT aerosol solution inhaler, Inhale 2 puffs Daily. Lot # [id number] Expiration: June 2023 (Patient not taking: Reported on 4/26/2023), Disp: 1 each, Rfl: 11     Objective   Vital Signs:   BP 98/53 (BP Location: Left arm, Patient Position: Sitting, Cuff Size: Adult)   Pulse 101   Temp 97.5 °F (36.4 °C) (Temporal)   Resp 20   Ht 180.3 cm (71\")   Wt 86 kg (189 lb 8 oz)   SpO2 93% Comment: room air  BMI 26.43 kg/m²     Mallampatti classification : 1  Physical Exam  Vital Signs Reviewed  Pleasant male, in mild distress, has conversational dyspnea  HEENT:  PERRL, EOMI.  OP, nares clear, no sinus tenderness  Neck:  Supple, No JVD, no thyromegaly  Lymph: no axillary, cervical, supraclavicular lymphadenopathy noted bilaterally  Chest: Bilateral diminished breath sounds, expiratory wheezing bilaterally, no rhonchi or crackles, resonant to percussion bilaterally  CV: RRR, no MGR, pulses 2+, equal  Abd:  Soft, NT, ND, + BS, no HSM  EXT:  no clubbing, no cyanosis, No BLE edema  Neuro:  A&Ox3, CN grossly intact, no focal deficits  Skin: No rashes or lesions noted       Result Review :   The following data was reviewed by: Roshan Hood MD on " 05/19/2022:  Common labs        1/4/2023    10:14   Common Labs   PSA <0.014           CBC w/diff    CBC w/Diff 2/16/22   WBC 5.57   RBC 5.25   Hemoglobin 13.5   Hematocrit 42.9   MCV 81.7   MCH 25.7 (A)   MCHC 31.5   RDW 13.9   Platelets 182   Neutrophil Rel % 64.9   Immature Granulocyte Rel % 0.4   Lymphocyte Rel % 24.6   Monocyte Rel % 6.1   Eosinophil Rel % 3.1   Basophil Rel % 0.9   (A) Abnormal value            Data reviewed: Radiologic studies Recent CT scan of the chest was reviewed.       Narrative & Impression   PROCEDURE:  CT CHEST HI RESOLUTION DIAGNOSTIC     COMPARISON: [facility], CT, CT CHEST W CONTRAST DIAGNOSTIC, 3/04/2022, 12:23.     INDICATIONS:  dyspnea, lung cancer hx, productive cough     TECHNIQUE:    CT images were created without the administration of contrast material.       PROTOCOL:     Standard imaging protocol performed                 RADIATION:      DLP: 447mGy*cm               Automated exposure control was utilized to minimize radiation dose.      FINDINGS:          The central tracheobronchial tree is clear.  There is moderate emphysema.  There is a calcified   granuloma in the left upper lobe.  No concerning pulmonary nodule is identified.  There is stable   interstitial thickening in the right hilar region, which could represent post-treatment changes.    There is no focal consolidation.  There is no pleural effusion.  There is no evidence of   significant interstitial lung disease.     A right internal jugular port has its tip at the upper SVC.  The heart size appears normal, with   evidence of calcified coronary artery disease.  The great vessels are normal caliber.  No   abnormally enlarged lymph nodes are identified.     Partial evaluation of the upper abdomen is unremarkable.     No aggressive osseous lesions are identified.     IMPRESSION:                 1. No acute cardiopulmonary process.  2. No evidence of metastasis within chest.  3. Moderate emphysema.             ZIA KEE MD         Electronically Signed and Approved By: ZIA KEE MD on 12/29/2022 at 8:47                     Assessment and Plan    Diagnoses and all orders for this visit:    1. COPD with acute exacerbation (Primary)  -     Case Request; Standing  -     Follow Anesthesia Guidlines / Standing Orders; Standing  -     Obtain Informed Consent; Standing  -     Case Request  -     predniSONE (DELTASONE) 10 MG tablet; 50mg qday x 3 days, 40mg qday x 3 days, 30mg qday x 3 days, 20mg qday x 3 days, 10mg qday x 3 days, then stop  Dispense: 45 tablet; Refill: 0    2. Moderate persistent asthma, unspecified whether complicated  -     Case Request; Standing  -     Follow Anesthesia Guidlines / Standing Orders; Standing  -     Obtain Informed Consent; Standing  -     Case Request  -     predniSONE (DELTASONE) 10 MG tablet; 50mg qday x 3 days, 40mg qday x 3 days, 30mg qday x 3 days, 20mg qday x 3 days, 10mg qday x 3 days, then stop  Dispense: 45 tablet; Refill: 0    3. Pulmonary emphysema, unspecified emphysema type  -     Case Request; Standing  -     Follow Anesthesia Guidlines / Standing Orders; Standing  -     Obtain Informed Consent; Standing  -     Case Request  -     predniSONE (DELTASONE) 10 MG tablet; 50mg qday x 3 days, 40mg qday x 3 days, 30mg qday x 3 days, 20mg qday x 3 days, 10mg qday x 3 days, then stop  Dispense: 45 tablet; Refill: 0    4. Adenocarcinoma of bronchus, right  -     Case Request; Standing  -     Follow Anesthesia Guidlines / Standing Orders; Standing  -     Obtain Informed Consent; Standing  -     Case Request  -     predniSONE (DELTASONE) 10 MG tablet; 50mg qday x 3 days, 40mg qday x 3 days, 30mg qday x 3 days, 20mg qday x 3 days, 10mg qday x 3 days, then stop  Dispense: 45 tablet; Refill: 0      COPD: Continue with Stiolto 2 puffs once daily and Flovent 2 puffs twice daily.  Use albuterol as needed.  He has DuoNeb nebulizer I advised him to continue with DuoNeb  nebulizer for airway clearance 2-3 times a day.  COPD exacerbation next plan was discussed in detail.  He is having acute exacerbation now.  Given his worsening symptoms, I have given him prednisone taper for 15 day.   Persistent symptoms with no significant changes on CT scan.  Discussed bronchoscopy with bronchoalveolar lavage, bronchial washing, possible transbronchial biopsy, endobronchial brushing, airway inspection.  Risks and benefits of the procedure were discussed in detail.  Alternatives and options were reviewed.  Risks including pneumothorax, pneumonia, bleeding, respiratory depression and that it could be fatal were all reviewed.  Patient understands and is agreeable for the procedure.  We will proceed with bronchoscopy next week.    Lung cancer: Poorly differentiated stage IIIa adenocarcinoma, status post chemotherapy and radiation therapy.  Patient has been doing well.  No recurrence of the disease now.    Up-to-date on COVID, pneumonia and flu vaccines.  He had flu vaccine in October 24, 2022.  Handicap placard signature provided today.    Follow Up   Return in about 4 weeks (around 5/24/2023).  Patient was given instructions and counseling regarding his condition or for health maintenance advice. Please see specific information pulled into the AVS if appropriate.       Electronically signed by Roshan Hood MD, 4/26/2023, 09:21 EDT.

## 2023-04-26 NOTE — PROGRESS NOTES
Primary Care Provider  Provider, No Known     Referring Provider  No ref. provider found     Chief Complaint  COPD, Asthma, Shortness of Breath (More than normal ), Wheezing (More then normal ), and Follow-up (3 month f/up )    Subjective          Daron Whitfield presents to [facility]  Emphysema  Associated symptoms include coughing.   Asthma  He complains of cough, shortness of breath and wheezing. His past medical history is significant for asthma and emphysema.   Shortness of Breath  Associated symptoms include wheezing. His past medical history is significant for asthma.   Wheezing   Associated symptoms include coughing and shortness of breath. His past medical history is significant for asthma.   Cough  Associated symptoms include shortness of breath and wheezing. His past medical history is significant for asthma and emphysema.   COPD  He complains of cough, shortness of breath and wheezing. His past medical history is significant for asthma and emphysema.     Daron Whitfield is a 78 y.o. 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  He is here for follow-up.  Since his last office visit, he has been on Stiolto 2 puffs once daily and Flovent twice daily and albuterol as needed.  He also uses DuoNeb nebulizer several times a day but does not feel better.  He had repeat CT scan of the chest which did not show any acute abnormality apart from his emphysema and no recurrence of cancer.  He has been having cough, wheezing, shortness of breath with minimal exertion.  He tries to move around some in room but is limited with activities.  His wife smokes around him.  He does not smoke anymore.  He has been having wheezing and having worsening symptoms.  He feels like nothing has been helping so far he had CT scan of the chest earlier this year which was stable from his lung cancer standpoint.  Last week, he felt  like he needed to go to the hospital with worsening shortness of breath and chest tightness.  He is barely able to do any activities he also wants DME placard for handicap.        Review of Systems   Respiratory: Positive for cough, shortness of breath and wheezing.    General:  Fatigue, No Fever No weight loss or loss of appetite  HEENT: No dysphagia, No Visual Changes, +rhinorrhea, significant sneezing and runny eyes  Respiratory:  + Incessant cough,+Dyspnea, mucoid phlegm, No Pleuritic Pain, ++wheezing, no hemoptysis.  Cardiovascular: Denies chest pain, denies palpitations,+SILVERIO, No Chest Pressure  Gastrointestinal:  No Abdominal Pain, No Nausea, No Vomiting, No Diarrhea  Genitourinary:  No Dysuria, No Frequency, No Hesitancy  Musculoskeletal: No muscle pain or swelling  Endocrine:  No Heat Intolerance, No Cold Intolerance  Hematologic:  No Bleeding, No Bruising  Psychiatric:  No Anxiety, No Depression  Neurologic:  No Confusion, no Dysarthria, No Headaches  Skin:  No Rash, No Open Wounds    Family History   Problem Relation Age of Onset   • Diabetes Mother    • Cancer Sister    • Diabetes Sister    • Colon cancer Sister 60   • Hypertension Sister    • Diabetes Sister    • Coronary artery disease Brother         Social History     Socioeconomic History   • Marital status:    Tobacco Use   • Smoking status: Former     Packs/day: 1.00     Years: 50.00     Pack years: 50.00     Types: Cigarettes     Start date:      Quit date: 2016     Years since quittin.3     Passive exposure: Past   • Smokeless tobacco: Never   • Tobacco comments:     STARTED AGE 17  QUIT IN 2016   Vaping Use   • Vaping Use: Never used   • Passive vaping exposure: Yes   Substance and Sexual Activity   • Alcohol use: Not Currently     Alcohol/week: 1.0 standard drink     Types: 1 Cans of beer per week     Comment: casual drinker   • Drug use: Never   • Sexual activity: Defer        Past Medical History:   Diagnosis Date   • Bladder  outflow obstruction    • Cervical spinal stenosis 01/23/2018   • Colon polyp    • COVID    • Emphysema lung    • Emphysema of lung    • Hand paresthesia 01/23/2018   • HTN (hypertension)    • Hypercholesteremia    • Lung cancer 2016   • Muscle atrophy of upper extremity 01/23/2018   • Numbness and tingling in both hands    • OAB (overactive bladder) 12/09/2014   • Prostate cancer 2012   • Ulnar neuropathy 01/23/2018        Immunization History   Administered Date(s) Administered   • COVID-19 (MODERNA) 1st,2nd,3rd Dose Monovalent 02/05/2021, 03/09/2021   • COVID-19 (PFIZER) BIVALENT 12+YRS 09/21/2022   • COVID-19 (PFIZER) Purple Cap Monovalent 11/17/2021   • Fluad Quad 65+ 10/24/2022   • Fluzone High Dose =>65 Years ([employer] ONLY) 12/11/2019   • Fluzone High-Dose 65+yrs 11/13/2021   • Pneumococcal Polysaccharide (PPSV23) 10/24/2022         No Known Allergies       Current Outpatient Medications:   •  albuterol (PROVENTIL) (2.5 MG/3ML) 0.083% nebulizer solution, , Disp: , Rfl:   •  albuterol sulfate  (90 Base) MCG/ACT inhaler, Inhale 2 puffs Every 4 (Four) Hours As Needed for Wheezing., Disp: , Rfl:   •  atorvastatin (LIPITOR) 40 MG tablet, Lipitor 40 mg oral tablet take 1 tablet (40 mg) by oral route once daily   Suspended, Disp: , Rfl:   •  bicalutamide (CASODEX) 50 MG chemo tablet, , Disp: , Rfl:   •  FeroSul 325 (65 Fe) MG tablet, , Disp: , Rfl:   •  fluticasone (Flovent HFA) 220 MCG/ACT inhaler, Inhale 1 puff 2 (Two) Times a Day., Disp: 1 each, Rfl: 11  •  Fluticasone-Umeclidin-Vilant 200-62.5-25 MCG/ACT aerosol powder , Inhale 1 puff Daily., Disp: 1 each, Rfl: 7  •  folic acid (FOLVITE) 1 MG tablet, folic acid 1 mg oral tablet take 1 tablet (1 mg) by oral route once daily   Active, Disp: , Rfl:   •  Gemtesa 75 MG tablet, , Disp: , Rfl:   •  ipratropium-albuterol (DUO-NEB) 0.5-2.5 mg/3 ml nebulizer, Take 3 mL by nebulization Every 4 (Four) Hours As Needed for Wheezing., Disp: 360 mL, Rfl: 4  •  leuprolide  "(Lupron Depot, 3-Month,) 22.5 MG injection, 22.5 mg., Disp: , Rfl:   •  lisinopril (PRINIVIL,ZESTRIL) 5 MG tablet, Daily., Disp: , Rfl:   •  metFORMIN (GLUCOPHAGE) 500 MG tablet, , Disp: , Rfl:   •  metoprolol succinate XL (TOPROL-XL) 25 MG 24 hr tablet, Toprol XL 25 mg oral tablet extended release 24 hr take 1 tablet (25 mg) by oral route once daily   Active, Disp: , Rfl:   •  sildenafil (VIAGRA) 100 MG tablet, , Disp: , Rfl:   •  tamsulosin (FLOMAX) 0.4 MG capsule 24 hr capsule, Take 1 capsule by mouth Daily for 360 days., Disp: 90 capsule, Rfl: 3  •  predniSONE (DELTASONE) 10 MG tablet, 50mg qday x 3 days, 40mg qday x 3 days, 30mg qday x 3 days, 20mg qday x 3 days, 10mg qday x 3 days, then stop, Disp: 45 tablet, Rfl: 0  •  tiotropium bromide-olodaterol (Stiolto Respimat) 2.5-2.5 MCG/ACT aerosol solution inhaler, Inhale 2 puffs Daily. Lot # [id number] Expiration: June 2023 (Patient not taking: Reported on 4/26/2023), Disp: 1 each, Rfl: 11     Objective   Vital Signs:   BP 98/53 (BP Location: Left arm, Patient Position: Sitting, Cuff Size: Adult)   Pulse 101   Temp 97.5 °F (36.4 °C) (Temporal)   Resp 20   Ht 180.3 cm (71\")   Wt 86 kg (189 lb 8 oz)   SpO2 93% Comment: room air  BMI 26.43 kg/m²     Mallampatti classification : 1  Physical Exam  Vital Signs Reviewed  Pleasant male, in mild distress, has conversational dyspnea  HEENT:  PERRL, EOMI.  OP, nares clear, no sinus tenderness  Neck:  Supple, No JVD, no thyromegaly  Lymph: no axillary, cervical, supraclavicular lymphadenopathy noted bilaterally  Chest: Bilateral diminished breath sounds, expiratory wheezing bilaterally, no rhonchi or crackles, resonant to percussion bilaterally  CV: RRR, no MGR, pulses 2+, equal  Abd:  Soft, NT, ND, + BS, no HSM  EXT:  no clubbing, no cyanosis, No BLE edema  Neuro:  A&Ox3, CN grossly intact, no focal deficits  Skin: No rashes or lesions noted       Result Review :   The following data was reviewed by: Roshan Hood MD on " 05/19/2022:  Common labs        1/4/2023    10:14   Common Labs   PSA <0.014           CBC w/diff    CBC w/Diff 2/16/22   WBC 5.57   RBC 5.25   Hemoglobin 13.5   Hematocrit 42.9   MCV 81.7   MCH 25.7 (A)   MCHC 31.5   RDW 13.9   Platelets 182   Neutrophil Rel % 64.9   Immature Granulocyte Rel % 0.4   Lymphocyte Rel % 24.6   Monocyte Rel % 6.1   Eosinophil Rel % 3.1   Basophil Rel % 0.9   (A) Abnormal value            Data reviewed: Radiologic studies Recent CT scan of the chest was reviewed.       Narrative & Impression   PROCEDURE:  CT CHEST HI RESOLUTION DIAGNOSTIC     COMPARISON: [facility], CT, CT CHEST W CONTRAST DIAGNOSTIC, 3/04/2022, 12:23.     INDICATIONS:  dyspnea, lung cancer hx, productive cough     TECHNIQUE:    CT images were created without the administration of contrast material.       PROTOCOL:     Standard imaging protocol performed                 RADIATION:      DLP: 447mGy*cm               Automated exposure control was utilized to minimize radiation dose.      FINDINGS:          The central tracheobronchial tree is clear.  There is moderate emphysema.  There is a calcified   granuloma in the left upper lobe.  No concerning pulmonary nodule is identified.  There is stable   interstitial thickening in the right hilar region, which could represent post-treatment changes.    There is no focal consolidation.  There is no pleural effusion.  There is no evidence of   significant interstitial lung disease.     A right internal jugular port has its tip at the upper SVC.  The heart size appears normal, with   evidence of calcified coronary artery disease.  The great vessels are normal caliber.  No   abnormally enlarged lymph nodes are identified.     Partial evaluation of the upper abdomen is unremarkable.     No aggressive osseous lesions are identified.     IMPRESSION:                 1. No acute cardiopulmonary process.  2. No evidence of metastasis within chest.  3. Moderate emphysema.             ZIA KEE MD         Electronically Signed and Approved By: ZIA KEE MD on 12/29/2022 at 8:47                     Assessment and Plan    Diagnoses and all orders for this visit:    1. COPD with acute exacerbation (Primary)  -     Case Request; Standing  -     Follow Anesthesia Guidlines / Standing Orders; Standing  -     Obtain Informed Consent; Standing  -     Case Request  -     predniSONE (DELTASONE) 10 MG tablet; 50mg qday x 3 days, 40mg qday x 3 days, 30mg qday x 3 days, 20mg qday x 3 days, 10mg qday x 3 days, then stop  Dispense: 45 tablet; Refill: 0    2. Moderate persistent asthma, unspecified whether complicated  -     Case Request; Standing  -     Follow Anesthesia Guidlines / Standing Orders; Standing  -     Obtain Informed Consent; Standing  -     Case Request  -     predniSONE (DELTASONE) 10 MG tablet; 50mg qday x 3 days, 40mg qday x 3 days, 30mg qday x 3 days, 20mg qday x 3 days, 10mg qday x 3 days, then stop  Dispense: 45 tablet; Refill: 0    3. Pulmonary emphysema, unspecified emphysema type  -     Case Request; Standing  -     Follow Anesthesia Guidlines / Standing Orders; Standing  -     Obtain Informed Consent; Standing  -     Case Request  -     predniSONE (DELTASONE) 10 MG tablet; 50mg qday x 3 days, 40mg qday x 3 days, 30mg qday x 3 days, 20mg qday x 3 days, 10mg qday x 3 days, then stop  Dispense: 45 tablet; Refill: 0    4. Adenocarcinoma of bronchus, right  -     Case Request; Standing  -     Follow Anesthesia Guidlines / Standing Orders; Standing  -     Obtain Informed Consent; Standing  -     Case Request  -     predniSONE (DELTASONE) 10 MG tablet; 50mg qday x 3 days, 40mg qday x 3 days, 30mg qday x 3 days, 20mg qday x 3 days, 10mg qday x 3 days, then stop  Dispense: 45 tablet; Refill: 0      COPD: Continue with Stiolto 2 puffs once daily and Flovent 2 puffs twice daily.  Use albuterol as needed.  He has DuoNeb nebulizer I advised him to continue with DuoNeb  nebulizer for airway clearance 2-3 times a day.  COPD exacerbation next plan was discussed in detail.  He is having acute exacerbation now.  Given his worsening symptoms, I have given him prednisone taper for 15 day.   Persistent symptoms with no significant changes on CT scan.  Discussed bronchoscopy with bronchoalveolar lavage, bronchial washing, possible transbronchial biopsy, endobronchial brushing, airway inspection.  Risks and benefits of the procedure were discussed in detail.  Alternatives and options were reviewed.  Risks including pneumothorax, pneumonia, bleeding, respiratory depression and that it could be fatal were all reviewed.  Patient understands and is agreeable for the procedure.  We will proceed with bronchoscopy next week.    Lung cancer: Poorly differentiated stage IIIa adenocarcinoma, status post chemotherapy and radiation therapy.  Patient has been doing well.  No recurrence of the disease now.    Up-to-date on COVID, pneumonia and flu vaccines.  He had flu vaccine in October 24, 2022.  Handicap placard signature provided today.    Follow Up   Return in about 4 weeks (around 5/24/2023).  Patient was given instructions and counseling regarding his condition or for health maintenance advice. Please see specific information pulled into the AVS if appropriate.       Electronically signed by Roshan Hood MD, 4/26/2023, 09:21 EDT.

## 2023-05-08 NOTE — PAT
Pt called concerning bronch. Reviewed instructions with pt. Bring picture ID and insurance card and have  due to anesthesia. Pt reports arrival time of 0830. Instructed to come to [facility]. Answered questions

## 2023-05-09 ENCOUNTER — ANESTHESIA (OUTPATIENT)
Dept: GASTROENTEROLOGY | Facility: HOSPITAL | Age: 78
End: 2023-05-09
Payer: MEDICARE

## 2023-05-09 ENCOUNTER — HOSPITAL ENCOUNTER (OUTPATIENT)
Facility: HOSPITAL | Age: 78
Setting detail: HOSPITAL OUTPATIENT SURGERY
Discharge: HOME | End: 2023-05-09
Attending: INTERNAL MEDICINE | Admitting: INTERNAL MEDICINE
Payer: MEDICARE

## 2023-05-09 ENCOUNTER — ANESTHESIA EVENT (OUTPATIENT)
Dept: GASTROENTEROLOGY | Facility: HOSPITAL | Age: 78
End: 2023-05-09
Payer: MEDICARE

## 2023-05-09 VITALS
TEMPERATURE: 97.5 F | SYSTOLIC BLOOD PRESSURE: 112 MMHG | WEIGHT: 190.48 LBS | BODY MASS INDEX: 26.57 KG/M2 | DIASTOLIC BLOOD PRESSURE: 71 MMHG | OXYGEN SATURATION: 93 % | HEART RATE: 80 BPM | RESPIRATION RATE: 12 BRPM

## 2023-05-09 DIAGNOSIS — J44.1 COPD WITH ACUTE EXACERBATION: ICD-10-CM

## 2023-05-09 DIAGNOSIS — J45.40 MODERATE PERSISTENT ASTHMA, UNSPECIFIED WHETHER COMPLICATED: ICD-10-CM

## 2023-05-09 DIAGNOSIS — C34.91 ADENOCARCINOMA OF BRONCHUS, RIGHT: ICD-10-CM

## 2023-05-09 DIAGNOSIS — J43.9 PULMONARY EMPHYSEMA, UNSPECIFIED EMPHYSEMA TYPE: ICD-10-CM

## 2023-05-09 LAB
ACB CMPLX DNA BAL NAA+NON-PRB-NCNCRNG: NOT DETECTED
BLACTX-M ISLT/SPM QL: ABNORMAL
BLAIMP ISLT/SPM QL: ABNORMAL
BLAKPC ISLT/SPM QL: ABNORMAL
BLAOXA-48-LIKE ISLT/SPM QL: ABNORMAL
BLAVIM ISLT/SPM QL: ABNORMAL
C PNEUM DNA NPH QL NAA+NON-PROBE: NOT DETECTED
CILIATED BAL QL: 30 %
E CLOAC COMP DNA BAL NAA+NON-PRB-NCNCRNG: NOT DETECTED
E COLI DNA BAL NAA+NON-PRB-NCNCRNG: NOT DETECTED
EOSINOPHIL NFR FLD MANUAL: 1 %
FLUAV SUBTYP SPEC NAA+PROBE: NOT DETECTED
FLUBV RNA ISLT QL NAA+PROBE: NOT DETECTED
GP B STREP DNA BAL NAA+NON-PRB-NCNCRNG: NOT DETECTED
HADV DNA SPEC NAA+PROBE: NOT DETECTED
HAEM INFLU DNA BAL NAA+NON-PRB-NCNCRNG: DETECTED
HCOV RNA LOWER RESP QL NAA+NON-PROBE: NOT DETECTED
HMPV RNA NPH QL NAA+NON-PROBE: NOT DETECTED
HPIV RNA LOWER RESP QL NAA+NON-PROBE: NOT DETECTED
K AEROGENES DNA BAL NAA+NON-PRB-NCNCRNG: NOT DETECTED
K OXYTOCA DNA BAL NAA+NON-PRB-NCNCRNG: NOT DETECTED
K PNEU GRP DNA BAL NAA+NON-PRB-NCNCRNG: NOT DETECTED
L PNEUMO DNA LOWER RESP QL NAA+NON-PROBE: NOT DETECTED
LYMPHOCYTES NFR FLD MANUAL: 16 %
M CATARRHALIS DNA BAL NAA+NON-PRB-NCNCRNG: NOT DETECTED
M PNEUMO IGG SER IA-ACNC: NOT DETECTED
MACROPHAGE FLUID: 8 %
MECA+MECC ISLT/SPM QL: ABNORMAL
NDM GENE: ABNORMAL
NEUTROPHILS NFR FLD MANUAL: 45 %
P AERUGINOSA DNA BAL NAA+NON-PRB-NCNCRNG: NOT DETECTED
PROTEUS SP DNA BAL NAA+NON-PRB-NCNCRNG: NOT DETECTED
RHINOVIRUS RNA SPEC NAA+PROBE: NOT DETECTED
RSV RNA NPH QL NAA+NON-PROBE: NOT DETECTED
S AUREUS DNA BAL NAA+NON-PRB-NCNCRNG: NOT DETECTED
S MARCESCENS DNA BAL NAA+NON-PRB-NCNCRNG: NOT DETECTED
S PNEUM DNA BAL NAA+NON-PRB-NCNCRNG: NOT DETECTED
S PYO DNA BAL NAA+NON-PRB-NCNCRNG: NOT DETECTED
VISUAL PRESENCE OF BLOOD: NORMAL

## 2023-05-09 PROCEDURE — 25010000002 PROPOFOL 10 MG/ML EMULSION: Performed by: NURSE ANESTHETIST, CERTIFIED REGISTERED

## 2023-05-09 PROCEDURE — 87205 SMEAR GRAM STAIN: CPT | Performed by: INTERNAL MEDICINE

## 2023-05-09 PROCEDURE — 87116 MYCOBACTERIA CULTURE: CPT | Performed by: INTERNAL MEDICINE

## 2023-05-09 PROCEDURE — 87071 CULTURE AEROBIC QUANT OTHER: CPT | Performed by: INTERNAL MEDICINE

## 2023-05-09 PROCEDURE — 87070 CULTURE OTHR SPECIMN AEROBIC: CPT | Performed by: INTERNAL MEDICINE

## 2023-05-09 PROCEDURE — 88312 SPECIAL STAINS GROUP 1: CPT | Performed by: INTERNAL MEDICINE

## 2023-05-09 PROCEDURE — 87633 RESP VIRUS 12-25 TARGETS: CPT | Performed by: INTERNAL MEDICINE

## 2023-05-09 PROCEDURE — 88108 CYTOPATH CONCENTRATE TECH: CPT | Performed by: INTERNAL MEDICINE

## 2023-05-09 PROCEDURE — 87185 SC STD ENZYME DETCJ PER NZM: CPT | Performed by: INTERNAL MEDICINE

## 2023-05-09 PROCEDURE — 89051 BODY FLUID CELL COUNT: CPT | Performed by: INTERNAL MEDICINE

## 2023-05-09 PROCEDURE — 87077 CULTURE AEROBIC IDENTIFY: CPT | Performed by: INTERNAL MEDICINE

## 2023-05-09 PROCEDURE — 87102 FUNGUS ISOLATION CULTURE: CPT | Performed by: INTERNAL MEDICINE

## 2023-05-09 PROCEDURE — 87206 SMEAR FLUORESCENT/ACID STAI: CPT | Performed by: INTERNAL MEDICINE

## 2023-05-09 PROCEDURE — 31624 DX BRONCHOSCOPE/LAVAGE: CPT | Performed by: INTERNAL MEDICINE

## 2023-05-09 PROCEDURE — 87107 FUNGI IDENTIFICATION MOLD: CPT | Performed by: INTERNAL MEDICINE

## 2023-05-09 PROCEDURE — 31645 BRNCHSC W/THER ASPIR 1ST: CPT | Performed by: INTERNAL MEDICINE

## 2023-05-09 RX ORDER — PHENYLEPHRINE HCL IN 0.9% NACL 1 MG/10 ML
SYRINGE (ML) INTRAVENOUS AS NEEDED
Status: DISCONTINUED | OUTPATIENT
Start: 2023-05-09 | End: 2023-05-09 | Stop reason: SURG

## 2023-05-09 RX ORDER — SODIUM CHLORIDE 0.9 % (FLUSH) 0.9 %
20 SYRINGE (ML) INJECTION AS NEEDED
Status: DISCONTINUED | OUTPATIENT
Start: 2023-05-09 | End: 2023-05-09 | Stop reason: HOSPADM

## 2023-05-09 RX ORDER — LIDOCAINE HYDROCHLORIDE 20 MG/ML
INJECTION, SOLUTION EPIDURAL; INFILTRATION; INTRACAUDAL; PERINEURAL AS NEEDED
Status: DISCONTINUED | OUTPATIENT
Start: 2023-05-09 | End: 2023-05-09 | Stop reason: SURG

## 2023-05-09 RX ORDER — SODIUM CHLORIDE 0.9 % (FLUSH) 0.9 %
10 SYRINGE (ML) INJECTION EVERY 12 HOURS SCHEDULED
Status: DISCONTINUED | OUTPATIENT
Start: 2023-05-09 | End: 2023-05-09 | Stop reason: HOSPADM

## 2023-05-09 RX ORDER — MAGNESIUM HYDROXIDE 1200 MG/15ML
LIQUID ORAL AS NEEDED
Status: DISCONTINUED | OUTPATIENT
Start: 2023-05-09 | End: 2023-05-09 | Stop reason: HOSPADM

## 2023-05-09 RX ORDER — HEPARIN SODIUM (PORCINE) LOCK FLUSH IV SOLN 100 UNIT/ML 100 UNIT/ML
5 SOLUTION INTRAVENOUS AS NEEDED
Status: DISCONTINUED | OUTPATIENT
Start: 2023-05-09 | End: 2023-05-09 | Stop reason: HOSPADM

## 2023-05-09 RX ORDER — SODIUM CHLORIDE 0.9 % (FLUSH) 0.9 %
10 SYRINGE (ML) INJECTION AS NEEDED
Status: DISCONTINUED | OUTPATIENT
Start: 2023-05-09 | End: 2023-05-09 | Stop reason: HOSPADM

## 2023-05-09 RX ORDER — SODIUM CHLORIDE, SODIUM LACTATE, POTASSIUM CHLORIDE, CALCIUM CHLORIDE 600; 310; 30; 20 MG/100ML; MG/100ML; MG/100ML; MG/100ML
30 INJECTION, SOLUTION INTRAVENOUS CONTINUOUS
Status: DISCONTINUED | OUTPATIENT
Start: 2023-05-09 | End: 2023-05-09 | Stop reason: HOSPADM

## 2023-05-09 RX ORDER — SODIUM CHLORIDE 9 MG/ML
40 INJECTION, SOLUTION INTRAVENOUS AS NEEDED
Status: DISCONTINUED | OUTPATIENT
Start: 2023-05-09 | End: 2023-05-09 | Stop reason: HOSPADM

## 2023-05-09 RX ORDER — PROPOFOL 10 MG/ML
VIAL (ML) INTRAVENOUS AS NEEDED
Status: DISCONTINUED | OUTPATIENT
Start: 2023-05-09 | End: 2023-05-09 | Stop reason: SURG

## 2023-05-09 RX ORDER — LIDOCAINE HYDROCHLORIDE 40 MG/ML
INJECTION, SOLUTION RETROBULBAR; TOPICAL AS NEEDED
Status: DISCONTINUED | OUTPATIENT
Start: 2023-05-09 | End: 2023-05-09 | Stop reason: HOSPADM

## 2023-05-09 RX ADMIN — Medication 200 MCG: at 10:07

## 2023-05-09 RX ADMIN — LIDOCAINE HYDROCHLORIDE 80 MG: 20 INJECTION, SOLUTION EPIDURAL; INFILTRATION; INTRACAUDAL; PERINEURAL at 09:46

## 2023-05-09 RX ADMIN — Medication 200 MCG: at 10:02

## 2023-05-09 RX ADMIN — Medication 200 MCG: at 09:51

## 2023-05-09 RX ADMIN — SODIUM CHLORIDE, POTASSIUM CHLORIDE, SODIUM LACTATE AND CALCIUM CHLORIDE 30 ML/HR: 600; 310; 30; 20 INJECTION, SOLUTION INTRAVENOUS at 09:29

## 2023-05-09 RX ADMIN — PROPOFOL 250 MCG/KG/MIN: 10 INJECTION, EMULSION INTRAVENOUS at 09:45

## 2023-05-09 RX ADMIN — PROPOFOL 70 MG: 10 INJECTION, EMULSION INTRAVENOUS at 09:45

## 2023-05-09 NOTE — ANESTHESIA PREPROCEDURE EVALUATION
Anesthesia Evaluation     Patient summary reviewed and Nursing notes reviewed   no history of anesthetic complications:  NPO Solid Status: > 8 hours  NPO Liquid Status: > 2 hours           Airway   Mallampati: III  TM distance: >3 FB  Neck ROM: full  No difficulty expected  Dental    (+) edentulous, upper dentures and lower dentures    Pulmonary    (+) a smoker Former, lung cancer (radiation and chemotherapy), COPD moderate, asthma,shortness of breath, rhonchi,   Cardiovascular - normal exam  Exercise tolerance: good (4-7 METS)    Patient on routine beta blocker and Beta blocker given within 24 hours of surgery  Rhythm: regular  Rate: normal    (+) hypertension 2 medications or greater, SILVERIO, hyperlipidemia,     ROS comment:  EF = 55%  •  Left ventricular diastolic function was normal.      Neuro/Psych  (+) numbness,    GI/Hepatic/Renal/Endo    (+)   renal disease, diabetes mellitus type 2,     Musculoskeletal     Abdominal    Substance History - negative use     OB/GYN negative ob/gyn ROS         Other   arthritis,    history of cancer (prostate in remission, and lung cancer) remission    ROS/Med Hx Other: PAT Nursing Notes unavailable.                   Anesthesia Plan    ASA 4     general     (Patient understands anesthesia not responsible for dental damage.)  intravenous induction     Anesthetic plan, risks, benefits, and alternatives have been provided, discussed and informed consent has been obtained with: patient.    Use of blood products discussed with patient  Consented to blood products.   Plan discussed with CRNA.        CODE STATUS:

## 2023-05-09 NOTE — OP NOTE
Bronchoscopy Procedure Note    Procedure:  Bronchoscopy with mucous plug removal  Bronchoscopy with bronchoalveolar lavage left lower lobe  Bronchoscopy with bronchial washings tracheobronchial tree  Airway inspection    Pre-Operative Diagnosis: Persistent bronchitis, recurrent COPD exacerbation  Post-Operative Diagnosis: Persistent bronchitis, recurrent COPD exacerbation, mucous plugging, difficulty with airway clearance    Indication:  Persistent bronchitis, recurrent COPD exacerbation    Anesthesia: MAC anesthesia    Procedure Details: Patient was consented for the procedure with all risks and benefits of the procedure explained in detail. Patient was given the opportunity to ask questions and all concerns were answered.    The bronchoscope was inserted into the main airway via the mouth. An anatomical survey was done of the main airways and the subsegmental bronchus. The findings are reported below.  Bilateral mucous plugging was seen, more so in left lower lobe, suctioned out in several attempts. A bronchoalveolar lavage was performed using 60 mL aliquots of normal saline x2 instilled into the airways then aspirated back from left lower lobe of lung with 35 mL serosanguineous fluid in return.  Bronchial washing was obtained from entire tracheobronchial tree.      Findings:  Bilateral mucous plugging, more so in the left lower lobe  Friable mucosa, easy bleeding with suction  Scattered purulent secretions    Estimated Blood Loss: Insignificant    Specimens:  BAL left lower lobe  Bronchial washings tracheobronchial tree    Complications: None; patient tolerated the procedure well.    Disposition: To home, once stable in recovery    Patient tolerated the procedure well.      Electronically signed by Roshan Hood MD, 5/9/2023, 10:03 EDT.

## 2023-05-09 NOTE — ANESTHESIA POSTPROCEDURE EVALUATION
Patient: Daron Whitfield    Procedure Summary     Date: 05/09/23 Room / Location: [facility] ENDOSCOPY 3 / [facility] ENDOSCOPY    Anesthesia Start: 0946 Anesthesia Stop: 1016    Procedure: BRONCHOSCOPY WITH BRONCHOALVEOLAR LAVAGE,  BRONCHIAL WASHINGS (Bronchus) Diagnosis:       COPD with acute exacerbation      Moderate persistent asthma, unspecified whether complicated      Pulmonary emphysema, unspecified emphysema type      Adenocarcinoma of bronchus, right      (COPD with acute exacerbation [J44.1])      (Moderate persistent asthma, unspecified whether complicated [J45.40])      (Pulmonary emphysema, unspecified emphysema type [J43.9])      (Adenocarcinoma of bronchus, right [C34.91])    Surgeons: Roshan Hood MD Provider: Reyes, Mirabelle, DO    Anesthesia Type: general ASA Status: 4          Anesthesia Type: general    Vitals  Vitals Value Taken Time   /64 05/09/23 1039   Temp 36.4 °C (97.5 °F) 05/09/23 1034   Pulse 78 05/09/23 1040   Resp 12 05/09/23 1034   SpO2 92 % 05/09/23 1040   Vitals shown include unvalidated device data.        Post Anesthesia Care and Evaluation    Patient location during evaluation: bedside  Patient participation: complete - patient participated  Level of consciousness: awake  Pain management: adequate    Airway patency: patent  PONV Status: none  Cardiovascular status: acceptable and stable  Respiratory status: acceptable  Hydration status: acceptable    Comments: An Anesthesiologist personally participated in the most demanding procedures (including induction and emergence if applicable) in the anesthesia plan, monitored the course of anesthesia administration at frequent intervals and remained physically present and available for immediate diagnosis and treatment of emergencies.

## 2023-05-11 LAB
BACTERIA SPEC AEROBE CULT: ABNORMAL
BACTERIA SPEC AEROBE CULT: ABNORMAL
BACTERIA SPEC RESP CULT: ABNORMAL
BACTERIA SPEC RESP CULT: ABNORMAL
GRAM STN SPEC: ABNORMAL

## 2023-05-12 LAB
CYTO UR: NORMAL
LAB AP CASE REPORT: NORMAL
LAB AP CLINICAL INFORMATION: NORMAL
LAB AP SPECIAL STAINS: NORMAL
PATH REPORT.FINAL DX SPEC: NORMAL
PATH REPORT.GROSS SPEC: NORMAL

## 2023-05-12 RX ORDER — AMOXICILLIN AND CLAVULANATE POTASSIUM 875; 125 MG/1; MG/1
1 TABLET, FILM COATED ORAL 2 TIMES DAILY
Qty: 28 TABLET | Refills: 0 | Status: SHIPPED | OUTPATIENT
Start: 2023-05-12

## 2023-05-14 LAB
FUNGUS WND CULT: NORMAL
FUNGUS WND CULT: NORMAL
MYCOBACTERIUM SPEC CULT: NORMAL
MYCOBACTERIUM SPEC CULT: NORMAL
NIGHT BLUE STAIN TISS: NORMAL

## 2023-05-16 LAB
FUNGUS WND CULT: ABNORMAL
FUNGUS WND CULT: NORMAL
MYCOBACTERIUM SPEC CULT: NORMAL
MYCOBACTERIUM SPEC CULT: NORMAL
NIGHT BLUE STAIN TISS: NORMAL

## 2023-05-21 NOTE — PROGRESS NOTES
Primary Care Provider  Provider, No Known   Referring Provider  No ref. provider found    Patient Complaint  Results, COPD, Shortness of Breath, Asthma, and Emphysema      SUBJECTIVE    History of Presenting Illness  Daron Whitfield is a pleasant 78 y.o. male who presents to [facility] for follow-up post bronchoscopy.  Patient underwent bronchoscopy by Dr. Hood on 5/9/2023 for persistent pneumonia, adenocarcinoma of bronchus.  Findings from bronchoscopy include bilateral mucous plugging more so in the left lower lobe.  Friable mucosa, easy bleeding with suction.  Scattered purulent secretions.  Results so far with positive haemophilus influenza for which patient was sent in Augmentin for 14 days by Dr. Hood.  Cytology negative, AFB no results preliminary result.  Fungal culture preliminary with positive organism sent to lab for ID.  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  He continues to be under the care of of oncology and radiation/oncology, status post chemo and radiation therapy.  Patient also has a diagnosis of prostate cancer and is under the care of Dr. Ames.    Denies dyspnea, coughing, wheezing, headaches, chest pain, weight loss or hemoptysis. Denies fevers, chills and night sweats. Daron Whitfield is able to perform ADLs without difficulties and denies any swollen glands/lymph nodes in the head or neck.    I have personally reviewed the review of systems, past family, social, medical and surgical histories; and agree with their findings.    Review of Systems  Constitutional symptoms:  Denied complaints   Ear, nose, throat: Denied complaints  Cardiovascular:  Denied complaints  Respiratory: Denied complaints  Gastrointestinal: Denied complaints  Musculoskeletal: Denied complaints    Family History   Problem Relation Age of Onset   • Diabetes Mother    • Cancer Sister    • Diabetes Sister    •  Colon cancer Sister 60   • Hypertension Sister    • Diabetes Sister    • Coronary artery disease Brother         Social History     Socioeconomic History   • Marital status:    Tobacco Use   • Smoking status: Former     Packs/day: 1.00     Years: 50.00     Pack years: 50.00     Types: Cigarettes     Start date:      Quit date:      Years since quittin.3     Passive exposure: Past   • Smokeless tobacco: Never   • Tobacco comments:     STARTED AGE 17  QUIT IN 2016   Vaping Use   • Vaping Use: Never used   • Passive vaping exposure: Yes   Substance and Sexual Activity   • Alcohol use: Not Currently     Alcohol/week: 1.0 standard drink     Types: 1 Cans of beer per week     Comment: casual drinker   • Drug use: Never   • Sexual activity: Defer        Past Medical History:   Diagnosis Date   • Bladder outflow obstruction    • Cervical spinal stenosis 2018   • Colon polyp    • COVID    • Emphysema lung    • Emphysema of lung    • Hand paresthesia 2018   • HTN (hypertension)    • Hypercholesteremia    • Lung cancer    • Muscle atrophy of upper extremity 2018   • Numbness and tingling in both hands    • OAB (overactive bladder) 2014   • Prostate cancer    • Ulnar neuropathy 2018        Immunization History   Administered Date(s) Administered   • COVID-19 (MODERNA) 1st,2nd,3rd Dose Monovalent 2021, 2021   • COVID-19 (PFIZER) BIVALENT 12+YRS 2022   • COVID-19 (PFIZER) Purple Cap Monovalent 2021   • Fluad Quad 65+ 10/24/2022   • Fluzone High Dose =>65 Years ([employer] ONLY) 2019   • Fluzone High-Dose 65+yrs 2021   • Pneumococcal Polysaccharide (PPSV23) 10/24/2022       No Known Allergies       Current Outpatient Medications:   •  albuterol sulfate  (90 Base) MCG/ACT inhaler, Inhale 2 puffs Every 4 (Four) Hours As Needed for Wheezing., Disp: 18 g, Rfl: 5  •  amoxicillin-clavulanate (Augmentin) 875-125 MG per tablet, Take 1 tablet  "by mouth 2 (Two) Times a Day., Disp: 28 tablet, Rfl: 0  •  atorvastatin (LIPITOR) 40 MG tablet, Lipitor 40 mg oral tablet take 1 tablet (40 mg) by oral route once daily   Suspended, Disp: , Rfl:   •  bicalutamide (CASODEX) 50 MG chemo tablet, , Disp: , Rfl:   •  FeroSul 325 (65 Fe) MG tablet, , Disp: , Rfl:   •  Fluticasone-Umeclidin-Vilant 200-62.5-25 MCG/ACT aerosol powder , Inhale 1 puff Daily., Disp: 1 each, Rfl: 7  •  Gemtesa 75 MG tablet, , Disp: , Rfl:   •  ipratropium-albuterol (DUO-NEB) 0.5-2.5 mg/3 ml nebulizer, Take 3 mL by nebulization Every 4 (Four) Hours As Needed for Wheezing., Disp: 360 mL, Rfl: 4  •  leuprolide (Lupron Depot, 3-Month,) 22.5 MG injection, 22.5 mg., Disp: , Rfl:   •  lisinopril (PRINIVIL,ZESTRIL) 5 MG tablet, Daily., Disp: , Rfl:   •  metFORMIN (GLUCOPHAGE) 500 MG tablet, , Disp: , Rfl:   •  metoprolol succinate XL (TOPROL-XL) 25 MG 24 hr tablet, Toprol XL 25 mg oral tablet extended release 24 hr take 1 tablet (25 mg) by oral route once daily   Active, Disp: , Rfl:   •  tamsulosin (FLOMAX) 0.4 MG capsule 24 hr capsule, Take 1 capsule by mouth Daily for 360 days., Disp: 90 capsule, Rfl: 3  •  folic acid (FOLVITE) 1 MG tablet, folic acid 1 mg oral tablet take 1 tablet (1 mg) by oral route once daily   Active (Patient not taking: Reported on 5/23/2023), Disp: , Rfl:            Vital Signs   BP 99/55 (BP Location: Left arm, Patient Position: Sitting, Cuff Size: Adult)   Pulse 89   Temp 98 °F (36.7 °C) (Tympanic)   Resp 18   Ht 180.3 cm (71\") Comment: PER PT  Wt 86.4 kg (190 lb 6.4 oz)   SpO2 92% Comment: ROOM AIR  BMI 26.56 kg/m²       OBJECTIVE    Physical Exam  Vital Signs Reviewed   WDWN, Alert, NAD.    HEENT:  PERRL, EOMI.  OP, nares clear  Neck:  Supple, no JVD, no thyromegaly  Chest:  good aeration, clear to auscultation bilaterally, tympanic to percussion bilaterally, no work of breathing noted  CV: RRR, no MGR, pulses 2+, equal.  Abd:  Soft, NT, ND, + BS, no HSM  EXT:  no " clubbing, no cyanosis, no edema  Neuro:  A&Ox3, CN grossly intact, no focal deficits.  Skin: No rashes or lesions noted    Results Review  I have personally reviewed the prior office notes, hospital records, labs, and diagnostics.  Non-gynecologic Cytology: [id number]  Order: [id number]   Status: Final result      Visible to patient: Yes (seen)      Next appt: 05/23/2023 at 08:45 AM in Pulmonology ([facility])      Dx: COPD with acute exacerbation; Adenoca...     Specimen Information: A: Lung, Left Lower Lobe; Lavage    B: Bronchus; Lavage    0 Result Notes      Component    Case Report   Medical Cytology Report                           Case: [id number]                                   Authorizing Provider:  Roshan Hood MD         Collected:           05/09/2023 09:58 AM           Ordering Location:     [facility] Received:            05/10/2023 09:11 AM                                  SUITES                                                                        Pathologist:           Tristen Garcia MD                                                             Specimens:   1) - Lung, Left Lower Lobe, LEFT LOWER LOBE BAL                                                      2) - Bronchus, BRONCHIAL WASHINGS                                                          Final Diagnosis   1. Lung, left lower lobe, BAL:               - Negative for malignant cells               - No viral inclusions               - Rare fungal organisms on GMS special stain, morphologically compatible with Candida species, favor oral contaminant        2. Bronchial washings:               - Negative for malignant cells               - No viral inclusions               - Negative for fungal organisms on GMS special stain         Electronically signed by Tristen Garcia MD on 5/12/2023 at 1247   Clinical Information    Pre-Operative Diagnosis: Persistent bronchitis, recurrent COPD exacerbation.  Post-Operative  Diagnosis: Persistent bronchitis, recurrent COPD exacerbation, mucus plugging, difficulty with airway clearance.     • Check for pneumocystis and fungus.   Gross Description    1. Lung, Left Lower Lobe.  BAL, left lower lobe                13 cc blood-tinged, hazy fluid received (1 cytospin prep and GMS-P&F stain prepared).      2. Bronchus.   Bronchial Washings                64 cc blood-tinged, hazy fluid received (1 cytospin prep and GMS-P&F stain prepared).    Microscopic Description    Comment:    Microscopic examination performed.      Special Stains    Special stain for GMS were performed on parts 1 and 2 to aid in the detection of fungal forms.  Controls are appropriate.      Resulting Agency [facility]              Specimen Collected: 05/09/23 09:58 EDT Last Resulted: 05/12/23 12:47 EDT           Fungus Culture - Lavage, Lung, Left Lower Lobe  Order: [id number]   Status: Preliminary result      Visible to patient: No (not released)      Next appt: 05/23/2023 at 08:45 AM in Pulmonology (Susanne Dobbins, [facility])      Dx: COPD with acute exacerbation; Adenoca...     Specimen Information: Lung, Left Lower Lobe; Lavage    0 Result Notes  Fungus Culture No fungus isolated at 1 week            Resulting Agency: [facility]           Specimen Collected: 05/09/23 09:58 EDT Last Resulted: 05/16/23 10:45 EDT           AFB Culture - Lavage, Lung, Left Lower Lobe  Order: [id number]   Status: Preliminary result      Visible to patient: No (not released)      Next appt: 05/23/2023 at 08:45 AM in Pulmonology (Susanne Dobbins, [facility])      Dx: COPD with acute exacerbation; Adenoca...     Specimen Information: Lung, Left Lower Lobe; Lavage    0 Result Notes  AFB Culture No AFB isolated at 1 week                AFB Stain  No acid fast bacilli seen on direct smear      No acid fast bacilli seen on concentrated smear              Resulting Agency: [facility]           Specimen Collected: 05/09/23 09:58 EDT Last Resulted: 05/16/23 10:45 EDT            BAL Culture, Quantitative - Lavage, Lung, Left Lower Lobe  Order: [id number]   Status: Final result      Visible to patient: Yes (not seen)      Next appt: 05/23/2023 at 08:45 AM in Pulmonology (Susanne Dobbins, [facility])      Dx: COPD with acute exacerbation; Adenoca...     Specimen Information: Lung, Left Lower Lobe; Lavage    1 Result Note  BAL Culture   Lab   >100,000 CFU/mL Haemophilus influenzae Abnormal   [facility]   This isolate is beta-lactamase NEGATIVE and are routinely susceptible to ampicillin and other beta-lactams.    >100,000 CFU/mL Normal Respiratory Emperatriz  [facility]               Gram Stain   Lab   Moderate (3+) Fungal elements [facility]      Few (2+) Gram positive cocci in pairs, chains and clusters [facility]      Few (2+) Gram negative bacilli [facility]                    Specimen Collected: 05/09/23 09:58 EDT Last Resulted: 05/11/23 13:59 EDT           Pneumonia Panel - Lavage, Lung, Left Lower Lobe  Order: [id number]   Status: Final result      Visible to patient: Yes (seen)      Next appt: 05/23/2023 at 08:45 AM in Pulmonology (Susanne Dobbins, [facility])      Dx: COPD with acute exacerbation; Adenoca...     Specimen Information: Lung, Left Lower Lobe; Lavage    1 Result Note      Component  Ref Range & Units    Escherichia coli PCR  Not Detected Not Detected    Acinetobacter calcoaceticus-baumannii complex PCR  Not Detected Not Detected    Enterobacter cloacae PCR  Not Detected Not Detected    Klebsiella oxytoca PCR  Not Detected Not Detected    Klebsiella pneumoniae group PCR  Not Detected Not Detected    Klebsiella aerogenes PCR  Not Detected Not Detected    Moraxella catarrhalis PCR  Not Detected Not Detected    Proteus species PCR  Not Detected Not Detected    Pseudomonas aeroginosa PCR  Not Detected Not Detected    Serratia marcescens PCR  Not Detected Not Detected    Staphylococcus aureus PCR  Not Detected Not Detected    Streptococcus pyogenes PCR  Not Detected Not Detected     Haemophilus influenzae PCR  Not Detected Detected Abnormal     Comment: >=10^7 Bin copies/mL   Streptococcus agalactiae PCR  Not Detected Not Detected    Streptococcus pneumoniae PCR  Not Detected Not Detected    Chlamydophila pneumoniae PCR  Not Detected Not Detected    Legionella pneumophilia PCR  Not Detected Not Detected    Mycoplasma pneumo by PCR  Not Detected Not Detected    ADENOVIRUS, PCR  Not Detected Not Detected    CTX-M Gene  Not Detected, N/A N/A    IMP Gene  Not Detected, N/A N/A    KPC Gene  Not Detected, N/A N/A    mecA/C and MREJ Gene  Not Detected, N/A N/A    NDM Gene  Not Detected, N/A N/A    OXA-48-like Gene  Not Detected, N/A N/A    VIM Gene  Not Detected, N/A N/A    Coronavirus  Not Detected Not Detected    Human Metapneumovirus  Not Detected Not Detected    Human Rhinovirus/Enterovirus  Not Detected Not Detected    Influenza A PCR  Not Detected Not Detected    Influenza B PCR  Not Detected Not Detected    RSV, PCR  Not Detected Not Detected    Parainfluenza virus PCR  Not Detected Not Detected    Resulting Agency [facility]              Specimen Collected: 05/09/23 09:58 EDT Last Resulted: 05/09/23 12:25 EDT            Bronch Wash/BAL Differential - Lavage, Lung, Left Lower Lobe  Order: [id number]   Status: Final result      Visible to patient: Yes (seen)      Next appt: 05/23/2023 at 08:45 AM in Pulmonology ([facility])      Dx: COPD with acute exacerbation; Adenoca...     Specimen Information: Lung, Left Lower Lobe; Lavage    1 Result Note      Component  Ref Range & Units 13 d ago   Visual Presence of Blood Absent    Lymphocytes, Fluid  % 16    Neutrophils, Fluid  % 45    Eosinophils, Fluid  % 1    Macrophage, Fluid  % 8    Bronchial Lining Cells  % 30    Resulting Agency [facility]           Narrative  Performed by: [facility]  Normal Adults (Nonsmokers)     Alveolar Macrophages       >85%   Lymphocytes              10-15%   Neutrophils              <or=3%   Eosinophils               <or=1%   Bronchial Lining Cells   <or=5%       Clinical Interpretations for BAL     Eosinophils >or=25%       Eosinophilic Pneumoniae   Lymphocytes >or=50%       Consider Drug Rxn,Acute HP   Bloody lavage             Diffuse Alveolar Hemorrhage   Other Findings            Specific Dx or Non-diagnostic      Specimen Collected: 05/09/23 09:58 EDT Last Resulted: 05/09/23 11:35 EDT           Contains abnormal data Fungus Culture - Wash, Bronchus  Order: [id number]   Status: Preliminary result      Visible to patient: No (not released)      Next appt: 05/23/2023 at 08:45 AM in Pulmonology ([facility], [facility])      Dx: COPD with acute exacerbation; Adenoca...     Specimen Information: Bronchus; Wash    0 Result Notes  Fungus Culture Fungus (Organism type) Abnormal           Sent to reference lab for Id        Resulting Agency: [facility]           Specimen Collected: 05/09/23 10:00 EDT Last Resulted: 05/16/23 09:03 EDT           AFB Culture - Wash, Bronchus  Order: [id number]   Status: Preliminary result      Visible to patient: No (not released)      Next appt: 05/23/2023 at 08:45 AM in Pulmonology ([facility], [facility])      Dx: COPD with acute exacerbation; Adenoca...     Specimen Information: Bronchus; Wash    0 Result Notes  AFB Culture No AFB isolated at 1 week                AFB Stain  No acid fast bacilli seen on direct smear      No acid fast bacilli seen on concentrated smear              Resulting Agency: [facility]           Specimen Collected: 05/09/23 10:00 EDT Last Resulted: 05/16/23 10:45 EDT           Contains abnormal data Respiratory Culture - Wash, Bronchus  Order: [id number]   Status: Final result      Visible to patient: Yes (seen)      Next appt: 05/23/2023 at 08:45 AM in Pulmonology ([facility], APRN)      Dx: COPD with acute exacerbation; Adenoca...     Specimen Information: Bronchus; Wash    1 Result Note  Respiratory Culture   Lab   Moderate growth (3+) Haemophilus influenzae Abnormal   [facility]  [facility]   This isolate is beta-lactamase NEGATIVE and are routinely susceptible to ampicillin and other beta-lactams.    Moderate growth (3+) Normal Respiratory Emperatriz   [facility]               Gram Stain   Lab   Moderate (3+) Gram negative bacilli, tiny  [facility]      Moderate (3+) Fungal elements [facility]      Few (2+) Gram positive cocci in pairs, chains and clusters [facility]                    Specimen Collected: 05/09/23 10:00 EDT Last Resulted: 05/11/23 14:01 EDT           Results  Full Pulmonary Function Test With Bronchodilator (Order [id number])  Order-Level Documents:    Scan on 12/28/2022 by Susanne Dobbins APRN: PULMONARY FUNCTION TEST, [facility], 12/28/2022         Author: -- Service: -- Author Type: --   Filed:  Date of Service:  Creation Time:    Status: (Other)   Pulmonary function test     Spirometry:  Moderately severe obstructive lung defect noted.  Postobstructive FEV1 is 1.62 L / 58% predicted.  Significant as both FEV1 and FVC improved by greater than 200 cc and 12% predicted bronchodilator response was noted.  Flow volume loop shows obstruction.     Lung volumes:  No evidence of restriction.  Borderline hyperinflation and definite air-trapping present     Diffusion capacity:  Diffuse capacity severely reduced at 21% predicted     Overall impression:  Moderately severe obstructive lung defect with significant bronchodilator response present.  Borderline hyperinflation with definite air-trapping present.  Diffusion capacity severely reduced.  Compared to testing done in June 2020 the FEV1 significantly dropped from 1.91 L to 1.36 L, a 29% drop, FVC dropped from 3.53 L to 2.81 L / 21% drop in diffusing capacity significantly dropped by 28%.     Electronically signed by Tristen Miles MD, 12/29/22, 1:12 PM EST.               File Link    Scan on 1/24/2023 by [facility], [place]: 6 Minute walk test 1/24/23        Key Information    Document ID File Type Document Type Description   [id number] Image  PULMONARY - SCAN 6 Minute walk test 1/24/23     Import Information    Attached At Date Time User Dept   Encounter Level 1/24/2023  [facility] [place]      Encounter    Office Visit on 1/24/23 with Dakota Watson MD       ASSESSMENT         Patient Active Problem List   Diagnosis   • Adenocarcinoma of bronchus, right   • Bladder outflow obstruction   • Cancer of prostate   • Cervical spinal stenosis   • Colon polyp   • Diabetic nephropathy   • Hand paresthesia   • HTN (hypertension)   • Hypercholesterolemia   • Hypertonicity of bladder   • Atrophy of muscle of right hand   • Pulmonary emphysema   • Stage 3a chronic kidney disease   • Primary osteoarthritis of left shoulder   • Injury of left shoulder   • Neuropathy of right ulnar nerve at wrist   • Bilateral carpal tunnel syndrome   • Polyneuropathy   • Moderate persistent asthma   • Benign prostatic hyperplasia   • Diabetes mellitus   • History of vaccination   • COVID-19 virus infection   • COPD with acute exacerbation   • Moderate persistent asthma   • SOB (shortness of breath)       Encounter Diagnoses   Name Primary?   • Haemophilus influenzae infection Yes   • Adenocarcinoma of bronchus, right    • Mucus plugging of bronchi       PLAN  Reviewed with patient today his bronchoscopy pathology cytology results explained AFB and fungal are preliminary at this time.  Patient continues to be on Augmentin for haemophilus influenza positive on pneumonia panel.  Patient doing well overall.  Prescription for his albuterol inhaler provided to him to take to [place].  Patient to continue on Trelegy 200 albuterol inhaler and duo nebulizer.  Patient follow back up in 4 weeks to finalize results for AFB and fungal culture results.  Diagnoses and all orders for this visit:    1. Haemophilus influenzae infection (Primary)  -     albuterol sulfate  (90 Base) MCG/ACT inhaler; Inhale 2 puffs Every 4 (Four) Hours As Needed for Wheezing.  Dispense: 18 g; Refill: 5    2.  Adenocarcinoma of bronchus, right  -     albuterol sulfate  (90 Base) MCG/ACT inhaler; Inhale 2 puffs Every 4 (Four) Hours As Needed for Wheezing.  Dispense: 18 g; Refill: 5    3. Mucus plugging of bronchi  -     albuterol sulfate  (90 Base) MCG/ACT inhaler; Inhale 2 puffs Every 4 (Four) Hours As Needed for Wheezing.  Dispense: 18 g; Refill: 5           Smoking status: Former  Vaccination status: Up to date  Medications personally reviewed    Follow Up  Return in about 4 weeks (around 6/20/2023).    Patient was given instructions and counseling regarding his condition or for health maintenance advice. Please see specific information pulled into the AVS if appropriate.      I spent 30 minutes caring for Daron Whitfield on this date of service. This time includes time spent by me in the following activities:preparing for the visit, reviewing tests, obtaining and/or reviewing a separately obtained history, performing a medically appropriate examination and/or evaluation, counseling and educating the patient/family/caregiver, ordering medications, tests, or procedures, documenting information in the medical record, independently interpreting results and communicating that information with the patient/family/caregiver and answered questions family members, discuss medications.

## 2023-05-23 ENCOUNTER — OFFICE VISIT (OUTPATIENT)
Dept: PULMONOLOGY | Facility: CLINIC | Age: 78
End: 2023-05-23
Payer: MEDICARE

## 2023-05-23 VITALS
HEART RATE: 89 BPM | RESPIRATION RATE: 18 BRPM | WEIGHT: 190.4 LBS | SYSTOLIC BLOOD PRESSURE: 99 MMHG | BODY MASS INDEX: 26.65 KG/M2 | DIASTOLIC BLOOD PRESSURE: 55 MMHG | TEMPERATURE: 98 F | HEIGHT: 71 IN | OXYGEN SATURATION: 92 %

## 2023-05-23 DIAGNOSIS — C34.91 ADENOCARCINOMA OF BRONCHUS, RIGHT: ICD-10-CM

## 2023-05-23 DIAGNOSIS — T17.500A MUCUS PLUGGING OF BRONCHI: ICD-10-CM

## 2023-05-23 DIAGNOSIS — A49.2 HAEMOPHILUS INFLUENZAE INFECTION: Primary | ICD-10-CM

## 2023-05-23 LAB
FUNGUS WND CULT: NORMAL
MYCOBACTERIUM SPEC CULT: NORMAL
MYCOBACTERIUM SPEC CULT: NORMAL
NIGHT BLUE STAIN TISS: NORMAL

## 2023-05-23 RX ORDER — ALBUTEROL SULFATE 90 UG/1
2 AEROSOL, METERED RESPIRATORY (INHALATION) EVERY 4 HOURS PRN
Qty: 18 G | Refills: 5 | Status: SHIPPED | OUTPATIENT
Start: 2023-05-23

## 2023-05-26 LAB
FUNGUS ISLT: ABNORMAL
FUNGUS ISLT: ABNORMAL

## 2023-05-31 LAB — FUNGUS WND CULT: ABNORMAL

## 2023-06-13 LAB
MYCOBACTERIUM SPEC CULT: NORMAL
MYCOBACTERIUM SPEC CULT: NORMAL
NIGHT BLUE STAIN TISS: NORMAL

## 2023-06-20 LAB
MYCOBACTERIUM SPEC CULT: NORMAL
MYCOBACTERIUM SPEC CULT: NORMAL
NIGHT BLUE STAIN TISS: NORMAL

## 2023-07-11 PROBLEM — Z86.010 HISTORY OF COLON POLYPS: Status: ACTIVE | Noted: 2023-07-11

## 2023-07-11 PROBLEM — Z86.0100 HISTORY OF COLON POLYPS: Status: ACTIVE | Noted: 2023-07-11

## 2023-07-11 PROBLEM — Z12.11 ENCOUNTER FOR SCREENING FOR MALIGNANT NEOPLASM OF COLON: Status: ACTIVE | Noted: 2023-07-11

## 2023-09-01 DIAGNOSIS — N40.1 BPH WITH OBSTRUCTION/LOWER URINARY TRACT SYMPTOMS: ICD-10-CM

## 2023-09-01 DIAGNOSIS — C61 PROSTATE CANCER: Primary | ICD-10-CM

## 2023-09-01 DIAGNOSIS — N13.8 BPH WITH OBSTRUCTION/LOWER URINARY TRACT SYMPTOMS: ICD-10-CM

## 2023-09-01 DIAGNOSIS — R35.0 URINARY FREQUENCY: ICD-10-CM

## 2023-09-01 RX ORDER — BICALUTAMIDE 50 MG/1
50 TABLET, FILM COATED ORAL DAILY
Qty: 90 TABLET | Refills: 3 | Status: SHIPPED | OUTPATIENT
Start: 2023-09-01 | End: 2024-08-26

## 2023-09-01 RX ORDER — TAMSULOSIN HYDROCHLORIDE 0.4 MG/1
1 CAPSULE ORAL DAILY
Qty: 90 CAPSULE | Refills: 3 | Status: SHIPPED | OUTPATIENT
Start: 2023-09-01 | End: 2024-08-26

## 2023-09-01 RX ORDER — VIBEGRON 75 MG/1
75 TABLET, FILM COATED ORAL DAILY
Qty: 90 TABLET | Refills: 1 | Status: SHIPPED | OUTPATIENT
Start: 2023-09-01 | End: 2024-08-26

## 2023-09-01 NOTE — TELEPHONE ENCOUNTER
DELETE AFTER REVIEWING: Send the encounter HIGH priority, If patient has less than a 3 day supply. If the patient will run out of medication over the weekend add that information to the additional details line. Send this encounter to the clinical pool.    Caller: Daron Whitfield    Relationship: Self    Best call back number: [phone]    Requested Prescriptions:   Requested Prescriptions     Pending Prescriptions Disp Refills    tamsulosin (FLOMAX) 0.4 MG capsule 24 hr capsule 30 capsule      Sig: Take 1 capsule by mouth Daily.    Gemtesa 75 MG tablet 30 tablet     bicalutamide (CASODEX) 50 MG tablet          Pharmacy where request should be sent:    [facility]  [address]      Last office visit with prescribing clinician: 4/10/2023   Last telemedicine visit with prescribing clinician: Visit date not found   Next office visit with prescribing clinician: 10/10/2023     Additional details provided by patient: PT HAS ABOUT 5 DAYS OF THE MEDICATION    Does the patient have less than a 3 day supply:  [] Yes  [x] No    Would you like a call back once the refill request has been completed: [x] Yes [] No    If the office needs to give you a call back, can they leave a voicemail: [x] Yes [] No

## 2023-10-10 ENCOUNTER — OFFICE VISIT (OUTPATIENT)
Dept: UROLOGY | Facility: CLINIC | Age: 78
End: 2023-10-10
Payer: MEDICARE

## 2023-10-10 VITALS
DIASTOLIC BLOOD PRESSURE: 63 MMHG | HEIGHT: 71 IN | WEIGHT: 191.4 LBS | BODY MASS INDEX: 26.8 KG/M2 | HEART RATE: 93 BPM | SYSTOLIC BLOOD PRESSURE: 130 MMHG

## 2023-10-10 DIAGNOSIS — N39.41 URGENCY INCONTINENCE: ICD-10-CM

## 2023-10-10 DIAGNOSIS — C61 PROSTATE CANCER: Primary | ICD-10-CM

## 2023-10-10 DIAGNOSIS — R35.0 URINARY FREQUENCY: ICD-10-CM

## 2023-10-10 LAB
BILIRUB BLD-MCNC: NEGATIVE MG/DL
CLARITY, POC: CLEAR
COLOR UR: YELLOW
EXPIRATION DATE: ABNORMAL
GLUCOSE UR STRIP-MCNC: NEGATIVE MG/DL
KETONES UR QL: NEGATIVE
LEUKOCYTE EST, POC: NEGATIVE
Lab: ABNORMAL
NITRITE UR-MCNC: NEGATIVE MG/ML
PH UR: 5.5 [PH] (ref 5–8)
PROT UR STRIP-MCNC: NEGATIVE MG/DL
PSA SERPL-MCNC: <0.014 NG/ML (ref 0–4)
RBC # UR STRIP: ABNORMAL /UL
SP GR UR: 1.02 (ref 1–1.03)
UROBILINOGEN UR QL: ABNORMAL

## 2023-10-10 PROCEDURE — 84153 ASSAY OF PSA TOTAL: CPT | Performed by: UROLOGY

## 2023-10-10 NOTE — PROGRESS NOTES
"Chief Complaint  Follow-up (Follow/up for Increasing PSA level after treatment for prostate cancer)    Subjective  no acute distress        Daron Whitfield presents to [facility]  History of Present Illness    78-year-old -American male with prostate carcinoma diagnosed in 2011 and underwent cryoablation of prostate.  His PSA started going up and was started on antiandrogen therapy.  Patient was double minded about continuing Casodex and Depo-Lupron and wanted to stop but then the change his mind and continued Casodex and Depo-Lupron.  Patient had lung cancer and has some shortness of breath.  Patient is an ex-smoker.    Patient has no back pain and doing relatively well.  He is not losing any weight cannot do any exercise.    Objective no acute distress    vital Signs:   /63 (BP Location: Right arm, Patient Position: Sitting, Cuff Size: Adult)   Pulse 93   Ht 180.3 cm (70.98\")   Wt 86.8 kg (191 lb 6.4 oz)   BMI 26.71 kg/mý     No Known Allergies   Past medical history:  has a past medical history of Bladder outflow obstruction, Cervical spinal stenosis (01/23/2018), Colon polyp, COVID, Diabetes mellitus (2019), Emphysema lung, Emphysema of lung, Hand paresthesia (01/23/2018), HTN (hypertension), Hypercholesteremia, Lung cancer (2016), Muscle atrophy of upper extremity (01/23/2018), Numbness and tingling in both hands, OAB (overactive bladder) (12/09/2014), Prostate cancer (2012), and Ulnar neuropathy (01/23/2018).   Past surgical history:  has a past surgical history that includes Colonoscopy (2019); Cystoscopy; Lung biopsy; Cryoablation of Prostate (01/17/2012); Teeth Extraction; TURP / transurethral incision / drainage prostate (06/16/2015); Cardiac catheterization; Bronchoscopy (N/A, 05/09/2023); and Hand surgery (Right).  Personal history: family history includes Cancer in his sister; Colon cancer (age of onset: 60) in his sister; Colon polyps in his sister; Coronary " artery disease in his brother; Diabetes in his mother, sister, and sister; Hypertension in his sister.  Social history:  reports that he quit smoking about 7 years ago. His smoking use included cigarettes. He started smoking about 57 years ago. He has a 50.00 pack-year smoking history. He has been exposed to tobacco smoke. He has never used smokeless tobacco. He reports current alcohol use of about 1.0 standard drink of alcohol per week. He reports that he does not use drugs.    Review of Systems    Please see past medical and surgical history rest of the system is negative.    Physical Exam  Constitutional:       General: He is not in acute distress.     Appearance: Normal appearance. He is obese. He is not ill-appearing or toxic-appearing.   HENT:      Head: Normocephalic and atraumatic.      Ears:      Comments: No loss of hearing  Cardiovascular:      Rate and Rhythm: Normal rate and regular rhythm.      Pulses: Normal pulses.      Heart sounds: Normal heart sounds. No murmur heard.  Pulmonary:      Effort: Pulmonary effort is normal.      Breath sounds: No rhonchi or rales.   Abdominal:      Palpations: Abdomen is soft. There is no mass.      Tenderness: There is no abdominal tenderness. There is no right CVA tenderness or left CVA tenderness.   Genitourinary:     Comments: Uncircumcised normal penis.    Right and left scrotum is normal.    Right and left testicle and epididymis was are smaller because of androgen deficiency but nontender and not abnormal.    LUL.  Prostate gland is just about 15 g and benign  Musculoskeletal:         General: No swelling. Normal range of motion.      Cervical back: Normal range of motion and neck supple. No rigidity or tenderness.   Lymphadenopathy:      Cervical: No cervical adenopathy.   Skin:     General: Skin is warm.      Coloration: Skin is not jaundiced.   Neurological:      General: No focal deficit present.      Mental Status: He is alert and oriented to person,  place, and time.      Motor: No weakness.      Gait: Gait normal.   Psychiatric:         Mood and Affect: Mood normal.         Behavior: Behavior normal.         Thought Content: Thought content normal.         Judgment: Judgment normal.        Result Review :                 Assessment and Plan    Diagnoses and all orders for this visit:    1. Prostate cancer (Primary)  -     POC Urinalysis Dipstick, Automated  -     PSA DIAGNOSTIC; Future    2. Urgency incontinence    3. Urinary frequency    Patient is remission.  We will recheck him in 6 months time.  I am glad patient is continuing Casodex and Depo-Lupron to keep his cancer under control.     Brief Urine Lab Results  (Last result in the past 365 days)        Color   Clarity   Blood   Leuk Est   Nitrite   Protein   CREAT   Urine HCG        10/10/23 1021 Yellow   Clear   Trace   Negative   Negative   Negative                    Follow Up   No follow-ups on file.  Patient was given instructions and counseling regarding his condition or for health maintenance advice. Please see specific information pulled into the AVS if appropriate.     Merrill Ames MD

## 2023-10-11 ENCOUNTER — CLINICAL SUPPORT (OUTPATIENT)
Dept: UROLOGY | Facility: CLINIC | Age: 78
End: 2023-10-11
Payer: MEDICARE

## 2023-10-11 DIAGNOSIS — R97.21 INCREASING PSA LEVEL AFTER TREATMENT FOR PROSTATE CANCER: Primary | ICD-10-CM

## 2023-11-01 ENCOUNTER — TELEPHONE (OUTPATIENT)
Dept: GASTROENTEROLOGY | Facility: CLINIC | Age: 78
End: 2023-11-01
Payer: MEDICARE

## 2023-11-01 NOTE — TELEPHONE ENCOUNTER
Cardiac Clearance Request  2023      Patient Name: Daron Whitfield  : 1945      Dear Dr Leon,    This patient is waiting to have a Colonoscopy and/or Esophagogastroduodenoscopy which I will perform  at [facility] on _.4.23_. Please respond to this request noting your recommendations regarding clearance from a cardiology standpoint.  You may contact our office at [phone] Option 2 with any questions. I appreciate your prompt response in this matter. Please return this form to our office as soon as possible to [phone].    ____ I approve my patient from a cardiology standpoint    ____ I do NOT approve my patient from a cardiology standpoint at this time      Approving physician name (please print): _____________________________________________      Approving physician signature: ________________________________ Date:________________      Sincerely,  [facility] [address]  Dr. Moore's Office                            Please fax approval or denial to our office as soon as possible.

## 2023-11-01 NOTE — TELEPHONE ENCOUNTER
2023    Dear Dr Hood,      Patient Name: Daron Whitfield  : 1945      This patient is waiting to have a Colonoscopy and/or Esophagogastroduodenoscopy which I will perform at [facility] on _.23_. Please respond to this request noting your recommendations regarding clearance from a pulmonary standpoint.  You may contact our office at [phone] (option 2) with any questions. I appreciate your prompt response in this matter. Please return this form to our office as soon as possible to [phone].    ____ I approve my patient from a pulmonary standpoint    ____ I do NOT approve my patient from a pulmonary standpoint at this time      Approving physician name (please print): _____________________________________________      Approving physician signature: ________________________________ Date:________________  Sincerely,  [facility] - [facility], [address]            Please fax approval or denial to our office as soon as possible.

## 2023-11-27 NOTE — PRE-PROCEDURE INSTRUCTIONS
"Instructed on date and arrival time of 0700. Come to entrance \"C\". Must have  over age 18 to drive home.  May have two visitors; however, children under 12 must stay in waiting room.  Discussed clear liquid diet (no red or purple), bowel prep, and NPO.  May take medications as usual except for blood thinners, diabetic medications, and weight loss medications.  Bring list of medications.  Verbalized understanding of instructions given.  Instructed to call for questions or concerns.  Pulmonary and cardiac clearances noted in chart.  "

## 2023-12-04 ENCOUNTER — HOSPITAL ENCOUNTER (OUTPATIENT)
Facility: HOSPITAL | Age: 78
Setting detail: HOSPITAL OUTPATIENT SURGERY
Discharge: HOME | End: 2023-12-04
Attending: INTERNAL MEDICINE | Admitting: INTERNAL MEDICINE
Payer: MEDICARE

## 2023-12-04 ENCOUNTER — ANESTHESIA (OUTPATIENT)
Dept: GASTROENTEROLOGY | Facility: HOSPITAL | Age: 78
End: 2023-12-04
Payer: MEDICARE

## 2023-12-04 ENCOUNTER — ANESTHESIA EVENT (OUTPATIENT)
Dept: GASTROENTEROLOGY | Facility: HOSPITAL | Age: 78
End: 2023-12-04
Payer: MEDICARE

## 2023-12-04 VITALS
HEART RATE: 101 BPM | TEMPERATURE: 97.1 F | DIASTOLIC BLOOD PRESSURE: 67 MMHG | OXYGEN SATURATION: 92 % | BODY MASS INDEX: 25.72 KG/M2 | RESPIRATION RATE: 16 BRPM | SYSTOLIC BLOOD PRESSURE: 96 MMHG | WEIGHT: 184.3 LBS

## 2023-12-04 DIAGNOSIS — Z12.11 ENCOUNTER FOR SCREENING FOR MALIGNANT NEOPLASM OF COLON: ICD-10-CM

## 2023-12-04 DIAGNOSIS — Z86.010 HISTORY OF COLON POLYPS: ICD-10-CM

## 2023-12-04 LAB — GLUCOSE BLDC GLUCOMTR-MCNC: 83 MG/DL (ref 70–99)

## 2023-12-04 PROCEDURE — 25010000002 PROPOFOL 10 MG/ML EMULSION: Performed by: NURSE ANESTHETIST, CERTIFIED REGISTERED

## 2023-12-04 PROCEDURE — 25810000003 LACTATED RINGERS PER 1000 ML: Performed by: NURSE ANESTHETIST, CERTIFIED REGISTERED

## 2023-12-04 PROCEDURE — 82948 REAGENT STRIP/BLOOD GLUCOSE: CPT

## 2023-12-04 PROCEDURE — 88305 TISSUE EXAM BY PATHOLOGIST: CPT | Performed by: INTERNAL MEDICINE

## 2023-12-04 RX ORDER — PANTOPRAZOLE SODIUM 40 MG/1
40 TABLET, DELAYED RELEASE ORAL DAILY
Qty: 30 TABLET | Refills: 5 | Status: SHIPPED | OUTPATIENT
Start: 2023-12-04

## 2023-12-04 RX ORDER — SODIUM CHLORIDE, SODIUM LACTATE, POTASSIUM CHLORIDE, CALCIUM CHLORIDE 600; 310; 30; 20 MG/100ML; MG/100ML; MG/100ML; MG/100ML
INJECTION, SOLUTION INTRAVENOUS CONTINUOUS PRN
Status: DISCONTINUED | OUTPATIENT
Start: 2023-12-04 | End: 2023-12-04 | Stop reason: SURG

## 2023-12-04 RX ORDER — LIDOCAINE HYDROCHLORIDE 20 MG/ML
INJECTION, SOLUTION EPIDURAL; INFILTRATION; INTRACAUDAL; PERINEURAL AS NEEDED
Status: DISCONTINUED | OUTPATIENT
Start: 2023-12-04 | End: 2023-12-04 | Stop reason: SURG

## 2023-12-04 RX ORDER — HEPARIN SODIUM (PORCINE) LOCK FLUSH IV SOLN 100 UNIT/ML 100 UNIT/ML
5 SOLUTION INTRAVENOUS AS NEEDED
Status: DISCONTINUED | OUTPATIENT
Start: 2023-12-04 | End: 2023-12-04 | Stop reason: HOSPADM

## 2023-12-04 RX ORDER — SODIUM CHLORIDE 0.9 % (FLUSH) 0.9 %
20 SYRINGE (ML) INJECTION AS NEEDED
Status: DISCONTINUED | OUTPATIENT
Start: 2023-12-04 | End: 2023-12-04 | Stop reason: HOSPADM

## 2023-12-04 RX ORDER — PROPOFOL 10 MG/ML
VIAL (ML) INTRAVENOUS AS NEEDED
Status: DISCONTINUED | OUTPATIENT
Start: 2023-12-04 | End: 2023-12-04 | Stop reason: SURG

## 2023-12-04 RX ORDER — SODIUM CHLORIDE 0.9 % (FLUSH) 0.9 %
10 SYRINGE (ML) INJECTION AS NEEDED
Status: DISCONTINUED | OUTPATIENT
Start: 2023-12-04 | End: 2023-12-04 | Stop reason: HOSPADM

## 2023-12-04 RX ORDER — SODIUM CHLORIDE 9 MG/ML
40 INJECTION, SOLUTION INTRAVENOUS AS NEEDED
Status: DISCONTINUED | OUTPATIENT
Start: 2023-12-04 | End: 2023-12-04 | Stop reason: HOSPADM

## 2023-12-04 RX ORDER — GLYCOPYRROLATE 0.2 MG/ML
INJECTION INTRAMUSCULAR; INTRAVENOUS AS NEEDED
Status: DISCONTINUED | OUTPATIENT
Start: 2023-12-04 | End: 2023-12-04 | Stop reason: SURG

## 2023-12-04 RX ORDER — SODIUM CHLORIDE 0.9 % (FLUSH) 0.9 %
10 SYRINGE (ML) INJECTION EVERY 12 HOURS SCHEDULED
Status: DISCONTINUED | OUTPATIENT
Start: 2023-12-04 | End: 2023-12-04 | Stop reason: HOSPADM

## 2023-12-04 RX ADMIN — GLYCOPYRROLATE 0.2 MG: 0.2 INJECTION INTRAMUSCULAR; INTRAVENOUS at 09:24

## 2023-12-04 RX ADMIN — PROPOFOL 50 MG: 10 INJECTION, EMULSION INTRAVENOUS at 09:23

## 2023-12-04 RX ADMIN — SODIUM CHLORIDE, POTASSIUM CHLORIDE, SODIUM LACTATE AND CALCIUM CHLORIDE: 600; 310; 30; 20 INJECTION, SOLUTION INTRAVENOUS at 09:19

## 2023-12-04 RX ADMIN — LIDOCAINE HYDROCHLORIDE 50 MG: 20 INJECTION, SOLUTION EPIDURAL; INFILTRATION; INTRACAUDAL; PERINEURAL at 09:23

## 2023-12-04 RX ADMIN — PROPOFOL 100 MCG/KG/MIN: 10 INJECTION, EMULSION INTRAVENOUS at 09:23

## 2023-12-04 NOTE — ANESTHESIA POSTPROCEDURE EVALUATION
Patient: Daron Whitfield    Procedure Summary       Date: 12/04/23 Room / Location: [facility] ENDOSCOPY 4 / [facility] ENDOSCOPY    Anesthesia Start: 0919 Anesthesia Stop: 1004    Procedure: COLONOSCOPY WITH POLYPECTOMIES Diagnosis:       History of colon polyps      Encounter for screening for malignant neoplasm of colon      (History of colon polyps [Z86.010])      (Encounter for screening for malignant neoplasm of colon [Z12.11])    Surgeons: Zhen Moore MD Provider: Sriram Newby CRNA    Anesthesia Type: general ASA Status: 4            Anesthesia Type: general    Vitals  Vitals Value Taken Time   BP 96/67 12/04/23 1023   Temp 36.2 °C (97.1 °F) 12/04/23 1023   Pulse 99 12/04/23 1025   Resp 16 12/04/23 1023   SpO2 94 % 12/04/23 1025   Vitals shown include unfiled device data.        Post Anesthesia Care and Evaluation    Post-procedure mental status: acceptable.  Pain management: satisfactory to patient    Airway patency: patent  Anesthetic complications: No anesthetic complications    Cardiovascular status: acceptable  Respiratory status: acceptable    Comments: Per chart review

## 2023-12-04 NOTE — H&P
Pre Procedure History & Physical    Chief Complaint:   Past history colon polyps    Subjective     HPI:   History of colon polyps    Past Medical History:   Past Medical History:   Diagnosis Date    Bladder outflow obstruction     Cervical spinal stenosis 01/23/2018    Colon polyp     COVID     Diabetes mellitus 2019    Emphysema lung     Emphysema of lung     Hand paresthesia 01/23/2018    HTN (hypertension)     Hypercholesteremia     Lung cancer 2016    Muscle atrophy of upper extremity 01/23/2018    Numbness and tingling in both hands     OAB (overactive bladder) 12/09/2014    Prostate cancer 2012    Ulnar neuropathy 01/23/2018       Past Surgical History:  Past Surgical History:   Procedure Laterality Date    BRONCHOSCOPY N/A 05/09/2023    Procedure: BRONCHOSCOPY WITH BRONCHOALVEOLAR LAVAGE,  BRONCHIAL WASHINGS;  Surgeon: Roshan Hood MD;  Location: [facility] ENDOSCOPY;  Service: Pulmonary;  Laterality: N/A;  PERSISTENT PNEUMONIA    CARDIAC CATHETERIZATION      COLONOSCOPY  2019    DR CUETO    CRYOABLATION OF PROSTATE  01/17/2012    CYSTOSCOPY      HAND SURGERY Right     LUNG BIOPSY      TEETH EXTRACTION      TURP / TRANSURETHRAL INCISION / DRAINAGE PROSTATE  06/16/2015       Family History:  Family History   Problem Relation Age of Onset    Diabetes Mother     Cancer Sister     Diabetes Sister     Colon cancer Sister 60    Colon polyps Sister     Hypertension Sister     Diabetes Sister     Coronary artery disease Brother        Social History:   reports that he quit smoking about 7 years ago. His smoking use included cigarettes. He started smoking about 57 years ago. He has a 50.00 pack-year smoking history. He has been exposed to tobacco smoke. He has never used smokeless tobacco. He reports current alcohol use of about 1.0 standard drink of alcohol per week. He reports that he does not use drugs.    Medications:   Facility-Administered Medications Prior to Admission   Medication Dose Route Frequency Provider  Last Rate Last Admin    leuprolide (LUPRON) injection 22.5 mg  22.5 mg Intramuscular Once Merrill Ames MD         Medications Prior to Admission   Medication Sig Dispense Refill Last Dose    albuterol sulfate  (90 Base) MCG/ACT inhaler Inhale 2 puffs Every 4 (Four) Hours As Needed for Wheezing. 18 g 5 12/3/2023    atorvastatin (LIPITOR) 40 MG tablet Lipitor 40 mg oral tablet take 1 tablet (40 mg) by oral route once daily   Suspended   12/3/2023    bicalutamide (CASODEX) 50 MG tablet Take 1 tablet by mouth Daily for 360 days. 90 tablet 3 12/3/2023    Fluticasone-Umeclidin-Vilant 200-62.5-25 MCG/ACT aerosol powder  Inhale 1 puff Daily. 1 each 7 12/3/2023    folic acid (FOLVITE) 1 MG tablet    12/3/2023    Gemtesa 75 MG tablet Take 1 tablet by mouth Daily for 360 days. 90 tablet 1 12/3/2023    guaiFENesin (Mucinex) 600 MG 12 hr tablet Take 2 tablets by mouth 2 (Two) Times a Day. 60 tablet 5 12/3/2023    ipratropium-albuterol (DUO-NEB) 0.5-2.5 mg/3 ml nebulizer Take 3 mL by nebulization Every 4 (Four) Hours As Needed for Wheezing. 360 mL 4 12/3/2023    leuprolide (Lupron Depot, 3-Month,) 22.5 MG injection 22.5 mg.   12/3/2023    lisinopril (PRINIVIL,ZESTRIL) 5 MG tablet Daily.   12/3/2023    metFORMIN (GLUCOPHAGE) 500 MG tablet    12/3/2023    metoprolol succinate XL (TOPROL-XL) 25 MG 24 hr tablet Toprol XL 25 mg oral tablet extended release 24 hr take 1 tablet (25 mg) by oral route once daily   Active   12/3/2023    tamsulosin (FLOMAX) 0.4 MG capsule 24 hr capsule Take 1 capsule by mouth Daily for 360 days. 90 capsule 3 12/3/2023       Allergies:  Patient has no known allergies.        Objective     Blood pressure 119/77, pulse 100, temperature 97.6 °F (36.4 °C), temperature source Temporal, resp. rate 18, weight 83.6 kg (184 lb 4.9 oz), SpO2 91%.    Physical Exam   Constitutional: Pt is oriented to person, place, and time and well-developed, well-nourished, and in no distress.   Mouth/Throat:  Oropharynx is clear and moist.   Neck: Normal range of motion.   Cardiovascular: Normal rate, regular rhythm and normal heart sounds.    Pulmonary/Chest: Effort normal and breath sounds normal.   Abdominal: Soft. Nontender  Skin: Skin is warm and dry.   Psychiatric: Mood, memory, affect and judgment normal.     Assessment & Plan     Diagnosis:  Surveillance    Anticipated Surgical Procedure:  Colonoscopy    The risks, benefits, and alternatives of this procedure have been discussed with the patient or the responsible party- the patient understands and agrees to proceed.

## 2023-12-04 NOTE — ANESTHESIA PREPROCEDURE EVALUATION
Anesthesia Evaluation     Patient summary reviewed and Nursing notes reviewed   no history of anesthetic complications:   NPO Solid Status: > 8 hours  NPO Liquid Status: > 2 hours           Airway   Mallampati: III  TM distance: >3 FB  Neck ROM: full  No difficulty expected  Dental    (+) edentulous, upper dentures and lower dentures    Pulmonary    (+) a smoker Former, lung cancer (radiation and chemotherapy), COPD (breathing at baseline, easy, unlabored) moderate, asthma,shortness of breath  Cardiovascular - normal exam  Exercise tolerance: good (4-7 METS)    Patient on routine beta blocker and Beta blocker given within 24 hours of surgery  Rhythm: regular  Rate: normal    (+) hypertension 2 medications or greater, hyperlipidemia    ROS comment:  EF = 55%    Left ventricular diastolic function was normal.      Neuro/Psych  (+) numbness  GI/Hepatic/Renal/Endo    (+) renal disease-, diabetes mellitus type 2    Musculoskeletal     Abdominal    Substance History - negative use     OB/GYN negative ob/gyn ROS         Other   arthritis,   history of cancer (prostate in remission, and lung cancer) remission    ROS/Med Hx Other: Reports no changes in health in 6 months since bronchoscopy                    Anesthesia Plan    ASA 4     general   total IV anesthesia    Anesthetic plan, risks, benefits, and alternatives have been provided, discussed and informed consent has been obtained with: patient.  Pre-procedure education provided  Plan discussed with CRNA.        CODE STATUS:

## 2023-12-05 LAB
CYTO UR: NORMAL
LAB AP CASE REPORT: NORMAL
LAB AP CLINICAL INFORMATION: NORMAL
PATH REPORT.FINAL DX SPEC: NORMAL
PATH REPORT.GROSS SPEC: NORMAL

## 2024-01-02 ENCOUNTER — OFFICE VISIT (OUTPATIENT)
Dept: PULMONOLOGY | Facility: CLINIC | Age: 79
End: 2024-01-02
Payer: MEDICARE

## 2024-01-02 VITALS
DIASTOLIC BLOOD PRESSURE: 62 MMHG | RESPIRATION RATE: 16 BRPM | HEIGHT: 71 IN | SYSTOLIC BLOOD PRESSURE: 115 MMHG | HEART RATE: 80 BPM | WEIGHT: 185.3 LBS | OXYGEN SATURATION: 93 % | BODY MASS INDEX: 25.94 KG/M2

## 2024-01-02 DIAGNOSIS — J45.40 MODERATE PERSISTENT ASTHMA WITHOUT COMPLICATION: ICD-10-CM

## 2024-01-02 DIAGNOSIS — J44.1 COPD WITH ACUTE EXACERBATION: ICD-10-CM

## 2024-01-02 DIAGNOSIS — J43.9 PULMONARY EMPHYSEMA, UNSPECIFIED EMPHYSEMA TYPE: Primary | ICD-10-CM

## 2024-01-02 DIAGNOSIS — J45.40 MODERATE PERSISTENT ASTHMA, UNSPECIFIED WHETHER COMPLICATED: ICD-10-CM

## 2024-01-02 DIAGNOSIS — R06.02 SOB (SHORTNESS OF BREATH): ICD-10-CM

## 2024-01-02 PROCEDURE — 1159F MED LIST DOCD IN RCRD: CPT | Performed by: INTERNAL MEDICINE

## 2024-01-02 PROCEDURE — 3074F SYST BP LT 130 MM HG: CPT | Performed by: INTERNAL MEDICINE

## 2024-01-02 PROCEDURE — 3078F DIAST BP <80 MM HG: CPT | Performed by: INTERNAL MEDICINE

## 2024-01-02 PROCEDURE — 99214 OFFICE O/P EST MOD 30 MIN: CPT | Performed by: INTERNAL MEDICINE

## 2024-01-02 PROCEDURE — 1160F RVW MEDS BY RX/DR IN RCRD: CPT | Performed by: INTERNAL MEDICINE

## 2024-01-02 RX ORDER — PREDNISONE 10 MG/1
TABLET ORAL
Qty: 45 TABLET | Refills: 0 | Status: SHIPPED | OUTPATIENT
Start: 2024-01-02

## 2024-01-02 RX ORDER — FERROUS SULFATE 325(65) MG
TABLET ORAL
COMMUNITY
Start: 2023-11-06

## 2024-01-02 RX ORDER — MIRTAZAPINE 15 MG/1
TABLET, FILM COATED ORAL
COMMUNITY
Start: 2023-11-01

## 2024-01-02 RX ORDER — COLCHICINE 0.6 MG/1
TABLET ORAL
COMMUNITY
Start: 2023-12-22

## 2024-01-02 RX ORDER — AZITHROMYCIN 250 MG/1
250 TABLET, FILM COATED ORAL DAILY
Qty: 30 TABLET | Refills: 11 | Status: SHIPPED | OUTPATIENT
Start: 2024-01-02

## 2024-01-02 NOTE — PROGRESS NOTES
Primary Care Provider  Carol Frank MD     Referring Provider  No ref. provider found     Chief Complaint  COPD; Asthma; Adenocarcinoma of bronchus, right; and Follow-up (3-4 Month )    Subjective          Daron Whitfield presents to [facility] PULMONARY & CRITICAL CARE MEDICINE  COPD  He complains of cough, shortness of breath and wheezing. His past medical history is significant for asthma and emphysema.   Asthma  He complains of cough, shortness of breath and wheezing. His past medical history is significant for asthma and emphysema.   Shortness of Breath  Associated symptoms include wheezing. His past medical history is significant for asthma.   Wheezing   Associated symptoms include coughing and shortness of breath. His past medical history is significant for asthma.   Emphysema  Associated symptoms include coughing.   Cough  Associated symptoms include shortness of breath and wheezing. His past medical history is significant for asthma and emphysema.     Daron Whitfield is a 78 y.o. 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  He is here for follow-up.  Since his last office visit, he has been on Trelegy Ellipta once daily and albuterol as needed.  He also uses DuoNeb nebulizer several times a day but does not feel better.  He continues to have cough, wheezing, shortness of breath with minimal exertion.  He tries to move around some in room but is limited with activities.  His wife smokes around him.  He does not smoke anymore.  He has been having wheezing and having worsening symptoms.  He feels like nothing has been helping so far.  He had bronchoscopy several months ago which did help.  He was found to have haemophilus influenza and was treated with antibiotics.  He is feeling that he would need another bronchoscopy in future.      Review of Systems   Respiratory:  Positive for cough, shortness of breath and wheezing.    General:   Fatigue, No Fever No weight loss or loss of appetite  HEENT: No dysphagia, No Visual Changes, +rhinorrhea, significant sneezing and runny eyes  Respiratory:  + Incessant cough,+Dyspnea, mucoid phlegm, No Pleuritic Pain, ++wheezing, no hemoptysis.  Cardiovascular: Denies chest pain, denies palpitations,+SILVERIO, No Chest Pressure  Gastrointestinal:  No Abdominal Pain, No Nausea, No Vomiting, No Diarrhea  Genitourinary:  No Dysuria, No Frequency, No Hesitancy  Musculoskeletal: No muscle pain or swelling  Endocrine:  No Heat Intolerance, No Cold Intolerance  Hematologic:  No Bleeding, No Bruising  Neurologic:  No Confusion, no Dysarthria, No Headaches  Skin:  No Rash, No Open Wounds    Family History   Problem Relation Age of Onset    Diabetes Mother     Cancer Sister     Diabetes Sister     Colon cancer Sister 60    Colon polyps Sister     Hypertension Sister     Diabetes Sister     Coronary artery disease Brother         Social History     Socioeconomic History    Marital status:    Tobacco Use    Smoking status: Former     Packs/day: 1.00     Years: 50.00     Additional pack years: 0.00     Total pack years: 50.00     Types: Cigarettes     Start date: 1966     Quit date: 2016     Years since quittin.0     Passive exposure: Past    Smokeless tobacco: Never    Tobacco comments:     STARTED AGE 17  QUIT IN 2016   Vaping Use    Vaping Use: Never used    Passive vaping exposure: Yes   Substance and Sexual Activity    Alcohol use: Yes     Alcohol/week: 1.0 standard drink of alcohol     Types: 1 Cans of beer per week     Comment: casual drinker    Drug use: Never    Sexual activity: Not Currently     Partners: Female        Past Medical History:   Diagnosis Date    Bladder outflow obstruction     Cervical spinal stenosis 2018    Colon polyp     COVID     Diabetes mellitus 2019    Emphysema lung     Emphysema of lung     Hand paresthesia 2018    HTN (hypertension)     Hypercholesteremia     Lung  cancer 2016    Muscle atrophy of upper extremity 01/23/2018    Numbness and tingling in both hands     OAB (overactive bladder) 12/09/2014    Prostate cancer 2012    Ulnar neuropathy 01/23/2018        Immunization History   Administered Date(s) Administered    ABRYSVO (RSV, 60+ or pregnant women 32-36 wks) 10/30/2023    COVID-19 (MODERNA) 1st,2nd,3rd Dose Monovalent 02/05/2021, 03/09/2021    COVID-19 (PFIZER) BIVALENT 12+YRS 09/21/2022    COVID-19 (PFIZER) Purple Cap Monovalent 11/17/2021    COVID-19 F23 (MODERNA) 12YRS+ (SPIKEVAX) 10/30/2023    Fluad Quad 65+ 10/24/2022, 10/04/2023    Fluzone High Dose =>65 Years ([employer] ONLY) 12/11/2019    Fluzone High-Dose 65+yrs 11/13/2021, 10/30/2023    Pneumococcal Polysaccharide (PPSV23) 10/24/2022         No Known Allergies       Current Outpatient Medications:     albuterol sulfate  (90 Base) MCG/ACT inhaler, Inhale 2 puffs Every 4 (Four) Hours As Needed for Wheezing., Disp: 18 g, Rfl: 5    atorvastatin (LIPITOR) 40 MG tablet, Lipitor 40 mg oral tablet take 1 tablet (40 mg) by oral route once daily   Suspended, Disp: , Rfl:     bicalutamide (CASODEX) 50 MG tablet, Take 1 tablet by mouth Daily for 360 days., Disp: 90 tablet, Rfl: 3    colchicine 0.6 MG tablet, , Disp: , Rfl:     FeroSul 325 (65 Fe) MG tablet, , Disp: , Rfl:     Fluticasone-Umeclidin-Vilant 200-62.5-25 MCG/ACT aerosol powder , Inhale 1 puff Daily., Disp: 1 each, Rfl: 7    folic acid (FOLVITE) 1 MG tablet, , Disp: , Rfl:     Gemtesa 75 MG tablet, Take 1 tablet by mouth Daily for 360 days., Disp: 90 tablet, Rfl: 1    guaiFENesin (Mucinex) 600 MG 12 hr tablet, Take 2 tablets by mouth 2 (Two) Times a Day., Disp: 60 tablet, Rfl: 5    ipratropium-albuterol (DUO-NEB) 0.5-2.5 mg/3 ml nebulizer, Take 3 mL by nebulization Every 4 (Four) Hours As Needed for Wheezing., Disp: 360 mL, Rfl: 4    leuprolide (Lupron Depot, 3-Month,) 22.5 MG injection, 22.5 mg., Disp: , Rfl:     lisinopril (PRINIVIL,ZESTRIL) 5 MG  "tablet, Daily., Disp: , Rfl:     metFORMIN (GLUCOPHAGE) 500 MG tablet, , Disp: , Rfl:     metoprolol succinate XL (TOPROL-XL) 25 MG 24 hr tablet, Toprol XL 25 mg oral tablet extended release 24 hr take 1 tablet (25 mg) by oral route once daily   Active, Disp: , Rfl:     mirtazapine (REMERON) 15 MG tablet, , Disp: , Rfl:     pantoprazole (PROTONIX) 40 MG EC tablet, Take 1 tablet by mouth Daily., Disp: 30 tablet, Rfl: 5    tamsulosin (FLOMAX) 0.4 MG capsule 24 hr capsule, Take 1 capsule by mouth Daily for 360 days., Disp: 90 capsule, Rfl: 3    azithromycin (ZITHROMAX) 250 MG tablet, Take 1 tablet by mouth Daily., Disp: 30 tablet, Rfl: 11    predniSONE (DELTASONE) 10 MG tablet, 50mg qday x 3 days, 40mg qday x 3 days, 30mg qday x 3 days, 20mg qday x 3 days, 10mg qday x 3 days, then stop, Disp: 45 tablet, Rfl: 0    Current Facility-Administered Medications:     leuprolide (LUPRON) injection 22.5 mg, 22.5 mg, Intramuscular, Once, Merrill Ames MD     Objective   Vital Signs:   /62 (BP Location: Left arm, Patient Position: Sitting, Cuff Size: Adult)   Pulse 80   Resp 16   Ht 180.3 cm (71\")   Wt 84.1 kg (185 lb 4.8 oz)   SpO2 93% Comment: Room air. Uses O2 at night and as needed unsure of liters  BMI 25.84 kg/m²     Mallampatti classification : 1  Physical Exam  Vital Signs Reviewed  Pleasant male, in mild distress, has conversational dyspnea  HEENT:  PERRL, EOMI.  OP, nares clear, no sinus tenderness  Neck:  Supple, No JVD, no thyromegaly  Lymph: no axillary, cervical, supraclavicular lymphadenopathy noted bilaterally  Chest: Bilateral diminished breath sounds, expiratory wheezing bilaterally, scattered rhonchi, no crackles, resonant to percussion bilaterally  CV: RRR, no MGR, pulses 2+, equal  Abd:  Soft, NT, ND, + BS, no HSM  EXT:  no clubbing, no cyanosis, No BLE edema  Neuro:  A&Ox3, CN grossly intact, no focal deficits  Skin: No rashes or lesions noted       Result Review :   The following data " was reviewed by: Roshan Hood MD on 05/19/2022:  Common labs          10/10/2023    12:20   Common Labs   PSA <0.014          CBC w/diff      CBC w/Diff 2/16/22   WBC 5.57   RBC 5.25   Hemoglobin 13.5   Hematocrit 42.9   MCV 81.7   MCH 25.7 (A)   MCHC 31.5   RDW 13.9   Platelets 182   Neutrophil Rel % 64.9   Immature Granulocyte Rel % 0.4   Lymphocyte Rel % 24.6   Monocyte Rel % 6.1   Eosinophil Rel % 3.1   Basophil Rel % 0.9   (A) Abnormal value              Data reviewed: Radiologic studies Recent CT scan of the chest was reviewed.        Narrative & Impression   PROCEDURE:  CT CHEST HI RESOLUTION DIAGNOSTIC     COMPARISON: [facility], CT, CT CHEST W CONTRAST DIAGNOSTIC, 3/04/2022, 12:23.     INDICATIONS:  dyspnea, lung cancer hx, productive cough     TECHNIQUE:    CT images were created without the administration of contrast material.       PROTOCOL:     Standard imaging protocol performed                 RADIATION:      DLP: 447mGy*cm               Automated exposure control was utilized to minimize radiation dose.      FINDINGS:          The central tracheobronchial tree is clear.  There is moderate emphysema.  There is a calcified   granuloma in the left upper lobe.  No concerning pulmonary nodule is identified.  There is stable   interstitial thickening in the right hilar region, which could represent post-treatment changes.    There is no focal consolidation.  There is no pleural effusion.  There is no evidence of   significant interstitial lung disease.     A right internal jugular port has its tip at the upper SVC.  The heart size appears normal, with   evidence of calcified coronary artery disease.  The great vessels are normal caliber.  No   abnormally enlarged lymph nodes are identified.     Partial evaluation of the upper abdomen is unremarkable.     No aggressive osseous lesions are identified.     IMPRESSION:                 1. No acute cardiopulmonary process.  2. No evidence of  metastasis within chest.  3. Moderate emphysema.            ZIA KEE MD         Electronically Signed and Approved By: ZIA KEE MD on 12/29/2022 at 8:47                     Assessment and Plan    Diagnoses and all orders for this visit:    1. Pulmonary emphysema, unspecified emphysema type (Primary)  -     predniSONE (DELTASONE) 10 MG tablet; 50mg qday x 3 days, 40mg qday x 3 days, 30mg qday x 3 days, 20mg qday x 3 days, 10mg qday x 3 days, then stop  Dispense: 45 tablet; Refill: 0  -     azithromycin (ZITHROMAX) 250 MG tablet; Take 1 tablet by mouth Daily.  Dispense: 30 tablet; Refill: 11    2. Moderate persistent asthma, unspecified whether complicated  -     predniSONE (DELTASONE) 10 MG tablet; 50mg qday x 3 days, 40mg qday x 3 days, 30mg qday x 3 days, 20mg qday x 3 days, 10mg qday x 3 days, then stop  Dispense: 45 tablet; Refill: 0  -     azithromycin (ZITHROMAX) 250 MG tablet; Take 1 tablet by mouth Daily.  Dispense: 30 tablet; Refill: 11    3. COPD with acute exacerbation  -     predniSONE (DELTASONE) 10 MG tablet; 50mg qday x 3 days, 40mg qday x 3 days, 30mg qday x 3 days, 20mg qday x 3 days, 10mg qday x 3 days, then stop  Dispense: 45 tablet; Refill: 0  -     azithromycin (ZITHROMAX) 250 MG tablet; Take 1 tablet by mouth Daily.  Dispense: 30 tablet; Refill: 11    4. Moderate persistent asthma without complication  -     predniSONE (DELTASONE) 10 MG tablet; 50mg qday x 3 days, 40mg qday x 3 days, 30mg qday x 3 days, 20mg qday x 3 days, 10mg qday x 3 days, then stop  Dispense: 45 tablet; Refill: 0  -     azithromycin (ZITHROMAX) 250 MG tablet; Take 1 tablet by mouth Daily.  Dispense: 30 tablet; Refill: 11    5. SOB (shortness of breath)  -     predniSONE (DELTASONE) 10 MG tablet; 50mg qday x 3 days, 40mg qday x 3 days, 30mg qday x 3 days, 20mg qday x 3 days, 10mg qday x 3 days, then stop  Dispense: 45 tablet; Refill: 0  -     azithromycin (ZITHROMAX) 250 MG tablet; Take 1 tablet by mouth  Daily.  Dispense: 30 tablet; Refill: 11        COPD: Continue with trilogy Ellipta once daily.  Use albuterol as needed.  He has DuoNeb nebulizer I advised him to continue with DuoNeb nebulizer for airway clearance 2-3 times a day.  COPD exacerbation next plan was discussed in detail.  He is having acute exacerbation now.  Given his worsening symptoms, I have given him prednisone taper for 15 day.   Persistent symptoms with no significant changes on CT scan.  May need bronchoscopy again.  Given persistent symptoms and recurrent COPD exacerbation, I discussed with him regarding daily azithromycin.  He is agreeable.  I will start him on chronic macrolide therapy with azithromycin to 50 mg once daily.    Lung cancer: Poorly differentiated stage IIIa adenocarcinoma, status post chemotherapy and radiation therapy.  Patient has been doing well.  No recurrence of the disease now.  Repeat CT scan of the chest per Dr. Flores.    Up-to-date on COVID, pneumonia and flu vaccines.  He had flu vaccine in October 24, 2022.  Handicap placard signature provided last office visit.    Continue oxygen with sleep.  Has nocturnal hypoxia.    Follow Up   Return in about 3 months (around 4/2/2024).  Patient was given instructions and counseling regarding his condition or for health maintenance advice. Please see specific information pulled into the AVS if appropriate.       Electronically signed by Roshan Hood MD, 1/2/2024, 11:31 EST.

## 2024-01-11 ENCOUNTER — CLINICAL SUPPORT (OUTPATIENT)
Dept: UROLOGY | Facility: CLINIC | Age: 79
End: 2024-01-11
Payer: MEDICARE

## 2024-01-11 DIAGNOSIS — C61 PROSTATE CANCER: Primary | ICD-10-CM

## 2024-03-08 ENCOUNTER — HOSPITAL ENCOUNTER (OUTPATIENT)
Dept: CT IMAGING | Facility: HOSPITAL | Age: 79
Discharge: HOME | End: 2024-03-08
Payer: MEDICARE

## 2024-03-08 DIAGNOSIS — C34.91 ADENOCARCINOMA OF BRONCHUS, RIGHT: ICD-10-CM

## 2024-03-08 LAB
CREAT BLDA-MCNC: 1.8 MG/DL (ref 0.6–1.3)
EGFRCR SERPLBLD CKD-EPI 2021: 37.6 ML/MIN/1.73

## 2024-03-08 PROCEDURE — 25510000001 IOPAMIDOL PER 1 ML: Performed by: INTERNAL MEDICINE

## 2024-03-08 PROCEDURE — 71260 CT THORAX DX C+: CPT

## 2024-03-08 PROCEDURE — 82565 ASSAY OF CREATININE: CPT

## 2024-03-08 RX ORDER — HEPARIN SODIUM (PORCINE) LOCK FLUSH IV SOLN 100 UNIT/ML 100 UNIT/ML
SOLUTION INTRAVENOUS
Status: DISPENSED
Start: 2024-03-08 | End: 2024-03-08

## 2024-03-08 RX ADMIN — IOPAMIDOL 70 ML: 755 INJECTION, SOLUTION INTRAVENOUS at 09:41

## 2024-03-13 ENCOUNTER — OFFICE VISIT (OUTPATIENT)
Dept: ONCOLOGY | Facility: HOSPITAL | Age: 79
End: 2024-03-13
Payer: MEDICARE

## 2024-03-13 VITALS
RESPIRATION RATE: 16 BRPM | DIASTOLIC BLOOD PRESSURE: 61 MMHG | OXYGEN SATURATION: 93 % | BODY MASS INDEX: 26.3 KG/M2 | SYSTOLIC BLOOD PRESSURE: 116 MMHG | WEIGHT: 188.6 LBS | HEART RATE: 86 BPM | TEMPERATURE: 97.4 F

## 2024-03-13 DIAGNOSIS — C34.91 ADENOCARCINOMA OF BRONCHUS, RIGHT: Primary | ICD-10-CM

## 2024-03-13 PROCEDURE — G0463 HOSPITAL OUTPT CLINIC VISIT: HCPCS | Performed by: INTERNAL MEDICINE

## 2024-03-13 NOTE — PROGRESS NOTES
Chief Complaint  Adenocarcinoma of bronchus, right    [facility], [facility]*  Carol Frank MD    Subjective          Daron Whitfield presents to [facility] HEMATOLOGY & ONCOLOGY for follow-up of his lung cancer.  He is status post treatments as outlined below.  He is now approaching 8 years out from completing his therapy.  He notes that his energy level is lower than he would like but adequate for his ADLs.  He states he recently bought an exercise machine.  He notes adequate appetite and his weight is stable.  He denies any masses, adenopathy, unusual aches or pains.    Oncology/Hematology History Overview Note   2016 RLL poorly differentiated NSCLC adenocarcinoma            Clinical Staging      Stage IIIA ([id number])            Treatments      Chemotherapy      4/14/16 thru 6/14/16 completed 3C Cisplatin   Radiation Therapy      4/14/16 thru 6/2/16 received 6000cGy XRT to right hilum/mediastinum         Adenocarcinoma of bronchus, right   6/3/2021 Initial Diagnosis    Adenocarcinoma of bronchus, right (CMS/HCC)     6/9/2021 -  Chemotherapy    OP CENTRAL VENOUS ACCESS DEVICE ACCESS, CARE, AND MAINTENANCE (CVAD)         Review of Systems   Constitutional:  Negative for appetite change, diaphoresis, fatigue, fever, unexpected weight gain and unexpected weight loss.   HENT:  Negative for hearing loss, sore throat and voice change.    Eyes:  Negative for blurred vision, double vision, pain, redness and visual disturbance.   Respiratory:  Negative for cough, shortness of breath and wheezing.    Cardiovascular:  Negative for chest pain, palpitations and leg swelling.   Endocrine: Negative for cold intolerance, heat intolerance, polydipsia and polyuria.   Genitourinary:  Negative for decreased urine volume, difficulty urinating, frequency and urinary incontinence.   Musculoskeletal:  Negative for arthralgias, back pain, joint swelling and myalgias.   Skin:  Negative for color change, rash, skin  lesions and wound.   Neurological:  Negative for dizziness, seizures, numbness and headache.   Hematological:  Negative for adenopathy. Does not bruise/bleed easily.   Psychiatric/Behavioral:  Negative for depressed mood. The patient is not nervous/anxious.      Current Outpatient Medications on File Prior to Visit   Medication Sig Dispense Refill    albuterol sulfate  (90 Base) MCG/ACT inhaler Inhale 2 puffs Every 4 (Four) Hours As Needed for Wheezing. 18 g 5    atorvastatin (LIPITOR) 40 MG tablet Lipitor 40 mg oral tablet take 1 tablet (40 mg) by oral route once daily   Suspended      azithromycin (ZITHROMAX) 250 MG tablet Take 1 tablet by mouth Daily. 30 tablet 11    bicalutamide (CASODEX) 50 MG tablet Take 1 tablet by mouth Daily for 360 days. 90 tablet 3    colchicine 0.6 MG tablet       FeroSul 325 (65 Fe) MG tablet       Fluticasone-Umeclidin-Vilant 200-62.5-25 MCG/ACT aerosol powder  Inhale 1 puff Daily. 1 each 7    folic acid (FOLVITE) 1 MG tablet       Gemtesa 75 MG tablet Take 1 tablet by mouth Daily for 360 days. 90 tablet 1    guaiFENesin (Mucinex) 600 MG 12 hr tablet Take 2 tablets by mouth 2 (Two) Times a Day. 60 tablet 5    ipratropium-albuterol (DUO-NEB) 0.5-2.5 mg/3 ml nebulizer Take 3 mL by nebulization Every 4 (Four) Hours As Needed for Wheezing. 360 mL 4    leuprolide (Lupron Depot, 3-Month,) 22.5 MG injection 22.5 mg.      lisinopril (PRINIVIL,ZESTRIL) 5 MG tablet Daily.      metFORMIN (GLUCOPHAGE) 500 MG tablet       metoprolol succinate XL (TOPROL-XL) 25 MG 24 hr tablet Toprol XL 25 mg oral tablet extended release 24 hr take 1 tablet (25 mg) by oral route once daily   Active      mirtazapine (REMERON) 15 MG tablet       pantoprazole (PROTONIX) 40 MG EC tablet Take 1 tablet by mouth Daily. 30 tablet 5    predniSONE (DELTASONE) 10 MG tablet 50mg qday x 3 days, 40mg qday x 3 days, 30mg qday x 3 days, 20mg qday x 3 days, 10mg qday x 3 days, then stop 45 tablet 0    tamsulosin (FLOMAX) 0.4  MG capsule 24 hr capsule Take 1 capsule by mouth Daily for 360 days. 90 capsule 3     Current Facility-Administered Medications on File Prior to Visit   Medication Dose Route Frequency Provider Last Rate Last Admin    leuprolide (LUPRON) injection 22.5 mg  22.5 mg Intramuscular Once Merrill Ames MD           No Known Allergies  Past Medical History:   Diagnosis Date    Bladder outflow obstruction     Cervical spinal stenosis 2018    Colon polyp     COVID     Diabetes mellitus 2019    Emphysema lung     Emphysema of lung     Hand paresthesia 2018    HTN (hypertension)     Hypercholesteremia     Lung cancer 2016    Muscle atrophy of upper extremity 2018    Numbness and tingling in both hands     OAB (overactive bladder) 2014    Prostate cancer 2012    Ulnar neuropathy 2018     Past Surgical History:   Procedure Laterality Date    BRONCHOSCOPY N/A 2023    Procedure: BRONCHOSCOPY WITH BRONCHOALVEOLAR LAVAGE,  BRONCHIAL WASHINGS;  Surgeon: Roshan Hood MD;  Location: [facility] ENDOSCOPY;  Service: Pulmonary;  Laterality: N/A;  PERSISTENT PNEUMONIA    CARDIAC CATHETERIZATION      COLONOSCOPY      DR CUETO    COLONOSCOPY N/A 2023    Procedure: COLONOSCOPY WITH POLYPECTOMIES;  Surgeon: Zhen Cueto MD;  Location: [facility] ENDOSCOPY;  Service: Gastroenterology;  Laterality: N/A;  DIVERTICULOSIS, COLON POLYPS    CRYOABLATION OF PROSTATE  2012    CYSTOSCOPY      HAND SURGERY Right     LUNG BIOPSY      TEETH EXTRACTION      TURP / TRANSURETHRAL INCISION / DRAINAGE PROSTATE  2015     Social History     Socioeconomic History    Marital status:    Tobacco Use    Smoking status: Former     Current packs/day: 0.00     Average packs/day: 1 pack/day for 50.0 years (50.0 ttl pk-yrs)     Types: Cigarettes     Start date: 1966     Quit date: 2016     Years since quittin.2     Passive exposure: Past    Smokeless tobacco: Never    Tobacco comments:      STARTED AGE 17  QUIT IN 2016   Vaping Use    Vaping status: Never Used    Passive vaping exposure: Yes   Substance and Sexual Activity    Alcohol use: Yes     Alcohol/week: 1.0 standard drink of alcohol     Types: 1 Cans of beer per week     Comment: casual drinker    Drug use: Never    Sexual activity: Not Currently     Partners: Female     Family History   Problem Relation Age of Onset    Diabetes Mother     Cancer Sister     Diabetes Sister     Colon cancer Sister 60    Colon polyps Sister     Hypertension Sister     Diabetes Sister     Coronary artery disease Brother        Objective   Physical Exam  Vitals reviewed. Exam conducted with a chaperone present.   Constitutional:       General: He is not in acute distress.     Appearance: Normal appearance.   Cardiovascular:      Rate and Rhythm: Normal rate and regular rhythm.      Heart sounds: Normal heart sounds. No murmur heard.     No gallop.   Pulmonary:      Effort: Pulmonary effort is normal.      Breath sounds: Normal breath sounds.   Abdominal:      General: Abdomen is flat. Bowel sounds are normal.      Palpations: Abdomen is soft.   Neurological:      Mental Status: He is alert.   Psychiatric:         Mood and Affect: Mood normal.         Behavior: Behavior normal.         Vitals:    03/13/24 0808   BP: 116/61   Pulse: 86   Resp: 16   Temp: 97.4 °F (36.3 °C)   TempSrc: Temporal   SpO2: 93%   Weight: 85.5 kg (188 lb 9.6 oz)   PainSc: 0-No pain     ECOG score: 0         PHQ-9 Total Score:                    Result Review :   The following data was reviewed by: Dayron Flores MD on 03/13/2024:  Lab Results   Component Value Date    HGB 13.5 02/16/2022    HCT 42.9 02/16/2022    MCV 81.7 02/16/2022     02/16/2022    WBC 5.57 02/16/2022    NEUTROABS 3.62 02/16/2022    LYMPHSABS 1.37 02/16/2022    MONOSABS 0.34 02/16/2022    EOSABS 0.17 02/16/2022    BASOSABS 0.05 02/16/2022     Lab Results   Component Value Date    GLUCOSE 85 02/16/2022    BUN 14  "02/16/2022    CREATININE 1.80 (H) 03/08/2024     02/16/2022    K 4.2 02/16/2022     02/16/2022    CO2 25.9 02/16/2022    CALCIUM 9.6 02/16/2022    PROTEINTOT 8.1 02/16/2022    ALBUMIN 4.20 02/16/2022    BILITOT 0.6 02/16/2022    ALKPHOS 99 02/16/2022    AST 15 02/16/2022    ALT 7 02/16/2022     No results found for: \"MG\", \"PHOS\", \"FREET4\", \"TSH\"  No results found for: \"IRON\", \"LABIRON\", \"TRANSFERRIN\", \"TIBC\"  No results found for: \"LDH\", \"FERRITIN\", \"[id number]\", \"FOLATE\"  Lab Results   Component Value Date    PSA <0.014 10/10/2023       Data reviewed : Radiologic studies CT chest reviewed demonstrating posttreatment changes and old granuloma which is stable.  No new or worsening findings.     Assessment and Plan    Diagnoses and all orders for this visit:    1. Adenocarcinoma of bronchus, right (Primary)  Assessment & Plan:  Status post concurrent chemoRT.  Patient is doing well.  I see no evidence of disease recurrence by his history, physical examination or recent CT chest.  He is now 8 years out from his treatment.  Clinically doing well.  I will see him back on a yearly basis for ongoing surveillance with CT chest prior.    Orders:  -     CT Chest With Contrast; Future            Patient Follow Up: 1 year with CT chest prior    Patient was given instructions and counseling regarding his condition or for health maintenance advice. Please see specific information pulled into the AVS if appropriate.     Dayron Flores MD    3/13/2024            "

## 2024-03-13 NOTE — ASSESSMENT & PLAN NOTE
Status post concurrent chemoRT.  Patient is doing well.  I see no evidence of disease recurrence by his history, physical examination or recent CT chest.  He is now 8 years out from his treatment.  Clinically doing well.  I will see him back on a yearly basis for ongoing surveillance with CT chest prior.

## 2024-04-10 ENCOUNTER — OFFICE VISIT (OUTPATIENT)
Dept: UROLOGY | Facility: CLINIC | Age: 79
End: 2024-04-10
Payer: MEDICARE

## 2024-04-10 VITALS
HEIGHT: 71 IN | DIASTOLIC BLOOD PRESSURE: 58 MMHG | WEIGHT: 191.6 LBS | HEART RATE: 94 BPM | SYSTOLIC BLOOD PRESSURE: 98 MMHG | BODY MASS INDEX: 26.82 KG/M2

## 2024-04-10 DIAGNOSIS — N40.1 BPH WITH OBSTRUCTION/LOWER URINARY TRACT SYMPTOMS: ICD-10-CM

## 2024-04-10 DIAGNOSIS — C61 PROSTATE CANCER: Primary | ICD-10-CM

## 2024-04-10 DIAGNOSIS — R97.21 RISING PSA FOLLOWING TREATMENT FOR MALIGNANT NEOPLASM OF PROSTATE: ICD-10-CM

## 2024-04-10 DIAGNOSIS — N13.8 BPH WITH OBSTRUCTION/LOWER URINARY TRACT SYMPTOMS: ICD-10-CM

## 2024-04-10 DIAGNOSIS — R35.0 URINARY FREQUENCY: ICD-10-CM

## 2024-04-10 RX ORDER — VIBEGRON 75 MG/1
75 TABLET, FILM COATED ORAL DAILY
Qty: 90 TABLET | Refills: 1 | Status: SHIPPED | OUTPATIENT
Start: 2024-04-10 | End: 2025-04-05

## 2024-04-10 RX ORDER — BICALUTAMIDE 50 MG/1
50 TABLET, FILM COATED ORAL DAILY
Qty: 90 TABLET | Refills: 3 | Status: SHIPPED | OUTPATIENT
Start: 2024-04-10 | End: 2025-04-05

## 2024-04-10 NOTE — PROGRESS NOTES
"Chief Complaint  Prostate Cancer (6 MO. F/U)    Urinary urgency    Subjective          Daron Whitfield presents to [facility] UROLOGY  History of Present Illness  79-year-old  -American male had prostate cancer diagnosed in 2011.  Patient underwent cryoablation of prostate.  His PSA started going up.  Patient is on antiandrogen therapy.  Last PSA 6 months ago was less than 0.014.  Patient has been doing very well and has nocturia 1 time.  Patient has no intermittency but he does have some urgency.  Patient is on Casodex 50 mg daily and Depo-Lupron 22.5 mg IM every 3 months.  Patient quality score is 1 for urination.  Patient is on Gemtesa for urinary frequency.  Patient also has lung cancer which is in remission after radiation.  Objective no acute distress  Vital Signs:   BP 98/58 (BP Location: Left arm, Patient Position: Sitting, Cuff Size: Adult)   Pulse 94   Ht 180.3 cm (71\")   Wt 86.9 kg (191 lb 9.6 oz)   BMI 26.72 kg/m²     No Known Allergies   Past medical history:  has a past medical history of Bladder outflow obstruction, Cervical spinal stenosis (01/23/2018), Colon polyp, COVID, Diabetes mellitus (2019), Emphysema lung, Emphysema of lung, Hand paresthesia (01/23/2018), HTN (hypertension), Hypercholesteremia, Lung cancer (2016), Muscle atrophy of upper extremity (01/23/2018), Numbness and tingling in both hands, OAB (overactive bladder) (12/09/2014), Prostate cancer (2012), and Ulnar neuropathy (01/23/2018).   Past surgical history:  has a past surgical history that includes Colonoscopy (2019); Cystoscopy; Lung biopsy; Cryoablation of Prostate (01/17/2012); Teeth Extraction; TURP / transurethral incision / drainage prostate (06/16/2015); Cardiac catheterization; Bronchoscopy (N/A, 05/09/2023); Hand surgery (Right); and Colonoscopy (N/A, 12/4/2023).  Personal history: family history includes Cancer in his sister; Colon cancer (age of onset: 60) in his sister; Colon polyps " in his sister; Coronary artery disease in his brother; Diabetes in his mother, sister, and sister; Hypertension in his sister.  Social history:  reports that he quit smoking about 8 years ago. His smoking use included cigarettes. He started smoking about 58 years ago. He has a 50 pack-year smoking history. He has been exposed to tobacco smoke. He has never used smokeless tobacco. He reports current alcohol use of about 1.0 standard drink of alcohol per week. He reports that he does not use drugs.    Review of Systems     Physical Exam  Constitutional:       General: He is not in acute distress.     Appearance: Normal appearance. He is normal weight. He is not ill-appearing or toxic-appearing.   HENT:      Head: Normocephalic and atraumatic.      Ears:      Comments: No loss of hearing  Cardiovascular:      Rate and Rhythm: Normal rate and regular rhythm.      Pulses: Normal pulses.      Heart sounds: Normal heart sounds. No murmur heard.  Pulmonary:      Effort: Pulmonary effort is normal. No respiratory distress.      Breath sounds: Normal breath sounds. No rhonchi or rales.   Abdominal:      General: There is no distension.      Palpations: Abdomen is soft. There is no mass.      Tenderness: There is no abdominal tenderness.   Genitourinary:     Comments: Uncircumcised normal penis.    Right and left scrotum is normal.    Right and left testicle and epididymis is normal.    LUL.  Prostate gland is very minimal in size and no hard areas felt  Musculoskeletal:         General: Normal range of motion.      Cervical back: Normal range of motion and neck supple. No rigidity or tenderness.   Lymphadenopathy:      Cervical: No cervical adenopathy.   Skin:     General: Skin is warm.      Coloration: Skin is not jaundiced.   Neurological:      General: No focal deficit present.      Mental Status: He is alert and oriented to person, place, and time.      Motor: No weakness.      Gait: Gait normal.   Psychiatric:          Mood and Affect: Mood normal.         Behavior: Behavior normal.         Thought Content: Thought content normal.         Judgment: Judgment normal.        Result Review :                 Assessment and Plan    Diagnoses and all orders for this visit:    1. Prostate cancer (Primary)  -     POC Urinalysis Dipstick, Automated  -     bicalutamide (CASODEX) 50 MG tablet; Take 1 tablet by mouth Daily for 360 days.  Dispense: 90 tablet; Refill: 3    2. Urinary frequency  -     Gemtesa 75 MG tablet; Take 1 tablet by mouth Daily for 360 days.  Dispense: 90 tablet; Refill: 1    3. BPH with obstruction/lower urinary tract symptoms  -     bicalutamide (CASODEX) 50 MG tablet; Take 1 tablet by mouth Daily for 360 days.  Dispense: 90 tablet; Refill: 3    4. Rising PSA following treatment for malignant neoplasm of prostate    I will recheck the patient in 6 months time.  He had a PSA done at [facility]s place and we are trying to get that     Brief Urine Lab Results  (Last result in the past 365 days)        Color   Clarity   Blood   Leuk Est   Nitrite   Protein   CREAT   Urine HCG        10/10/23 1021 Yellow   Clear   Trace   Negative   Negative   Negative                    Follow Up   No follow-ups on file.  Patient was given instructions and counseling regarding his condition or for health maintenance advice. Please see specific information pulled into the AVS if appropriate.     Merrill Ames MD

## 2024-04-15 ENCOUNTER — CLINICAL SUPPORT (OUTPATIENT)
Dept: UROLOGY | Facility: CLINIC | Age: 79
End: 2024-04-15
Payer: MEDICARE

## 2024-04-15 DIAGNOSIS — C61 PROSTATE CANCER: ICD-10-CM

## 2024-04-15 DIAGNOSIS — R97.21 RISING PSA FOLLOWING TREATMENT FOR MALIGNANT NEOPLASM OF PROSTATE: Primary | ICD-10-CM

## 2024-05-02 NOTE — PROGRESS NOTES
Primary Care Provider  Carol Frank MD   Referring Provider  No ref. provider found    Patient Complaint  Follow-up (Months); Adenocarcinoma of bronchus, right; and Shortness of Breath      SUBJECTIVE    History of Presenting Illness  Daron Whitfield is a pleasant 79 y.o. male who presents to [facility] for followup appointment.  Patient last saw Dr. Hood 1/2/2024.  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  He continues on Trelegy Ellipta 200 and albuterol as duo nebulizer as needed.  Patient was started on macrolide therapy last visit.  He was also treated with prednisone taper for 15 days due to an exacerbation of his COPD at last visit.  Patient is on oxygen 2 L during the day and at night for nocturnal hypoxia.  He presents to the office today with a sat of 87% on room air.  Patient states he forgot to bring his portable oxygen concentrator with him.  He was placed on our oxygen concentrator rebounded up to 95% on 2 L.  His most recent CT scan was 3/8/2024 which showed stable right perihilar treatment related changes.  No findings to suggest locally recurrent malignancy or thoracic metastatic disease.  Advanced pulmonary emphysema and coronary artery atherosclerosis.  Patient states he is having increased in allergy symptoms sneezing runny nose runny eyes nasal drainage.  He does get short of air with exertional activities but he improves with rest and using his oxygen.  He states he is having a little bit of a cough as well and has used NyQuil the past few days.  He states he was unable to get his Mucinex through [facility] or his local pharmacy.    At present time he denies wheezing, headaches, chest pain, weight loss or hemoptysis. Denies fevers, chills and night sweats. Daron Whitfield is able to perform ADLs without difficulties and denies  any swollen glands/lymph nodes in the head or neck.    I have personally reviewed the review of systems, past family, social, medical and surgical histories; and agree with their findings.    Review of Systems  Constitutional symptoms:  Denied complaints   Ear, nose, throat: Runny eyes runny nose nasal congestion  Cardiovascular:  Denied complaints  Respiratory: Shortness of air with exertion, cough  Gastrointestinal: Denied complaints  Musculoskeletal: Denied complaints    Family History   Problem Relation Age of Onset    Diabetes Mother     Cancer Sister     Diabetes Sister     Colon cancer Sister 60    Colon polyps Sister     Hypertension Sister     Diabetes Sister     Coronary artery disease Brother         Social History     Socioeconomic History    Marital status:    Tobacco Use    Smoking status: Former     Current packs/day: 0.00     Average packs/day: 1 pack/day for 50.0 years (50.0 ttl pk-yrs)     Types: Cigarettes     Start date: 1966     Quit date: 2016     Years since quittin.3     Passive exposure: Past    Smokeless tobacco: Never    Tobacco comments:     STARTED AGE 17  QUIT IN    Vaping Use    Vaping status: Never Used    Passive vaping exposure: Yes   Substance and Sexual Activity    Alcohol use: Yes     Alcohol/week: 1.0 standard drink of alcohol     Types: 1 Cans of beer per week     Comment: casual drinker    Drug use: Never    Sexual activity: Not Currently     Partners: Female        Past Medical History:   Diagnosis Date    Bladder outflow obstruction     Cervical spinal stenosis 2018    Colon polyp     COVID     Diabetes mellitus 2019    Emphysema lung     Emphysema of lung     Hand paresthesia 2018    HTN (hypertension)     Hypercholesteremia     Lung cancer 2016    Muscle atrophy of upper extremity 2018    Numbness and tingling in both hands     OAB (overactive bladder) 2014    Prostate cancer 2012    Ulnar neuropathy 2018         Immunization History   Administered Date(s) Administered    ABRYSVO (RSV, 60+ or pregnant women 32-36 wks) 10/30/2023    COVID-19 (MODERNA) 1st,2nd,3rd Dose Monovalent 02/05/2021, 03/09/2021    COVID-19 (PFIZER) BIVALENT 12+YRS 09/21/2022    COVID-19 (PFIZER) Purple Cap Monovalent 11/17/2021    COVID-19 F23 (MODERNA) 12YRS+ (SPIKEVAX) 10/30/2023    Fluad Quad 65+ 10/24/2022, 10/04/2023    Fluzone High Dose =>65 Years ([employer] ONLY) 12/11/2019    Fluzone High-Dose 65+yrs 11/13/2021, 10/30/2023    Pneumococcal Polysaccharide (PPSV23) 10/24/2022       No Known Allergies       Current Outpatient Medications:     albuterol sulfate  (90 Base) MCG/ACT inhaler, Inhale 2 puffs Every 4 (Four) Hours As Needed for Wheezing., Disp: 18 g, Rfl: 5    atorvastatin (LIPITOR) 40 MG tablet, Lipitor 40 mg oral tablet take 1 tablet (40 mg) by oral route once daily   Suspended, Disp: , Rfl:     bicalutamide (CASODEX) 50 MG tablet, Take 1 tablet by mouth Daily for 360 days., Disp: 90 tablet, Rfl: 3    Gemtesa 75 MG tablet, Take 1 tablet by mouth Daily for 360 days., Disp: 90 tablet, Rfl: 1    ipratropium-albuterol (DUO-NEB) 0.5-2.5 mg/3 ml nebulizer, Take 3 mL by nebulization Every 4 (Four) Hours As Needed for Wheezing., Disp: 360 mL, Rfl: 4    leuprolide (Lupron Depot, 3-Month,) 22.5 MG injection, 22.5 mg., Disp: , Rfl:     lisinopril (PRINIVIL,ZESTRIL) 5 MG tablet, Daily., Disp: , Rfl:     metFORMIN (GLUCOPHAGE) 500 MG tablet, , Disp: , Rfl:     metoprolol succinate XL (TOPROL-XL) 25 MG 24 hr tablet, Toprol XL 25 mg oral tablet extended release 24 hr take 1 tablet (25 mg) by oral route once daily   Active, Disp: , Rfl:     mirtazapine (REMERON) 15 MG tablet, , Disp: , Rfl:     pantoprazole (PROTONIX) 40 MG EC tablet, Take 1 tablet by mouth Daily., Disp: 30 tablet, Rfl: 5    cetirizine (zyrTEC) 10 MG tablet, Take 1 tablet by mouth Daily., Disp: 90 tablet, Rfl: 3    Fluticasone-Umeclidin-Vilant (TRELEGY ELLIPTA) 200-62.5-25  "MCG/ACT inhaler, Inhale 1 puff Daily., Disp: 60 each, Rfl: 11    Current Facility-Administered Medications:     leuprolide (LUPRON) injection 22.5 mg, 22.5 mg, Intramuscular, Once, Merrill Ames MD           Vital Signs   /70 (BP Location: Left arm, Patient Position: Sitting, Cuff Size: Adult)   Pulse 87   Temp 98.2 °F (36.8 °C) (Oral)   Resp 18   Ht 180.3 cm (71\")   Wt 86.2 kg (190 lb)   SpO2 (!) 87% Comment: Room air  BMI 26.50 kg/m²       OBJECTIVE    Physical Exam  Vital Signs Reviewed   WDWN, Alert, NAD.    HEENT:  PERRL, EOMI.  OP, nares clear  Neck:  Supple, no JVD, no thyromegaly  Chest:  good aeration, clear to auscultation bilaterally, tympanic to percussion bilaterally, no work of breathing noted  CV: RRR, no MGR, pulses 2+, equal.  Abd:  Soft, NT, ND, + BS, no HSM  EXT:  no clubbing, no cyanosis, no edema  Neuro:  A&Ox3, CN grossly intact, no focal deficits.  Skin: No rashes or lesions noted    Results Review  I have personally reviewed the prior office notes, hospital records, labs, and diagnostics.  CT Chest With Contrast Diagnostic [[id number]] (Order [id number])  Order  Status: Final result     Study Notes     Nieves Brice on 3/8/2024  9:44 AM EST   PT STATES HERE FOR EVAL OF LUNG CANCER  PT DENIES COMPLAINTS AT THIS TIME     Appointment Information    PACS Images     Radiology Images  Study Result    Narrative & Impression   PROCEDURE:CT CHEST W CONTRAST DIAGNOSTIC     COMPARISON:  [facility], CT, CT CHEST WO CONTRAST DIAGNOSTIC, 3/07/2023, 9:03.  INDICATIONS:Lung cancer surveillance     TECHNIQUE:    After obtaining the patient's consent, CT images were obtained with non-ionic   intravenous contrast material.       PROTOCOL:     Standard imaging protocol performed                 RADIATION:      DLP: 155 mGy*cm               Automated exposure control was utilized to minimize radiation dose.   CONTRAST:70 cc Isovue 370 I.V.     FINDINGS:        "   Keli/mediastinum:  No adenopathy.  Normal caliber atherosclerotic aorta with some eccentric mural   thrombus in the distal descending.  Coronary artery calcification.  No pericardial effusion     Lungs/pleura:  Advanced emphysema.  Stable right perihilar fibrosis extending into the right lower   lobe.  Atelectasis in the posterior basilar right lower lobe.  No new suspicious pulmonary nodule.    Calcified granuloma in the left upper lobe.  No acute abnormality.  No pleural effusion     Upper abdomen:  No suspicious findings     Bones/soft tissues:  No suspicious bone lesion.  Bilateral gynecomastia     IMPRESSION:                    1. Stable right perihilar treatment related changes.  No findings to suggest locally recurrent   malignancy or thoracic metastatic disease     2. Advanced pulmonary emphysema     3. Coronary artery atherosclerosis              RUSTAM ELISE MD         Electronically Signed and Approved By: RUSTAM ELISE MD on 3/08/2024 at 13:17        File Link    Scan on 6/20/2023 by [facility]: IN OFFICE 6 MINUTE WALK - 6/20/23        Key Information    Document ID File Type Document Type Description   [id number] Image PULMONARY - SCAN IN OFFICE 6 MINUTE WALK - 6/20/23     Import Information    Attached At Date Time User Dept   Encounter Level 6/20/2023  [facility]      Encounter    Office Visit on 6/20/23 with Susanne Dobbins APRN     ASSESSMENT         Patient Active Problem List   Diagnosis    Adenocarcinoma of bronchus, right    Bladder outflow obstruction    Cancer of prostate    Cervical spinal stenosis    Colon polyp    Diabetic nephropathy    Hand paresthesia    HTN (hypertension)    Hypercholesterolemia    Hypertonicity of bladder    Atrophy of muscle of right hand    Pulmonary emphysema    Stage 3a chronic kidney disease    Primary osteoarthritis of left shoulder    Injury of left shoulder    Neuropathy of right ulnar nerve at wrist    Bilateral carpal tunnel syndrome     Polyneuropathy    Moderate persistent asthma    Benign prostatic hyperplasia    Diabetes mellitus    History of vaccination    COVID-19 virus infection    COPD with acute exacerbation    Moderate persistent asthma    SOB (shortness of breath)    History of colon polyps    Encounter for screening for malignant neoplasm of colon    Increasing PSA level after treatment for prostate cancer       Encounter Diagnoses   Name Primary?    Adenocarcinoma of bronchus, right Yes    Pulmonary emphysema, unspecified emphysema type     SOB (shortness of breath)     Environmental and seasonal allergies       PLAN  -CT for surveillance per Dr. Flores  -Encourage patient to use his oxygen at 2 L and titrating it to keep his sats above 90% patient verbalized understanding  -Encourage patient to use his oxygen for sleep which he does  -Trelegy 200 sent to his pharmacy with instruction to rinse his mouth out to prevent oral thrush  -Cetirizine sent to his pharmacy for allergy symptoms  -Mucinex samples and Delsym provided to patient in office today  -Patient will follow back up with me in 1 month to assess response to using Mucinex Delsym and Zyrtec return to office sooner if needed    Diagnoses and all orders for this visit:    1. Adenocarcinoma of bronchus, right (Primary)  -     Fluticasone-Umeclidin-Vilant (TRELEGY ELLIPTA) 200-62.5-25 MCG/ACT inhaler; Inhale 1 puff Daily.  Dispense: 60 each; Refill: 11  -     cetirizine (zyrTEC) 10 MG tablet; Take 1 tablet by mouth Daily.  Dispense: 90 tablet; Refill: 3    2. Pulmonary emphysema, unspecified emphysema type  -     Fluticasone-Umeclidin-Vilant (TRELEGY ELLIPTA) 200-62.5-25 MCG/ACT inhaler; Inhale 1 puff Daily.  Dispense: 60 each; Refill: 11  -     cetirizine (zyrTEC) 10 MG tablet; Take 1 tablet by mouth Daily.  Dispense: 90 tablet; Refill: 3    3. SOB (shortness of breath)  -     Fluticasone-Umeclidin-Vilant (TRELEGY ELLIPTA) 200-62.5-25 MCG/ACT inhaler; Inhale 1 puff Daily.   Dispense: 60 each; Refill: 11  -     cetirizine (zyrTEC) 10 MG tablet; Take 1 tablet by mouth Daily.  Dispense: 90 tablet; Refill: 3    4. Environmental and seasonal allergies  -     Fluticasone-Umeclidin-Vilant (TRELEGY ELLIPTA) 200-62.5-25 MCG/ACT inhaler; Inhale 1 puff Daily.  Dispense: 60 each; Refill: 11  -     cetirizine (zyrTEC) 10 MG tablet; Take 1 tablet by mouth Daily.  Dispense: 90 tablet; Refill: 3           Smoking status: Former quit in 20 1650-pack-year history  Vaccination status: Reviewed  Medications personally reviewed    Follow Up  Return in about 4 weeks (around 5/31/2024).    Patient was given instructions and counseling regarding his condition or for health maintenance advice. Please see specific information pulled into the AVS if appropriate.     I spent 20 minutes caring for Daron Whitfield on this date of service. This time includes time spent by me in the following activities:preparing for the visit, reviewing tests, obtaining and/or reviewing a separately obtained history, performing a medically appropriate examination and/or evaluation, counseling and educating the patient/family/caregiver, ordering medications, tests, or procedures, documenting information in the medical record, independently interpreting results and communicating that information with the patient/family/caregiver and answered questions family members, discuss medications.

## 2024-05-03 ENCOUNTER — OFFICE VISIT (OUTPATIENT)
Dept: PULMONOLOGY | Facility: CLINIC | Age: 79
End: 2024-05-03
Payer: MEDICARE

## 2024-05-03 VITALS
OXYGEN SATURATION: 87 % | RESPIRATION RATE: 18 BRPM | BODY MASS INDEX: 26.6 KG/M2 | DIASTOLIC BLOOD PRESSURE: 70 MMHG | TEMPERATURE: 98.2 F | HEIGHT: 71 IN | WEIGHT: 190 LBS | HEART RATE: 87 BPM | SYSTOLIC BLOOD PRESSURE: 116 MMHG

## 2024-05-03 DIAGNOSIS — R06.02 SOB (SHORTNESS OF BREATH): ICD-10-CM

## 2024-05-03 DIAGNOSIS — C34.91 ADENOCARCINOMA OF BRONCHUS, RIGHT: Primary | ICD-10-CM

## 2024-05-03 DIAGNOSIS — J43.9 PULMONARY EMPHYSEMA, UNSPECIFIED EMPHYSEMA TYPE: ICD-10-CM

## 2024-05-03 DIAGNOSIS — J30.89 ENVIRONMENTAL AND SEASONAL ALLERGIES: ICD-10-CM

## 2024-05-03 PROCEDURE — 3078F DIAST BP <80 MM HG: CPT | Performed by: NURSE PRACTITIONER

## 2024-05-03 PROCEDURE — 3074F SYST BP LT 130 MM HG: CPT | Performed by: NURSE PRACTITIONER

## 2024-05-03 PROCEDURE — 99214 OFFICE O/P EST MOD 30 MIN: CPT | Performed by: NURSE PRACTITIONER

## 2024-05-03 PROCEDURE — 1159F MED LIST DOCD IN RCRD: CPT | Performed by: NURSE PRACTITIONER

## 2024-05-03 PROCEDURE — 1160F RVW MEDS BY RX/DR IN RCRD: CPT | Performed by: NURSE PRACTITIONER

## 2024-05-03 RX ORDER — CETIRIZINE HYDROCHLORIDE 10 MG/1
10 TABLET ORAL DAILY
Qty: 90 TABLET | Refills: 3 | Status: SHIPPED | OUTPATIENT
Start: 2024-05-03

## 2024-06-07 ENCOUNTER — HOSPITAL ENCOUNTER (OUTPATIENT)
Dept: GENERAL RADIOLOGY | Facility: HOSPITAL | Age: 79
Discharge: HOME | End: 2024-06-07
Payer: MEDICARE

## 2024-06-07 ENCOUNTER — OFFICE VISIT (OUTPATIENT)
Dept: PULMONOLOGY | Facility: CLINIC | Age: 79
End: 2024-06-07
Payer: MEDICARE

## 2024-06-07 VITALS
HEIGHT: 71 IN | HEART RATE: 99 BPM | SYSTOLIC BLOOD PRESSURE: 93 MMHG | TEMPERATURE: 98 F | DIASTOLIC BLOOD PRESSURE: 59 MMHG | WEIGHT: 179 LBS | BODY MASS INDEX: 25.06 KG/M2 | OXYGEN SATURATION: 90 % | RESPIRATION RATE: 18 BRPM

## 2024-06-07 DIAGNOSIS — J30.89 ENVIRONMENTAL AND SEASONAL ALLERGIES: ICD-10-CM

## 2024-06-07 DIAGNOSIS — J43.9 PULMONARY EMPHYSEMA, UNSPECIFIED EMPHYSEMA TYPE: ICD-10-CM

## 2024-06-07 DIAGNOSIS — T17.500A MUCUS PLUGGING OF BRONCHI: ICD-10-CM

## 2024-06-07 DIAGNOSIS — C34.91 ADENOCARCINOMA OF BRONCHUS, RIGHT: ICD-10-CM

## 2024-06-07 DIAGNOSIS — R06.02 SOB (SHORTNESS OF BREATH): ICD-10-CM

## 2024-06-07 DIAGNOSIS — J44.1 COPD WITH ACUTE EXACERBATION: Primary | ICD-10-CM

## 2024-06-07 DIAGNOSIS — J44.1 COPD WITH ACUTE EXACERBATION: ICD-10-CM

## 2024-06-07 DIAGNOSIS — R05.1 ACUTE COUGH: ICD-10-CM

## 2024-06-07 PROCEDURE — 71046 X-RAY EXAM CHEST 2 VIEWS: CPT

## 2024-06-07 RX ORDER — IPRATROPIUM BROMIDE AND ALBUTEROL SULFATE 2.5; .5 MG/3ML; MG/3ML
3 SOLUTION RESPIRATORY (INHALATION) ONCE
Status: COMPLETED | OUTPATIENT
Start: 2024-06-07 | End: 2024-06-07

## 2024-06-07 RX ORDER — IPRATROPIUM BROMIDE AND ALBUTEROL SULFATE 2.5; .5 MG/3ML; MG/3ML
3 SOLUTION RESPIRATORY (INHALATION) EVERY 4 HOURS PRN
Qty: 360 ML | Refills: 4 | Status: SHIPPED | OUTPATIENT
Start: 2024-06-07

## 2024-06-07 RX ORDER — AZITHROMYCIN 250 MG/1
TABLET, FILM COATED ORAL
Qty: 6 TABLET | Refills: 0 | Status: SHIPPED | OUTPATIENT
Start: 2024-06-07

## 2024-06-07 RX ORDER — METHYLPREDNISOLONE SODIUM SUCCINATE 40 MG/ML
80 INJECTION, POWDER, LYOPHILIZED, FOR SOLUTION INTRAMUSCULAR; INTRAVENOUS ONCE
Status: COMPLETED | OUTPATIENT
Start: 2024-06-07 | End: 2024-06-07

## 2024-06-07 RX ORDER — ALBUTEROL SULFATE 90 UG/1
2 AEROSOL, METERED RESPIRATORY (INHALATION) EVERY 4 HOURS PRN
Qty: 18 G | Refills: 5 | Status: SHIPPED | OUTPATIENT
Start: 2024-06-07

## 2024-06-07 RX ORDER — PREDNISONE 10 MG/1
TABLET ORAL
Qty: 42 TABLET | Refills: 0 | Status: SHIPPED | OUTPATIENT
Start: 2024-06-07

## 2024-06-07 RX ADMIN — METHYLPREDNISOLONE SODIUM SUCCINATE 80 MG: 40 INJECTION, POWDER, LYOPHILIZED, FOR SOLUTION INTRAMUSCULAR; INTRAVENOUS at 10:12

## 2024-06-07 RX ADMIN — IPRATROPIUM BROMIDE AND ALBUTEROL SULFATE 3 ML: 2.5; .5 SOLUTION RESPIRATORY (INHALATION) at 10:12

## 2024-06-07 NOTE — PROGRESS NOTES
Primary Care Provider  Carol Frank MD   Referring Provider  No ref. provider found    Patient Complaint  Follow-up (1 Month) and Adenocarcinoma of bronchus, right      SUBJECTIVE    History of Presenting Illness  Daron Whitfield is a pleasant 79 y.o. male who presents to [facility] for follow-up appointment.  I last saw the patient 5/3/2024.  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  Continues to get surveillance CTs by radiation oncology.  He continues on Trelegy Ellipta 200 and albuterol as duo nebulizer as needed.  He also is on macrolide therapy at this time.  He uses oxygen 2 L during the day and at night for nocturnal hypoxia.  At last visit we had given him some Mucinex Delsym as well as Zyrtec for his allergy symptoms he is to follow back up to evaluate response to the addition of these medications. Patient states he is not doing well. Having a hard time breathing, not able to cough up much mucus. He denies fever and chills, but has been having wheezing, shortness of air. His last duo nebulizer treatment was last night he states.    Denies headaches, chest pain, weight loss or hemoptysis. Denies fevers, chills and night sweats. Daron Whitfield is able to perform ADLs without difficulties and denies any swollen glands/lymph nodes in the head or neck.    I have personally reviewed the review of systems, past family, social, medical and surgical histories; and agree with their findings.    Review of Systems  Constitutional symptoms:  Denied complaints   Ear, nose, throat: Denied complaints  Cardiovascular:  Denied complaints  Respiratory: shortness of air, wheeze, cough  Gastrointestinal: Denied complaints  Musculoskeletal: Denied complaints    Family History   Problem Relation Age of Onset    Diabetes Mother     Cancer Sister     Diabetes Sister     Colon cancer  Sister 60    Colon polyps Sister     Hypertension Sister     Diabetes Sister     Coronary artery disease Brother         Social History     Socioeconomic History    Marital status:    Tobacco Use    Smoking status: Former     Current packs/day: 0.00     Average packs/day: 1 pack/day for 50.0 years (50.0 ttl pk-yrs)     Types: Cigarettes     Start date: 1966     Quit date: 2016     Years since quittin.4     Passive exposure: Past    Smokeless tobacco: Never    Tobacco comments:     STARTED AGE 17  QUIT IN 2016   Vaping Use    Vaping status: Never Used    Passive vaping exposure: Yes   Substance and Sexual Activity    Alcohol use: Yes     Alcohol/week: 1.0 standard drink of alcohol     Types: 1 Cans of beer per week     Comment: casual drinker    Drug use: Never    Sexual activity: Not Currently     Partners: Female        Past Medical History:   Diagnosis Date    Bladder outflow obstruction     Cervical spinal stenosis 2018    Colon polyp     COVID     Diabetes mellitus     Emphysema lung     Emphysema of lung     Hand paresthesia 2018    HTN (hypertension)     Hypercholesteremia     Lung cancer 2016    Muscle atrophy of upper extremity 2018    Numbness and tingling in both hands     OAB (overactive bladder) 2014    Prostate cancer 2012    Ulnar neuropathy 2018        Immunization History   Administered Date(s) Administered    ABRYSVO (RSV, 60+ or pregnant women 32-36 wks) 10/30/2023    COVID-19 (MODERNA) 1st,2nd,3rd Dose Monovalent 2021, 2021    COVID-19 (PFIZER) BIVALENT 12+YRS 2022    COVID-19 (PFIZER) Purple Cap Monovalent 2021    COVID-19 F23 (MODERNA) 12YRS+ (SPIKEVAX) 10/30/2023    Fluad Quad 65+ 10/24/2022, 10/04/2023    Fluzone High Dose =>65 Years ([employer] ONLY) 2019    Fluzone High-Dose 65+yrs 2021, 10/30/2023    Pneumococcal Polysaccharide (PPSV23) 10/24/2022       No Known Allergies       Current Outpatient  Medications:     albuterol sulfate  (90 Base) MCG/ACT inhaler, Inhale 2 puffs Every 4 (Four) Hours As Needed for Wheezing., Disp: 18 g, Rfl: 5    atorvastatin (LIPITOR) 40 MG tablet, Lipitor 40 mg oral tablet take 1 tablet (40 mg) by oral route once daily   Suspended, Disp: , Rfl:     bicalutamide (CASODEX) 50 MG tablet, Take 1 tablet by mouth Daily for 360 days., Disp: 90 tablet, Rfl: 3    cetirizine (zyrTEC) 10 MG tablet, Take 1 tablet by mouth Daily., Disp: 90 tablet, Rfl: 3    Fluticasone-Umeclidin-Vilant (TRELEGY ELLIPTA) 200-62.5-25 MCG/ACT inhaler, Inhale 1 puff Daily., Disp: 60 each, Rfl: 11    Gemtesa 75 MG tablet, Take 1 tablet by mouth Daily for 360 days., Disp: 90 tablet, Rfl: 1    ipratropium-albuterol (DUO-NEB) 0.5-2.5 mg/3 ml nebulizer, Take 3 mL by nebulization Every 4 (Four) Hours As Needed for Wheezing., Disp: 360 mL, Rfl: 4    leuprolide (Lupron Depot, 3-Month,) 22.5 MG injection, 22.5 mg., Disp: , Rfl:     lisinopril (PRINIVIL,ZESTRIL) 5 MG tablet, Daily., Disp: , Rfl:     metFORMIN (GLUCOPHAGE) 500 MG tablet, , Disp: , Rfl:     metoprolol succinate XL (TOPROL-XL) 25 MG 24 hr tablet, Toprol XL 25 mg oral tablet extended release 24 hr take 1 tablet (25 mg) by oral route once daily   Active, Disp: , Rfl:     mirtazapine (REMERON) 15 MG tablet, , Disp: , Rfl:     pantoprazole (PROTONIX) 40 MG EC tablet, Take 1 tablet by mouth Daily., Disp: 30 tablet, Rfl: 5    azithromycin (ZITHROMAX) 250 MG tablet, Take 2 by mouth today then 1 daily for 4 days, Disp: 6 tablet, Rfl: 0    predniSONE (DELTASONE) 10 MG tablet, 6 daily x 2 days, 5 daily x 2 days, 4 daily x 2 days, 3 daily x 2 days, 2 daily x 2 days, 1 daily x 2 days, Disp: 42 tablet, Rfl: 0    Current Facility-Administered Medications:     ipratropium-albuterol (DUO-NEB) nebulizer solution 3 mL, 3 mL, Nebulization, Once, Susanne Dobbins APRN    leuprolide (LUPRON) injection 22.5 mg, 22.5 mg, Intramuscular, Once, Merrill Ames MD     "methylPREDNISolone sodium succinate (SOLU-Medrol) injection 80 mg, 80 mg, Intramuscular, Once, Susanne Dobbins, APRN           Vital Signs   BP 93/59 (BP Location: Left arm, Patient Position: Sitting, Cuff Size: Adult)   Pulse 99   Temp 98 °F (36.7 °C) (Temporal)   Resp 18   Ht 180.3 cm (71\")   Wt 81.2 kg (179 lb)   SpO2 90% Comment: 3L Oxygen  BMI 24.97 kg/m²       OBJECTIVE    Physical Exam  Vital Signs Reviewed   WDWN, Alert, NAD.    HEENT:  PERRL, EOMI.  OP, nares clear  Neck:  Supple, no JVD, no thyromegaly  Chest:  diminished breath sounds bilaterally, no work of breathing noted  CV: RRR, no MGR, pulses 2+, equal.  Abd:  Soft, NT, ND, + BS, no HSM  EXT:  no clubbing, no cyanosis, no edema  Neuro:  A&Ox3, CN grossly intact, no focal deficits.  Skin: No rashes or lesions noted    Results Review  I have personally reviewed the prior office notes, hospital records, labs, and diagnostics.    ASSESSMENT         Patient Active Problem List   Diagnosis    Adenocarcinoma of bronchus, right    Bladder outflow obstruction    Cancer of prostate    Cervical spinal stenosis    Colon polyp    Diabetic nephropathy    Hand paresthesia    HTN (hypertension)    Hypercholesterolemia    Hypertonicity of bladder    Atrophy of muscle of right hand    Pulmonary emphysema    Stage 3a chronic kidney disease    Primary osteoarthritis of left shoulder    Injury of left shoulder    Neuropathy of right ulnar nerve at wrist    Bilateral carpal tunnel syndrome    Polyneuropathy    Moderate persistent asthma    Benign prostatic hyperplasia    Diabetes mellitus    History of vaccination    COVID-19 virus infection    COPD with acute exacerbation    Moderate persistent asthma    SOB (shortness of breath)    History of colon polyps    Encounter for screening for malignant neoplasm of colon    Increasing PSA level after treatment for prostate cancer       Encounter Diagnoses   Name Primary?    COPD with acute exacerbation Yes    Acute cough  "    SOB (shortness of breath)     Adenocarcinoma of bronchus, right     Pulmonary emphysema, unspecified emphysema type     Mucus plugging of bronchi     Environmental and seasonal allergies       PLAN  -duo nebulizer treatment now with some improvement in breath sounds  -Patient encouraged to use his duo nebulizer every 6 hours  -Patient advised to continue with his Mucinex  -solu medrol IM 80 mg now  -refill on albuterol and duo nebulizer sent to pharmacy  -CXR now, will notify of results  -Will also get a CT of his chest due to hx mucus plugging  -Patient advised if his symptoms persist or worsen he is to go to emergency room for further evaluation  -Patient will follow back up in 2 weeks or sooner if needed  Diagnoses and all orders for this visit:    1. COPD with acute exacerbation (Primary)  -     ipratropium-albuterol (DUO-NEB) 0.5-2.5 mg/3 ml nebulizer; Take 3 mL by nebulization Every 4 (Four) Hours As Needed for Wheezing.  Dispense: 360 mL; Refill: 4  -     albuterol sulfate  (90 Base) MCG/ACT inhaler; Inhale 2 puffs Every 4 (Four) Hours As Needed for Wheezing.  Dispense: 18 g; Refill: 5  -     ipratropium-albuterol (DUO-NEB) nebulizer solution 3 mL  -     methylPREDNISolone sodium succinate (SOLU-Medrol) injection 80 mg  -     azithromycin (ZITHROMAX) 250 MG tablet; Take 2 by mouth today then 1 daily for 4 days  Dispense: 6 tablet; Refill: 0  -     predniSONE (DELTASONE) 10 MG tablet; 6 daily x 2 days, 5 daily x 2 days, 4 daily x 2 days, 3 daily x 2 days, 2 daily x 2 days, 1 daily x 2 days  Dispense: 42 tablet; Refill: 0  -     XR Chest 2 View; Future  -     CT Chest Without Contrast; Future    2. Acute cough  -     methylPREDNISolone sodium succinate (SOLU-Medrol) injection 80 mg  -     azithromycin (ZITHROMAX) 250 MG tablet; Take 2 by mouth today then 1 daily for 4 days  Dispense: 6 tablet; Refill: 0  -     predniSONE (DELTASONE) 10 MG tablet; 6 daily x 2 days, 5 daily x 2 days, 4 daily x 2 days, 3  daily x 2 days, 2 daily x 2 days, 1 daily x 2 days  Dispense: 42 tablet; Refill: 0  -     XR Chest 2 View; Future  -     CT Chest Without Contrast; Future    3. SOB (shortness of breath)  -     ipratropium-albuterol (DUO-NEB) 0.5-2.5 mg/3 ml nebulizer; Take 3 mL by nebulization Every 4 (Four) Hours As Needed for Wheezing.  Dispense: 360 mL; Refill: 4  -     albuterol sulfate  (90 Base) MCG/ACT inhaler; Inhale 2 puffs Every 4 (Four) Hours As Needed for Wheezing.  Dispense: 18 g; Refill: 5  -     ipratropium-albuterol (DUO-NEB) nebulizer solution 3 mL  -     methylPREDNISolone sodium succinate (SOLU-Medrol) injection 80 mg  -     azithromycin (ZITHROMAX) 250 MG tablet; Take 2 by mouth today then 1 daily for 4 days  Dispense: 6 tablet; Refill: 0  -     predniSONE (DELTASONE) 10 MG tablet; 6 daily x 2 days, 5 daily x 2 days, 4 daily x 2 days, 3 daily x 2 days, 2 daily x 2 days, 1 daily x 2 days  Dispense: 42 tablet; Refill: 0  -     XR Chest 2 View; Future  -     CT Chest Without Contrast; Future    4. Adenocarcinoma of bronchus, right  -     ipratropium-albuterol (DUO-NEB) 0.5-2.5 mg/3 ml nebulizer; Take 3 mL by nebulization Every 4 (Four) Hours As Needed for Wheezing.  Dispense: 360 mL; Refill: 4  -     albuterol sulfate  (90 Base) MCG/ACT inhaler; Inhale 2 puffs Every 4 (Four) Hours As Needed for Wheezing.  Dispense: 18 g; Refill: 5  -     XR Chest 2 View; Future  -     CT Chest Without Contrast; Future    5. Pulmonary emphysema, unspecified emphysema type  -     ipratropium-albuterol (DUO-NEB) 0.5-2.5 mg/3 ml nebulizer; Take 3 mL by nebulization Every 4 (Four) Hours As Needed for Wheezing.  Dispense: 360 mL; Refill: 4  -     XR Chest 2 View; Future  -     CT Chest Without Contrast; Future    6. Mucus plugging of bronchi  -     XR Chest 2 View; Future  -     CT Chest Without Contrast; Future    7. Environmental and seasonal allergies  -     CT Chest Without Contrast; Future           Smoking  status:former, quit 2016, 50 pack history  Vaccination status:reviewed  Medications personally reviewed    Follow Up  Return in about 6 months (around 12/7/2024).    Patient was given instructions and counseling regarding his condition or for health maintenance advice. Please see specific information pulled into the AVS if appropriate.       I spent 30 minutes caring for Daron Whitfield on this date of service. This time includes time spent by me in the following activities:preparing for the visit, reviewing tests, obtaining and/or reviewing a separately obtained history, performing a medically appropriate examination and/or evaluation, counseling and educating the patient/family/caregiver, ordering medications, tests, or procedures, documenting information in the medical record, independently interpreting results and communicating that information with the patient/family/caregiver and answered questions family members, discuss medications.

## 2024-06-18 ENCOUNTER — HOSPITAL ENCOUNTER (OUTPATIENT)
Dept: CT IMAGING | Facility: HOSPITAL | Age: 79
Discharge: HOME | End: 2024-06-18
Admitting: NURSE PRACTITIONER
Payer: MEDICARE

## 2024-06-18 DIAGNOSIS — J44.1 COPD WITH ACUTE EXACERBATION: ICD-10-CM

## 2024-06-18 DIAGNOSIS — T17.500A MUCUS PLUGGING OF BRONCHI: ICD-10-CM

## 2024-06-18 DIAGNOSIS — C34.91 ADENOCARCINOMA OF BRONCHUS, RIGHT: ICD-10-CM

## 2024-06-18 DIAGNOSIS — J43.9 PULMONARY EMPHYSEMA, UNSPECIFIED EMPHYSEMA TYPE: ICD-10-CM

## 2024-06-18 DIAGNOSIS — J30.89 ENVIRONMENTAL AND SEASONAL ALLERGIES: ICD-10-CM

## 2024-06-18 DIAGNOSIS — R06.02 SOB (SHORTNESS OF BREATH): ICD-10-CM

## 2024-06-18 DIAGNOSIS — R05.1 ACUTE COUGH: ICD-10-CM

## 2024-06-18 PROCEDURE — 71250 CT THORAX DX C-: CPT

## 2024-06-20 NOTE — H&P (VIEW-ONLY)
Primary Care Provider  Carol Frank MD   Referring Provider  No ref. provider found    Patient Complaint  Follow-up (6 Months) and COPD      SUBJECTIVE    History of Presenting Illness  Daron Whitfield is a pleasant 79 y.o. male who presents to [facility] for followup appointment after CT scan.  I last saw the patient 6/7/2024.  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  He continues to be under the care of radiation oncology and gets his surveillance CTs.  For his lungs he continues on Trelegy 200 albuterol and duo nebulizer as needed.  He continues on O2 at 2 L continuous throughout the day and at night for nocturnal hypoxia qualifying for oxygen 7/2023.  His most recent CT scan from 6/18/2024 showed groundglass opacities and tree-in-bud nodularity in the left lower lobe consistent with pneumonia.  Left lower lobe bronchial wall thickening with multifocal mucous plugging which could represent a component of aspiration.  Background severe centrilobular emphysema with mild diffuse bronchiectasis.  No suspicious pulmonary nodule or mass.  Enlarged pulmonary arteries which can be seen with pulmonary hypertension.  Patient's last bronchoscopy was 5/9/2023 and was positive for haemophilus influenza.  He continues to have shortness of air with minimal exertional activity productive cough with thick white-yellow sputum and wheezing at times.  Patient states that initially after getting Solu-Medrol injection he was breathing much better.  However he is back to where he feels like he cannot get nice deep breaths.  He thinks he needs to have a bronchoscopy again.  He did complete his steroid and antibiotics from last visit.  He denies  headaches, chest pain, weight loss or hemoptysis. Denies fevers, chills and night sweats. Daron  HIWOT Whitfield is able to perform ADLs without difficulties and denies any swollen glands/lymph nodes in the head or neck.    I have personally reviewed the review of systems, past family, social, medical and surgical histories; and agree with their findings.    Review of Systems  Constitutional symptoms:  Denied complaints   Ear, nose, throat: Denied complaints  Cardiovascular:  Denied complaints  Respiratory: Shortness of air productive cough occasional wheezing  Gastrointestinal: Denied complaints  Musculoskeletal: Denied complaints    Family History   Problem Relation Age of Onset    Diabetes Mother     Cancer Sister     Diabetes Sister     Colon cancer Sister 60    Colon polyps Sister     Hypertension Sister     Diabetes Sister     Coronary artery disease Brother         Social History     Socioeconomic History    Marital status:    Tobacco Use    Smoking status: Former     Current packs/day: 0.00     Average packs/day: 1 pack/day for 50.0 years (50.0 ttl pk-yrs)     Types: Cigarettes     Start date: 1966     Quit date: 2016     Years since quittin.4     Passive exposure: Past    Smokeless tobacco: Never    Tobacco comments:     STARTED AGE 17  QUIT IN    Vaping Use    Vaping status: Never Used    Passive vaping exposure: Yes   Substance and Sexual Activity    Alcohol use: Yes     Alcohol/week: 1.0 standard drink of alcohol     Types: 1 Cans of beer per week     Comment: casual drinker    Drug use: Never    Sexual activity: Not Currently     Partners: Female        Past Medical History:   Diagnosis Date    Bladder outflow obstruction     Cervical spinal stenosis 2018    Colon polyp     COVID     Diabetes mellitus 2019    Emphysema lung     Emphysema of lung     Hand paresthesia 2018    HTN (hypertension)     Hypercholesteremia     Lung cancer 2016    Muscle atrophy of upper extremity 2018    Numbness and tingling in both hands     OAB (overactive bladder) 2014    Prostate  cancer 2012    Ulnar neuropathy 01/23/2018        Immunization History   Administered Date(s) Administered    ABRYSVO (RSV, 60+ or pregnant women 32-36 wks) 10/30/2023    COVID-19 (MODERNA) 1st,2nd,3rd Dose Monovalent 02/05/2021, 03/09/2021    COVID-19 (PFIZER) BIVALENT 12+YRS 09/21/2022    COVID-19 (PFIZER) Purple Cap Monovalent 11/17/2021    COVID-19 F23 (MODERNA) 12YRS+ (SPIKEVAX) 10/30/2023    Fluad Quad 65+ 10/24/2022, 10/04/2023    Fluzone High Dose =>65 Years ([employer] ONLY) 12/11/2019    Fluzone High-Dose 65+yrs 11/13/2021, 10/30/2023    Pneumococcal Polysaccharide (PPSV23) 10/24/2022       No Known Allergies       Current Outpatient Medications:     albuterol sulfate  (90 Base) MCG/ACT inhaler, Inhale 2 puffs Every 4 (Four) Hours As Needed for Wheezing., Disp: 18 g, Rfl: 5    atorvastatin (LIPITOR) 40 MG tablet, Lipitor 40 mg oral tablet take 1 tablet (40 mg) by oral route once daily   Suspended, Disp: , Rfl:     bicalutamide (CASODEX) 50 MG tablet, Take 1 tablet by mouth Daily for 360 days., Disp: 90 tablet, Rfl: 3    cetirizine (zyrTEC) 10 MG tablet, Take 1 tablet by mouth Daily., Disp: 90 tablet, Rfl: 3    Fluticasone-Umeclidin-Vilant (TRELEGY ELLIPTA) 200-62.5-25 MCG/ACT inhaler, Inhale 1 puff Daily., Disp: 60 each, Rfl: 11    Gemtesa 75 MG tablet, Take 1 tablet by mouth Daily for 360 days., Disp: 90 tablet, Rfl: 1    ipratropium-albuterol (DUO-NEB) 0.5-2.5 mg/3 ml nebulizer, Take 3 mL by nebulization Every 4 (Four) Hours As Needed for Wheezing., Disp: 360 mL, Rfl: 4    leuprolide (Lupron Depot, 3-Month,) 22.5 MG injection, 22.5 mg., Disp: , Rfl:     lisinopril (PRINIVIL,ZESTRIL) 5 MG tablet, Daily., Disp: , Rfl:     metFORMIN (GLUCOPHAGE) 500 MG tablet, , Disp: , Rfl:     metoprolol succinate XL (TOPROL-XL) 25 MG 24 hr tablet, Toprol XL 25 mg oral tablet extended release 24 hr take 1 tablet (25 mg) by oral route once daily   Active, Disp: , Rfl:     mirtazapine (REMERON) 15 MG tablet, , Disp: ,  "Rfl:     pantoprazole (PROTONIX) 40 MG EC tablet, Take 1 tablet by mouth Daily., Disp: 30 tablet, Rfl: 5    amoxicillin-clavulanate (AUGMENTIN) 875-125 MG per tablet, Take 1 tablet by mouth 2 (Two) Times a Day., Disp: 20 tablet, Rfl: 0    Current Facility-Administered Medications:     leuprolide (LUPRON) injection 22.5 mg, 22.5 mg, Intramuscular, Once, Merrill Ames MD           Vital Signs   BP 98/65 (BP Location: Left arm, Patient Position: Sitting, Cuff Size: Adult)   Pulse 110   Temp 96.9 °F (36.1 °C) (Temporal)   Resp 18   Ht 180.3 cm (71\")   Wt 84.8 kg (187 lb)   SpO2 (!) 88% Comment: 2L PD  BMI 26.08 kg/m²       OBJECTIVE    Physical Exam  Vital Signs Reviewed   WDWN, Alert, NAD.    HEENT:  PERRL, EOMI.  OP, nares clear  Neck:  Supple, no JVD, no thyromegaly  Chest:  good aeration, clear to auscultation bilaterally, tympanic to percussion bilaterally, no work of breathing noted  CV: RRR, no MGR, pulses 2+, equal.  Abd:  Soft, NT, ND, + BS, no HSM  EXT:  no clubbing, no cyanosis, no edema  Neuro:  A&Ox3, CN grossly intact, no focal deficits.  Skin: No rashes or lesions noted    Results Review  I have personally reviewed the prior office notes, hospital records, labs, and diagnostics.  CT Chest Without Contrast Diagnostic [[id number]] (Order [id number])  Order  Status: Final result     Study Notes     Carrie Muñoz on 6/18/2024 10:34 AM EDT   SHORTNESS OF BREATH, COUGH, WHEEZING FOR 3-4 WEEKS, LUNG CANCER, COPD, FORMER SMOKER     Appointment Information    PACS Images     Radiology Images  Study Result    Narrative & Impression   CT CHEST WO CONTRAST DIAGNOSTIC     Date of Exam: 6/18/2024 10:33 AM EDT     Indication: Shortness of air, adenocarcinoma, history of mucous plugging, ineffective cough.     Comparison: 3/8/2024 CT and 6/7/2024 chest radiograph     Technique: Axial CT images were obtained of the chest without contrast administration.  Reconstructed coronal and sagittal images were also " obtained. Automated exposure control and iterative construction methods were used.        Findings:  Central airways are patent. There is mild diffuse bronchiectasis. Left lower lobe bronchial wall thickening with multifocal mucus plugging. Severe centrilobular emphysema. There are groundglass opacities and tree-in-bud nodularity in the left lower lobe.   Unchanged volume loss in scarring in the medial aspect of the superior right lower lobe adjacent to the right hilum. Left upper lobe calcified granuloma. No significant pleural effusion or pneumothorax. No new suspicious pulmonary nodule or mass.     No mediastinal mass or suspicious lymphadenopathy. Benign calcified mediastinal and left hilar lymph nodes. Right chest wall port with distal lead tip in the superior vena cava. Enlarged pulmonary arteries. Unchanged heart size. No pericardial effusion.   Coronary calcifications.     Bilateral gynecomastia. No acute abnormality in the chest wall soft tissues.     Upper abdomen shows no acute findings. No fracture or suspicious bony lesion.     IMPRESSION:  Impression:  Groundglass opacities and tree-in-bud nodularity in the left lower lobe consistent with pneumonia. Left lower lobe bronchial wall thickening with multifocal mucus plugging, which could represent a component of aspiration.     Background severe centrilobular emphysema with mild diffuse bronchiectasis. No suspicious pulmonary nodule or mass.     Enlarged pulmonary arteries which can be seen with pulmonary hypertension.           Electronically Signed: Chapin Chavez MD    6/19/2024 10:58 AM EDT    Workstation ID: [id number]     ASSESSMENT         Patient Active Problem List   Diagnosis    Adenocarcinoma of bronchus, right    Bladder outflow obstruction    Cancer of prostate    Cervical spinal stenosis    Colon polyp    Diabetic nephropathy    Hand paresthesia    HTN (hypertension)    Hypercholesterolemia    Hypertonicity of bladder    Atrophy of muscle of  right hand    Pulmonary emphysema    Stage 3a chronic kidney disease    Primary osteoarthritis of left shoulder    Injury of left shoulder    Neuropathy of right ulnar nerve at wrist    Bilateral carpal tunnel syndrome    Polyneuropathy    Moderate persistent asthma    Benign prostatic hyperplasia    Diabetes mellitus    History of vaccination    COVID-19 virus infection    COPD with acute exacerbation    Moderate persistent asthma    SOB (shortness of breath)    History of colon polyps    Encounter for screening for malignant neoplasm of colon    Increasing PSA level after treatment for prostate cancer       Encounter Diagnoses   Name Primary?    Mucus plugging of bronchi Yes    COPD with acute exacerbation     Acute cough     SOB (shortness of breath)     Adenocarcinoma of bronchus, right     Pulmonary emphysema, unspecified emphysema type     Multifocal pneumonia       PLAN  -Reviewed CT scan with patient which shows left lower lobe consistent with pneumonia and left lower lobe bronchial wall thickening with multi focus mucous plugging.  Will send in Augmentin to treat the pneumonia and get patient scheduled for bronchoscopy with BAL due to mucous plugging  -I have obtained consent for bronchoscopy with brushings, biopsies, and bronchioloalveolar lavage.  Risks and benefits of bronchoscopy discussed with patient today.  Risks include pneumothorax, hemothorax, bleeding, hypoxia, required mechanical ventilation and death.  The patient recognizes these findings, acknowledges these findings and is agreeable to the procedure.  Patient states he is not on any blood thinners.   -Augmentin 875 mg PO BID for 10 days for multifocal pneumonia on CT scan  -followup after bronchoscopy for results  -Continue with O2 at 2-3 L titrating to keep sats above 90%  -Continue with albuterol and/or duo nebulizer for shortness of air or wheeze-continue with Trelegy 200 for maintenance inhaler rinsing out his mouth to prevent oral  thrush       Diagnoses and all orders for this visit:    1. Mucus plugging of bronchi (Primary)  -     amoxicillin-clavulanate (AUGMENTIN) 875-125 MG per tablet; Take 1 tablet by mouth 2 (Two) Times a Day.  Dispense: 20 tablet; Refill: 0  -     Follow Anesthesia Guidlines / Standing Orders; Standing  -     Obtain Informed Consent; Standing  -     Case Request; Standing  -     Case Request    2. COPD with acute exacerbation  -     amoxicillin-clavulanate (AUGMENTIN) 875-125 MG per tablet; Take 1 tablet by mouth 2 (Two) Times a Day.  Dispense: 20 tablet; Refill: 0    3. Acute cough  -     amoxicillin-clavulanate (AUGMENTIN) 875-125 MG per tablet; Take 1 tablet by mouth 2 (Two) Times a Day.  Dispense: 20 tablet; Refill: 0  -     Follow Anesthesia Guidlines / Standing Orders; Standing  -     Obtain Informed Consent; Standing  -     Case Request; Standing  -     Case Request    4. SOB (shortness of breath)  -     amoxicillin-clavulanate (AUGMENTIN) 875-125 MG per tablet; Take 1 tablet by mouth 2 (Two) Times a Day.  Dispense: 20 tablet; Refill: 0  -     Follow Anesthesia Guidlines / Standing Orders; Standing  -     Obtain Informed Consent; Standing  -     Case Request; Standing  -     Case Request    5. Adenocarcinoma of bronchus, right  -     amoxicillin-clavulanate (AUGMENTIN) 875-125 MG per tablet; Take 1 tablet by mouth 2 (Two) Times a Day.  Dispense: 20 tablet; Refill: 0  -     Follow Anesthesia Guidlines / Standing Orders; Standing  -     Obtain Informed Consent; Standing  -     Case Request; Standing  -     Case Request    6. Pulmonary emphysema, unspecified emphysema type  -     amoxicillin-clavulanate (AUGMENTIN) 875-125 MG per tablet; Take 1 tablet by mouth 2 (Two) Times a Day.  Dispense: 20 tablet; Refill: 0    7. Multifocal pneumonia  -     amoxicillin-clavulanate (AUGMENTIN) 875-125 MG per tablet; Take 1 tablet by mouth 2 (Two) Times a Day.  Dispense: 20 tablet; Refill: 0  -     Follow Anesthesia Guidlines /  Standing Orders; Standing  -     Obtain Informed Consent; Standing  -     Case Request; Standing  -     Case Request           Smoking status:former  Vaccination status:reviewed  Medications personally reviewed    Follow Up  Return in about 18 days (around 7/9/2024) for after bronchoscopy.    Patient was given instructions and counseling regarding his condition or for health maintenance advice. Please see specific information pulled into the AVS if appropriate.       I spent 20 minutes caring for Daron Whitfield on this date of service. This time includes time spent by me in the following activities:preparing for the visit, reviewing tests, obtaining and/or reviewing a separately obtained history, performing a medically appropriate examination and/or evaluation, counseling and educating the patient/family/caregiver, ordering medications, tests, or procedures, documenting information in the medical record, independently interpreting results and communicating that information with the patient/family/caregiver and answered questions family members, discuss medications.

## 2024-06-20 NOTE — PROGRESS NOTES
Primary Care Provider  Carol Frank MD   Referring Provider  No ref. provider found    Patient Complaint  Follow-up (6 Months) and COPD      SUBJECTIVE    History of Presenting Illness  Daron Whitfield is a pleasant 79 y.o. male who presents to [facility] for followup appointment after CT scan.  I last saw the patient 6/7/2024.  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  He continues to be under the care of radiation oncology and gets his surveillance CTs.  For his lungs he continues on Trelegy 200 albuterol and duo nebulizer as needed.  He continues on O2 at 2 L continuous throughout the day and at night for nocturnal hypoxia qualifying for oxygen 7/2023.  His most recent CT scan from 6/18/2024 showed groundglass opacities and tree-in-bud nodularity in the left lower lobe consistent with pneumonia.  Left lower lobe bronchial wall thickening with multifocal mucous plugging which could represent a component of aspiration.  Background severe centrilobular emphysema with mild diffuse bronchiectasis.  No suspicious pulmonary nodule or mass.  Enlarged pulmonary arteries which can be seen with pulmonary hypertension.  Patient's last bronchoscopy was 5/9/2023 and was positive for haemophilus influenza.  He continues to have shortness of air with minimal exertional activity productive cough with thick white-yellow sputum and wheezing at times.  Patient states that initially after getting Solu-Medrol injection he was breathing much better.  However he is back to where he feels like he cannot get nice deep breaths.  He thinks he needs to have a bronchoscopy again.  He did complete his steroid and antibiotics from last visit.  He denies  headaches, chest pain, weight loss or hemoptysis. Denies fevers, chills and night sweats. Daron  HIWOT Whitfield is able to perform ADLs without difficulties and denies any swollen glands/lymph nodes in the head or neck.    I have personally reviewed the review of systems, past family, social, medical and surgical histories; and agree with their findings.    Review of Systems  Constitutional symptoms:  Denied complaints   Ear, nose, throat: Denied complaints  Cardiovascular:  Denied complaints  Respiratory: Shortness of air productive cough occasional wheezing  Gastrointestinal: Denied complaints  Musculoskeletal: Denied complaints    Family History   Problem Relation Age of Onset    Diabetes Mother     Cancer Sister     Diabetes Sister     Colon cancer Sister 60    Colon polyps Sister     Hypertension Sister     Diabetes Sister     Coronary artery disease Brother         Social History     Socioeconomic History    Marital status:    Tobacco Use    Smoking status: Former     Current packs/day: 0.00     Average packs/day: 1 pack/day for 50.0 years (50.0 ttl pk-yrs)     Types: Cigarettes     Start date: 1966     Quit date: 2016     Years since quittin.4     Passive exposure: Past    Smokeless tobacco: Never    Tobacco comments:     STARTED AGE 17  QUIT IN    Vaping Use    Vaping status: Never Used    Passive vaping exposure: Yes   Substance and Sexual Activity    Alcohol use: Yes     Alcohol/week: 1.0 standard drink of alcohol     Types: 1 Cans of beer per week     Comment: casual drinker    Drug use: Never    Sexual activity: Not Currently     Partners: Female        Past Medical History:   Diagnosis Date    Bladder outflow obstruction     Cervical spinal stenosis 2018    Colon polyp     COVID     Diabetes mellitus 2019    Emphysema lung     Emphysema of lung     Hand paresthesia 2018    HTN (hypertension)     Hypercholesteremia     Lung cancer 2016    Muscle atrophy of upper extremity 2018    Numbness and tingling in both hands     OAB (overactive bladder) 2014    Prostate  cancer 2012    Ulnar neuropathy 01/23/2018        Immunization History   Administered Date(s) Administered    ABRYSVO (RSV, 60+ or pregnant women 32-36 wks) 10/30/2023    COVID-19 (MODERNA) 1st,2nd,3rd Dose Monovalent 02/05/2021, 03/09/2021    COVID-19 (PFIZER) BIVALENT 12+YRS 09/21/2022    COVID-19 (PFIZER) Purple Cap Monovalent 11/17/2021    COVID-19 F23 (MODERNA) 12YRS+ (SPIKEVAX) 10/30/2023    Fluad Quad 65+ 10/24/2022, 10/04/2023    Fluzone High Dose =>65 Years (Vaxcare ONLY) 12/11/2019    Fluzone High-Dose 65+yrs 11/13/2021, 10/30/2023    Pneumococcal Polysaccharide (PPSV23) 10/24/2022       No Known Allergies       Current Outpatient Medications:     albuterol sulfate  (90 Base) MCG/ACT inhaler, Inhale 2 puffs Every 4 (Four) Hours As Needed for Wheezing., Disp: 18 g, Rfl: 5    atorvastatin (LIPITOR) 40 MG tablet, Lipitor 40 mg oral tablet take 1 tablet (40 mg) by oral route once daily   Suspended, Disp: , Rfl:     bicalutamide (CASODEX) 50 MG tablet, Take 1 tablet by mouth Daily for 360 days., Disp: 90 tablet, Rfl: 3    cetirizine (zyrTEC) 10 MG tablet, Take 1 tablet by mouth Daily., Disp: 90 tablet, Rfl: 3    Fluticasone-Umeclidin-Vilant (TRELEGY ELLIPTA) 200-62.5-25 MCG/ACT inhaler, Inhale 1 puff Daily., Disp: 60 each, Rfl: 11    Gemtesa 75 MG tablet, Take 1 tablet by mouth Daily for 360 days., Disp: 90 tablet, Rfl: 1    ipratropium-albuterol (DUO-NEB) 0.5-2.5 mg/3 ml nebulizer, Take 3 mL by nebulization Every 4 (Four) Hours As Needed for Wheezing., Disp: 360 mL, Rfl: 4    leuprolide (Lupron Depot, 3-Month,) 22.5 MG injection, 22.5 mg., Disp: , Rfl:     lisinopril (PRINIVIL,ZESTRIL) 5 MG tablet, Daily., Disp: , Rfl:     metFORMIN (GLUCOPHAGE) 500 MG tablet, , Disp: , Rfl:     metoprolol succinate XL (TOPROL-XL) 25 MG 24 hr tablet, Toprol XL 25 mg oral tablet extended release 24 hr take 1 tablet (25 mg) by oral route once daily   Active, Disp: , Rfl:     mirtazapine (REMERON) 15 MG tablet, , Disp: ,  "Rfl:     pantoprazole (PROTONIX) 40 MG EC tablet, Take 1 tablet by mouth Daily., Disp: 30 tablet, Rfl: 5    amoxicillin-clavulanate (AUGMENTIN) 875-125 MG per tablet, Take 1 tablet by mouth 2 (Two) Times a Day., Disp: 20 tablet, Rfl: 0    Current Facility-Administered Medications:     leuprolide (LUPRON) injection 22.5 mg, 22.5 mg, Intramuscular, Once, Merrill Ames MD           Vital Signs   BP 98/65 (BP Location: Left arm, Patient Position: Sitting, Cuff Size: Adult)   Pulse 110   Temp 96.9 °F (36.1 °C) (Temporal)   Resp 18   Ht 180.3 cm (71\")   Wt 84.8 kg (187 lb)   SpO2 (!) 88% Comment: 2L PD  BMI 26.08 kg/m²       OBJECTIVE    Physical Exam  Vital Signs Reviewed   WDWN, Alert, NAD.    HEENT:  PERRL, EOMI.  OP, nares clear  Neck:  Supple, no JVD, no thyromegaly  Chest:  good aeration, clear to auscultation bilaterally, tympanic to percussion bilaterally, no work of breathing noted  CV: RRR, no MGR, pulses 2+, equal.  Abd:  Soft, NT, ND, + BS, no HSM  EXT:  no clubbing, no cyanosis, no edema  Neuro:  A&Ox3, CN grossly intact, no focal deficits.  Skin: No rashes or lesions noted    Results Review  I have personally reviewed the prior office notes, hospital records, labs, and diagnostics.  CT Chest Without Contrast Diagnostic [[id number]] (Order [id number])  Order  Status: Final result     Study Notes     Carrie Muñoz on 6/18/2024 10:34 AM EDT   SHORTNESS OF BREATH, COUGH, WHEEZING FOR 3-4 WEEKS, LUNG CANCER, COPD, FORMER SMOKER     Appointment Information    PACS Images     Radiology Images  Study Result    Narrative & Impression   CT CHEST WO CONTRAST DIAGNOSTIC     Date of Exam: 6/18/2024 10:33 AM EDT     Indication: Shortness of air, adenocarcinoma, history of mucous plugging, ineffective cough.     Comparison: 3/8/2024 CT and 6/7/2024 chest radiograph     Technique: Axial CT images were obtained of the chest without contrast administration.  Reconstructed coronal and sagittal images were also " obtained. Automated exposure control and iterative construction methods were used.        Findings:  Central airways are patent. There is mild diffuse bronchiectasis. Left lower lobe bronchial wall thickening with multifocal mucus plugging. Severe centrilobular emphysema. There are groundglass opacities and tree-in-bud nodularity in the left lower lobe.   Unchanged volume loss in scarring in the medial aspect of the superior right lower lobe adjacent to the right hilum. Left upper lobe calcified granuloma. No significant pleural effusion or pneumothorax. No new suspicious pulmonary nodule or mass.     No mediastinal mass or suspicious lymphadenopathy. Benign calcified mediastinal and left hilar lymph nodes. Right chest wall port with distal lead tip in the superior vena cava. Enlarged pulmonary arteries. Unchanged heart size. No pericardial effusion.   Coronary calcifications.     Bilateral gynecomastia. No acute abnormality in the chest wall soft tissues.     Upper abdomen shows no acute findings. No fracture or suspicious bony lesion.     IMPRESSION:  Impression:  Groundglass opacities and tree-in-bud nodularity in the left lower lobe consistent with pneumonia. Left lower lobe bronchial wall thickening with multifocal mucus plugging, which could represent a component of aspiration.     Background severe centrilobular emphysema with mild diffuse bronchiectasis. No suspicious pulmonary nodule or mass.     Enlarged pulmonary arteries which can be seen with pulmonary hypertension.           Electronically Signed: Chapin Chavez MD    6/19/2024 10:58 AM EDT    Workstation ID: [id number]     ASSESSMENT         Patient Active Problem List   Diagnosis    Adenocarcinoma of bronchus, right    Bladder outflow obstruction    Cancer of prostate    Cervical spinal stenosis    Colon polyp    Diabetic nephropathy    Hand paresthesia    HTN (hypertension)    Hypercholesterolemia    Hypertonicity of bladder    Atrophy of muscle of  right hand    Pulmonary emphysema    Stage 3a chronic kidney disease    Primary osteoarthritis of left shoulder    Injury of left shoulder    Neuropathy of right ulnar nerve at wrist    Bilateral carpal tunnel syndrome    Polyneuropathy    Moderate persistent asthma    Benign prostatic hyperplasia    Diabetes mellitus    History of vaccination    COVID-19 virus infection    COPD with acute exacerbation    Moderate persistent asthma    SOB (shortness of breath)    History of colon polyps    Encounter for screening for malignant neoplasm of colon    Increasing PSA level after treatment for prostate cancer       Encounter Diagnoses   Name Primary?    Mucus plugging of bronchi Yes    COPD with acute exacerbation     Acute cough     SOB (shortness of breath)     Adenocarcinoma of bronchus, right     Pulmonary emphysema, unspecified emphysema type     Multifocal pneumonia       PLAN  -Reviewed CT scan with patient which shows left lower lobe consistent with pneumonia and left lower lobe bronchial wall thickening with multi focus mucous plugging.  Will send in Augmentin to treat the pneumonia and get patient scheduled for bronchoscopy with BAL due to mucous plugging  -I have obtained consent for bronchoscopy with brushings, biopsies, and bronchioloalveolar lavage.  Risks and benefits of bronchoscopy discussed with patient today.  Risks include pneumothorax, hemothorax, bleeding, hypoxia, required mechanical ventilation and death.  The patient recognizes these findings, acknowledges these findings and is agreeable to the procedure.  Patient states he is not on any blood thinners.   -Augmentin 875 mg PO BID for 10 days for multifocal pneumonia on CT scan  -followup after bronchoscopy for results  -Continue with O2 at 2-3 L titrating to keep sats above 90%  -Continue with albuterol and/or duo nebulizer for shortness of air or wheeze-continue with Trelegy 200 for maintenance inhaler rinsing out his mouth to prevent oral  thrush       Diagnoses and all orders for this visit:    1. Mucus plugging of bronchi (Primary)  -     amoxicillin-clavulanate (AUGMENTIN) 875-125 MG per tablet; Take 1 tablet by mouth 2 (Two) Times a Day.  Dispense: 20 tablet; Refill: 0  -     Follow Anesthesia Guidlines / Standing Orders; Standing  -     Obtain Informed Consent; Standing  -     Case Request; Standing  -     Case Request    2. COPD with acute exacerbation  -     amoxicillin-clavulanate (AUGMENTIN) 875-125 MG per tablet; Take 1 tablet by mouth 2 (Two) Times a Day.  Dispense: 20 tablet; Refill: 0    3. Acute cough  -     amoxicillin-clavulanate (AUGMENTIN) 875-125 MG per tablet; Take 1 tablet by mouth 2 (Two) Times a Day.  Dispense: 20 tablet; Refill: 0  -     Follow Anesthesia Guidlines / Standing Orders; Standing  -     Obtain Informed Consent; Standing  -     Case Request; Standing  -     Case Request    4. SOB (shortness of breath)  -     amoxicillin-clavulanate (AUGMENTIN) 875-125 MG per tablet; Take 1 tablet by mouth 2 (Two) Times a Day.  Dispense: 20 tablet; Refill: 0  -     Follow Anesthesia Guidlines / Standing Orders; Standing  -     Obtain Informed Consent; Standing  -     Case Request; Standing  -     Case Request    5. Adenocarcinoma of bronchus, right  -     amoxicillin-clavulanate (AUGMENTIN) 875-125 MG per tablet; Take 1 tablet by mouth 2 (Two) Times a Day.  Dispense: 20 tablet; Refill: 0  -     Follow Anesthesia Guidlines / Standing Orders; Standing  -     Obtain Informed Consent; Standing  -     Case Request; Standing  -     Case Request    6. Pulmonary emphysema, unspecified emphysema type  -     amoxicillin-clavulanate (AUGMENTIN) 875-125 MG per tablet; Take 1 tablet by mouth 2 (Two) Times a Day.  Dispense: 20 tablet; Refill: 0    7. Multifocal pneumonia  -     amoxicillin-clavulanate (AUGMENTIN) 875-125 MG per tablet; Take 1 tablet by mouth 2 (Two) Times a Day.  Dispense: 20 tablet; Refill: 0  -     Follow Anesthesia Guidlines /  Standing Orders; Standing  -     Obtain Informed Consent; Standing  -     Case Request; Standing  -     Case Request           Smoking status:former  Vaccination status:reviewed  Medications personally reviewed    Follow Up  Return in about 18 days (around 7/9/2024) for after bronchoscopy.    Patient was given instructions and counseling regarding his condition or for health maintenance advice. Please see specific information pulled into the AVS if appropriate.       I spent 20 minutes caring for Daron Whitfield on this date of service. This time includes time spent by me in the following activities:preparing for the visit, reviewing tests, obtaining and/or reviewing a separately obtained history, performing a medically appropriate examination and/or evaluation, counseling and educating the patient/family/caregiver, ordering medications, tests, or procedures, documenting information in the medical record, independently interpreting results and communicating that information with the patient/family/caregiver and answered questions family members, discuss medications.

## 2024-06-21 ENCOUNTER — OFFICE VISIT (OUTPATIENT)
Dept: PULMONOLOGY | Facility: CLINIC | Age: 79
End: 2024-06-21
Payer: MEDICARE

## 2024-06-21 ENCOUNTER — TELEPHONE (OUTPATIENT)
Dept: PULMONOLOGY | Facility: CLINIC | Age: 79
End: 2024-06-21

## 2024-06-21 VITALS
OXYGEN SATURATION: 88 % | TEMPERATURE: 96.9 F | SYSTOLIC BLOOD PRESSURE: 98 MMHG | HEIGHT: 71 IN | WEIGHT: 187 LBS | HEART RATE: 110 BPM | DIASTOLIC BLOOD PRESSURE: 65 MMHG | BODY MASS INDEX: 26.18 KG/M2 | RESPIRATION RATE: 18 BRPM

## 2024-06-21 DIAGNOSIS — C34.91 ADENOCARCINOMA OF BRONCHUS, RIGHT: ICD-10-CM

## 2024-06-21 DIAGNOSIS — J44.1 COPD WITH ACUTE EXACERBATION: ICD-10-CM

## 2024-06-21 DIAGNOSIS — J18.9 MULTIFOCAL PNEUMONIA: ICD-10-CM

## 2024-06-21 DIAGNOSIS — T17.500A MUCUS PLUGGING OF BRONCHI: Primary | ICD-10-CM

## 2024-06-21 DIAGNOSIS — R05.1 ACUTE COUGH: ICD-10-CM

## 2024-06-21 DIAGNOSIS — J43.9 PULMONARY EMPHYSEMA, UNSPECIFIED EMPHYSEMA TYPE: ICD-10-CM

## 2024-06-21 DIAGNOSIS — R06.02 SOB (SHORTNESS OF BREATH): ICD-10-CM

## 2024-06-21 RX ORDER — AMOXICILLIN AND CLAVULANATE POTASSIUM 875; 125 MG/1; MG/1
1 TABLET, FILM COATED ORAL 2 TIMES DAILY
Qty: 20 TABLET | Refills: 0 | Status: SHIPPED | OUTPATIENT
Start: 2024-06-21

## 2024-06-21 NOTE — TELEPHONE ENCOUNTER
Spoke with Angelia in endo to get patient scheduled for 6/25 arrive 8:30am. Patient received instruction sheet at check out.

## 2024-06-24 ENCOUNTER — ANESTHESIA EVENT (OUTPATIENT)
Dept: GASTROENTEROLOGY | Facility: HOSPITAL | Age: 79
End: 2024-06-24
Payer: MEDICARE

## 2024-06-24 NOTE — ANESTHESIA PREPROCEDURE EVALUATION
Anesthesia Evaluation     Patient summary reviewed and Nursing notes reviewed   NPO Solid Status: > 8 hours  NPO Liquid Status: > 8 hours           Airway   Mallampati: I  TM distance: >3 FB  Neck ROM: full  No difficulty expected  Dental    (+) edentulous    Pulmonary     breath sounds clear to auscultation  (+) pneumonia , a smoker Former, lung cancer, COPD moderate, asthma,home oxygen (wears 2 lpm via nc continuous), shortness of breath  Cardiovascular - normal exam  Exercise tolerance: poor (<4 METS)    ECG reviewed  Patient on routine beta blocker  Rhythm: regular  Rate: normal    (+) hypertension well controlled 2 medications or greater, hyperlipidemia      Neuro/Psych  (+) numbness  GI/Hepatic/Renal/Endo    (+) renal disease (CKD stage 3)- CRI, diabetes mellitus type 2 well controlled    Musculoskeletal     (+) neck pain      ROS comment: Cervical stenosis  Abdominal    Substance History      OB/GYN          Other   arthritis,   history of cancer (prostate, lung)    ROS/Med Hx Other: Date/Time: 2023 3:34 PM  Performed by: Meet Leon MD  Authorized by: Meet Leon MD   Comparison: not compared with previous ECG   Previous ECG: no previous ECG available  Rhythm: sinus rhythm  Conduction: right bundle branch block  Pacin% captureComments: Consider old inferior wall MI      23 Echo  ·  Left ventricular systolic function is normal. Estimated left ventricular EF = 55%  ·  Left ventricular diastolic function was normal.    CT Chest 24:   Impression:  Groundglass opacities and tree-in-bud nodularity in the left lower lobe consistent with pneumonia. Left lower lobe bronchial wall thickening with multifocal mucus plugging, which could represent a component of aspiration.   Background severe centrilobular emphysema with mild diffuse bronchiectasis. No suspicious pulmonary nodule or mass.   Enlarged pulmonary arteries which can be seen with pulmonary hypertension.          Phys Exam Other: 96%  on 2 lpm via nc               Anesthesia Plan    ASA 3     general   total IV anesthesia  (Total IV Anesthesia    Patient understands anesthesia not responsible for dental damage.  )  intravenous induction     Anesthetic plan, risks, benefits, and alternatives have been provided, discussed and informed consent has been obtained with: patient.  Pre-procedure education provided  Plan discussed with CRNA.      CODE STATUS:

## 2024-06-25 ENCOUNTER — HOSPITAL ENCOUNTER (OUTPATIENT)
Facility: HOSPITAL | Age: 79
Setting detail: HOSPITAL OUTPATIENT SURGERY
Discharge: HOME | End: 2024-06-25
Attending: INTERNAL MEDICINE | Admitting: INTERNAL MEDICINE
Payer: MEDICARE

## 2024-06-25 ENCOUNTER — ANESTHESIA (OUTPATIENT)
Dept: GASTROENTEROLOGY | Facility: HOSPITAL | Age: 79
End: 2024-06-25
Payer: MEDICARE

## 2024-06-25 VITALS
WEIGHT: 174.16 LBS | RESPIRATION RATE: 20 BRPM | BODY MASS INDEX: 24.29 KG/M2 | DIASTOLIC BLOOD PRESSURE: 80 MMHG | SYSTOLIC BLOOD PRESSURE: 116 MMHG | HEART RATE: 92 BPM | OXYGEN SATURATION: 97 % | TEMPERATURE: 96.2 F

## 2024-06-25 DIAGNOSIS — R06.02 SOB (SHORTNESS OF BREATH): ICD-10-CM

## 2024-06-25 DIAGNOSIS — J18.9 MULTIFOCAL PNEUMONIA: ICD-10-CM

## 2024-06-25 DIAGNOSIS — T17.500A MUCUS PLUGGING OF BRONCHI: ICD-10-CM

## 2024-06-25 DIAGNOSIS — R05.1 ACUTE COUGH: ICD-10-CM

## 2024-06-25 DIAGNOSIS — C34.91 ADENOCARCINOMA OF BRONCHUS, RIGHT: ICD-10-CM

## 2024-06-25 LAB
ACB CMPLX DNA BAL NAA+NON-PRB-NCNCRNG: NOT DETECTED
BLACTX-M ISLT/SPM QL: ABNORMAL
BLAIMP ISLT/SPM QL: ABNORMAL
BLAKPC ISLT/SPM QL: ABNORMAL
BLAOXA-48-LIKE ISLT/SPM QL: ABNORMAL
BLAVIM ISLT/SPM QL: ABNORMAL
C PNEUM DNA NPH QL NAA+NON-PROBE: NOT DETECTED
CILIATED BAL QL: 11 %
E CLOAC COMP DNA BAL NAA+NON-PRB-NCNCRNG: NOT DETECTED
E COLI DNA BAL NAA+NON-PRB-NCNCRNG: NOT DETECTED
FLUAV SUBTYP SPEC NAA+PROBE: NOT DETECTED
FLUBV RNA ISLT QL NAA+PROBE: NOT DETECTED
GLUCOSE BLDC GLUCOMTR-MCNC: 128 MG/DL (ref 70–99)
GP B STREP DNA BAL NAA+NON-PRB-NCNCRNG: NOT DETECTED
HADV DNA SPEC NAA+PROBE: NOT DETECTED
HAEM INFLU DNA BAL NAA+NON-PRB-NCNCRNG: DETECTED
HCOV RNA LOWER RESP QL NAA+NON-PROBE: NOT DETECTED
HMPV RNA NPH QL NAA+NON-PROBE: NOT DETECTED
HPIV RNA LOWER RESP QL NAA+NON-PROBE: NOT DETECTED
K AEROGENES DNA BAL NAA+NON-PRB-NCNCRNG: NOT DETECTED
K OXYTOCA DNA BAL NAA+NON-PRB-NCNCRNG: NOT DETECTED
K PNEU GRP DNA BAL NAA+NON-PRB-NCNCRNG: NOT DETECTED
L PNEUMO DNA LOWER RESP QL NAA+NON-PROBE: NOT DETECTED
LYMPHOCYTES NFR FLD MANUAL: 3 %
M CATARRHALIS DNA BAL NAA+NON-PRB-NCNCRNG: NOT DETECTED
M PNEUMO IGG SER IA-ACNC: NOT DETECTED
MACROPHAGE FLUID: 8 %
MECA+MECC ISLT/SPM QL: ABNORMAL
NDM GENE: ABNORMAL
NEUTROPHILS NFR FLD MANUAL: 78 %
P AERUGINOSA DNA BAL NAA+NON-PRB-NCNCRNG: NOT DETECTED
PROTEUS SP DNA BAL NAA+NON-PRB-NCNCRNG: NOT DETECTED
RHINOVIRUS RNA SPEC NAA+PROBE: NOT DETECTED
RSV RNA NPH QL NAA+NON-PROBE: NOT DETECTED
S AUREUS DNA BAL NAA+NON-PRB-NCNCRNG: NOT DETECTED
S MARCESCENS DNA BAL NAA+NON-PRB-NCNCRNG: NOT DETECTED
S PNEUM DNA BAL NAA+NON-PRB-NCNCRNG: NOT DETECTED
S PYO DNA BAL NAA+NON-PRB-NCNCRNG: NOT DETECTED
VISUAL PRESENCE OF BLOOD: NORMAL

## 2024-06-25 PROCEDURE — 82948 REAGENT STRIP/BLOOD GLUCOSE: CPT

## 2024-06-25 PROCEDURE — 87205 SMEAR GRAM STAIN: CPT | Performed by: INTERNAL MEDICINE

## 2024-06-25 PROCEDURE — 25810000003 LACTATED RINGERS PER 1000 ML: Performed by: NURSE ANESTHETIST, CERTIFIED REGISTERED

## 2024-06-25 PROCEDURE — 31645 BRNCHSC W/THER ASPIR 1ST: CPT | Performed by: INTERNAL MEDICINE

## 2024-06-25 PROCEDURE — 87116 MYCOBACTERIA CULTURE: CPT | Performed by: INTERNAL MEDICINE

## 2024-06-25 PROCEDURE — 88108 CYTOPATH CONCENTRATE TECH: CPT | Performed by: INTERNAL MEDICINE

## 2024-06-25 PROCEDURE — 87071 CULTURE AEROBIC QUANT OTHER: CPT | Performed by: INTERNAL MEDICINE

## 2024-06-25 PROCEDURE — 87633 RESP VIRUS 12-25 TARGETS: CPT | Performed by: INTERNAL MEDICINE

## 2024-06-25 PROCEDURE — 89051 BODY FLUID CELL COUNT: CPT | Performed by: INTERNAL MEDICINE

## 2024-06-25 PROCEDURE — 87102 FUNGUS ISOLATION CULTURE: CPT | Performed by: INTERNAL MEDICINE

## 2024-06-25 PROCEDURE — 87070 CULTURE OTHR SPECIMN AEROBIC: CPT | Performed by: INTERNAL MEDICINE

## 2024-06-25 PROCEDURE — 25010000002 PROPOFOL 10 MG/ML EMULSION: Performed by: NURSE ANESTHETIST, CERTIFIED REGISTERED

## 2024-06-25 PROCEDURE — 87206 SMEAR FLUORESCENT/ACID STAI: CPT | Performed by: INTERNAL MEDICINE

## 2024-06-25 PROCEDURE — 31624 DX BRONCHOSCOPE/LAVAGE: CPT | Performed by: INTERNAL MEDICINE

## 2024-06-25 RX ORDER — SODIUM CHLORIDE 9 MG/ML
9 INJECTION, SOLUTION INTRAVENOUS CONTINUOUS
Status: DISCONTINUED | OUTPATIENT
Start: 2024-06-25 | End: 2024-06-25 | Stop reason: HOSPADM

## 2024-06-25 RX ORDER — PROPOFOL 10 MG/ML
VIAL (ML) INTRAVENOUS AS NEEDED
Status: DISCONTINUED | OUTPATIENT
Start: 2024-06-25 | End: 2024-06-25 | Stop reason: SURG

## 2024-06-25 RX ORDER — CEFDINIR 300 MG/1
300 CAPSULE ORAL 2 TIMES DAILY
Qty: 20 CAPSULE | Refills: 0 | Status: SHIPPED | OUTPATIENT
Start: 2024-06-25

## 2024-06-25 RX ORDER — SODIUM CHLORIDE, SODIUM LACTATE, POTASSIUM CHLORIDE, CALCIUM CHLORIDE 600; 310; 30; 20 MG/100ML; MG/100ML; MG/100ML; MG/100ML
INJECTION, SOLUTION INTRAVENOUS CONTINUOUS PRN
Status: DISCONTINUED | OUTPATIENT
Start: 2024-06-25 | End: 2024-06-25 | Stop reason: SURG

## 2024-06-25 RX ORDER — LIDOCAINE HYDROCHLORIDE 20 MG/ML
INJECTION, SOLUTION EPIDURAL; INFILTRATION; INTRACAUDAL; PERINEURAL AS NEEDED
Status: DISCONTINUED | OUTPATIENT
Start: 2024-06-25 | End: 2024-06-25 | Stop reason: SURG

## 2024-06-25 RX ORDER — MAGNESIUM HYDROXIDE 1200 MG/15ML
LIQUID ORAL AS NEEDED
Status: DISCONTINUED | OUTPATIENT
Start: 2024-06-25 | End: 2024-06-25 | Stop reason: HOSPADM

## 2024-06-25 RX ORDER — LIDOCAINE HYDROCHLORIDE 40 MG/ML
INJECTION, SOLUTION RETROBULBAR AS NEEDED
Status: DISCONTINUED | OUTPATIENT
Start: 2024-06-25 | End: 2024-06-25 | Stop reason: HOSPADM

## 2024-06-25 RX ORDER — SODIUM CHLORIDE, SODIUM LACTATE, POTASSIUM CHLORIDE, CALCIUM CHLORIDE 600; 310; 30; 20 MG/100ML; MG/100ML; MG/100ML; MG/100ML
30 INJECTION, SOLUTION INTRAVENOUS CONTINUOUS
Status: CANCELLED | OUTPATIENT
Start: 2024-06-25

## 2024-06-25 RX ADMIN — LIDOCAINE HYDROCHLORIDE 50 MG: 20 INJECTION, SOLUTION INTRAVENOUS at 10:36

## 2024-06-25 RX ADMIN — PROPOFOL 50 MG: 10 INJECTION, EMULSION INTRAVENOUS at 10:36

## 2024-06-25 RX ADMIN — PROPOFOL 150 MCG/KG/MIN: 10 INJECTION, EMULSION INTRAVENOUS at 10:36

## 2024-06-25 RX ADMIN — SODIUM CHLORIDE, POTASSIUM CHLORIDE, SODIUM LACTATE AND CALCIUM CHLORIDE: 600; 310; 30; 20 INJECTION, SOLUTION INTRAVENOUS at 10:32

## 2024-06-25 NOTE — ANESTHESIA POSTPROCEDURE EVALUATION
Patient: Daron Whitfield    Procedure Summary       Date: 06/25/24 Room / Location: [facility] ENDOSCOPY 3 / [facility] ENDOSCOPY    Anesthesia Start: 1032 Anesthesia Stop: 1059    Procedure: BRONCHOSCOPY W/ BRONCHOALVEOLAR AND BRONCHIAL WASHINGS (Bronchus) Diagnosis:       Mucus plugging of bronchi      Acute cough      SOB (shortness of breath)      Adenocarcinoma of bronchus, right      Multifocal pneumonia      (Mucus plugging of bronchi [T17.500A])      (Acute cough [R05.1])      (SOB (shortness of breath) [R06.02])      (Adenocarcinoma of bronchus, right [C34.91])      (Multifocal pneumonia [J18.9])    Surgeons: Roshan Hood MD Provider: Cristy Braga CRNA    Anesthesia Type: general ASA Status: 3            Anesthesia Type: general    Vitals  Vitals Value Taken Time   BP 99/54 06/25/24 1126   Temp 35.7 °C (96.2 °F) 06/25/24 1112   Pulse 96 06/25/24 1130   Resp 20 06/25/24 1112   SpO2 93 % 06/25/24 1130   Vitals shown include unfiled device data.        Post Anesthesia Care and Evaluation    Patient location during evaluation: bedside  Patient participation: complete - patient participated  Level of consciousness: awake  Pain management: adequate    Airway patency: patent  Anesthetic complications: No anesthetic complications  PONV Status: controlled  Cardiovascular status: acceptable and stable  Respiratory status: acceptable

## 2024-06-25 NOTE — OP NOTE
Bronchoscopy Procedure Note    Procedure:  Bronchoscopy with mucous plug removal  Bronchoscopy with bronchoalveolar lavage left lower lobe  Bronchoscopy with bronchial washings tracheobronchial tree  Airway inspection    Pre-Operative Diagnosis: Recurrent and persistent pneumonia, mucous plugging, difficulty with airway clearance    Post-Operative Diagnosis: Bilateral mucous plugging, more so in left lower lobe, excessive dynamic collapse of the airway    Indication:  Recurrent and persistent pneumonia, mucous plugging, difficulty with airway clearance    Anesthesia: MAC anesthesia    Procedure Details: Patient was consented for the procedure with all risks and benefits of the procedure explained in detail. Patient was given the opportunity to ask questions and all concerns were answered.    The bronchoscope was inserted into the main airway via the mouth. An anatomical survey was done of the main airways and the subsegmental bronchus. The findings are reported below.  Bilateral scattered mucous plugging was seen, more so in left lower lobe, suctioned out in the several attempts.  Excessive dynamic collapse of the airway was seen and left bronchial tubes as well as in trachea.  A bronchoalveolar lavage was performed using 60 mL aliquots of normal saline x 2 instilled into the airways then aspirated back from left lower lobe of lung with 45 mL serosanguineous fluid in return.  Bronchial washing was obtained from entire tracheobronchial tree.      Findings:  Bilateral scattered mucous plugging was seen, more so in left lower lobe, suctioned out in the several attempts.    Excessive dynamic collapse of the airway was seen and left bronchial tubes as well as in trachea.   Friable mucosa, easy bleeding with suction  Scattered purulent secretions    Estimated Blood Loss: None    Specimens:  BAL left lower lobe  Bronchial washings tracheobronchial tree    Complications: None; patient tolerated the procedure  well.    Disposition: To home, once stable in recovery.    Recommend: Use of NIPPV/CPAP at home.  May need sleep study.  Follow cultures, continue with current antibiotics and steroids.    Patient tolerated the procedure well.      Electronically signed by Roshan Hood MD, 6/25/2024, 10:47 EDT.

## 2024-06-27 LAB
BACTERIA SPEC AEROBE CULT: NORMAL
BACTERIA SPEC RESP CULT: NORMAL
CYTO UR: NORMAL
GRAM STN SPEC: NORMAL
LAB AP CASE REPORT: NORMAL
LAB AP CLINICAL INFORMATION: NORMAL
PATH REPORT.FINAL DX SPEC: NORMAL
PATH REPORT.GROSS SPEC: NORMAL

## 2024-07-02 ENCOUNTER — HOSPITAL ENCOUNTER (EMERGENCY)
Facility: HOSPITAL | Age: 79
Discharge: HOME | End: 2024-07-02
Attending: EMERGENCY MEDICINE
Payer: MEDICARE

## 2024-07-02 ENCOUNTER — APPOINTMENT (OUTPATIENT)
Dept: GENERAL RADIOLOGY | Facility: HOSPITAL | Age: 79
End: 2024-07-02
Payer: MEDICARE

## 2024-07-02 VITALS
HEART RATE: 103 BPM | TEMPERATURE: 98 F | SYSTOLIC BLOOD PRESSURE: 132 MMHG | RESPIRATION RATE: 20 BRPM | OXYGEN SATURATION: 92 % | HEIGHT: 71 IN | BODY MASS INDEX: 24.38 KG/M2 | WEIGHT: 174.16 LBS | DIASTOLIC BLOOD PRESSURE: 58 MMHG

## 2024-07-02 DIAGNOSIS — S82.001A CLOSED NONDISPLACED FRACTURE OF RIGHT PATELLA, UNSPECIFIED FRACTURE MORPHOLOGY, INITIAL ENCOUNTER: Primary | ICD-10-CM

## 2024-07-02 PROCEDURE — 73562 X-RAY EXAM OF KNEE 3: CPT

## 2024-07-02 PROCEDURE — 99283 EMERGENCY DEPT VISIT LOW MDM: CPT

## 2024-07-02 RX ORDER — TRAMADOL HYDROCHLORIDE 50 MG/1
50 TABLET ORAL ONCE
Status: COMPLETED | OUTPATIENT
Start: 2024-07-02 | End: 2024-07-02

## 2024-07-02 RX ORDER — TRAMADOL HYDROCHLORIDE 50 MG/1
50 TABLET ORAL EVERY 6 HOURS PRN
Qty: 15 TABLET | Refills: 0 | Status: SHIPPED | OUTPATIENT
Start: 2024-07-02

## 2024-07-02 RX ADMIN — TRAMADOL HYDROCHLORIDE 50 MG: 50 TABLET, FILM COATED ORAL at 16:34

## 2024-07-02 NOTE — SIGNIFICANT NOTE
Patient reports on Friday he was crawling around under his deck and started having pain in his right knee that evening.  He states the pain is increased with moving from sitting to standing.  He states he has tried Aspercreme, Biofreeze, and ice.  He has not taken any pain medicine.  He denies any history of issues with this knee.  He denies use of an assistive device.  He reports difficulty lifting his leg to get in and out of car bed and bending his knee.    85 degrees of R knee flexion  R knee extension strength: 2+/5  Palpation: tender at quadricep tendon and superior to patella; redness noted in this area    PT evaluation was stopped and not completed as pt's x-ray resulted in a patellar fracture.    Shanell Tamayo, PT, DPT

## 2024-07-02 NOTE — ED PROVIDER NOTES
Time: 2:51 PM EDT  Date of encounter:  7/2/2024  Independent Historian/Clinical History and Information was obtained by:   Patient    History is limited by: N/A    Chief Complaint   Patient presents with    Knee Injury         History of Present Illness:  Patient is a 79 y.o. year old male who presents to the emergency department for evaluation of right knee pain ongoing for 5 days.  Patient reports before pain started he had crawled under his deck at home to fix something and is unsure if maybe kneeling on the knee may have started this pain.  Denies any other injuries.     Patient Care Team  Primary Care Provider: Carol Frank MD    Past Medical History:     No Known Allergies  Past Medical History:   Diagnosis Date    Bladder outflow obstruction     Cervical spinal stenosis 01/23/2018    Colon polyp     COVID     Diabetes mellitus 2019    Emphysema lung     Emphysema of lung     Hand paresthesia 01/23/2018    HTN (hypertension)     Hypercholesteremia     Lung cancer 2016    Muscle atrophy of upper extremity 01/23/2018    Numbness and tingling in both hands     OAB (overactive bladder) 12/09/2014    Prostate cancer 2012    Ulnar neuropathy 01/23/2018     Past Surgical History:   Procedure Laterality Date    BRONCHOSCOPY N/A 05/09/2023    Procedure: BRONCHOSCOPY WITH BRONCHOALVEOLAR LAVAGE,  BRONCHIAL WASHINGS;  Surgeon: Roshan Hood MD;  Location: [facility] ENDOSCOPY;  Service: Pulmonary;  Laterality: N/A;  PERSISTENT PNEUMONIA    BRONCHOSCOPY N/A 6/25/2024    Procedure: BRONCHOSCOPY W/ BRONCHOALVEOLAR AND BRONCHIAL WASHINGS;  Surgeon: Roshan Hood MD;  Location: [facility] ENDOSCOPY;  Service: Pulmonary;  Laterality: N/A;  MUCOUS PLUGGING    CARDIAC CATHETERIZATION      COLONOSCOPY  2019    DR CUETO    COLONOSCOPY N/A 12/4/2023    Procedure: COLONOSCOPY WITH POLYPECTOMIES;  Surgeon: Zhen Cueto MD;  Location: [facility] ENDOSCOPY;  Service: Gastroenterology;  Laterality: N/A;  DIVERTICULOSIS, COLON  POLYPS    CRYOABLATION OF PROSTATE  01/17/2012    CYSTOSCOPY      HAND SURGERY Right     LUNG BIOPSY      TEETH EXTRACTION      TURP / TRANSURETHRAL INCISION / DRAINAGE PROSTATE  06/16/2015     Family History   Problem Relation Age of Onset    Diabetes Mother     Cancer Sister     Diabetes Sister     Colon cancer Sister 60    Colon polyps Sister     Hypertension Sister     Diabetes Sister     Coronary artery disease Brother        Home Medications:  Prior to Admission medications    Medication Sig Start Date End Date Taking? Authorizing Provider   albuterol sulfate  (90 Base) MCG/ACT inhaler Inhale 2 puffs Every 4 (Four) Hours As Needed for Wheezing. 6/7/24   Susanne Dobbins APRN   amoxicillin-clavulanate (AUGMENTIN) 875-125 MG per tablet Take 1 tablet by mouth 2 (Two) Times a Day. 6/21/24   Susanne Dobbins APRN   atorvastatin (LIPITOR) 40 MG tablet Lipitor 40 mg oral tablet take 1 tablet (40 mg) by oral route once daily   Suspended    ProviderTherese MD   bicalutamide (CASODEX) 50 MG tablet Take 1 tablet by mouth Daily for 360 days. 4/10/24 4/5/25  Merrill Ames MD   cefdinir (OMNICEF) 300 MG capsule Take 1 capsule by mouth 2 (Two) Times a Day. 6/25/24   Roshan Hood MD   cetirizine (zyrTEC) 10 MG tablet Take 1 tablet by mouth Daily. 5/3/24   Susanne Dobbins APRN   Fluticasone-Umeclidin-Vilant (TRELEGY ELLIPTA) 200-62.5-25 MCG/ACT inhaler Inhale 1 puff Daily. 5/3/24   Susanne Dobbins APRN   Gemtesa 75 MG tablet Take 1 tablet by mouth Daily for 360 days. 4/10/24 4/5/25  Merrill Ames MD   ipratropium-albuterol (DUO-NEB) 0.5-2.5 mg/3 ml nebulizer Take 3 mL by nebulization Every 4 (Four) Hours As Needed for Wheezing. 6/7/24   Susanne Dobbins APRN   leuprolide (Lupron Depot, 3-Month,) 22.5 MG injection 22.5 mg.    ProviderTherese MD   lisinopril (PRINIVIL,ZESTRIL) 5 MG tablet Daily.    Provider, Historical, MD   metFORMIN (GLUCOPHAGE) 500 MG tablet  7/6/21   Provider, Historical, MD  "  metoprolol succinate XL (TOPROL-XL) 25 MG 24 hr tablet Toprol XL 25 mg oral tablet extended release 24 hr take 1 tablet (25 mg) by oral route once daily   Active    Provider, MD Therese   mirtazapine (REMERON) 15 MG tablet  23   Provider, MD Therese   pantoprazole (PROTONIX) 40 MG EC tablet Take 1 tablet by mouth Daily. 23   Zhen Moore MD        Social History:   Social History     Tobacco Use    Smoking status: Former     Current packs/day: 0.00     Average packs/day: 1 pack/day for 50.0 years (50.0 ttl pk-yrs)     Types: Cigarettes     Start date: 1966     Quit date: 2016     Years since quittin.5     Passive exposure: Past    Smokeless tobacco: Never    Tobacco comments:     STARTED AGE 17  QUIT IN    Vaping Use    Vaping status: Never Used    Passive vaping exposure: Yes   Substance Use Topics    Alcohol use: Yes     Alcohol/week: 1.0 standard drink of alcohol     Types: 1 Cans of beer per week     Comment: casual drinker    Drug use: Never         Review of Systems:  Review of Systems   Constitutional:  Negative for chills, fatigue and fever.   HENT:  Negative for ear pain, rhinorrhea and sore throat.    Eyes:  Negative for visual disturbance.   Respiratory:  Negative for cough and shortness of breath.    Cardiovascular:  Negative for chest pain.   Gastrointestinal:  Negative for abdominal pain, diarrhea and vomiting.   Genitourinary:  Negative for difficulty urinating.   Musculoskeletal:  Positive for arthralgias and myalgias. Negative for back pain.   Skin:  Negative for rash.   Neurological:  Negative for light-headedness and headaches.   Hematological:  Negative for adenopathy.   Psychiatric/Behavioral: Negative.          Physical Exam:  /58 (BP Location: Right arm, Patient Position: Sitting)   Pulse 103   Temp 98 °F (36.7 °C) (Oral)   Resp 20   Ht 180.3 cm (71\")   Wt 79 kg (174 lb 2.6 oz)   SpO2 92%   BMI 24.29 kg/m²         Physical Exam  Vitals " and nursing note reviewed.   Constitutional:       General: He is not in acute distress.     Appearance: Normal appearance. He is not toxic-appearing.   HENT:      Head: Normocephalic and atraumatic.      Nose: Nose normal.      Mouth/Throat:      Mouth: Mucous membranes are moist.   Eyes:      Conjunctiva/sclera: Conjunctivae normal.   Cardiovascular:      Rate and Rhythm: Normal rate.      Pulses: Normal pulses.   Pulmonary:      Effort: Pulmonary effort is normal.   Musculoskeletal:         General: Normal range of motion.      Cervical back: Normal range of motion.   Skin:     General: Skin is warm and dry.   Neurological:      General: No focal deficit present.      Mental Status: He is alert and oriented to person, place, and time.   Psychiatric:         Mood and Affect: Mood normal.         Behavior: Behavior normal.         Thought Content: Thought content normal.         Judgment: Judgment normal.                      Procedures:  Procedures      Medical Decision Making:      Comorbidities that affect care:    Diabetes, Hypertension    External Notes reviewed:    None      The following orders were placed and all results were independently analyzed by me:  Orders Placed This Encounter   Procedures    [facility] 05.  Knee Immobilizer    [facility] 12.  Standard Walker Folding; Prevents Accomplishing MRADLs; Able to Safely Use Equipment; Mobility Deficit Can Be Sufficiently Resolved By Use of Equipment    XR Knee 3 View Right    Ambulatory Referral to Orthopedic Surgery    Obtain & Apply The Following- Lower extremity; Knee immobilizer       Medications Given in the Emergency Department:  Medications   traMADol (ULTRAM) tablet 50 mg (50 mg Oral Given 7/2/24 0823)        ED Course:    The patient was initially evaluated in the triage area where orders were placed. The patient was later dispositioned by LALY Duffy.      The patient was advised to stay for completion of workup  which includes but is not limited to communication of labs and radiological results, reassessment and plan. The patient was advised that leaving prior to disposition by a provider could result in critical findings that are not communicated to the patient.          Labs:    Lab Results (last 24 hours)       ** No results found for the last 24 hours. **             Imaging:    XR Knee 3 View Right    Result Date: 7/2/2024  XR KNEE 3 VW RIGHT Date of Exam: 7/2/2024 2:31 PM EDT Indication: PT states he may have injured his knee, having right knee pain. Acute anterior right knee pain for 6 days. Comparison: None available. Findings: There is calcific atherosclerosis of the visualized lower extremity arteries. No significant knee effusion. Mineralization appears within the limits. No definite acute malalignment. There appears to be mild osteophyte formation and mild tricompartmental joint space narrowing. There is mild enthesopathy. On the lateral view, there is subtle oblique linear lucency at the inferior patella without definite displacement. There appears to be mild adjacent soft tissue prominence. Findings are suspicious for a nondisplaced inferior patellar fracture. No other definite fracture or malalignment is seen.     Impression: 1.Subtle oblique linear lucency at the inferior patella on the lateral view. Finding is suspicious for a nondisplaced inferior patellar fracture given patient's symptoms. 2.Mild tricompartmental osteoarthritis. 3.Calcific atherosclerosis. Electronically Signed: Miguel Rodríguez  7/2/2024 2:49 PM EDT  Workstation ID: [id number]       Differential Diagnosis and Discussion:      Extremity Pain: Differential diagnosis includes but is not limited to soft tissue sprain, tendonitis, tendon injury, dislocation, fracture, deep vein thrombosis, arterial insufficiency, osteoarthritis, bursitis, and ligamentous damage.    All X-rays impressions were independently interpreted by me.    MDM  Number of  Diagnoses or Management Options  Closed nondisplaced fracture of right patella, unspecified fracture morphology, initial encounter  Diagnosis management comments: I have explained the patient´s condition, diagnoses and treatment plan based on the information available to me at this time. I have answered questions and addressed any concerns. The patient has a good  understanding of the patient´s diagnosis, condition, and treatment plan as can be expected at this point. The vital signs have been stable. The patient´s condition is stable and appropriate for discharge from the emergency department.      The patient will pursue further outpatient evaluation with the primary care physician or other designated or consulting physician as outlined in the discharge instructions. They are agreeable to this plan of care and follow-up instructions have been explained in detail. The patient has received these instructions in written format and has expressed an understanding of the discharge instructions. The patient is aware that any significant change in condition or worsening of symptoms should prompt an immediate return to this or the closest emergency department or call to 911.                Patient Care Considerations:          Consultants/Shared Management Plan:    Consultant: I have discussed the case with Dr. Tabor who states patient may be placed in knee immobilizer and follow-up in the office.    Social Determinants of Health:    Patient is independent, reliable, and has access to care.       Disposition and Care Coordination:    Discharged: The patient is suitable and stable for discharge with no need for consideration of admission.    I have explained the patient´s condition, diagnoses and treatment plan based on the information available to me at this time. I have answered questions and addressed any concerns. The patient has a good  understanding of the patient´s diagnosis, condition, and treatment plan as  can be expected at this point. The vital signs have been stable. The patient´s condition is stable and appropriate for discharge from the emergency department.      The patient will pursue further outpatient evaluation with the primary care physician or other designated or consulting physician as outlined in the discharge instructions. They are agreeable to this plan of care and follow-up instructions have been explained in detail. The patient has received these instructions in written format and has expressed an understanding of the discharge instructions. The patient is aware that any significant change in condition or worsening of symptoms should prompt an immediate return to this or the closest emergency department or call to [facility].  I have explained discharge medications and the need for follow up with the patient/caretakers. This was also printed in the discharge instructions. Patient was discharged with the following medications and follow up:      Medication List        New Prescriptions      traMADol 50 MG tablet  Commonly known as: ULTRAM  Take 1 tablet by mouth Every 6 (Six) Hours As Needed for Moderate Pain.               Where to Get Your Medications        These medications were sent to [facility] - [place], [place] - [address] [place] [address] [phone] [place] [id number] [address] [address]      Phone: [phone]   traMADol 50 MG tablet      Meet Tabor MD  [address]  [place] [address] [phone]             Final diagnoses:   Closed nondisplaced fracture of right patella, unspecified fracture morphology, initial encounter        ED Disposition       ED Disposition   Discharge    Condition   Stable    Comment   --               This medical record created using voice recognition software.             Elizabeth Phillips P, APRN  07/02/24 [id number]

## 2024-07-02 NOTE — DISCHARGE INSTRUCTIONS
Someone will contact you this week to schedule an appointment with orthopedist.  Please wear knee immobilizer and continue to use your walker until you are able to follow-up.  Return to the ED for worsening pain, swelling or any other new or concerning worsening symptoms.  Please be cautious when taking pain medication and do not drive.

## 2024-07-02 NOTE — SIGNIFICANT NOTE
07/02/24 1642   Plan   Final Note SW provided pt with walker and sent a referral [facility].

## 2024-07-03 ENCOUNTER — TELEPHONE (OUTPATIENT)
Dept: ORTHOPEDIC SURGERY | Facility: CLINIC | Age: 79
End: 2024-07-03
Payer: MEDICARE

## 2024-07-03 NOTE — TELEPHONE ENCOUNTER
Closed nondisplaced fracture of right patella - PROG NOTE ED 7.2.24 - XR 7.2.24 -    Problem: Bronchopulmonary Hygiene:  Goal: Increase mobilization of retained secretions  Outcome: PROGRESSING AS EXPECTED  3 ML of 8.4% Sodium bicarb every 4 hours and IPV QID and 30% T-Piece when off of ventilator.

## 2024-07-11 LAB
FUNGUS ISLT: ABNORMAL
FUNGUS ISLT: ABNORMAL

## 2024-07-12 ENCOUNTER — OFFICE VISIT (OUTPATIENT)
Dept: ORTHOPEDIC SURGERY | Facility: CLINIC | Age: 79
End: 2024-07-12
Payer: MEDICARE

## 2024-07-12 VITALS — BODY MASS INDEX: 24.5 KG/M2 | WEIGHT: 175 LBS | HEIGHT: 71 IN

## 2024-07-12 DIAGNOSIS — M25.561 RIGHT KNEE PAIN, UNSPECIFIED CHRONICITY: Primary | ICD-10-CM

## 2024-07-12 NOTE — PROGRESS NOTES
"Chief Complaint  Initial Evaluation of the Right Knee     Subjective      Daron Whitfield presents to [facility] for initial evaluation of the right knee. He had an injury and went to the ER on 24.  He has no pain now in the right knee. He is doing much better and has no pain.  He was placed in a brace and now he is wearing a knee sleeve.      No Known Allergies     Social History     Socioeconomic History    Marital status:    Tobacco Use    Smoking status: Former     Current packs/day: 0.00     Average packs/day: 1 pack/day for 50.0 years (50.0 ttl pk-yrs)     Types: Cigarettes     Start date: 1966     Quit date: 2016     Years since quittin.5     Passive exposure: Past    Smokeless tobacco: Never    Tobacco comments:     STARTED AGE 17  QUIT IN    Vaping Use    Vaping status: Never Used    Passive vaping exposure: Yes   Substance and Sexual Activity    Alcohol use: Yes     Alcohol/week: 1.0 standard drink of alcohol     Types: 1 Cans of beer per week     Comment: casual drinker    Drug use: Never    Sexual activity: Not Currently     Partners: Female        I reviewed the patient's chief complaint, history of present illness, review of systems, past medical history, surgical history, family history, social history, medications, and allergy list.     Review of Systems     Constitutional: Denies fevers, chills, weight loss  Cardiovascular: Denies chest pain, shortness of breath  Skin: Denies rashes, acute skin changes  Neurologic: Denies headache, loss of consciousness  MSK: Right knee pain.       Vital Signs:   Ht 180.3 cm (71\")   Wt 79.4 kg (175 lb)   BMI 24.41 kg/m²            Ortho Exam    Physical Exam  General:Alert. No acute distress     RIGHT KNEE Flexion 130. Extension 0. Stable to varus/valgus stress. Stable to anterior/posterior drawer. Neurovascularly intact. Negative Destiney. Negative Lachman. Positive EHL, FHL, HS and TA. Sensation intact to " light touch all 5 nerves of the foot. Ambulates with Non-antalgic gait. Patella is well tracking. Calf supple, non-tender. Negative tenderness to the medial joint line. Negative tenderness to the lateral joint line. Negative Crepitus. Good strength to hamstrings, quadriceps, dorsiflexors, and plantar flexors.  Knee Extensor Mechanism intact No effusion and non tender to the patella.  He can hold a straight leg raise.      Procedures        Imaging Results (Most Recent)       None             Result Review :       XR Knee 3 View Right    Result Date: 7/2/2024  Narrative: XR KNEE 3 VW RIGHT Date of Exam: 7/2/2024 2:31 PM EDT Indication: PT states he may have injured his knee, having right knee pain. Acute anterior right knee pain for 6 days. Comparison: None available. Findings: There is calcific atherosclerosis of the visualized lower extremity arteries. No significant knee effusion. Mineralization appears within the limits. No definite acute malalignment. There appears to be mild osteophyte formation and mild tricompartmental joint space narrowing. There is mild enthesopathy. On the lateral view, there is subtle oblique linear lucency at the inferior patella without definite displacement. There appears to be mild adjacent soft tissue prominence. Findings are suspicious for a nondisplaced inferior patellar fracture. No other definite fracture or malalignment is seen.     Impression: Impression: 1.Subtle oblique linear lucency at the inferior patella on the lateral view. Finding is suspicious for a nondisplaced inferior patellar fracture given patient's symptoms. 2.Mild tricompartmental osteoarthritis. 3.Calcific atherosclerosis. Electronically Signed: Miguel Rodríguez  7/2/2024 2:49 PM EDT  Workstation ID: [id number]    CT Chest Without Contrast Diagnostic    Result Date: 6/19/2024  Narrative: CT CHEST WO CONTRAST DIAGNOSTIC Date of Exam: 6/18/2024 10:33 AM EDT Indication: Shortness of air, adenocarcinoma, history of  mucous plugging, ineffective cough. Comparison: 3/8/2024 CT and 6/7/2024 chest radiograph Technique: Axial CT images were obtained of the chest without contrast administration.  Reconstructed coronal and sagittal images were also obtained. Automated exposure control and iterative construction methods were used. Findings: Central airways are patent. There is mild diffuse bronchiectasis. Left lower lobe bronchial wall thickening with multifocal mucus plugging. Severe centrilobular emphysema. There are groundglass opacities and tree-in-bud nodularity in the left lower lobe.  Unchanged volume loss in scarring in the medial aspect of the superior right lower lobe adjacent to the right hilum. Left upper lobe calcified granuloma. No significant pleural effusion or pneumothorax. No new suspicious pulmonary nodule or mass. No mediastinal mass or suspicious lymphadenopathy. Benign calcified mediastinal and left hilar lymph nodes. Right chest wall port with distal lead tip in the superior vena cava. Enlarged pulmonary arteries. Unchanged heart size. No pericardial effusion. Coronary calcifications. Bilateral gynecomastia. No acute abnormality in the chest wall soft tissues. Upper abdomen shows no acute findings. No fracture or suspicious bony lesion.     Impression: Impression: Groundglass opacities and tree-in-bud nodularity in the left lower lobe consistent with pneumonia. Left lower lobe bronchial wall thickening with multifocal mucus plugging, which could represent a component of aspiration. Background severe centrilobular emphysema with mild diffuse bronchiectasis. No suspicious pulmonary nodule or mass. Enlarged pulmonary arteries which can be seen with pulmonary hypertension. Electronically Signed: Chapin Chavez MD  6/19/2024 10:58 AM EDT  Workstation ID: [id number]            Assessment and Plan     Diagnoses and all orders for this visit:    1. Right knee pain, unspecified chronicity (Primary)        Discussed the  treatment plan with the patient. I reviewed the X-rays that were obtained 7/2/24 with the patient.     Modify activities and wear knee sleeve as needed.       Call or return if worsening symptoms.    Follow Up     PRN        Patient was given instructions and counseling regarding his condition or for health maintenance advice. Please see specific information pulled into the AVS if appropriate.     Scribed for Meet Tabor MD by Luci Barlow MA.  07/12/24   10:35 EDT    I have personally performed the services described in this document as scribed by the above individual and it is both accurate and complete. Meet Tabor MD 07/14/24

## 2024-07-15 ENCOUNTER — CLINICAL SUPPORT (OUTPATIENT)
Dept: UROLOGY | Facility: CLINIC | Age: 79
End: 2024-07-15
Payer: MEDICARE

## 2024-07-15 DIAGNOSIS — C61 PROSTATE CANCER: Primary | ICD-10-CM

## 2024-07-16 ENCOUNTER — LAB (OUTPATIENT)
Dept: LAB | Facility: HOSPITAL | Age: 79
End: 2024-07-16
Payer: MEDICARE

## 2024-07-16 ENCOUNTER — OFFICE VISIT (OUTPATIENT)
Dept: PULMONOLOGY | Facility: CLINIC | Age: 79
End: 2024-07-16
Payer: MEDICARE

## 2024-07-16 VITALS
SYSTOLIC BLOOD PRESSURE: 109 MMHG | TEMPERATURE: 98.6 F | HEIGHT: 74 IN | BODY MASS INDEX: 22.72 KG/M2 | OXYGEN SATURATION: 89 % | RESPIRATION RATE: 16 BRPM | HEART RATE: 74 BPM | DIASTOLIC BLOOD PRESSURE: 66 MMHG | WEIGHT: 177 LBS

## 2024-07-16 DIAGNOSIS — R06.02 SOB (SHORTNESS OF BREATH): ICD-10-CM

## 2024-07-16 DIAGNOSIS — B44.89 ASPERGILLUS FUMIGATUS: ICD-10-CM

## 2024-07-16 DIAGNOSIS — B44.89 ASPERGILLUS FUMIGATUS: Primary | ICD-10-CM

## 2024-07-16 DIAGNOSIS — J43.9 PULMONARY EMPHYSEMA, UNSPECIFIED EMPHYSEMA TYPE: ICD-10-CM

## 2024-07-16 DIAGNOSIS — T17.500A MUCUS PLUGGING OF BRONCHI: ICD-10-CM

## 2024-07-16 LAB
ALBUMIN SERPL-MCNC: 4 G/DL (ref 3.5–5.2)
ALBUMIN/GLOB SERPL: 1.4 G/DL
ALP SERPL-CCNC: 81 U/L (ref 39–117)
ALT SERPL W P-5'-P-CCNC: 8 U/L (ref 1–41)
ANION GAP SERPL CALCULATED.3IONS-SCNC: 10 MMOL/L (ref 5–15)
AST SERPL-CCNC: 15 U/L (ref 1–40)
BILIRUB SERPL-MCNC: 0.3 MG/DL (ref 0–1.2)
BUN SERPL-MCNC: 19 MG/DL (ref 8–23)
BUN/CREAT SERPL: 12.1 (ref 7–25)
CALCIUM SPEC-SCNC: 9.4 MG/DL (ref 8.6–10.5)
CHLORIDE SERPL-SCNC: 107 MMOL/L (ref 98–107)
CO2 SERPL-SCNC: 22 MMOL/L (ref 22–29)
CREAT SERPL-MCNC: 1.57 MG/DL (ref 0.76–1.27)
EGFRCR SERPLBLD CKD-EPI 2021: 44.3 ML/MIN/1.73
FUNGUS WND CULT: NORMAL
GLOBULIN UR ELPH-MCNC: 2.9 GM/DL
GLUCOSE SERPL-MCNC: 93 MG/DL (ref 65–99)
MYCOBACTERIUM SPEC CULT: NORMAL
MYCOBACTERIUM SPEC CULT: NORMAL
NIGHT BLUE STAIN TISS: NORMAL
POTASSIUM SERPL-SCNC: 4.7 MMOL/L (ref 3.5–5.2)
PROT SERPL-MCNC: 6.9 G/DL (ref 6–8.5)
SODIUM SERPL-SCNC: 139 MMOL/L (ref 136–145)

## 2024-07-16 PROCEDURE — 99214 OFFICE O/P EST MOD 30 MIN: CPT | Performed by: NURSE PRACTITIONER

## 2024-07-16 PROCEDURE — 3074F SYST BP LT 130 MM HG: CPT | Performed by: NURSE PRACTITIONER

## 2024-07-16 PROCEDURE — 1160F RVW MEDS BY RX/DR IN RCRD: CPT | Performed by: NURSE PRACTITIONER

## 2024-07-16 PROCEDURE — 80053 COMPREHEN METABOLIC PANEL: CPT

## 2024-07-16 PROCEDURE — 3078F DIAST BP <80 MM HG: CPT | Performed by: NURSE PRACTITIONER

## 2024-07-16 PROCEDURE — 1159F MED LIST DOCD IN RCRD: CPT | Performed by: NURSE PRACTITIONER

## 2024-07-16 PROCEDURE — 36415 COLL VENOUS BLD VENIPUNCTURE: CPT

## 2024-07-16 RX ORDER — ITRACONAZOLE 100 MG/1
200 CAPSULE ORAL 2 TIMES DAILY
Qty: 360 CAPSULE | Refills: 1 | Status: SHIPPED | OUTPATIENT
Start: 2024-07-16

## 2024-07-16 RX ORDER — NALOXONE HYDROCHLORIDE 4 MG/.1ML
SPRAY NASAL
COMMUNITY
Start: 2024-07-03

## 2024-07-16 NOTE — PROGRESS NOTES
Primary Care Provider  Carol Frank MD   Referring Provider  No ref. provider found    Patient Complaint  Results and Shortness of Breath (On exertion)      SUBJECTIVE    History of Presenting Illness  Daron Whitfield is a pleasant 79 y.o. male who presents to [facility] for follow-up after bronchoscopy.  Patient underwent bronchoscopy by Dr. Hood on 6/25/2024 for mucous plugging.  Findings revealed bilateral scattered mucous plugging more so on the left lower lobe suctioned out and several attempts.  There was excessive dynamic collapse of the airway.  Friable mucosa easily with suction and scattered purulent secretions as well.  Cytology is returned negative for malignant cells.  Pneumonia panel was positive for haemophilus influenza for which patient was treated with 10 days of Omnicef.  Fungal culture positive for Aspergillus.  Patient will need to start itraconazole for 3-6 months and will need to monitor kidney and liver function during treatment.  We have no recent lab work on him and we will need to obtain today to get baseline.  Patient is also on cholesterol medicine and Protonix for his stomach which are contraindicated with itraconazole.  Patient states after bronchoscopy he is breathing much better.  He still has shortness of air with exertional activities but continues with his O2 at 2 L pulsed dose.  He says his coughing has improved. At present he denies wheezing, headaches, chest pain, weight loss or hemoptysis. Denies fevers, chills and night sweats. Daron Whitfield is able to perform ADLs without difficulties and denies any swollen glands/lymph nodes in the head or neck.    I have personally reviewed the review of systems, past family, social, medical and surgical histories; and agree with their findings.    Review of Systems  Constitutional symptoms:  Denied complaints   Ear, nose, throat: Denied complaints  Cardiovascular:  Denied  complaints  Respiratory: Shortness of air, cough  Gastrointestinal: Denied complaints  Musculoskeletal: Denied complaints    Family History   Problem Relation Age of Onset    Diabetes Mother     Cancer Sister     Diabetes Sister     Colon cancer Sister 60    Colon polyps Sister     Hypertension Sister     Diabetes Sister     Coronary artery disease Brother         Social History     Socioeconomic History    Marital status:    Tobacco Use    Smoking status: Former     Current packs/day: 0.00     Average packs/day: 1 pack/day for 50.0 years (50.0 ttl pk-yrs)     Types: Cigarettes     Start date: 1966     Quit date: 2016     Years since quittin.5     Passive exposure: Past    Smokeless tobacco: Never    Tobacco comments:     STARTED AGE 17  QUIT IN    Vaping Use    Vaping status: Never Used    Passive vaping exposure: Yes   Substance and Sexual Activity    Alcohol use: Yes     Alcohol/week: 1.0 standard drink of alcohol     Types: 1 Cans of beer per week     Comment: casual drinker    Drug use: Never    Sexual activity: Not Currently     Partners: Female        Past Medical History:   Diagnosis Date    Bladder outflow obstruction     Cervical spinal stenosis 2018    Colon polyp     COVID     Diabetes mellitus 2019    Emphysema lung     Emphysema of lung     Hand paresthesia 2018    HTN (hypertension)     Hypercholesteremia     Lung cancer 2016    Muscle atrophy of upper extremity 2018    Numbness and tingling in both hands     OAB (overactive bladder) 2014    Prostate cancer 2012    Ulnar neuropathy 2018        Immunization History   Administered Date(s) Administered    ABRYSVO (RSV, 60+ or pregnant women 32-36 wks) 10/30/2023    COVID-19 (MODERNA) 1st,2nd,3rd Dose Monovalent 2021, 2021    COVID-19 (PFIZER) BIVALENT 12+YRS 2022    COVID-19 (PFIZER) Purple Cap Monovalent 2021    COVID-19 F23 (MODERNA) 12YRS+ (SPIKEVAX) 10/30/2023    Fluad Quad  65+ 10/24/2022, 10/04/2023    Fluzone High Dose =>65 Years ([facility] ONLY) 12/11/2019    Fluzone High-Dose 65+yrs 11/13/2021, 10/30/2023    Pneumococcal Polysaccharide (PPSV23) 10/24/2022       No Known Allergies       Current Outpatient Medications:     albuterol sulfate  (90 Base) MCG/ACT inhaler, Inhale 2 puffs Every 4 (Four) Hours As Needed for Wheezing., Disp: 18 g, Rfl: 5    atorvastatin (LIPITOR) 40 MG tablet, Lipitor 40 mg oral tablet take 1 tablet (40 mg) by oral route once daily   Suspended, Disp: , Rfl:     bicalutamide (CASODEX) 50 MG tablet, Take 1 tablet by mouth Daily for 360 days., Disp: 90 tablet, Rfl: 3    cetirizine (zyrTEC) 10 MG tablet, Take 1 tablet by mouth Daily., Disp: 90 tablet, Rfl: 3    Fluticasone-Umeclidin-Vilant (TRELEGY ELLIPTA) 200-62.5-25 MCG/ACT inhaler, Inhale 1 puff Daily., Disp: 60 each, Rfl: 11    Gemtesa 75 MG tablet, Take 1 tablet by mouth Daily for 360 days., Disp: 90 tablet, Rfl: 1    ipratropium-albuterol (DUO-NEB) 0.5-2.5 mg/3 ml nebulizer, Take 3 mL by nebulization Every 4 (Four) Hours As Needed for Wheezing., Disp: 360 mL, Rfl: 4    leuprolide (Lupron Depot, 3-Month,) 22.5 MG injection, 22.5 mg., Disp: , Rfl:     lisinopril (PRINIVIL,ZESTRIL) 5 MG tablet, Daily., Disp: , Rfl:     metFORMIN (GLUCOPHAGE) 500 MG tablet, , Disp: , Rfl:     metoprolol succinate XL (TOPROL-XL) 25 MG 24 hr tablet, Toprol XL 25 mg oral tablet extended release 24 hr take 1 tablet (25 mg) by oral route once daily   Active, Disp: , Rfl:     mirtazapine (REMERON) 15 MG tablet, , Disp: , Rfl:     Narcan 4 MG/0.1ML nasal spray, , Disp: , Rfl:     pantoprazole (PROTONIX) 40 MG EC tablet, Take 1 tablet by mouth Daily., Disp: 30 tablet, Rfl: 5    traMADol (ULTRAM) 50 MG tablet, Take 1 tablet by mouth Every 6 (Six) Hours As Needed for Moderate Pain., Disp: 15 tablet, Rfl: 0    itraconazole (Sporanox) 100 MG capsule, Take 2 capsules by mouth 2 (Two) Times a Day., Disp: 360 capsule, Rfl: 1    Current  "Facility-Administered Medications:     leuprolide (LUPRON) injection 22.5 mg, 22.5 mg, Intramuscular, Once, Merrill Ames MD           Vital Signs   /66 (BP Location: Left arm, Patient Position: Sitting, Cuff Size: Adult)   Pulse 74   Temp 98.6 °F (37 °C)   Resp 16   Ht 188 cm (74\")   Wt 80.3 kg (177 lb)   SpO2 (!) 89% Comment: 3L POC  BMI 22.73 kg/m²       OBJECTIVE    Physical Exam  Vital Signs Reviewed   WDWN, Alert, NAD.    HEENT:  PERRL, EOMI.  OP, nares clear  Neck:  Supple, no JVD, no thyromegaly  Chest:  good aeration, clear to auscultation bilaterally, tympanic to percussion bilaterally, no work of breathing noted  CV: RRR, no MGR, pulses 2+, equal.  Abd:  Soft, NT, ND, + BS, no HSM  EXT:  no clubbing, no cyanosis, no edema  Neuro:  A&Ox3, CN grossly intact, no focal deficits.  Skin: No rashes or lesions noted    Results Review  I have personally reviewed the prior office notes, hospital records, labs, and diagnostics.  Non-gynecologic Cytology: [id number]  Order: [id number]  Status: Final result       Visible to patient: Yes (seen)       Next appt: Today at 09:45 AM in Pulmonology (Susanne Dobbins, LALY)       Dx: SOB (shortness of breath); Adenocarci...    Specimen Information: Lung, Left Lower Lobe; Lavage   0 Result Notes      Component    Case Report   Medical Cytology Report                           Case: [id number]                                   Authorizing Provider:  Roshan Hood MD         Collected:           06/25/2024 10:44 AM           Ordering Location:     [facility] Received:            06/26/2024 10:35 AM                                  SUITES                                                                       Pathologist:           Tirsten Garcia MD                                                             Specimen:    Lung, Left Lower Lobe, LEFT LOWER LOBE BAL                                                Final Diagnosis   Lung, left lower " lobe, BAL:               - Negative for malignant cells   Electronically signed by Tristen Garcia MD on 6/27/2024 at 0932   Clinical Information    Pre-Operative Diagnosis: Recurrent and persistent pneumonia, mucus plugging, difficulty with airway clearance.  Post-Operative Diagnosis: Bilateral mucus plugging, more so in left lower lobe, excessive dynamic collapse of the airway.   Gross Description    1. Lung, Left Lower Lobe.  BAL, left lower lobe       26 cc of blood-tinged, slightly mucoid fluid received (1 cytospin prep prepared).      Microscopic Description    Microscopic examination performed.   Resulting Agency [facility]              Specimen Collected: 06/25/24 10:44 EDT Last Resulted: 06/27/24 09:32 EDT         Fungus Culture - Lavage, Lung, Left Lower Lobe  Order: [id number]  Status: Preliminary result       Visible to patient: No (not released)       Next appt: Today at 09:45 AM in Pulmonology ([facility], APRN)       Dx: SOB (shortness of breath); Adenocarci...    Specimen Information: Lung, Left Lower Lobe; Lavage   0 Result Notes  Fungus Culture No fungus isolated at 2 weeks           Resulting Agency: [facility]           Specimen Collected: 06/25/24 10:44 EDT Last Resulted: 07/09/24 11:15 EDT         AFB Culture - Lavage, Lung, Left Lower Lobe  Order: [id number]  Status: Preliminary result       Visible to patient: No (not released)       Next appt: Today at 09:45 AM in Pulmonology ([facility], APRN)       Dx: SOB (shortness of breath); Adenocarci...    Specimen Information: Lung, Left Lower Lobe; Lavage   0 Result Notes  AFB Culture No AFB isolated at 2 weeks               AFB Stain No acid fast bacilli seen on direct smear      No acid fast bacilli seen on concentrated smear              Resulting Agency: [facility]           Specimen Collected: 06/25/24 10:44 EDT Last Resulted: 07/09/24 11:15 EDT         BAL Culture, Quantitative - Lavage, Lung, Left Lower Lobe  Order: [id number]  Status: Final  result       Visible to patient: Yes (seen)       Next appt: Today at 09:45 AM in Pulmonology (Susanne Walleror, APRN)       Dx: SOB (shortness of breath); Adenocarci...    Specimen Information: Lung, Left Lower Lobe; Lavage   0 Result Notes  BAL Culture   Lab   >100,000 CFU/mL Normal respiratory jaylene. No S. aureus or Pseudomonas aeruginosa detected. Final report.  [facility]               Gram Stain  Lab   Few (2+) Gram positive cocci in pairs and chains  [facility]      Rare (1+) Gram negative bacilli [facility]      Rare (1+) WBCs seen [facility]                    Specimen Collected: 06/25/24 10:44 EDT Last Resulted: 06/27/24 12:01 EDT         Pneumonia Panel - Lavage, Lung, Left Lower Lobe  Order: [id number]  Status: Final result       Visible to patient: Yes (seen)       Next appt: Today at 09:45 AM in Pulmonology (Susanne Dobbins, APRN)       Dx: SOB (shortness of breath); Adenocarci...    Specimen Information: Lung, Left Lower Lobe; Lavage   1 Result Note      Component  Ref Range & Units    Escherichia coli PCR  Not Detected Not Detected   Acinetobacter calcoaceticus-baumannii complex PCR  Not Detected Not Detected   Enterobacter cloacae PCR  Not Detected Not Detected   Klebsiella oxytoca PCR  Not Detected Not Detected   Klebsiella pneumoniae group PCR  Not Detected Not Detected   Klebsiella aerogenes PCR  Not Detected Not Detected   Moraxella catarrhalis PCR  Not Detected Not Detected   Proteus species PCR  Not Detected Not Detected   Pseudomonas aeroginosa PCR  Not Detected Not Detected   Serratia marcescens PCR  Not Detected Not Detected   Staphylococcus aureus PCR  Not Detected Not Detected   Streptococcus pyogenes PCR  Not Detected Not Detected   Haemophilus influenzae PCR  Not Detected Detected Abnormal    Comment: 10^4 Bin copies/mL   Streptococcus agalactiae PCR  Not Detected Not Detected   Streptococcus pneumoniae PCR  Not Detected Not Detected   Chlamydophila pneumoniae PCR  Not Detected Not Detected    Legionella pneumophilia PCR  Not Detected Not Detected   Mycoplasma pneumo by PCR  Not Detected Not Detected   ADENOVIRUS, PCR  Not Detected Not Detected   CTX-M Gene  Not Detected, N/A N/A   IMP Gene  Not Detected, N/A N/A   KPC Gene  Not Detected, N/A N/A   mecA/C and MREJ Gene  Not Detected, N/A N/A   NDM Gene  Not Detected, N/A N/A   OXA-48-like Gene  Not Detected, N/A N/A   VIM Gene  Not Detected, N/A N/A   Coronavirus  Not Detected Not Detected   Human Metapneumovirus  Not Detected Not Detected   Human Rhinovirus/Enterovirus  Not Detected Not Detected   Influenza A PCR  Not Detected Not Detected   Influenza B PCR  Not Detected Not Detected   RSV, PCR  Not Detected Not Detected   Parainfluenza virus PCR  Not Detected Not Detected   Resulting Agency [facility]              Specimen Collected: 06/25/24 10:44 EDT Last Resulted: 06/25/24 12:46 EDT         Bronch Wash/BAL Differential - Lavage, Lung, Left Lower Lobe  Order: [id number]  Status: Final result       Visible to patient: Yes (seen)       Next appt: Today at 09:45 AM in Pulmonology (LALY Bethea)       Dx: SOB (shortness of breath); Adenocarci...    Specimen Information: Lung, Left Lower Lobe; Lavage   0 Result Notes       Component  Ref Range & Units 3 wk ago 1 yr ago   Visual Presence of Blood  Absent   Lymphocytes, Fluid  % 3 16   Neutrophils, Fluid  % 78 45   Macrophage, Fluid  % 8 8   Bronchial Lining Cells  % 11 30   Resulting Agency [facility]              Narrative  Performed by: [facility]  Normal Adults (Nonsmokers)    Alveolar Macrophages       >85%  Lymphocytes              10-15%  Neutrophils              <or=3%  Eosinophils              <or=1%  Bronchial Lining Cells   <or=5%      Clinical Interpretations for BAL    Eosinophils >or=25%       Eosinophilic Pneumoniae  Lymphocytes >or=50%       Consider Drug Rxn,Acute HP  Bloody lavage             Diffuse Alveolar Hemorrhage  Other Findings            Specific Dx or  Non-diagnostic      Specimen Collected: 06/25/24 10:44 EDT Last Resulted: 06/25/24 11:46 EDT         Fungus Culture - Wash, Bronchus  Order: [id number]  Status: Preliminary result       Visible to patient: No (not released)       Next appt: Today at 09:45 AM in Pulmonology ([facility])       Dx: SOB (shortness of breath); Adenocarci...    Specimen Information: Bronchus; Wash   0 Result Notes  Fungus Culture Fungus (Organism type) Abnormal          Sent to reference lab for ID.        Resulting Agency: [facility]           Specimen Collected: 06/25/24 10:49 EDT Last Resulted: 07/02/24 13:41 EDT         AFB Culture - Wash, Bronchus  Order: [id number]  Status: Preliminary result       Visible to patient: No (not released)       Next appt: Today at 09:45 AM in Pulmonology ([facility])       Dx: SOB (shortness of breath); Adenocarci...    Specimen Information: Bronchus; Wash   0 Result Notes  AFB Culture No AFB isolated at 2 weeks               AFB Stain No acid fast bacilli seen on direct smear      No acid fast bacilli seen on concentrated smear              Resulting Agency: [facility]           Specimen Collected: 06/25/24 10:49 EDT Last Resulted: 07/09/24 11:15 EDT         Respiratory Culture - Wash, Bronchus  Order: [id number]  Status: Final result       Visible to patient: Yes (seen)       Next appt: Today at 09:45 AM in Pulmonology ([facility])       Dx: SOB (shortness of breath); Adenocarci...    Specimen Information: Bronchus; Wash   0 Result Notes  Respiratory Culture   Lab   Moderate growth (3+) Normal respiratory jaylene. No S. aureus or Pseudomonas aeruginosa detected. Final report.  [facility]               Gram Stain  Lab   No organisms seen [facility]                    Specimen Collected: 06/25/24 10:49 EDT Last Resulted: 06/27/24 12:02 EDT         Organism ID Mold - Isolate, Lung  Order: [id number]  Collected 6/25/2024 10:49       Status: Final result       Visible to patient: Yes  (seen)    Specimen Information: Lung; Isolate   0 Result Notes      Component    Organism ID, Mold Final report Abnormal    Mold ID, Result 1 Aspergillus fumigatus Abnormal    Resulting Agency [facility]              Narrative  Performed by: [facility]  Performed at:  01 - [facility] [address]  [id number]  : Juvenal James PhD, Phone:  [phone]      Specimen Collected: 06/25/24 10:49 EDT Last Resulted: 07/11/24 18:07 EDT           ASSESSMENT         Patient Active Problem List   Diagnosis    Adenocarcinoma of bronchus, right    Bladder outflow obstruction    Cancer of prostate    Cervical spinal stenosis    Colon polyp    Diabetic nephropathy    Hand paresthesia    HTN (hypertension)    Hypercholesterolemia    Hypertonicity of bladder    Atrophy of muscle of right hand    Pulmonary emphysema    Stage 3a chronic kidney disease    Primary osteoarthritis of left shoulder    Injury of left shoulder    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    Aspergillus fumigatus Yes    Mucus plugging of bronchi     SOB (shortness of breath)     Pulmonary emphysema, unspecified emphysema type       PLAN  -Will start patient on itraconazole 200 mg p.o. twice daily for 6 months, reviewed with patient side effects of itraconazole, educational information provided to patient on Aspergillus and Itraconazole  -Advised patient to stop cholesterol medicine atorvastatin and hold  off on Protonix while taking itraconazole  -Advised patient to follow-up with his PCP regarding alternatives for Protonix and atorvastatin  -We will call patient's PCP to let them know we are starting him on I terconazole  -Patient will get a CMP today and every 2 months during treatment to monitor status of kidney and liver function  -Will notify patient of additional AFB and fungal culture results as available  -Patient verbalized understanding of the above medication management, drug interactions, plan of treatment    Diagnoses and all orders for this visit:    1. Aspergillus fumigatus (Primary)  -     Comprehensive Metabolic Panel; Standing  -     itraconazole (Sporanox) 100 MG capsule; Take 2 capsules by mouth 2 (Two) Times a Day.  Dispense: 360 capsule; Refill: 1    2. Mucus plugging of bronchi    3. SOB (shortness of breath)    4. Pulmonary emphysema, unspecified emphysema type           Smoking status:former, quit 2016 50 pack history  Vaccination status:reviewed  Medications personally reviewed    Follow Up  Return in about 6 weeks (around 8/27/2024).    Patient was given instructions and counseling regarding his condition or for health maintenance advice. Please see specific information pulled into the AVS if appropriate.      I spent 30 minutes caring for Daron Whitfield on this date of service. This time includes time spent by me in the following activities:preparing for the visit, reviewing tests, obtaining and/or reviewing a separately obtained history, performing a medically appropriate examination and/or evaluation, counseling and educating the patient/family/caregiver, ordering medications, tests, or procedures, documenting information in the medical record, independently interpreting results and communicating that information with the patient/family/caregiver and answered questions family members, discuss medications.

## 2024-07-17 LAB — FUNGUS WND CULT: ABNORMAL

## 2024-07-30 LAB
MYCOBACTERIUM SPEC CULT: NORMAL
MYCOBACTERIUM SPEC CULT: NORMAL
NIGHT BLUE STAIN TISS: NORMAL

## 2024-08-06 LAB
MYCOBACTERIUM SPEC CULT: NORMAL
MYCOBACTERIUM SPEC CULT: NORMAL
NIGHT BLUE STAIN TISS: NORMAL

## 2024-08-26 NOTE — PROGRESS NOTES
Primary Care Provider  Carol Frank MD   Referring Provider  No ref. provider found    Patient Complaint  Follow-up and Shortness of Breath      Subjective       History of Presenting Illness  Daron Whitfield is a pleasant 79 y.o. male who presents to [facility] for followup appointment.  I last saw the patient 7/16/2024.  Patient had underwent bronchoscopy by Dr. Hood June 2024 for mucous plugging with findings of Aspergillus for which patient has been on Itraconazole. He will need to be on Itraconazole for 3-6 months. His last labs show some decline in GFR and creatinine but liver enzymes are normal. His creatinine clearance is 42.41. He states he gets short of air with  minimal exertional activities of moderate severity, improves with 02, albuterol, and rest.  He states he does have coughing still coughing up some white sputum at times.  And he does have occasional wheezing.  He is very frustrated that he can do very much without feeling short of air.  States he feels the air going on but does not want to come out like it supposed to.  Patient states he did stop his lisinopril as he thought it had a drug interaction with the eye terconazole.  He did stop the cholesterol medicine as well.  Patient states he is going to get his follow-up labs done in 1 month to continue to monitor his kidney and liver function. Patient denies headaches, chest pain, weight loss or hemoptysis. Patient denies fevers, chills and night sweats. Daron Whitfield is able to perform ADLs without difficulties.    I have personally reviewed the review of systems, past family, social, medical and surgical histories; and agree with their findings.      Review of Systems   Constitutional: Negative.    HENT: Negative.     Respiratory:  Positive for cough, shortness of breath and wheezing.    Cardiovascular: Negative.    Musculoskeletal: Negative.    Neurological: Negative.     Psychiatric/Behavioral: Negative.           Family History   Problem Relation Age of Onset    Diabetes Mother     Cancer Sister     Diabetes Sister     Colon cancer Sister 60    Colon polyps Sister     Hypertension Sister     Diabetes Sister     Coronary artery disease Brother         Social History     Socioeconomic History    Marital status:    Tobacco Use    Smoking status: Former     Current packs/day: 0.00     Average packs/day: 1 pack/day for 50.0 years (50.0 ttl pk-yrs)     Types: Cigarettes     Start date: 1966     Quit date: 2016     Years since quittin.6     Passive exposure: Past    Smokeless tobacco: Never    Tobacco comments:     STARTED AGE 17  QUIT IN    Vaping Use    Vaping status: Never Used    Passive vaping exposure: Yes   Substance and Sexual Activity    Alcohol use: Yes     Alcohol/week: 1.0 standard drink of alcohol     Types: 1 Cans of beer per week     Comment: casual drinker    Drug use: Never    Sexual activity: Not Currently     Partners: Female        Past Medical History:   Diagnosis Date    Bladder outflow obstruction     Cervical spinal stenosis 2018    Colon polyp     COVID     Diabetes mellitus     Emphysema lung     Emphysema of lung     Hand paresthesia 2018    HTN (hypertension)     Hypercholesteremia     Lung cancer 2016    Muscle atrophy of upper extremity 2018    Numbness and tingling in both hands     OAB (overactive bladder) 2014    Prostate cancer 2012    Ulnar neuropathy 2018        Immunization History   Administered Date(s) Administered    ABRYSVO (RSV, 60+ or pregnant women 32-36 wks) 10/30/2023    COVID-19 (MODERNA) 1st,2nd,3rd Dose Monovalent 2021, 2021    COVID-19 (PFIZER) BIVALENT 12+YRS 2022    COVID-19 (PFIZER) Purple Cap Monovalent 2021    COVID-19 F23 (MODERNA) 12YRS+ (SPIKEVAX) 10/30/2023    Fluad Quad 65+ 10/24/2022, 10/04/2023    Fluzone High-Dose 65+YRS 2019    Fluzone  High-Dose 65+yrs 11/13/2021, 10/30/2023    Pneumococcal Polysaccharide (PPSV23) 10/24/2022       No Known Allergies       Current Outpatient Medications:     albuterol sulfate  (90 Base) MCG/ACT inhaler, Inhale 2 puffs Every 4 (Four) Hours As Needed for Wheezing., Disp: 18 g, Rfl: 5    azithromycin (ZITHROMAX) 250 MG tablet, , Disp: , Rfl:     bicalutamide (CASODEX) 50 MG tablet, Take 1 tablet by mouth Daily for 360 days., Disp: 90 tablet, Rfl: 3    cetirizine (zyrTEC) 10 MG tablet, Take 1 tablet by mouth Daily., Disp: 90 tablet, Rfl: 3    Fluticasone-Umeclidin-Vilant (TRELEGY ELLIPTA) 200-62.5-25 MCG/ACT inhaler, Inhale 1 puff Daily., Disp: 60 each, Rfl: 11    Gemtesa 75 MG tablet, Take 1 tablet by mouth Daily for 360 days., Disp: 90 tablet, Rfl: 1    ipratropium-albuterol (DUO-NEB) 0.5-2.5 mg/3 ml nebulizer, Take 3 mL by nebulization Every 4 (Four) Hours As Needed for Wheezing., Disp: 360 mL, Rfl: 4    itraconazole (Sporanox) 100 MG capsule, Take 2 capsules by mouth 2 (Two) Times a Day., Disp: 360 capsule, Rfl: 1    leuprolide (Lupron Depot, 3-Month,) 22.5 MG injection, 22.5 mg., Disp: , Rfl:     lisinopril (PRINIVIL,ZESTRIL) 5 MG tablet, Daily., Disp: , Rfl:     metFORMIN (GLUCOPHAGE) 500 MG tablet, , Disp: , Rfl:     metoprolol succinate XL (TOPROL-XL) 25 MG 24 hr tablet, Toprol XL 25 mg oral tablet extended release 24 hr take 1 tablet (25 mg) by oral route once daily   Active, Disp: , Rfl:     mirtazapine (REMERON) 15 MG tablet, , Disp: , Rfl:     Narcan 4 MG/0.1ML nasal spray, , Disp: , Rfl:     pantoprazole (PROTONIX) 40 MG EC tablet, Take 1 tablet by mouth Daily., Disp: 30 tablet, Rfl: 5    traMADol (ULTRAM) 50 MG tablet, Take 1 tablet by mouth Every 6 (Six) Hours As Needed for Moderate Pain., Disp: 15 tablet, Rfl: 0    atorvastatin (LIPITOR) 40 MG tablet, Lipitor 40 mg oral tablet take 1 tablet (40 mg) by oral route once daily   Suspended (Patient not taking: Reported on 8/27/2024), Disp: , Rfl:  "    Current Facility-Administered Medications:     leuprolide (LUPRON) injection 22.5 mg, 22.5 mg, Intramuscular, Once, Merrill Ames MD         Vital Signs   /60 (BP Location: Left arm, Patient Position: Sitting, Cuff Size: Adult)   Pulse 63   Temp 97.9 °F (36.6 °C)   Resp 16   Ht 188 cm (74\")   Wt 80.3 kg (177 lb)   SpO2 99% Comment: 3 liters  BMI 22.73 kg/m²       Objective     Physical Exam  Vitals reviewed.   Constitutional:       General: He is not in acute distress.     Appearance: Normal appearance. He is not ill-appearing.   HENT:      Head: Normocephalic and atraumatic.      Nose: Nose normal.      Mouth/Throat:      Mouth: Mucous membranes are moist.      Pharynx: Oropharynx is clear.   Eyes:      Extraocular Movements: Extraocular movements intact.      Conjunctiva/sclera: Conjunctivae normal.      Pupils: Pupils are equal, round, and reactive to light.   Cardiovascular:      Rate and Rhythm: Normal rate and regular rhythm.      Pulses: Normal pulses.      Heart sounds: Normal heart sounds.   Pulmonary:      Effort: Pulmonary effort is normal. No respiratory distress.      Breath sounds: Wheezing and rhonchi present.   Abdominal:      General: Bowel sounds are normal.   Musculoskeletal:         General: Normal range of motion.      Cervical back: Normal range of motion and neck supple.   Skin:     General: Skin is warm and dry.   Neurological:      Mental Status: He is alert and oriented to person, place, and time.   Psychiatric:         Behavior: Behavior normal.         Results Review  I have personally reviewed the prior office notes, hospital records, labs, and diagnostics.  CT Chest Without Contrast Diagnostic [[id number]] (Order [id number])  Order  Status: Final result     Study Notes     Carrie Muñoz on 6/18/2024 10:34 AM EDT   SHORTNESS OF BREATH, COUGH, WHEEZING FOR 3-4 WEEKS, LUNG CANCER, COPD, FORMER SMOKER     Appointment Information    PACS Images     Radiology " Images  Study Result    Narrative & Impression   CT CHEST WO CONTRAST DIAGNOSTIC     Date of Exam: 6/18/2024 10:33 AM EDT     Indication: Shortness of air, adenocarcinoma, history of mucous plugging, ineffective cough.     Comparison: 3/8/2024 CT and 6/7/2024 chest radiograph     Technique: Axial CT images were obtained of the chest without contrast administration.  Reconstructed coronal and sagittal images were also obtained. Automated exposure control and iterative construction methods were used.        Findings:  Central airways are patent. There is mild diffuse bronchiectasis. Left lower lobe bronchial wall thickening with multifocal mucus plugging. Severe centrilobular emphysema. There are groundglass opacities and tree-in-bud nodularity in the left lower lobe.   Unchanged volume loss in scarring in the medial aspect of the superior right lower lobe adjacent to the right hilum. Left upper lobe calcified granuloma. No significant pleural effusion or pneumothorax. No new suspicious pulmonary nodule or mass.     No mediastinal mass or suspicious lymphadenopathy. Benign calcified mediastinal and left hilar lymph nodes. Right chest wall port with distal lead tip in the superior vena cava. Enlarged pulmonary arteries. Unchanged heart size. No pericardial effusion.   Coronary calcifications.     Bilateral gynecomastia. No acute abnormality in the chest wall soft tissues.     Upper abdomen shows no acute findings. No fracture or suspicious bony lesion.     IMPRESSION:  Impression:  Groundglass opacities and tree-in-bud nodularity in the left lower lobe consistent with pneumonia. Left lower lobe bronchial wall thickening with multifocal mucus plugging, which could represent a component of aspiration.     Background severe centrilobular emphysema with mild diffuse bronchiectasis. No suspicious pulmonary nodule or mass.     Enlarged pulmonary arteries which can be seen with pulmonary hypertension.           Electronically  Signed: Chapin Chavez MD    6/19/2024 10:58 AM EDT    Workstation ID: [id number]      Contains abnormal data Fungus Culture - Wash, Bronchus  Order: [id number]  Status: Final result       Visible to patient: Yes (seen)       Next appt: 08/27/2024 at 10:45 AM in Pulmonology ([facility])       Dx: SOB (shortness of breath); Adenocarci...    Specimen Information: Bronchus; Wash   0 Result Notes       1 Follow-up Encounter  Fungus Culture Aspergillus fumigatus Abnormal          Sent to reference lab for ID.    ID performed by [facility]                  [address], [address]                   [place] [address]        Resulting Agency: [facility]           Specimen Collected: 06/25/24 10:49 EDT Last Resulted: 07/17/24 15:12 EDT        Assessment         Patient Active Problem List   Diagnosis    Adenocarcinoma of bronchus, right    Bladder outflow obstruction    Cancer of prostate    Cervical spinal stenosis    Colon polyp    Diabetic nephropathy    Hand paresthesia    HTN (hypertension)    Hypercholesterolemia    Hypertonicity of bladder    Atrophy of muscle of right hand    Pulmonary emphysema    Stage 3a chronic kidney disease    Primary osteoarthritis of left shoulder    Injury of left shoulder    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 Aspergillus fumigatus (Primary)  Comments:  Continue with Itraconazole at this time, holding  atorvastatin, continue Lisinopril as no drug interaction with Itraconazole  Orders:  -     CT Chest Without Contrast; Future    2. SOB (shortness of breath)  Comments:  continue with 02 at 3 L, albuterol and/or duo nebulizer for shortness of air or wheeze    3. Mucus plugging of bronchi  Comments:  followup CT scan ordered for 9/2024, flutter valve ordered  Orders:  -     CT Chest Without Contrast; Future  -     Oscillating Positive Expiratory Pressure (OPEP); Future    4. Pulmonary emphysema, unspecified emphysema type  Comments:  continue with Trelegy 200, albuterol and duo nebulizer as needed  Orders:  -     CT Chest Without Contrast; Future             Smoking status:  reports that he quit smoking about 8 years ago. His smoking use included cigarettes. He started smoking about 58 years ago. He has a 50 pack-year smoking history. He has been exposed to tobacco smoke. He has never used smokeless tobacco.  Vaccination status: Reviewed  Immunization History   Administered Date(s) Administered    ABRYSVO (RSV, 60+ or pregnant women 32-36 wks) 10/30/2023    COVID-19 (MODERNA) 1st,2nd,3rd Dose Monovalent 02/05/2021, 03/09/2021    COVID-19 (PFIZER) BIVALENT 12+YRS 09/21/2022    COVID-19 (PFIZER) Purple Cap Monovalent 11/17/2021    COVID-19 F23 (MODERNA) 12YRS+ (SPIKEVAX) 10/30/2023    Fluad Quad 65+ 10/24/2022, 10/04/2023    Fluzone High-Dose 65+YRS 12/11/2019    Fluzone High-Dose 65+yrs 11/13/2021, 10/30/2023    Pneumococcal Polysaccharide (PPSV23) 10/24/2022      Medications personally reviewed    Follow Up  Return in about 1 month (around 9/30/2024) for Dr. Hood or Susanne.    Patient was given instructions and counseling regarding his condition or for health maintenance advice. Please see specific information pulled into the AVS if appropriate.     I spent 20 minutes caring for Daron Whitfield on this date of service. This time includes time spent by me in the following activities:preparing for the visit, reviewing  tests, obtaining and/or reviewing a separately obtained history, performing a medically appropriate examination and/or evaluation, counseling and educating the patient/family/caregiver, ordering medications, tests, or procedures, documenting information in the medical record, independently interpreting results and communicating that information with the patient/family/caregiver and answered questions family members, discuss medications.

## 2024-08-27 ENCOUNTER — OFFICE VISIT (OUTPATIENT)
Dept: PULMONOLOGY | Facility: CLINIC | Age: 79
End: 2024-08-27
Payer: MEDICARE

## 2024-08-27 VITALS
RESPIRATION RATE: 16 BRPM | HEART RATE: 63 BPM | HEIGHT: 74 IN | BODY MASS INDEX: 22.72 KG/M2 | OXYGEN SATURATION: 99 % | DIASTOLIC BLOOD PRESSURE: 60 MMHG | SYSTOLIC BLOOD PRESSURE: 147 MMHG | TEMPERATURE: 97.9 F | WEIGHT: 177 LBS

## 2024-08-27 DIAGNOSIS — T17.500A MUCUS PLUGGING OF BRONCHI: ICD-10-CM

## 2024-08-27 DIAGNOSIS — J43.9 PULMONARY EMPHYSEMA, UNSPECIFIED EMPHYSEMA TYPE: ICD-10-CM

## 2024-08-27 DIAGNOSIS — R06.02 SOB (SHORTNESS OF BREATH): ICD-10-CM

## 2024-08-27 DIAGNOSIS — B44.89 ASPERGILLUS FUMIGATUS: Primary | ICD-10-CM

## 2024-08-27 PROCEDURE — 1160F RVW MEDS BY RX/DR IN RCRD: CPT | Performed by: NURSE PRACTITIONER

## 2024-08-27 PROCEDURE — 99214 OFFICE O/P EST MOD 30 MIN: CPT | Performed by: NURSE PRACTITIONER

## 2024-08-27 PROCEDURE — 3077F SYST BP >= 140 MM HG: CPT | Performed by: NURSE PRACTITIONER

## 2024-08-27 PROCEDURE — 3078F DIAST BP <80 MM HG: CPT | Performed by: NURSE PRACTITIONER

## 2024-08-27 PROCEDURE — 1159F MED LIST DOCD IN RCRD: CPT | Performed by: NURSE PRACTITIONER

## 2024-08-27 RX ORDER — AZITHROMYCIN 250 MG/1
TABLET, FILM COATED ORAL
COMMUNITY
Start: 2024-08-01

## 2024-09-19 ENCOUNTER — LAB (OUTPATIENT)
Dept: LAB | Facility: HOSPITAL | Age: 79
End: 2024-09-19
Payer: MEDICARE

## 2024-09-19 ENCOUNTER — HOSPITAL ENCOUNTER (OUTPATIENT)
Dept: CT IMAGING | Facility: HOSPITAL | Age: 79
Discharge: HOME | End: 2024-09-19
Payer: MEDICARE

## 2024-09-19 DIAGNOSIS — B44.89 ASPERGILLUS FUMIGATUS: ICD-10-CM

## 2024-09-19 DIAGNOSIS — T17.500A MUCUS PLUGGING OF BRONCHI: ICD-10-CM

## 2024-09-19 DIAGNOSIS — J43.9 PULMONARY EMPHYSEMA, UNSPECIFIED EMPHYSEMA TYPE: ICD-10-CM

## 2024-09-19 LAB
ALBUMIN SERPL-MCNC: 4.1 G/DL (ref 3.5–5.2)
ALBUMIN/GLOB SERPL: 1.6 G/DL
ALP SERPL-CCNC: 89 U/L (ref 39–117)
ALT SERPL W P-5'-P-CCNC: 6 U/L (ref 1–41)
ANION GAP SERPL CALCULATED.3IONS-SCNC: 10.7 MMOL/L (ref 5–15)
AST SERPL-CCNC: 14 U/L (ref 1–40)
BILIRUB SERPL-MCNC: 0.5 MG/DL (ref 0–1.2)
BUN SERPL-MCNC: 8 MG/DL (ref 8–23)
BUN/CREAT SERPL: 7 (ref 7–25)
CALCIUM SPEC-SCNC: 9.3 MG/DL (ref 8.6–10.5)
CHLORIDE SERPL-SCNC: 101 MMOL/L (ref 98–107)
CO2 SERPL-SCNC: 28.3 MMOL/L (ref 22–29)
CREAT SERPL-MCNC: 1.14 MG/DL (ref 0.76–1.27)
EGFRCR SERPLBLD CKD-EPI 2021: 65 ML/MIN/1.73
GLOBULIN UR ELPH-MCNC: 2.6 GM/DL
GLUCOSE SERPL-MCNC: 88 MG/DL (ref 65–99)
POTASSIUM SERPL-SCNC: 3.6 MMOL/L (ref 3.5–5.2)
PROT SERPL-MCNC: 6.7 G/DL (ref 6–8.5)
SODIUM SERPL-SCNC: 140 MMOL/L (ref 136–145)

## 2024-09-19 PROCEDURE — 80053 COMPREHEN METABOLIC PANEL: CPT

## 2024-09-19 PROCEDURE — 36415 COLL VENOUS BLD VENIPUNCTURE: CPT

## 2024-09-19 PROCEDURE — 71250 CT THORAX DX C-: CPT

## 2024-09-30 ENCOUNTER — OFFICE VISIT (OUTPATIENT)
Dept: PULMONOLOGY | Facility: CLINIC | Age: 79
End: 2024-09-30
Payer: MEDICARE

## 2024-09-30 VITALS
SYSTOLIC BLOOD PRESSURE: 134 MMHG | RESPIRATION RATE: 16 BRPM | OXYGEN SATURATION: 99 % | WEIGHT: 169 LBS | BODY MASS INDEX: 23.66 KG/M2 | TEMPERATURE: 97.4 F | DIASTOLIC BLOOD PRESSURE: 64 MMHG | HEIGHT: 71 IN | HEART RATE: 69 BPM

## 2024-09-30 DIAGNOSIS — C34.91 ADENOCARCINOMA OF BRONCHUS, RIGHT: Primary | ICD-10-CM

## 2024-09-30 DIAGNOSIS — R06.02 SOB (SHORTNESS OF BREATH): ICD-10-CM

## 2024-09-30 DIAGNOSIS — U07.1 COVID-19 VIRUS INFECTION: ICD-10-CM

## 2024-09-30 DIAGNOSIS — J45.40 MODERATE PERSISTENT ASTHMA WITHOUT COMPLICATION: ICD-10-CM

## 2024-09-30 DIAGNOSIS — J45.40 MODERATE PERSISTENT ASTHMA, UNSPECIFIED WHETHER COMPLICATED: ICD-10-CM

## 2024-09-30 DIAGNOSIS — J44.1 COPD WITH ACUTE EXACERBATION: ICD-10-CM

## 2024-09-30 DIAGNOSIS — R05.1 ACUTE COUGH: ICD-10-CM

## 2024-09-30 DIAGNOSIS — J43.9 PULMONARY EMPHYSEMA, UNSPECIFIED EMPHYSEMA TYPE: ICD-10-CM

## 2024-09-30 DIAGNOSIS — T17.500A MUCUS PLUGGING OF BRONCHI: ICD-10-CM

## 2024-09-30 DIAGNOSIS — J18.9 MULTIFOCAL PNEUMONIA: ICD-10-CM

## 2024-09-30 DIAGNOSIS — Z92.29 HISTORY OF VACCINATION: ICD-10-CM

## 2024-09-30 PROCEDURE — 1159F MED LIST DOCD IN RCRD: CPT | Performed by: INTERNAL MEDICINE

## 2024-09-30 PROCEDURE — 3078F DIAST BP <80 MM HG: CPT | Performed by: INTERNAL MEDICINE

## 2024-09-30 PROCEDURE — 3075F SYST BP GE 130 - 139MM HG: CPT | Performed by: INTERNAL MEDICINE

## 2024-09-30 PROCEDURE — 1160F RVW MEDS BY RX/DR IN RCRD: CPT | Performed by: INTERNAL MEDICINE

## 2024-09-30 PROCEDURE — 99214 OFFICE O/P EST MOD 30 MIN: CPT | Performed by: INTERNAL MEDICINE

## 2024-09-30 RX ORDER — ONDANSETRON 4 MG/1
4 TABLET, FILM COATED ORAL EVERY 8 HOURS PRN
Qty: 60 TABLET | Refills: 0 | Status: SHIPPED | OUTPATIENT
Start: 2024-09-30

## 2024-09-30 NOTE — PROGRESS NOTES
Primary Care Provider  Carol Frank MD     Referring Provider  No ref. provider found     Chief Complaint  Aspergillus fumigatus, COPD, Emphysema, Follow-up (1 Month ), Shortness of Breath, Nausea, Weight Loss (Due to loss of appetite /), Fatigue, and Balance Issues    Subjective          Daron Whitfield presents to [facility] PULMONARY & CRITICAL CARE MEDICINE  History of Present Illness  Daron Whitfield is a 79 y.o. male patient   History of Present Illness  The patient is an 79-year-old male with a 60-year history of COPD and adenocarcinoma of the right bronchus, presenting for a follow-up visit.    He has undergone chemotherapy and radiation in the past and has a history of recurrent COPD exacerbations. Recently, he was diagnosed with aspergillus pneumonia and is currently on an antifungal medication, taking two pills in the morning and two in the evening. He has been on this antifungal medication since July 2024. Additionally, he takes azithromycin in the evening and uses a nebulizer twice a day.    His cough has improved, but he experiences frequent nausea and occasional vomiting. Despite not eating much, he has been losing weight and now weighs 169 pounds. He reports no leg swelling.    He experiences pain when walking to collect his mail and shortness of breath. He reports no chest pain but occasionally experiences minor aches. His last echocardiogram was performed in February 2023.    IMMUNIZATIONS  He is up-to-date on influenza and pneumonia vaccines.    Review of Systems     General:  No Fatigue, No Fever No weight loss or loss of appetite  HEENT: No dysphagia, No Visual Changes, no rhinorrhea  Respiratory:  + cough,+Dyspnea, scant phlegm, No Pleuritic Pain, no wheezing, no hemoptysis.  Cardiovascular: Denies chest pain, denies palpitations,+SILVERIO, No Chest Pressure  Gastrointestinal:  No Abdominal Pain, No Nausea, No Vomiting, No Diarrhea  Genitourinary:  No Dysuria, No Frequency, No  Hesitancy  Musculoskeletal: No muscle pain or swelling  Endocrine:  No Heat Intolerance, No Cold Intolerance  Hematologic:  No Bleeding, No Bruising  Psychiatric:  No Anxiety, No Depression  Neurologic:  No Confusion, no Dysarthria, No Headaches  Skin:  No Rash, No Open Wounds    Family History   Problem Relation Age of Onset    Diabetes Mother     Cancer Sister     Diabetes Sister     Colon cancer Sister 60    Colon polyps Sister     Hypertension Sister     Diabetes Sister     Coronary artery disease Brother         Social History     Socioeconomic History    Marital status:    Tobacco Use    Smoking status: Former     Current packs/day: 0.00     Average packs/day: 1 pack/day for 50.0 years (50.0 ttl pk-yrs)     Types: Cigarettes     Start date: 1966     Quit date: 2016     Years since quittin.7     Passive exposure: Past    Smokeless tobacco: Never    Tobacco comments:     STARTED AGE 17  QUIT IN    Vaping Use    Vaping status: Never Used    Passive vaping exposure: Yes   Substance and Sexual Activity    Alcohol use: Yes     Alcohol/week: 1.0 standard drink of alcohol     Types: 1 Cans of beer per week     Comment: casual drinker    Drug use: Never    Sexual activity: Not Currently     Partners: Female        Past Medical History:   Diagnosis Date    Bladder outflow obstruction     Cervical spinal stenosis 2018    Colon polyp     COVID     Diabetes mellitus 2019    Emphysema lung     Emphysema of lung     Hand paresthesia 2018    HTN (hypertension)     Hypercholesteremia     Lung cancer 2016    Muscle atrophy of upper extremity 2018    Numbness and tingling in both hands     OAB (overactive bladder) 2014    Prostate cancer 2012    Ulnar neuropathy 2018        Immunization History   Administered Date(s) Administered    ABRYSVO (RSV, 60+ or pregnant women 32-36 wks) 10/30/2023    COVID-19 (MODERNA) 12YRS+ (SPIKEVAX) 10/30/2023    COVID-19 (MODERNA) 1st,2nd,3rd  Dose Monovalent 02/05/2021, 03/09/2021    COVID-19 (PFIZER) BIVALENT 12+YRS 09/21/2022    COVID-19 (PFIZER) Purple Cap Monovalent 11/17/2021    FLUAD TRI 65YR+ 08/06/2024    Fluad Quad 65+ 10/24/2022, 10/04/2023    Fluzone High-Dose 65+YRS 12/11/2019    Fluzone High-Dose 65+yrs 11/13/2021, 10/30/2023    Pneumococcal Conjugate 20-Valent (PCV20) 08/06/2024    Pneumococcal Polysaccharide (PPSV23) 10/24/2022         No Known Allergies       Current Outpatient Medications:     albuterol sulfate  (90 Base) MCG/ACT inhaler, Inhale 2 puffs Every 4 (Four) Hours As Needed for Wheezing., Disp: 18 g, Rfl: 5    atorvastatin (LIPITOR) 40 MG tablet, , Disp: , Rfl:     bicalutamide (CASODEX) 50 MG tablet, Take 1 tablet by mouth Daily for 360 days., Disp: 90 tablet, Rfl: 3    cetirizine (zyrTEC) 10 MG tablet, Take 1 tablet by mouth Daily., Disp: 90 tablet, Rfl: 3    Fluticasone-Umeclidin-Vilant (TRELEGY ELLIPTA) 200-62.5-25 MCG/ACT inhaler, Inhale 1 puff Daily., Disp: 60 each, Rfl: 11    Gemtesa 75 MG tablet, Take 1 tablet by mouth Daily for 360 days., Disp: 90 tablet, Rfl: 1    ipratropium-albuterol (DUO-NEB) 0.5-2.5 mg/3 ml nebulizer, Take 3 mL by nebulization Every 4 (Four) Hours As Needed for Wheezing., Disp: 360 mL, Rfl: 4    itraconazole (Sporanox) 100 MG capsule, Take 2 capsules by mouth 2 (Two) Times a Day., Disp: 360 capsule, Rfl: 1    leuprolide (Lupron Depot, 3-Month,) 22.5 MG injection, 22.5 mg., Disp: , Rfl:     lisinopril (PRINIVIL,ZESTRIL) 5 MG tablet, Daily., Disp: , Rfl:     metFORMIN (GLUCOPHAGE) 500 MG tablet, , Disp: , Rfl:     metoprolol succinate XL (TOPROL-XL) 25 MG 24 hr tablet, Toprol XL 25 mg oral tablet extended release 24 hr take 1 tablet (25 mg) by oral route once daily   Active, Disp: , Rfl:     mirtazapine (REMERON) 15 MG tablet, , Disp: , Rfl:     Narcan 4 MG/0.1ML nasal spray, , Disp: , Rfl:     pantoprazole (PROTONIX) 40 MG EC tablet, Take 1 tablet by mouth Daily., Disp: 30 tablet, Rfl: 5     "traMADol (ULTRAM) 50 MG tablet, Take 1 tablet by mouth Every 6 (Six) Hours As Needed for Moderate Pain., Disp: 15 tablet, Rfl: 0    ondansetron (Zofran) 4 MG tablet, Take 1 tablet by mouth Every 8 (Eight) Hours As Needed for Nausea or Vomiting., Disp: 60 tablet, Rfl: 0    Current Facility-Administered Medications:     leuprolide (LUPRON) injection 22.5 mg, 22.5 mg, Intramuscular, Once, Merrill Ames MD     Objective   Physical Exam  Physical Exam      Vital Signs:   /64 (BP Location: Left arm, Patient Position: Sitting, Cuff Size: Adult)   Pulse 69   Temp 97.4 °F (36.3 °C) (Oral)   Resp 16   Ht 180.3 cm (71\")   Wt 76.7 kg (169 lb)   SpO2 99% Comment: 3L pd, uses 2L cf at home  BMI 23.57 kg/m²       Vital Signs Reviewed  WDWN, Alert, in mild distress, has conversational dyspnea, nasal oxygen  HEENT:  PERRL, EOMI.  OP, nares clear, no sinus tenderness  Mallampatti classification : 1  Neck:  Supple, no JVD, no thyromegaly  Lymph: no axillary, cervical, supraclavicular lymphadenopathy noted bilaterally  Chest: Poor aeration, bilateral diminished breath sounds, no wheezing, crackles or rhonchi, resonant to percussion b/l  CV: RRR, no MGR, pulses 2+, equal.  Abd:  Soft, NT, ND, + BS, no HSM  EXT:  no clubbing, no cyanosis, No BLE edema  Neuro:  A&Ox3, CN grossly intact, no focal deficits.  Skin: No rashes or lesions noted     Result Review :   The following data was reviewed by: Roshan Hood MD on 09/30/2024:  Common labs          3/8/2024    09:39 7/16/2024    10:19 9/19/2024    11:29   Common Labs   Glucose  93  88    BUN  19  8    Creatinine 1.80  1.57  1.14    Sodium  139  140    Potassium  4.7  3.6    Chloride  107  101    Calcium  9.4  9.3    Albumin  4.0  4.1    Total Bilirubin  0.3  0.5    Alkaline Phosphatase  81  89    AST (SGOT)  15  14    ALT (SGPT)  8  6      CMP          3/8/2024    09:39 7/16/2024    10:19 9/19/2024    11:29   CMP   Glucose  93  88    BUN  19  8    Creatinine 1.80  " 1.57  1.14    EGFR 37.6  44.3  65.0    Sodium  139  140    Potassium  4.7  3.6    Chloride  107  101    Calcium  9.4  9.3    Total Protein  6.9  6.7    Albumin  4.0  4.1    Globulin  2.9  2.6    Total Bilirubin  0.3  0.5    Alkaline Phosphatase  81  89    AST (SGOT)  15  14    ALT (SGPT)  8  6    Albumin/Globulin Ratio  1.4  1.6    BUN/Creatinine Ratio  12.1  7.0    Anion Gap  10.0  10.7        Data reviewed : Radiologic studies recent CT scan of chest reviewed.    Results  Imaging  CT scan of chest shows improvement in left lower lung pneumonia and residual scar from previous cancer on the right side.             Assessment and Plan    Diagnoses and all orders for this visit:    1. Adenocarcinoma of bronchus, right (Primary)    2. Pulmonary emphysema, unspecified emphysema type  -     Adult Transthoracic Echo Complete W/ Cont if Necessary Per Protocol; Future  -     ondansetron (Zofran) 4 MG tablet; Take 1 tablet by mouth Every 8 (Eight) Hours As Needed for Nausea or Vomiting.  Dispense: 60 tablet; Refill: 0    3. Moderate persistent asthma, unspecified whether complicated  -     Adult Transthoracic Echo Complete W/ Cont if Necessary Per Protocol; Future  -     ondansetron (Zofran) 4 MG tablet; Take 1 tablet by mouth Every 8 (Eight) Hours As Needed for Nausea or Vomiting.  Dispense: 60 tablet; Refill: 0    4. COPD with acute exacerbation  -     Adult Transthoracic Echo Complete W/ Cont if Necessary Per Protocol; Future  -     ondansetron (Zofran) 4 MG tablet; Take 1 tablet by mouth Every 8 (Eight) Hours As Needed for Nausea or Vomiting.  Dispense: 60 tablet; Refill: 0    5. Moderate persistent asthma without complication  -     Adult Transthoracic Echo Complete W/ Cont if Necessary Per Protocol; Future  -     ondansetron (Zofran) 4 MG tablet; Take 1 tablet by mouth Every 8 (Eight) Hours As Needed for Nausea or Vomiting.  Dispense: 60 tablet; Refill: 0    6. SOB (shortness of breath)  -     Adult Transthoracic Echo  Complete W/ Cont if Necessary Per Protocol; Future  -     ondansetron (Zofran) 4 MG tablet; Take 1 tablet by mouth Every 8 (Eight) Hours As Needed for Nausea or Vomiting.  Dispense: 60 tablet; Refill: 0    7. Mucus plugging of bronchi  -     Adult Transthoracic Echo Complete W/ Cont if Necessary Per Protocol; Future  -     ondansetron (Zofran) 4 MG tablet; Take 1 tablet by mouth Every 8 (Eight) Hours As Needed for Nausea or Vomiting.  Dispense: 60 tablet; Refill: 0    8. Multifocal pneumonia  -     Adult Transthoracic Echo Complete W/ Cont if Necessary Per Protocol; Future  -     ondansetron (Zofran) 4 MG tablet; Take 1 tablet by mouth Every 8 (Eight) Hours As Needed for Nausea or Vomiting.  Dispense: 60 tablet; Refill: 0    9. Acute cough  -     Adult Transthoracic Echo Complete W/ Cont if Necessary Per Protocol; Future  -     ondansetron (Zofran) 4 MG tablet; Take 1 tablet by mouth Every 8 (Eight) Hours As Needed for Nausea or Vomiting.  Dispense: 60 tablet; Refill: 0    10. COVID-19 virus infection  -     Adult Transthoracic Echo Complete W/ Cont if Necessary Per Protocol; Future  -     ondansetron (Zofran) 4 MG tablet; Take 1 tablet by mouth Every 8 (Eight) Hours As Needed for Nausea or Vomiting.  Dispense: 60 tablet; Refill: 0    11. History of vaccination  -     Adult Transthoracic Echo Complete W/ Cont if Necessary Per Protocol; Future  -     ondansetron (Zofran) 4 MG tablet; Take 1 tablet by mouth Every 8 (Eight) Hours As Needed for Nausea or Vomiting.  Dispense: 60 tablet; Refill: 0      Assessment & Plan  1. Aspergillus pneumonia.  His CT scan of the chest shows improvement. The pneumonia in the left lower lung has resolved, leaving only a small scar from the previous cancer on the right side. The nausea he experiences is likely a side effect of the antifungal medication. Azithromycin will be discontinued while he completes the antifungal treatment. The current dose of the antifungal will be maintained. If  nausea persists after 2 weeks, the dose will be reduced or the medication changed, depending on insurance coverage. An anti-nausea medication will be prescribed for use as needed.     2. Chronic Obstructive Pulmonary Disease (COPD).  He reports persistent shortness of breath and significant weight loss. His nebulizer use should be increased to at least twice a day. He should continue using oxygen at 3 L for at least another 3-1/2 months. If symptoms worsen, he should contact us immediately.    3. Medication Management.  Azithromycin will be held while he is on the antifungal medication. An anti-nausea medication will be prescribed for use as needed.    4. Cardiology Referral.  He should follow up with his cardiologist, Dr. Leon, for further evaluation. An echocardiogram will be ordered to assess his heart function.    Follow-up  Return in 3 months for follow up.    Follow Up   Return in about 3 months (around 12/30/2024).  Patient was given instructions and counseling regarding his condition or for health maintenance advice. Please see specific information pulled into the AVS if appropriate.     Patient or patient representative verbalized consent for the use of Ambient Listening during the visit with  Roshan Hood MD for chart documentation. 9/30/2024  10:39 EDT    Electronically signed by Roshan Hood MD, 9/30/2024, 10:55 EDT.

## 2024-10-09 ENCOUNTER — OFFICE VISIT (OUTPATIENT)
Dept: UROLOGY | Facility: CLINIC | Age: 79
End: 2024-10-09
Payer: MEDICARE

## 2024-10-09 VITALS
SYSTOLIC BLOOD PRESSURE: 132 MMHG | BODY MASS INDEX: 23.88 KG/M2 | HEIGHT: 71 IN | HEART RATE: 63 BPM | WEIGHT: 170.6 LBS | TEMPERATURE: 97.8 F | DIASTOLIC BLOOD PRESSURE: 65 MMHG

## 2024-10-09 DIAGNOSIS — C61 PROSTATE CANCER: Primary | ICD-10-CM

## 2024-10-09 DIAGNOSIS — R97.21 INCREASING PSA LEVEL AFTER TREATMENT FOR PROSTATE CANCER: ICD-10-CM

## 2024-10-09 DIAGNOSIS — R35.0 URINARY FREQUENCY: ICD-10-CM

## 2024-10-09 LAB
BACTERIA UR QL AUTO: NORMAL /HPF
BILIRUB BLD-MCNC: NEGATIVE MG/DL
CLARITY, POC: CLEAR
COLOR UR: ABNORMAL
EPI CELLS #/AREA URNS HPF: 0 /[HPF]
EXPIRATION DATE: ABNORMAL
GLUCOSE UR STRIP-MCNC: NEGATIVE MG/DL
KETONES UR QL: NEGATIVE
LEUKOCYTE EST, POC: NEGATIVE
Lab: ABNORMAL
NITRITE UR-MCNC: NEGATIVE MG/ML
PH UR: 5.5 [PH] (ref 5–8)
PROT UR STRIP-MCNC: ABNORMAL MG/DL
PSA SERPL-MCNC: <0.014 NG/ML (ref 0–4)
RBC # UR STRIP: ABNORMAL /UL
RBC # UR STRIP: NORMAL /HPF
RENAL EPITHELIAL, POC: 0
SP GR UR: 1.03 (ref 1–1.03)
UNIDENT CRYS URNS QL MICRO: NORMAL /HPF
UROBILINOGEN UR QL: NORMAL
WBC # UR STRIP: NORMAL /HPF

## 2024-10-09 PROCEDURE — 84153 ASSAY OF PSA TOTAL: CPT | Performed by: UROLOGY

## 2024-10-09 RX ORDER — VIBEGRON 75 MG/1
75 TABLET, FILM COATED ORAL DAILY
Qty: 90 TABLET | Refills: 1 | Status: SHIPPED | OUTPATIENT
Start: 2024-10-09 | End: 2025-10-04

## 2024-10-09 NOTE — PROGRESS NOTES
"Chief Complaint  Prostate Cancer (6mo. F/u last Psa was 10/10/23)    Subjective          Daron Whitfield presents to [facility] UROLOGY  History of Present Illness    79-year-old -American male had prostate carcinoma diagnosed in 2011 and underwent cryoablation of prostate.  His PSA started to go up and patient is on antiandrogen therapy.  Patient is urinating without any difficulties.  Patient has no dysuria or gross hematuria.  Patient is on Casodex and Depo-Lupron.  Patient is on Gemtesa for urinary frequency.  At this time patient has no voiding difficulties.    Patient also has history of lung cancer and recently has had yeast infection in his lungs.  Is concerned about that problem.    Objective no acute distress  Vital Signs:   /65 (BP Location: Left arm, Patient Position: Sitting, Cuff Size: Adult)   Pulse 63   Temp 97.8 °F (36.6 °C) (Tympanic)   Ht 180.3 cm (71\")   Wt 77.4 kg (170 lb 9.6 oz)   BMI 23.79 kg/m²     No Known Allergies   Past medical history:  has a past medical history of Bladder outflow obstruction, Cervical spinal stenosis (01/23/2018), Colon polyp, COVID, Diabetes mellitus (2019), Emphysema lung, Emphysema of lung, Hand paresthesia (01/23/2018), HTN (hypertension), Hypercholesteremia, Lung cancer (2016), Muscle atrophy of upper extremity (01/23/2018), Numbness and tingling in both hands, OAB (overactive bladder) (12/09/2014), Prostate cancer (2012), and Ulnar neuropathy (01/23/2018).   Past surgical history:  has a past surgical history that includes Colonoscopy (2019); Cystoscopy; Lung biopsy; Cryoablation of Prostate (01/17/2012); Teeth Extraction; TURP / transurethral incision / drainage prostate (06/16/2015); Cardiac catheterization; Bronchoscopy (N/A, 05/09/2023); Hand surgery (Right); Colonoscopy (N/A, 12/4/2023); and Bronchoscopy (N/A, 6/25/2024).  Personal history: family history includes Cancer in his sister; Colon cancer (age of onset: 60) in his " sister; Colon polyps in his sister; Coronary artery disease in his brother; Diabetes in his mother, sister, and sister; Hypertension in his sister.  Social history:  reports that he quit smoking about 8 years ago. His smoking use included cigarettes. He started smoking about 58 years ago. He has a 50 pack-year smoking history. He has been exposed to tobacco smoke. He has never used smokeless tobacco. He reports current alcohol use of about 1.0 standard drink of alcohol per week. He reports that he does not use drugs.    Review of Systems    Please see past medical and surgical history    Physical Exam  Constitutional:       General: He is not in acute distress.     Appearance: Normal appearance. He is normal weight. He is not ill-appearing or toxic-appearing.      Comments: Patient looks pale   HENT:      Head: Normocephalic and atraumatic.      Ears:      Comments: No loss of hearing  Pulmonary:      Effort: Pulmonary effort is normal.   Abdominal:      Palpations: There is no mass.      Tenderness: There is no abdominal tenderness. There is no right CVA tenderness or left CVA tenderness.   Genitourinary:     Comments: Penis is uncircumcised with some adhesions on the corona.    Right and left scrotum is normal.    Right left testicle and epididymis is normal.    LUL.  Prostate gland is only about 15 g and benign  Musculoskeletal:         General: No swelling. Normal range of motion.      Cervical back: Normal range of motion and neck supple. No rigidity or tenderness.   Lymphadenopathy:      Cervical: No cervical adenopathy.   Skin:     General: Skin is warm.      Coloration: Skin is not jaundiced.   Neurological:      General: No focal deficit present.      Mental Status: He is alert and oriented to person, place, and time.      Motor: No weakness.      Gait: Gait normal.   Psychiatric:         Mood and Affect: Mood normal.         Behavior: Behavior normal.         Thought Content: Thought content normal.          Judgment: Judgment normal.        Result Review :                 Assessment and Plan    Diagnoses and all orders for this visit:    1. Prostate cancer (Primary)  -     POC Urinalysis Dipstick, Automated  -     PSA DIAGNOSTIC    2. Urinary frequency  -     Gemtesa 75 MG tablet; Take 1 tablet by mouth Daily for 360 days.  Dispense: 90 tablet; Refill: 1    3. Increasing PSA level after treatment for prostate cancer    Will continue Gemtesa 75 mg daily which is helping him with frequency and urgency.    I have discussed with him with continuing antiandrogen therapy which does not reduce immune system.  Patient wants to continue the treatment because he does not want to take a chance for the prostate cancer to come back.     Brief Urine Lab Results  (Last result in the past 365 days)        Color   Clarity   Blood   Leuk Est   Nitrite   Protein   CREAT   Urine HCG        10/09/24 1036 Melissa   Clear   Small   Negative   Negative   30 mg/dL                    Follow Up   No follow-ups on file.  Patient was given instructions and counseling regarding his condition or for health maintenance advice. Please see specific information pulled into the AVS if appropriate.     Merrill Ames MD

## 2024-10-22 ENCOUNTER — CLINICAL SUPPORT (OUTPATIENT)
Dept: UROLOGY | Facility: CLINIC | Age: 79
End: 2024-10-22
Payer: MEDICARE

## 2024-10-22 DIAGNOSIS — C61 PROSTATE CANCER: Primary | ICD-10-CM

## 2024-10-22 DIAGNOSIS — R97.21 RISING PSA FOLLOWING TREATMENT FOR MALIGNANT NEOPLASM OF PROSTATE: ICD-10-CM

## 2024-10-30 ENCOUNTER — HOSPITAL ENCOUNTER (OUTPATIENT)
Dept: CARDIOLOGY | Facility: HOSPITAL | Age: 79
Discharge: HOME | End: 2024-10-30
Admitting: INTERNAL MEDICINE
Payer: MEDICARE

## 2024-10-30 DIAGNOSIS — U07.1 COVID-19 VIRUS INFECTION: ICD-10-CM

## 2024-10-30 DIAGNOSIS — R06.02 SOB (SHORTNESS OF BREATH): ICD-10-CM

## 2024-10-30 DIAGNOSIS — T17.500A MUCUS PLUGGING OF BRONCHI: ICD-10-CM

## 2024-10-30 DIAGNOSIS — R05.1 ACUTE COUGH: ICD-10-CM

## 2024-10-30 DIAGNOSIS — J18.9 MULTIFOCAL PNEUMONIA: ICD-10-CM

## 2024-10-30 DIAGNOSIS — Z92.29 HISTORY OF VACCINATION: ICD-10-CM

## 2024-10-30 DIAGNOSIS — J45.40 MODERATE PERSISTENT ASTHMA WITHOUT COMPLICATION: ICD-10-CM

## 2024-10-30 DIAGNOSIS — J44.1 COPD WITH ACUTE EXACERBATION: ICD-10-CM

## 2024-10-30 DIAGNOSIS — J45.40 MODERATE PERSISTENT ASTHMA, UNSPECIFIED WHETHER COMPLICATED: ICD-10-CM

## 2024-10-30 DIAGNOSIS — J43.9 PULMONARY EMPHYSEMA, UNSPECIFIED EMPHYSEMA TYPE: ICD-10-CM

## 2024-10-30 LAB
AORTIC DIMENSIONLESS INDEX: 0.74 (DI)
ASCENDING AORTA: 3.1 CM
BH CV ECHO MEAS - ACS: 1.9 CM
BH CV ECHO MEAS - AI P1/2T: 458.2 MSEC
BH CV ECHO MEAS - AO MAX PG: 5.3 MMHG
BH CV ECHO MEAS - AO MEAN PG: 3 MMHG
BH CV ECHO MEAS - AO ROOT DIAM: 2.9 CM
BH CV ECHO MEAS - AO V2 MAX: 115 CM/SEC
BH CV ECHO MEAS - AO V2 VTI: 27 CM
BH CV ECHO MEAS - AVA(I,D): 2.33 CM2
BH CV ECHO MEAS - EDV(CUBED): 59.3 ML
BH CV ECHO MEAS - EDV(MOD-SP2): 95.3 ML
BH CV ECHO MEAS - EDV(MOD-SP4): 73.9 ML
BH CV ECHO MEAS - EF(MOD-BP): 57.7 %
BH CV ECHO MEAS - EF(MOD-SP2): 60.8 %
BH CV ECHO MEAS - EF(MOD-SP4): 56.6 %
BH CV ECHO MEAS - ESV(CUBED): 17.6 ML
BH CV ECHO MEAS - ESV(MOD-SP2): 37.4 ML
BH CV ECHO MEAS - ESV(MOD-SP4): 32.1 ML
BH CV ECHO MEAS - FS: 33.3 %
BH CV ECHO MEAS - IVS/LVPW: 0.9 CM
BH CV ECHO MEAS - IVSD: 0.9 CM
BH CV ECHO MEAS - LA DIMENSION: 3.5 CM
BH CV ECHO MEAS - LAT PEAK E' VEL: 8.7 CM/SEC
BH CV ECHO MEAS - LV DIASTOLIC VOL/BSA (35-75): 37.5 CM2
BH CV ECHO MEAS - LV MASS(C)D: 113.6 GRAMS
BH CV ECHO MEAS - LV MAX PG: 3.3 MMHG
BH CV ECHO MEAS - LV MEAN PG: 2 MMHG
BH CV ECHO MEAS - LV SYSTOLIC VOL/BSA (12-30): 16.3 CM2
BH CV ECHO MEAS - LV V1 MAX: 91.2 CM/SEC
BH CV ECHO MEAS - LV V1 VTI: 20 CM
BH CV ECHO MEAS - LVIDD: 3.9 CM
BH CV ECHO MEAS - LVIDS: 2.6 CM
BH CV ECHO MEAS - LVOT AREA: 3.1 CM2
BH CV ECHO MEAS - LVOT DIAM: 2 CM
BH CV ECHO MEAS - LVPWD: 1 CM
BH CV ECHO MEAS - MED PEAK E' VEL: 8.3 CM/SEC
BH CV ECHO MEAS - MR MAX PG: 68.2 MMHG
BH CV ECHO MEAS - MR MAX VEL: 413 CM/SEC
BH CV ECHO MEAS - MV A MAX VEL: 87.5 CM/SEC
BH CV ECHO MEAS - MV DEC SLOPE: 932 CM/SEC2
BH CV ECHO MEAS - MV DEC TIME: 0.18 SEC
BH CV ECHO MEAS - MV E MAX VEL: 72.8 CM/SEC
BH CV ECHO MEAS - MV E/A: 0.83
BH CV ECHO MEAS - MV MEAN PG: 2 MMHG
BH CV ECHO MEAS - MV P1/2T: 37.1 MSEC
BH CV ECHO MEAS - MV V2 VTI: 28.9 CM
BH CV ECHO MEAS - MVA(P1/2T): 5.9 CM2
BH CV ECHO MEAS - MVA(VTI): 2.17 CM2
BH CV ECHO MEAS - PA V2 MAX: 68.2 CM/SEC
BH CV ECHO MEAS - PULM A REVS DUR: 0.11 SEC
BH CV ECHO MEAS - PULM A REVS VEL: 34.9 CM/SEC
BH CV ECHO MEAS - PULM DIAS VEL: 56.4 CM/SEC
BH CV ECHO MEAS - PULM S/D: 0.76
BH CV ECHO MEAS - PULM SYS VEL: 43 CM/SEC
BH CV ECHO MEAS - QP/QS: 0.42
BH CV ECHO MEAS - RV MAX PG: 0.8 MMHG
BH CV ECHO MEAS - RV V1 MAX: 44.7 CM/SEC
BH CV ECHO MEAS - RV V1 VTI: 8.4 CM
BH CV ECHO MEAS - RVDD: 3.3 CM
BH CV ECHO MEAS - RVOT DIAM: 2 CM
BH CV ECHO MEAS - SV(LVOT): 62.8 ML
BH CV ECHO MEAS - SV(MOD-SP2): 57.9 ML
BH CV ECHO MEAS - SV(MOD-SP4): 41.8 ML
BH CV ECHO MEAS - SV(RVOT): 26.3 ML
BH CV ECHO MEAS - SVI(LVOT): 31.9 ML/M2
BH CV ECHO MEAS - SVI(MOD-SP2): 29.4 ML/M2
BH CV ECHO MEAS - SVI(MOD-SP4): 21.2 ML/M2
BH CV ECHO MEAS - TAPSE (>1.6): 1.68 CM
BH CV ECHO MEASUREMENTS AVERAGE E/E' RATIO: 8.56
BH CV XLRA - TDI S': 7.5 CM/SEC
LEFT ATRIUM VOLUME INDEX: 27.6 ML/M2

## 2024-10-30 PROCEDURE — 93306 TTE W/DOPPLER COMPLETE: CPT

## 2025-01-21 ENCOUNTER — TELEPHONE (OUTPATIENT)
Dept: PULMONOLOGY | Facility: CLINIC | Age: 80
End: 2025-01-21
Payer: MEDICARE

## 2025-01-21 DIAGNOSIS — R06.02 SOB (SHORTNESS OF BREATH): ICD-10-CM

## 2025-01-21 DIAGNOSIS — C34.91 ADENOCARCINOMA OF BRONCHUS, RIGHT: ICD-10-CM

## 2025-01-21 DIAGNOSIS — J44.1 COPD WITH ACUTE EXACERBATION: ICD-10-CM

## 2025-01-21 RX ORDER — ALBUTEROL SULFATE 90 UG/1
2 INHALANT RESPIRATORY (INHALATION) EVERY 4 HOURS PRN
Qty: 18 G | Refills: 5 | Status: SHIPPED | OUTPATIENT
Start: 2025-01-21

## 2025-01-21 NOTE — TELEPHONE ENCOUNTER
PATIENT CALLED ABOUT NEEDING MEDICATION REFILLED.  PLEASE SEND ALBUTEROL INHALER AND TRELEGY. THEY BOTH NEED TO BE SENT TO [place].    IF ANY QUESTIONS PLEASE CALL PATIENT.

## 2025-01-21 NOTE — TELEPHONE ENCOUNTER
PT requested refill on Albuterol and trelegy PT should still have refills of trelegy will confirm with pharmacy.

## 2025-02-06 ENCOUNTER — OFFICE VISIT (OUTPATIENT)
Dept: UROLOGY | Age: 80
End: 2025-02-06
Payer: MEDICARE

## 2025-02-06 VITALS — RESPIRATION RATE: 14 BRPM | WEIGHT: 170.64 LBS | BODY MASS INDEX: 23.89 KG/M2 | HEIGHT: 71 IN

## 2025-02-06 DIAGNOSIS — C61 PROSTATE CANCER: Primary | ICD-10-CM

## 2025-02-06 DIAGNOSIS — R35.0 URINARY FREQUENCY: ICD-10-CM

## 2025-02-06 LAB — URINE VOLUME: 0

## 2025-02-06 RX ORDER — TOLTERODINE 2 MG/1
2 CAPSULE, EXTENDED RELEASE ORAL DAILY
Qty: 90 CAPSULE | Refills: 0 | Status: SHIPPED | OUTPATIENT
Start: 2025-02-06

## 2025-02-06 NOTE — PROGRESS NOTES
UROLOGY OFFICE FOLLOW UP NOTE    Subjective   HPI  Daron Whitfield is a 80 y.o. male.  Former patient of Dr. 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  Per Dr. Ames's note, patient had rising PSA, placed on ADT.  On Casodex and Lupron.  Per chart review, documentation, patient was on ADT 7/2017; prior PSAs or trend of PSA rise unable to obtain.    History of lower urinary tract symptoms, urgency frequency on Gemtesa.    Also with history of lung cancer, managed by oncology, Dr. Flores.    History of Present Illness  Per patient, he has been on Casodex 50 mg since 2012, following his prostate cryoablation. Lupron injections were initiated approximately 7 to 8 years ago when his PSA levels increased from 0.01 to 0.04 within a 90-day period post-surgery. He reports no adverse effects from these medications and expresses willingness to discontinue them temporarily to assess his condition.    Over the past 2 to 3 months, he has experienced increased urinary frequency, necessitating urination every hour during the day and several times at night, typically between 3:00 and 4:30 AM. He does not always drink fluids before bedtime.   He is currently on Gemtesa, which has not alleviated his symptoms.   He reports satisfactory bladder emptying and is not on any diuretic medication. He recalls undergoing a cysto during his biopsy procedure over 10 years ago. He was previously on tamsulosin and Detrol LA 4 mg for bladder issues, but these were discontinued due to normal urination.    His diabetes is well-controlled, and he was taken off metformin 3 months ago.      _________  Prostate cryotherapy 2012    Prostate biopsy pathology 11/17/2011: Adenocarcinoma Donn 3+3= 6 (6/21 cores)    PSA  10/9/2024: <0.014  10/10/2023: <0.014  1/4/2023: <0.014  11/4/2020: <0.01  4/28/2020: <0.01    Results for orders placed or performed in visit on 02/06/25   Bladder Scan    Collection Time: 02/06/25 10:49  "AM   Result Value Ref Range    Urine Volume 0          Review of systems  A review of systems was performed, and positive findings are noted in the HPI.    Objective     Vital Signs:   Resp 14   Ht 180.3 cm (71\")   Wt 77.4 kg (170 lb 10.2 oz)   BMI 23.80 kg/m²       Physical exam  No acute distress, well-nourished  Awake alert and oriented  Mood normal; affect normal  Physical Exam        Bladder Scan interpretation 02/06/2025    Estimation of residual urine via BVI 3000 [place] Bladder Scan  Performed by: ARABELLA Meadows  Residual Urine: 0 ml  Indication: Prostate cancer    Urinary frequency   Position: Supine  Examination: Incremental scanning of the suprapubic area using 2.0 MHz transducer using copious amounts of acoustic gel.   Findings: An anechoic area was demonstrated which represented the bladder, with measurement of residual urine as noted. I inspected this myself. In that the residual urine was stable or insignificant, refer to plan for treatment and plan necessary at this time.     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 Prostate cancer (Primary)  -     PSA DIAGNOSTIC; Future    2. Urinary frequency  -     tolterodine LA (DETROL LA) 2 MG 24 hr capsule; Take 1 capsule by mouth Daily.  Dispense: 90 capsule; Refill: 0  -     Bladder Scan      Performed extensive chart review at today's visit; discussed with patient at length.  Assessment & Plan  1. Prostate cancer.  He has been on Casodex 50 mg and Lupron for many years; pathology reviewed, patient with low risk [place] 6 prostate cancer at time of cryoablation. Given the low-risk nature of his prostate cancer, advanced age, and the potential side effects of long-term ADT medication use, a androgen deprivation holiday is recommended.   He is agreeable with this.    No Lupron injection provided today; advised to take Casodex 50 mg every other day for a week, then discontinue it.     A follow-up PSA test will be conducted in 3 months to monitor levels.   Expect PSA to go up and his prostate is still present after cryoablation.    Will need time to assess where his baseline PSA is at this time.    If PSA levels increase significantly or other concerning symptoms arise, imaging studies may be considered as well as resuming treatment.     He participated in the discussion, notes understanding and is agreeable.    - Urinary frequency.  He reports increased urinary frequency despite being on Gemtesa 75 mg.   Detrol LA 2 mg will be added to his regimen, previously tolerated.    He is advised to take the medication in the morning if daytime symptoms are more bothersome. He is also advised to limit fluid intake before bedtime to reduce nocturnal symptoms.   Emptying bladder without postvoid residual; continue to monitor     If symptoms persist, Flomax may be considered at the next visit.  If refractory symptoms, will warrant  cystoscopy    Follow-up  The patient will follow up in 3 months, PVR, PSA prior.         All questions addressed      Patient or patient representative verbalized consent for the use of Ambient Listening during the visit with  Vinita Mcnulty MD for chart documentation. 2/7/2025  06:49 EST

## 2025-02-10 ENCOUNTER — OFFICE VISIT (OUTPATIENT)
Dept: SURGERY | Facility: CLINIC | Age: 80
End: 2025-02-10
Payer: MEDICARE

## 2025-02-10 VITALS — RESPIRATION RATE: 16 BRPM | HEIGHT: 71 IN | BODY MASS INDEX: 25.06 KG/M2 | WEIGHT: 179 LBS

## 2025-02-10 DIAGNOSIS — Z95.828 PORT-A-CATH IN PLACE: Primary | ICD-10-CM

## 2025-02-10 PROCEDURE — 1159F MED LIST DOCD IN RCRD: CPT | Performed by: SURGERY

## 2025-02-10 PROCEDURE — 1160F RVW MEDS BY RX/DR IN RCRD: CPT | Performed by: SURGERY

## 2025-02-10 PROCEDURE — 99213 OFFICE O/P EST LOW 20 MIN: CPT | Performed by: SURGERY

## 2025-02-10 RX ORDER — ONDANSETRON 2 MG/ML
4 INJECTION INTRAMUSCULAR; INTRAVENOUS EVERY 6 HOURS PRN
OUTPATIENT
Start: 2025-02-10

## 2025-02-10 RX ORDER — SODIUM CHLORIDE 0.9 % (FLUSH) 0.9 %
10 SYRINGE (ML) INJECTION EVERY 12 HOURS SCHEDULED
OUTPATIENT
Start: 2025-02-10

## 2025-02-10 RX ORDER — SODIUM CHLORIDE 9 MG/ML
40 INJECTION, SOLUTION INTRAVENOUS AS NEEDED
OUTPATIENT
Start: 2025-02-10

## 2025-02-10 RX ORDER — SODIUM CHLORIDE 0.9 % (FLUSH) 0.9 %
10 SYRINGE (ML) INJECTION AS NEEDED
OUTPATIENT
Start: 2025-02-10

## 2025-02-10 NOTE — PROGRESS NOTES
Inpatient History and Physical Surgical Orders    Preadmission Location:   Preadmission Time:  Facility:  Surgery Date:  Surgery Time:  Preadmission Test date:     Chief Complaint  Outpatient History and Physical / Surgical Orders    Primary Care Provider: Carol Frank MD    Referring Provider: No ref. provider found    Subjective      Patient Name: Daron Whitfield : 1945    HPI  The patient is an 80-year-old gentleman that we have taken care of in the past.  He had a port placed several years ago for treatment of lung cancer.  He is doing well now and continues to be in remission.  He would like to go ahead and have the port removed.    Past History:  Medical History: has a past medical history of Bladder outflow obstruction, Cervical spinal stenosis (2018), Colon polyp, COVID, Diabetes mellitus (), Emphysema lung, Emphysema of lung, Hand paresthesia (2018), HTN (hypertension), Hypercholesteremia, Lung cancer (), Muscle atrophy of upper extremity (2018), Numbness and tingling in both hands, OAB (overactive bladder) (2014), Prostate cancer (), and Ulnar neuropathy (2018).   Surgical History: has a past surgical history that includes Colonoscopy (); Cystoscopy; Lung biopsy; Cryoablation of Prostate (2012); Teeth Extraction; TURP / transurethral incision / drainage prostate (2015); Cardiac catheterization; Bronchoscopy (N/A, 2023); Hand surgery (Right); Colonoscopy (N/A, 2023); and Bronchoscopy (N/A, 2024).   Family History: family history includes Cancer in his sister; Colon cancer (age of onset: 60) in his sister; Colon polyps in his sister; Coronary artery disease in his brother; Diabetes in his mother, sister, and sister; Hypertension in his sister.   Social History: reports that he quit smoking about 9 years ago. His smoking use included cigarettes. He started smoking about 59 years ago. He has a 50 pack-year smoking history. He  has been exposed to tobacco smoke. He has never used smokeless tobacco. He reports current alcohol use of about 1.0 standard drink of alcohol per week. He reports that he does not use drugs.  Allergies: Patient has no known allergies.       Current Outpatient Medications:     atorvastatin (LIPITOR) 40 MG tablet, , Disp: , Rfl:     bicalutamide (CASODEX) 50 MG tablet, Take 1 tablet by mouth Daily for 360 days., Disp: 90 tablet, Rfl: 3    cetirizine (zyrTEC) 10 MG tablet, Take 1 tablet by mouth Daily., Disp: 90 tablet, Rfl: 3    Fluticasone-Umeclidin-Vilant (TRELEGY ELLIPTA) 200-62.5-25 MCG/ACT inhaler, Inhale 1 puff Daily., Disp: 60 each, Rfl: 11    Gemtesa 75 MG tablet, Take 1 tablet by mouth Daily for 360 days., Disp: 90 tablet, Rfl: 1    ipratropium-albuterol (DUO-NEB) 0.5-2.5 mg/3 ml nebulizer, Take 3 mL by nebulization Every 4 (Four) Hours As Needed for Wheezing., Disp: 360 mL, Rfl: 4    itraconazole (Sporanox) 100 MG capsule, Take 2 capsules by mouth 2 (Two) Times a Day., Disp: 360 capsule, Rfl: 1    leuprolide (Lupron [place], 3-Month,) 22.5 MG injection, 22.5 mg., Disp: , Rfl:     lisinopril (PRINIVIL,ZESTRIL) 5 MG tablet, Daily., Disp: , Rfl:     metoprolol succinate XL (TOPROL-XL) 25 MG 24 hr tablet, Toprol XL 25 mg oral tablet extended release 24 hr take 1 tablet (25 mg) by oral route once daily   Active, Disp: , Rfl:     mirtazapine (REMERON) 15 MG tablet, , Disp: , Rfl:     Narcan 4 MG/0.1ML nasal spray, , Disp: , Rfl:     ondansetron (Zofran) 4 MG tablet, Take 1 tablet by mouth Every 8 (Eight) Hours As Needed for Nausea or Vomiting., Disp: 60 tablet, Rfl: 0    pantoprazole (PROTONIX) 40 MG EC tablet, Take 1 tablet by mouth Daily., Disp: 30 tablet, Rfl: 5    tolterodine LA (DETROL LA) 2 MG 24 hr capsule, Take 1 capsule by mouth Daily., Disp: 90 capsule, Rfl: 0    Current Facility-Administered Medications:     leuprolide (LUPRON) injection 22.5 mg, 22.5 mg, Intramuscular, Once, Merrill Ames MD  "      Objective   Vital Signs:   Resp 16   Ht 180.3 cm (70.98\")   Wt 81.2 kg (179 lb)   BMI 24.98 kg/m²       Physical Exam  Vitals and nursing note reviewed.   Constitutional:       Appearance: Normal appearance. The patient is well-developed.   Cardiovascular:      Rate and Rhythm: Normal rate and regular rhythm.   Pulmonary:      Effort: Pulmonary effort is normal.      Breath sounds: Normal air entry.   Abdominal:      General: Bowel sounds are normal.      Palpations: Abdomen is soft.      Skin:     General: Skin is warm and dry.   Neurological:      Mental Status: The patient is alert and oriented to person, place, and time.      Motor: Motor function is intact.   Psychiatric:         Mood and Affect: Mood normal.       Result Review :               Assessment and Plan   Diagnoses and all orders for this visit:    1. Port-A-Cath in place (Primary)  -     Case Request; Standing  -     Follow Anesthesia Guidelines / Protocol; Future  -     Follow Anesthesia Guidelines / Protocol; Standing  -     Verify NPO Status; Standing  -     Verify / Perform Chlorhexidine Skin Prep; Standing  -     Insert Peripheral IV; Standing  -     Saline Lock & Maintain IV Access; Standing  -     sodium chloride 0.9 % flush 10 mL  -     sodium chloride 0.9 % flush 10 mL  -     sodium chloride 0.9 % infusion 40 mL  -     Place Sequential Compression Device; Standing  -     Maintain Sequential Compression Device; Standing  -     ondansetron (ZOFRAN) injection 4 mg  -     Case Request    We will schedule him for a removal of his central venous access port.  I have described the procedure to him as well as the risk and benefits and he is agreeable to proceeding.    I  Ollie Peña MD  02/10/2025    "

## 2025-02-13 ENCOUNTER — OFFICE VISIT (OUTPATIENT)
Dept: PULMONOLOGY | Facility: CLINIC | Age: 80
End: 2025-02-13
Payer: MEDICARE

## 2025-02-13 VITALS
WEIGHT: 178 LBS | TEMPERATURE: 97.6 F | SYSTOLIC BLOOD PRESSURE: 145 MMHG | RESPIRATION RATE: 16 BRPM | HEART RATE: 76 BPM | BODY MASS INDEX: 24.92 KG/M2 | OXYGEN SATURATION: 96 % | DIASTOLIC BLOOD PRESSURE: 90 MMHG | HEIGHT: 71 IN

## 2025-02-13 DIAGNOSIS — J18.9 MULTIFOCAL PNEUMONIA: ICD-10-CM

## 2025-02-13 DIAGNOSIS — J43.9 PULMONARY EMPHYSEMA, UNSPECIFIED EMPHYSEMA TYPE: ICD-10-CM

## 2025-02-13 DIAGNOSIS — J45.40 MODERATE PERSISTENT ASTHMA WITHOUT COMPLICATION: ICD-10-CM

## 2025-02-13 DIAGNOSIS — C34.91 ADENOCARCINOMA OF BRONCHUS, RIGHT: ICD-10-CM

## 2025-02-13 DIAGNOSIS — T17.500A MUCUS PLUGGING OF BRONCHI: ICD-10-CM

## 2025-02-13 DIAGNOSIS — R06.02 SOB (SHORTNESS OF BREATH): ICD-10-CM

## 2025-02-13 DIAGNOSIS — R05.1 ACUTE COUGH: Primary | ICD-10-CM

## 2025-02-13 DIAGNOSIS — J45.40 MODERATE PERSISTENT ASTHMA, UNSPECIFIED WHETHER COMPLICATED: ICD-10-CM

## 2025-02-13 DIAGNOSIS — J44.1 COPD WITH ACUTE EXACERBATION: ICD-10-CM

## 2025-02-13 RX ORDER — ALBUTEROL SULFATE 90 UG/1
2 INHALANT RESPIRATORY (INHALATION) EVERY 4 HOURS PRN
Qty: 18 G | Refills: 5 | Status: SHIPPED | OUTPATIENT
Start: 2025-02-13

## 2025-02-13 NOTE — PROGRESS NOTES
Primary Care Provider  Carol Frank MD     Referring Provider  No ref. provider found     Chief Complaint  Follow-up (3 MONTH); Adenocarcinoma of bronchus, right; Emphysema; and COPD    Subjective          Daron Whitfield presents to [facility] PULMONARY & CR[facility]ICAL CARE MEDICINE  History of Present Illness  Daron Whitfield is a 80 y.o. male patient   History of Present Illness  The patient is an 80-year-old male with a history of COPD and adenocarcinoma of the right bronchus, status post chemotherapy and radiation in the past. He has recurrent COPD exacerbations and is here for a follow-up.    He was diagnosed with Aspergillus pneumonia and was treated with itraconazole starting 07/2024. He reports no current respiratory distress or productive cough. He has been on Trelegy Ellipta 200 mg once daily and albuterol as needed. He continues to use his prescribed inhaler and rescue inhaler as needed, typically when experiencing shortness of breath. The frequency of rescue inhaler use varies, with some days requiring no use at all. He also utilizes a nebulizer at least once daily, occasionally twice. He maintains an active lifestyle, although he does not attend the gym. He has not participated in pulmonary rehabilitation but expresses interest in doing so twice weekly. He believes his immunizations are up-to-date.    He has prostate cancer and is on Lupron injections. He was taken off Lupron on the 10th because he had been taking it for a while, and they are going to recheck to see what happens.    He has been experiencing nausea, which he attributes to his medication regimen. He reports no febrile episodes, chills, or night sweats. He has completed a 6-month course of itraconazole but has not refilled the prescription for approximately 1 month.    MEDICATIONS  Current: Trelegy Ellipta, albuterol, Lupron (discontinued), itraconazole (discontinued).    IMMUNIZATIONS  He believes his immunizations are  up-to-date.    Review of Systems     General:  No Fatigue, No Fever No weight loss or loss of appetite  HEENT: No dysphagia, No Visual Changes, no rhinorrhea  Respiratory:  + cough,+Dyspnea, intermittent phlegm, No Pleuritic Pain, no wheezing, no hemoptysis.  Cardiovascular: Denies chest pain, denies palpitations,+SILVERIO, No Chest Pressure  Gastrointestinal:  No Abdominal Pain, No Nausea, No Vomiting, No Diarrhea  Genitourinary:  No Dysuria, No Frequency, No Hesitancy  Musculoskeletal: No muscle pain or swelling  Endocrine:  No Heat Intolerance, No Cold Intolerance  Hematologic:  No Bleeding, No Bruising  Psychiatric:  No Anxiety, No Depression  Neurologic:  No Confusion, no Dysarthria, No Headaches  Skin:  No Rash, No Open Wounds    Family History   Problem Relation Age of Onset    Diabetes Mother     Cancer Sister     Diabetes Sister     Colon cancer Sister 60    Colon polyps Sister     Hypertension Sister     Diabetes Sister     Coronary artery disease Brother         Social History     Socioeconomic History    Marital status:    Tobacco Use    Smoking status: Former     Current packs/day: 0.00     Average packs/day: 1 pack/day for 50.0 years (50.0 ttl pk-yrs)     Types: Cigarettes     Start date: 1966     Quit date: 2016     Years since quittin.1     Passive exposure: Past    Smokeless tobacco: Never    Tobacco comments:     STARTED AGE 17  QUIT IN 2016   Vaping Use    Vaping status: Never Used    Passive vaping exposure: Yes   Substance and Sexual Activity    Alcohol use: Yes     Alcohol/week: 1.0 standard drink of alcohol     Types: 1 Cans of beer per week     Comment: casual drinker    Drug use: Never    Sexual activity: Not Currently     Partners: Female        Past Medical History:   Diagnosis Date    Bladder outflow obstruction     Cervical spinal stenosis 2018    Colon polyp     COVID     Diabetes mellitus 2019    Emphysema lung     Emphysema of lung     Hand paresthesia 2018     HTN (hypertension)     Hypercholesteremia     Lung cancer 2016    Muscle atrophy of upper extremity 01/23/2018    Numbness and tingling in both hands     OAB (overactive bladder) 12/09/2014    Prostate cancer 2012    Ulnar neuropathy 01/23/2018        Immunization History   Administered Date(s) Administered    ABRYSVO (RSV, 60+ or pregnant women 32-36 wks) 10/30/2023    COVID-19 (MODERNA) 12YRS+ (SPIKEVAX) 10/30/2023    COVID-19 (MODERNA) 1st,2nd,3rd Dose Monovalent 02/05/2021, 03/09/2021    COVID-19 (PFIZER) BIVALENT 12+YRS 09/21/2022    COVID-19 (PFIZER) Purple Cap Monovalent 11/17/2021    FLUAD TRI 65YR+ 08/06/2024    Fluad Quad 65+ 10/24/2022, 10/04/2023    Fluzone High-Dose 65+YRS 12/11/2019    Fluzone High-Dose 65+yrs 11/13/2021, 10/30/2023    Pneumococcal Conjugate 20-Valent (PCV20) 08/06/2024    Pneumococcal Polysaccharide (PPSV23) 10/24/2022         No Known Allergies       Current Outpatient Medications:     atorvastatin (LIPITOR) 40 MG tablet, , Disp: , Rfl:     bicalutamide (CASODEX) 50 MG tablet, Take 1 tablet by mouth Daily for 360 days., Disp: 90 tablet, Rfl: 3    cetirizine (zyrTEC) 10 MG tablet, Take 1 tablet by mouth Daily., Disp: 90 tablet, Rfl: 3    Fluticasone-Umeclidin-Vilant (TRELEGY ELLIPTA) 200-62.5-25 MCG/ACT inhaler, Inhale 1 puff Daily., Disp: 60 each, Rfl: 11    Gemtesa 75 MG tablet, Take 1 tablet by mouth Daily for 360 days., Disp: 90 tablet, Rfl: 1    ipratropium-albuterol (DUO-NEB) 0.5-2.5 mg/3 ml nebulizer, Take 3 mL by nebulization Every 4 (Four) Hours As Needed for Wheezing., Disp: 360 mL, Rfl: 4    itraconazole (Sporanox) 100 MG capsule, Take 2 capsules by mouth 2 (Two) Times a Day., Disp: 360 capsule, Rfl: 1    leuprolide (Lupron Depot, 3-Month,) 22.5 MG injection, 22.5 mg., Disp: , Rfl:     lisinopril (PRINIVIL,ZESTRIL) 5 MG tablet, Daily., Disp: , Rfl:     metoprolol succinate XL (TOPROL-XL) 25 MG 24 hr tablet, Toprol XL 25 mg oral tablet extended release 24 hr take 1 tablet  "(25 mg) by oral route once daily   Active, Disp: , Rfl:     mirtazapine (REMERON) 15 MG tablet, , Disp: , Rfl:     Narcan 4 MG/0.1ML nasal spray, , Disp: , Rfl:     ondansetron (Zofran) 4 MG tablet, Take 1 tablet by mouth Every 8 (Eight) Hours As Needed for Nausea or Vomiting., Disp: 60 tablet, Rfl: 0    pantoprazole (PROTONIX) 40 MG EC tablet, Take 1 tablet by mouth Daily., Disp: 30 tablet, Rfl: 5    tolterodine LA (DETROL LA) 2 MG 24 hr capsule, Take 1 capsule by mouth Daily., Disp: 90 capsule, Rfl: 0    albuterol sulfate  (90 Base) MCG/ACT inhaler, Inhale 2 puffs Every 4 (Four) Hours As Needed for Wheezing., Disp: 18 g, Rfl: 5    Current Facility-Administered Medications:     leuprolide (LUPRON) injection 22.5 mg, 22.5 mg, Intramuscular, Once, Merrill Ames MD     Objective   Physical Exam  Physical Exam  Lungs were auscultated.    Vital Signs:   /90 (BP Location: Left arm, Patient Position: Sitting, Cuff Size: Adult)   Pulse 76   Temp 97.6 °F (36.4 °C) (Temporal)   Resp 16   Ht 180.3 cm (71\")   Wt 80.7 kg (178 lb)   SpO2 96% Comment: room air  BMI 24.83 kg/m²       Vital Signs Reviewed  WDWN, Alert, NAD.   HEENT:  PERRL, EOMI.  OP, nares clear, no sinus tenderness  Mallampatti classification : 1  Neck:  Supple, no JVD, no thyromegaly  Lymph: no axillary, cervical, supraclavicular lymphadenopathy noted bilaterally  Chest:  good aeration, bilateral diminished breath sounds, no wheezing, crackles or rhonchi, resonant to percussion b/l  CV: RRR, no MGR, pulses 2+, equal.  Abd:  Soft, NT, ND, + BS, no HSM  EXT:  no clubbing, no cyanosis, No BLE edema  Neuro:  A&Ox3, CN grossly intact, no focal deficits.  Skin: No rashes or lesions noted     Result Review :   The following data was reviewed by: Roshan Hood MD on 02/13/2025:  Common labs          7/16/2024    10:19 9/19/2024    11:29 10/9/2024    10:47   Common Labs   Glucose 93  88     BUN 19  8     Creatinine 1.57  1.14     Sodium 139 "  140     Potassium 4.7  3.6     Chloride 107  101     Calcium 9.4  9.3     Albumin 4.0  4.1     Total Bilirubin 0.3  0.5     Alkaline Phosphatase 81  89     AST (SGOT) 15  14     ALT (SGPT) 8  6     PSA   <0.014      CMP          3/8/2024    09:39 7/16/2024    10:19 9/19/2024    11:29   CMP   Glucose  93  88    BUN  19  8    Creatinine 1.80  1.57  1.14    EGFR 37.6  44.3  65.0    Sodium  139  140    Potassium  4.7  3.6    Chloride  107  101    Calcium  9.4  9.3    Total Protein  6.9  6.7    Albumin  4.0  4.1    Globulin  2.9  2.6    Total Bilirubin  0.3  0.5    Alkaline Phosphatase  81  89    AST (SGOT)  15  14    ALT (SGPT)  8  6    Albumin/Globulin Ratio  1.4  1.6    BUN/Creatinine Ratio  12.1  7.0    Anion Gap  10.0  10.7        Data reviewed : Radiologic studies Previous imaging reviewed.     Results  Imaging  Echocardiogram shows diastolic dysfunction.             Assessment and Plan    Diagnoses and all orders for this visit:    1. Acute cough (Primary)  -     Complete PFT - Pre & Post Bronchodilator; Future  -     Ambulatory Referral to Pulmonary Rehab  -     albuterol sulfate  (90 Base) MCG/ACT inhaler; Inhale 2 puffs Every 4 (Four) Hours As Needed for Wheezing.  Dispense: 18 g; Refill: 5    2. SOB (shortness of breath)  -     Complete PFT - Pre & Post Bronchodilator; Future  -     Ambulatory Referral to Pulmonary Rehab  -     albuterol sulfate  (90 Base) MCG/ACT inhaler; Inhale 2 puffs Every 4 (Four) Hours As Needed for Wheezing.  Dispense: 18 g; Refill: 5    3. Mucus plugging of bronchi  -     Complete PFT - Pre & Post Bronchodilator; Future  -     Ambulatory Referral to Pulmonary Rehab  -     albuterol sulfate  (90 Base) MCG/ACT inhaler; Inhale 2 puffs Every 4 (Four) Hours As Needed for Wheezing.  Dispense: 18 g; Refill: 5    4. Multifocal pneumonia  -     Complete PFT - Pre & Post Bronchodilator; Future  -     Ambulatory Referral to Pulmonary Rehab  -     albuterol sulfate   (90 Base) MCG/ACT inhaler; Inhale 2 puffs Every 4 (Four) Hours As Needed for Wheezing.  Dispense: 18 g; Refill: 5    5. Moderate persistent asthma without complication  -     Complete PFT - Pre & Post Bronchodilator; Future  -     Ambulatory Referral to Pulmonary Rehab  -     albuterol sulfate  (90 Base) MCG/ACT inhaler; Inhale 2 puffs Every 4 (Four) Hours As Needed for Wheezing.  Dispense: 18 g; Refill: 5    6. COPD with acute exacerbation  -     Complete PFT - Pre & Post Bronchodilator; Future  -     Ambulatory Referral to Pulmonary Rehab  -     albuterol sulfate  (90 Base) MCG/ACT inhaler; Inhale 2 puffs Every 4 (Four) Hours As Needed for Wheezing.  Dispense: 18 g; Refill: 5    7. Moderate persistent asthma, unspecified whether complicated  -     Complete PFT - Pre & Post Bronchodilator; Future  -     Ambulatory Referral to Pulmonary Rehab  -     albuterol sulfate  (90 Base) MCG/ACT inhaler; Inhale 2 puffs Every 4 (Four) Hours As Needed for Wheezing.  Dispense: 18 g; Refill: 5    8. Pulmonary emphysema, unspecified emphysema type  -     Complete PFT - Pre & Post Bronchodilator; Future  -     Ambulatory Referral to Pulmonary Rehab  -     albuterol sulfate  (90 Base) MCG/ACT inhaler; Inhale 2 puffs Every 4 (Four) Hours As Needed for Wheezing.  Dispense: 18 g; Refill: 5    9. Adenocarcinoma of bronchus, right  -     Complete PFT - Pre & Post Bronchodilator; Future  -     Ambulatory Referral to Pulmonary Rehab  -     albuterol sulfate  (90 Base) MCG/ACT inhaler; Inhale 2 puffs Every 4 (Four) Hours As Needed for Wheezing.  Dispense: 18 g; Refill: 5      Assessment & Plan  1. Chronic Obstructive Pulmonary Disease (COPD).  He reports no current issues with breathing and uses his rescue inhaler as needed. He uses his nebulizer at least once a day, sometimes twice. He has been on Trelegy Ellipta 200 mg once daily and albuterol as needed. He has not been to pulmonary rehab for a long  time. He is advised to continue using the nebulizer twice daily and to attend pulmonary rehabilitation sessions twice a week to help manage his lung problems. A lung function test will be scheduled.    2. Aspergillus Pneumonia.  He was treated with itraconazole for approximately 6 months but has not had any refills for the past month. Given the CAT scan results showing significant inflammation, he is advised to continue itraconazole 200 mg twice daily for another 5 months.    3. Diastolic Dysfunction.  An echocardiogram showed diastolic dysfunction, likely due to high blood pressure and COPD. The antifungal medication may also contribute to this condition.    4. Prostate Cancer.  He is currently off Lupron injections as of 07/10/2024, and his condition is being monitored.    5. Medication Management.  Refills for his current medications, including Trelegy Ellipta and albuterol, will be provided.    Follow-up  The patient will follow up in 4 to 6 months.    Follow Up   Return in about 6 months (around 8/13/2025).  Patient was given instructions and counseling regarding his condition or for health maintenance advice. Please see specific information pulled into the AVS if appropriate.     Patient or patient representative verbalized consent for the use of Ambient Listening during the visit with  Roshan Hood MD for chart documentation. 2/13/2025  09:33 EST    Electronically signed by Roshan Hood MD, 2/13/2025, 09:33 EST.

## 2025-02-20 ENCOUNTER — TELEPHONE (OUTPATIENT)
Dept: SURGERY | Facility: CLINIC | Age: 80
End: 2025-02-20
Payer: MEDICARE

## 2025-03-03 ENCOUNTER — HOSPITAL ENCOUNTER (OUTPATIENT)
Dept: CT IMAGING | Facility: HOSPITAL | Age: 80
Discharge: HOME | End: 2025-03-03
Admitting: INTERNAL MEDICINE
Payer: MEDICARE

## 2025-03-03 DIAGNOSIS — C34.91 ADENOCARCINOMA OF BRONCHUS, RIGHT: ICD-10-CM

## 2025-03-03 LAB
CREAT BLDA-MCNC: 1.5 MG/DL (ref 0.6–1.3)
EGFRCR SERPLBLD CKD-EPI 2021: 46.5 ML/MIN/1.73

## 2025-03-03 PROCEDURE — 71260 CT THORAX DX C+: CPT

## 2025-03-03 PROCEDURE — 25510000001 IOPAMIDOL PER 1 ML: Performed by: INTERNAL MEDICINE

## 2025-03-03 PROCEDURE — 82565 ASSAY OF CREATININE: CPT

## 2025-03-03 RX ORDER — IOPAMIDOL 755 MG/ML
100 INJECTION, SOLUTION INTRAVASCULAR
Status: COMPLETED | OUTPATIENT
Start: 2025-03-03 | End: 2025-03-03

## 2025-03-03 RX ADMIN — IOPAMIDOL 75 ML: 755 INJECTION, SOLUTION INTRAVENOUS at 10:07

## 2025-03-10 ENCOUNTER — TELEPHONE (OUTPATIENT)
Dept: SURGERY | Facility: CLINIC | Age: 80
End: 2025-03-10
Payer: MEDICARE

## 2025-03-10 NOTE — TELEPHONE ENCOUNTER
ANNETTE CALLED FROM PAT.  SHE JUST SPOKE TO THE PATIENT AND HE WANTS TO RESCHEDULE.  HE SHOULD BE CALLING OUR OFFICE.    CB#[phone]

## 2025-03-10 NOTE — TELEPHONE ENCOUNTER
Hub staff attempted to follow warm transfer process and was unsuccessful     Caller: Daron Whitfield     Relationship to patient: SELF     Best call back number: [phone]     Patient is needing: PATIENT IS WANTING TO CANCEL HIS SURGERY DUE TO FAMILY EMERGENCY AND WILL CALL BACK TO SCHEDULE AT A LATER TIME

## 2025-03-12 ENCOUNTER — OFFICE VISIT (OUTPATIENT)
Dept: ONCOLOGY | Facility: HOSPITAL | Age: 80
End: 2025-03-12
Payer: MEDICARE

## 2025-03-12 ENCOUNTER — HOSPITAL ENCOUNTER (OUTPATIENT)
Dept: ONCOLOGY | Facility: HOSPITAL | Age: 80
Discharge: HOME | End: 2025-03-12
Payer: MEDICARE

## 2025-03-12 VITALS
WEIGHT: 180.12 LBS | TEMPERATURE: 97.8 F | DIASTOLIC BLOOD PRESSURE: 74 MMHG | HEIGHT: 71 IN | BODY MASS INDEX: 25.22 KG/M2 | HEART RATE: 87 BPM | OXYGEN SATURATION: 90 % | RESPIRATION RATE: 16 BRPM | SYSTOLIC BLOOD PRESSURE: 113 MMHG

## 2025-03-12 DIAGNOSIS — C34.91 ADENOCARCINOMA OF BRONCHUS, RIGHT: Primary | ICD-10-CM

## 2025-03-12 DIAGNOSIS — Z95.828 PORT-A-CATH IN PLACE: Primary | ICD-10-CM

## 2025-03-12 PROCEDURE — 96523 IRRIG DRUG DELIVERY DEVICE: CPT

## 2025-03-12 PROCEDURE — 25010000002 HEPARIN LOCK FLUSH PER 10 UNITS: Performed by: INTERNAL MEDICINE

## 2025-03-12 RX ORDER — SODIUM CHLORIDE 0.9 % (FLUSH) 0.9 %
20 SYRINGE (ML) INJECTION AS NEEDED
Status: CANCELLED | OUTPATIENT
Start: 2025-03-12

## 2025-03-12 RX ORDER — SODIUM CHLORIDE 0.9 % (FLUSH) 0.9 %
20 SYRINGE (ML) INJECTION AS NEEDED
OUTPATIENT
Start: 2025-03-12

## 2025-03-12 RX ORDER — HEPARIN SODIUM (PORCINE) LOCK FLUSH IV SOLN 100 UNIT/ML 100 UNIT/ML
500 SOLUTION INTRAVENOUS AS NEEDED
Status: CANCELLED | OUTPATIENT
Start: 2025-03-12

## 2025-03-12 RX ORDER — SODIUM CHLORIDE 0.9 % (FLUSH) 0.9 %
20 SYRINGE (ML) INJECTION AS NEEDED
Status: DISCONTINUED | OUTPATIENT
Start: 2025-03-12 | End: 2025-03-13 | Stop reason: HOSPADM

## 2025-03-12 RX ORDER — HEPARIN SODIUM (PORCINE) LOCK FLUSH IV SOLN 100 UNIT/ML 100 UNIT/ML
500 SOLUTION INTRAVENOUS AS NEEDED
Status: DISCONTINUED | OUTPATIENT
Start: 2025-03-12 | End: 2025-03-13 | Stop reason: HOSPADM

## 2025-03-12 RX ORDER — HEPARIN SODIUM (PORCINE) LOCK FLUSH IV SOLN 100 UNIT/ML 100 UNIT/ML
500 SOLUTION INTRAVENOUS AS NEEDED
OUTPATIENT
Start: 2025-03-12

## 2025-03-12 RX ADMIN — HEPARIN 500 UNITS: 100 SYRINGE at 08:39

## 2025-03-12 RX ADMIN — Medication 20 ML: at 08:40

## 2025-03-12 NOTE — ASSESSMENT & PLAN NOTE
Patient is status post treatments as outlined above.  He is doing well.  I see no evidence for recurrence by his history, physical examination or CT chest.  He is on antifungal medications according to the pulmonology note for fungal infection in the lungs.  This is the likely explanation of the borderline lymph node seen in the chest.  Port flush routinely when not in active use-okay to have it removed from our standpoint since he has not needed treatment for some time.  Patient states he will reschedule with the surgeon once he returns from his sisters .  I will see him back in 1 year for ongoing surveillance with CT chest prior.

## 2025-03-12 NOTE — PROGRESS NOTES
Chief Complaint   FOLLOW UP 1    [facility]  Carol Frank MD    Subjective          Daron Whitfield presents to [facility] HEMATOLOGY & ONCOLOGY for follow-up of his lung cancer.  He status post treatments as outlined below.  He notes that he has recently lost his wife and sister but is grieving appropriately.  He denies new masses, adenopathy, unusual aches or pains.  He was scheduled to have his Port-A-Cath removed this week but had to reschedule due to his sister's .  He notes adequate appetite and energy level.  He has no other complaints today.    Oncology/Hematology History Overview Note   2016 RLL poorly differentiated NSCLC adenocarcinoma            Clinical Staging      Stage IIIA ([id number])            Treatments      Chemotherapy      16 thru 16 completed 3C Cisplatin   Radiation Therapy      16 thru 16 received 6000cGy XRT to right hilum/mediastinum         Adenocarcinoma of bronchus, right   6/3/2021 Initial Diagnosis    Adenocarcinoma of bronchus, right (CMS/HCC)     2021 -  Chemotherapy    OP CENTRAL VENOUS ACCESS DEVICE ACCESS, CARE, AND MAINTENANCE (CVAD)           Current Outpatient Medications on File Prior to Visit   Medication Sig Dispense Refill    albuterol sulfate  (90 Base) MCG/ACT inhaler Inhale 2 puffs Every 4 (Four) Hours As Needed for Wheezing. 18 g 5    atorvastatin (LIPITOR) 40 MG tablet       cetirizine (zyrTEC) 10 MG tablet Take 1 tablet by mouth Daily. 90 tablet 3    Fluticasone-Umeclidin-Vilant (TRELEGY ELLIPTA) 200-62.5-25 MCG/ACT inhaler Inhale 1 puff Daily. 60 each 11    Gemtesa 75 MG tablet Take 1 tablet by mouth Daily for 360 days. 90 tablet 1    ipratropium-albuterol (DUO-NEB) 0.5-2.5 mg/3 ml nebulizer Take 3 mL by nebulization Every 4 (Four) Hours As Needed for Wheezing. 360 mL 4    lisinopril (PRINIVIL,ZESTRIL) 5 MG tablet Daily.      metoprolol succinate XL (TOPROL-XL) 25 MG 24 hr tablet Toprol XL 25 mg  oral tablet extended release 24 hr take 1 tablet (25 mg) by oral route once daily   Active      mirtazapine (REMERON) 15 MG tablet       tolterodine LA (DETROL LA) 2 MG 24 hr capsule Take 1 capsule by mouth Daily. 90 capsule 0    bicalutamide (CASODEX) 50 MG tablet Take 1 tablet by mouth Daily for 360 days. (Patient not taking: Reported on 3/12/2025) 90 tablet 3    itraconazole (Sporanox) 100 MG capsule Take 2 capsules by mouth 2 (Two) Times a Day. (Patient not taking: Reported on 3/12/2025) 360 capsule 1    leuprolide (Lupron Depot, 3-Month,) 22.5 MG injection 22.5 mg. (Patient not taking: Reported on 3/12/2025)      Narcan 4 MG/0.1ML nasal spray  (Patient not taking: Reported on 3/12/2025)      ondansetron (Zofran) 4 MG tablet Take 1 tablet by mouth Every 8 (Eight) Hours As Needed for Nausea or Vomiting. (Patient not taking: Reported on 3/12/2025) 60 tablet 0    pantoprazole (PROTONIX) 40 MG EC tablet Take 1 tablet by mouth Daily. (Patient not taking: Reported on 3/12/2025) 30 tablet 5     Current Facility-Administered Medications on File Prior to Visit   Medication Dose Route Frequency Provider Last Rate Last Admin    leuprolide (LUPRON) injection 22.5 mg  22.5 mg Intramuscular Once Merrill Ames MD           No Known Allergies  Past Medical History:   Diagnosis Date    Bladder outflow obstruction     Cervical spinal stenosis 01/23/2018    Colon polyp     COVID     Diabetes mellitus 2019    Emphysema lung     Emphysema of lung     Hand paresthesia 01/23/2018    HTN (hypertension)     Hypercholesteremia     Lung cancer 2016    Muscle atrophy of upper extremity 01/23/2018    Numbness and tingling in both hands     OAB (overactive bladder) 12/09/2014    Prostate cancer 2012    Ulnar neuropathy 01/23/2018     Past Surgical History:   Procedure Laterality Date    BRONCHOSCOPY N/A 05/09/2023    Procedure: BRONCHOSCOPY WITH BRONCHOALVEOLAR LAVAGE,  BRONCHIAL WASHINGS;  Surgeon: Roshan Hood MD;  Location:   [facility] ENDOSCOPY;  Service: Pulmonary;  Laterality: N/A;  PERSISTENT PNEUMONIA    BRONCHOSCOPY N/A 2024    Procedure: BRONCHOSCOPY W/ BRONCHOALVEOLAR AND BRONCHIAL WASHINGS;  Surgeon: Roshan Hood MD;  Location: [facility] ENDOSCOPY;  Service: Pulmonary;  Laterality: N/A;  MUCOUS PLUGGING    CARDIAC CATHETERIZATION      COLONOSCOPY      DR CUETO    COLONOSCOPY N/A 2023    Procedure: COLONOSCOPY WITH POLYPECTOMIES;  Surgeon: Zhen Cueto MD;  Location: [facility] ENDOSCOPY;  Service: Gastroenterology;  Laterality: N/A;  DIVERTICULOSIS, COLON POLYPS    CRYOABLATION OF PROSTATE  2012    CYSTOSCOPY      HAND SURGERY Right     LUNG BIOPSY      TEETH EXTRACTION      TURP / TRANSURETHRAL INCISION / DRAINAGE PROSTATE  2015     Social History     Socioeconomic History    Marital status:    Tobacco Use    Smoking status: Former     Current packs/day: 0.00     Average packs/day: 1 pack/day for 50.0 years (50.0 ttl pk-yrs)     Types: Cigarettes     Start date: 1966     Quit date: 2016     Years since quittin.2     Passive exposure: Past    Smokeless tobacco: Never    Tobacco comments:     STARTED AGE 17  QUIT IN 2016   Vaping Use    Vaping status: Never Used    Passive vaping exposure: Yes   Substance and Sexual Activity    Alcohol use: Yes     Alcohol/week: 1.0 standard drink of alcohol     Types: 1 Cans of beer per week     Comment: casual drinker    Drug use: Never    Sexual activity: Not Currently     Partners: Female     Family History   Problem Relation Age of Onset    Diabetes Mother     Cancer Sister     Diabetes Sister     Colon cancer Sister 60    Colon polyps Sister     Hypertension Sister     Diabetes Sister     Coronary artery disease Brother        Objective   Physical Exam  Vitals reviewed. Exam conducted with a chaperone present.   Cardiovascular:      Rate and Rhythm: Normal rate and regular rhythm.      Heart sounds: Normal heart sounds. No murmur heard.     No  "gallop.   Pulmonary:      Effort: Pulmonary effort is normal.      Breath sounds: Normal breath sounds.      Comments: Port-A-Cath  Abdominal:      General: Bowel sounds are normal.   Lymphadenopathy:      Cervical: No cervical adenopathy.   Psychiatric:         Mood and Affect: Mood normal.         Behavior: Behavior normal.         Vitals:    03/12/25 0800   BP: 113/74   Pulse: 87   Resp: 16   Temp: 97.8 °F (36.6 °C)   TempSrc: Temporal   SpO2: 90%   Weight: 81.7 kg (180 lb 1.9 oz)   Height: 180.3 cm (70.98\")   PainSc: 0-No pain     ECOG score: 0         PHQ-9 Total Score:                    Result Review :   The following data was reviewed by: Dayron Flores MD on 03/12/2025:  Lab Results   Component Value Date    HGB 13.5 02/16/2022    HCT 42.9 02/16/2022    MCV 81.7 02/16/2022     02/16/2022    WBC 5.57 02/16/2022    NEUTROABS 3.62 02/16/2022    LYMPHSABS 1.37 02/16/2022    MONOSABS 0.34 02/16/2022    EOSABS 0.17 02/16/2022    BASOSABS 0.05 02/16/2022     Lab Results   Component Value Date    GLUCOSE 88 09/19/2024    BUN 8 09/19/2024    CREATININE 1.50 (H) 03/03/2025     09/19/2024    K 3.6 09/19/2024     09/19/2024    CO2 28.3 09/19/2024    CALCIUM 9.3 09/19/2024    PROTEINTOT 6.7 09/19/2024    ALBUMIN 4.1 09/19/2024    BILITOT 0.5 09/19/2024    ALKPHOS 89 09/19/2024    AST 14 09/19/2024    ALT 6 09/19/2024     No results found for: \"MG\", \"PHOS\", \"FREET4\", \"TSH\"  No results found for: \"IRON\", \"LABIRON\", \"TRANSFERRIN\", \"TIBC\"  No results found for: \"LDH\", \"FERRITIN\", \"LEYRCBWI07\", \"FOLATE\"  Lab Results   Component Value Date    PSA <0.014 10/09/2024       Data reviewed : Radiologic studies CT chest reviewed    Pulmonology note from Dr. Hood reviewed     Assessment and Plan    Diagnoses and all orders for this visit:    1. Adenocarcinoma of bronchus, right (Primary)  Assessment & Plan:  Patient is status post treatments as outlined above.  He is doing well.  I see no evidence for " recurrence by his history, physical examination or CT chest.  He is on antifungal medications according to the pulmonology note for fungal infection in the lungs.  This is the likely explanation of the borderline lymph node seen in the chest.  Port flush routinely when not in active use-okay to have it removed from our standpoint since he has not needed treatment for some time.  Patient states he will reschedule with the surgeon once he returns from his sisters .  I will see him back in 1 year for ongoing surveillance with CT chest prior.              Patient Follow Up: 1 year    Patient was given instructions and counseling regarding his condition or for health maintenance advice. Please see specific information pulled into the AVS if appropriate.     Dayron Flores MD    3/12/2025

## 2025-03-13 NOTE — TELEPHONE ENCOUNTER
Moderate risk   Talk to staff and ask for assistance when having suicidal wishes instead of acting out Talk to staff and ask for assistance when having suicidal wishes instead of acting out Talk to staff and ask for assistance when having suicidal wishes instead of acting out Talk to staff and ask for assistance when having suicidal wishes instead of acting out Talk to staff and ask for assistance when having suicidal wishes instead of acting out Talk to staff and ask for assistance when having suicidal wishes instead of acting out Talk to staff and ask for assistance when having suicidal wishes instead of acting out Talk to staff and ask for assistance when having suicidal wishes instead of acting out

## 2025-04-17 ENCOUNTER — TELEPHONE (OUTPATIENT)
Dept: SURGERY | Facility: CLINIC | Age: 80
End: 2025-04-17

## 2025-04-17 NOTE — TELEPHONE ENCOUNTER
Caller: Daron Whitfield    Relationship: Self    Best call back number: [phone]     What is the best time to reach you: ANYTIME    Who are you requesting to speak with (clinical staff, provider,  specific staff member): SURGERY SCHEDULER FOR [facility]    Do you know the name of the person who called:     What was the call regarding: PT WANTING RESCHEDULE SURGERY FROM MARCH HE HAD TO CANCEL    Is it okay if the provider responds through [employer]: NO, PLEASE CALL - OKAY TO LEAVE DETAILED VM

## 2025-04-17 NOTE — TELEPHONE ENCOUNTER
Kitty spoke with the patient and he was rescheduled to 5/1/2025 per the patient request. Patient will receive new instructions with date in his MyChart. Patient v/u

## 2025-04-23 ENCOUNTER — HOSPITAL ENCOUNTER (OUTPATIENT)
Dept: RESPIRATORY THERAPY | Facility: HOSPITAL | Age: 80
Discharge: HOME | End: 2025-04-23
Admitting: INTERNAL MEDICINE
Payer: MEDICARE

## 2025-04-23 DIAGNOSIS — J45.40 MODERATE PERSISTENT ASTHMA, UNSPECIFIED WHETHER COMPLICATED: ICD-10-CM

## 2025-04-23 DIAGNOSIS — R05.1 ACUTE COUGH: ICD-10-CM

## 2025-04-23 DIAGNOSIS — J18.9 MULTIFOCAL PNEUMONIA: ICD-10-CM

## 2025-04-23 DIAGNOSIS — J44.1 COPD WITH ACUTE EXACERBATION: ICD-10-CM

## 2025-04-23 DIAGNOSIS — J45.40 MODERATE PERSISTENT ASTHMA WITHOUT COMPLICATION: ICD-10-CM

## 2025-04-23 DIAGNOSIS — T17.500A MUCUS PLUGGING OF BRONCHI: ICD-10-CM

## 2025-04-23 DIAGNOSIS — R06.02 SOB (SHORTNESS OF BREATH): ICD-10-CM

## 2025-04-23 DIAGNOSIS — J43.9 PULMONARY EMPHYSEMA, UNSPECIFIED EMPHYSEMA TYPE: ICD-10-CM

## 2025-04-23 DIAGNOSIS — C34.91 ADENOCARCINOMA OF BRONCHUS, RIGHT: ICD-10-CM

## 2025-04-23 PROCEDURE — 94726 PLETHYSMOGRAPHY LUNG VOLUMES: CPT

## 2025-04-23 PROCEDURE — 94060 EVALUATION OF WHEEZING: CPT

## 2025-04-23 PROCEDURE — 94729 DIFFUSING CAPACITY: CPT

## 2025-04-23 RX ORDER — ALBUTEROL SULFATE 0.83 MG/ML
2.5 SOLUTION RESPIRATORY (INHALATION) ONCE
Status: COMPLETED | OUTPATIENT
Start: 2025-04-23 | End: 2025-04-23

## 2025-04-23 RX ADMIN — ALBUTEROL SULFATE 2.5 MG: 2.5 SOLUTION RESPIRATORY (INHALATION) at 11:11

## 2025-04-25 ENCOUNTER — LAB (OUTPATIENT)
Facility: HOSPITAL | Age: 80
End: 2025-04-25
Payer: MEDICARE

## 2025-04-25 DIAGNOSIS — C61 PROSTATE CANCER: ICD-10-CM

## 2025-04-25 LAB — PSA SERPL-MCNC: <0.014 NG/ML (ref 0–4)

## 2025-04-25 PROCEDURE — 36415 COLL VENOUS BLD VENIPUNCTURE: CPT

## 2025-04-25 PROCEDURE — 84153 ASSAY OF PSA TOTAL: CPT

## 2025-04-30 ENCOUNTER — ANESTHESIA EVENT (OUTPATIENT)
Dept: PERIOP | Facility: HOSPITAL | Age: 80
End: 2025-04-30
Payer: MEDICARE

## 2025-04-30 NOTE — PRE-PROCEDURE INSTRUCTIONS
PATIENT INSTRUCTED TO BE:    - NOTHING TO EAT AFTER MIDNIGHT OR CHEW, EXCEPT CAN HAVE SIPS OF WATER WITH MEDICATIONS - TO HOLD ALL VITAMINS, SUPPLEMENTS, NSAIDS FOR ONE WEEK PRIOR TO THEIR SURGICAL PROCEDURE    - DO NOT TAKE ____na__________________ 7 DAYS PRIOR TO PROCEDURE PER ANESTHESIA RECOMMENDATIONS/INSTRUCTIONS     - INSTRUCTED PT TO USE SURGICAL SOAP 1 TIME THE NIGHT PRIOR TO SURGERY _____8-34-57______ OR THE AM OF SURGERY _____9-6-04________   USE THE SOAP FROM NECK TO TOES, AVOID THEIR FACE, HAIR, AND PRIVATE PARTS. IF USE THE SOAP THE NIGHT PRIOR TO SURGERY, CHANGE BED LINENS AND NO PETS IN THE BED.     INSTRUCTED NO LOTIONS, JEWELRY, PIERCINGS,  NAIL POLISH, OR DEODORANT DAY OF SURGERY    - IF DIABETIC, CHECK BLOOD GLUCOSE IF LESS THAN 70 OR HAVING SYMPTOMS CALL THE PREOP AREA FOR INSTRUCTIONS ON AM OF SURGERY ([phone] )    -INSTRUCTED TO TAKE THE FOLLOWING MEDICATIONS THE DAY OF SURGERY WITH SIPS OF WATER:       Inhalers, lipitor, zyrtec, gemtesa, metoprolol       - DO NOT BRING ANY MEDICATIONS WITH YOU TO THE HOSPITAL THE DAY OF SURGERY, EXCEPT IF USE INHALERS. BRING INHALERS DAY OF SURGERY       - BRING CPAP OR BIPAP TO THE HOSPITAL ONLY IF YOU ARE SPENDING THE NIGHT    - DO NOT SMOKE OR VAPE 24 HOURS PRIOR TO PROCEDURE PER ANESTHESIA REQUEST     -MAKE SURE YOU HAVE A RIDE HOME OR SOMEONE TO STAY WITH YOU THE DAY OF THE PROCEDURE AFTER YOU GO HOME     - FOLLOW ANY OTHER INSTRUCTIONS GIVEN TO YOU BY YOUR SURGEON'S OFFICE.     - DAY OF SURGERY ___1-8-82_________,[facility] ( [address]), YOU CAN  PARK OR SELF PARK. ENTER THE PAVILION THRU MAIN ENTRANCE, TAKE ELEVATORS TO THE FIRST FLOOR, CHECK IN AT THE DESK FOR REGISTRATION/ SURGERY.     - YOU WILL RECEIVE A PHONE CALL THE DAY PRIOR TO SURGERY BETWEEN 1PM AND 4 PM WITH ARRIVAL TIME, IF YOUR SURGERY IS ON A MONDAY YOU WILL RECEIVE A CALL THE FRIDAY PRIOR TO SURGERY DATE    - BRING CASH OR CREDIT CARD FOR  COPAYMENT OF MEDICATIONS AFTER SURGERY IF YOU USE THE HOSPITAL PHARMACY (MED[facility] TO BED)    - PREADMISSION TESTING NURSE VÍCTOR CERNA -[phone] IF HAVE ANY QUESTIONS     -PATIENT PROVIDED THE NUMBER FOR PREOP SURGICAL DEPT IF HAD QUESTIONS AFTER HOURS PRIOR TO SURGERY ([phone] ).  INFORMED PT IF NO ANSWER, LEAVE A MESSAGE AND SOMEONE WILL RETURN THEIR CALL       PATIENT VERBALIZED UNDERSTANDING

## 2025-05-01 ENCOUNTER — ANESTHESIA (OUTPATIENT)
Dept: PERIOP | Facility: HOSPITAL | Age: 80
End: 2025-05-01
Payer: MEDICARE

## 2025-05-01 ENCOUNTER — HOSPITAL ENCOUNTER (OUTPATIENT)
Facility: HOSPITAL | Age: 80
Setting detail: HOSPITAL OUTPATIENT SURGERY
Discharge: HOME | End: 2025-05-01
Attending: SURGERY | Admitting: SURGERY
Payer: MEDICARE

## 2025-05-01 VITALS
WEIGHT: 178.79 LBS | BODY MASS INDEX: 27.1 KG/M2 | RESPIRATION RATE: 14 BRPM | OXYGEN SATURATION: 92 % | DIASTOLIC BLOOD PRESSURE: 68 MMHG | HEART RATE: 84 BPM | TEMPERATURE: 97.6 F | HEIGHT: 68 IN | SYSTOLIC BLOOD PRESSURE: 108 MMHG

## 2025-05-01 DIAGNOSIS — Z95.828 PORT-A-CATH IN PLACE: ICD-10-CM

## 2025-05-01 LAB
GLUCOSE BLDC GLUCOMTR-MCNC: 110 MG/DL (ref 70–99)
GLUCOSE BLDC GLUCOMTR-MCNC: 127 MG/DL (ref 70–99)

## 2025-05-01 PROCEDURE — 82948 REAGENT STRIP/BLOOD GLUCOSE: CPT | Performed by: NURSE ANESTHETIST, CERTIFIED REGISTERED

## 2025-05-01 PROCEDURE — 36590 REMOVAL TUNNELED CV CATH: CPT | Performed by: SURGERY

## 2025-05-01 PROCEDURE — 25810000003 LACTATED RINGERS PER 1000 ML: Performed by: ANESTHESIOLOGY

## 2025-05-01 PROCEDURE — 25010000002 BUPIVACAINE (PF) 0.25 % SOLUTION: Performed by: SURGERY

## 2025-05-01 PROCEDURE — 25010000002 MIDAZOLAM PER 1MG: Performed by: ANESTHESIOLOGY

## 2025-05-01 PROCEDURE — 25010000002 LIDOCAINE PF 2% 2 % SOLUTION: Performed by: NURSE ANESTHETIST, CERTIFIED REGISTERED

## 2025-05-01 PROCEDURE — 25010000002 PROPOFOL 10 MG/ML EMULSION: Performed by: NURSE ANESTHETIST, CERTIFIED REGISTERED

## 2025-05-01 PROCEDURE — 82948 REAGENT STRIP/BLOOD GLUCOSE: CPT | Performed by: ANESTHESIOLOGY

## 2025-05-01 PROCEDURE — 25010000002 PHENYLEPHRINE HCL-NACL 1-0.9 MG/10ML-% SOLUTION PREFILLED SYRINGE: Performed by: NURSE ANESTHETIST, CERTIFIED REGISTERED

## 2025-05-01 RX ORDER — PROMETHAZINE HYDROCHLORIDE 25 MG/1
25 SUPPOSITORY RECTAL ONCE AS NEEDED
Status: DISCONTINUED | OUTPATIENT
Start: 2025-05-01 | End: 2025-05-01 | Stop reason: HOSPADM

## 2025-05-01 RX ORDER — ONDANSETRON 2 MG/ML
4 INJECTION INTRAMUSCULAR; INTRAVENOUS ONCE AS NEEDED
Status: DISCONTINUED | OUTPATIENT
Start: 2025-05-01 | End: 2025-05-01 | Stop reason: HOSPADM

## 2025-05-01 RX ORDER — SODIUM CHLORIDE 0.9 % (FLUSH) 0.9 %
10 SYRINGE (ML) INJECTION EVERY 12 HOURS SCHEDULED
Status: DISCONTINUED | OUTPATIENT
Start: 2025-05-01 | End: 2025-05-01 | Stop reason: HOSPADM

## 2025-05-01 RX ORDER — SODIUM CHLORIDE 0.9 % (FLUSH) 0.9 %
10 SYRINGE (ML) INJECTION AS NEEDED
Status: DISCONTINUED | OUTPATIENT
Start: 2025-05-01 | End: 2025-05-01 | Stop reason: HOSPADM

## 2025-05-01 RX ORDER — PROPOFOL 10 MG/ML
VIAL (ML) INTRAVENOUS AS NEEDED
Status: DISCONTINUED | OUTPATIENT
Start: 2025-05-01 | End: 2025-05-01 | Stop reason: SURG

## 2025-05-01 RX ORDER — SODIUM CHLORIDE 9 MG/ML
40 INJECTION, SOLUTION INTRAVENOUS AS NEEDED
Status: DISCONTINUED | OUTPATIENT
Start: 2025-05-01 | End: 2025-05-01 | Stop reason: HOSPADM

## 2025-05-01 RX ORDER — IBUPROFEN 600 MG/1
600 TABLET, FILM COATED ORAL EVERY 6 HOURS PRN
Status: DISCONTINUED | OUTPATIENT
Start: 2025-05-01 | End: 2025-05-01 | Stop reason: HOSPADM

## 2025-05-01 RX ORDER — LIDOCAINE HYDROCHLORIDE 20 MG/ML
INJECTION, SOLUTION EPIDURAL; INFILTRATION; INTRACAUDAL; PERINEURAL AS NEEDED
Status: DISCONTINUED | OUTPATIENT
Start: 2025-05-01 | End: 2025-05-01 | Stop reason: SURG

## 2025-05-01 RX ORDER — MIDAZOLAM HYDROCHLORIDE 2 MG/2ML
1 INJECTION, SOLUTION INTRAMUSCULAR; INTRAVENOUS ONCE
Status: COMPLETED | OUTPATIENT
Start: 2025-05-01 | End: 2025-05-01

## 2025-05-01 RX ORDER — ACETAMINOPHEN 500 MG
1000 TABLET ORAL ONCE
Status: COMPLETED | OUTPATIENT
Start: 2025-05-01 | End: 2025-05-01

## 2025-05-01 RX ORDER — OXYCODONE HYDROCHLORIDE 5 MG/1
5 TABLET ORAL
Status: DISCONTINUED | OUTPATIENT
Start: 2025-05-01 | End: 2025-05-01 | Stop reason: HOSPADM

## 2025-05-01 RX ORDER — FENTANYL CITRATE 50 UG/ML
25 INJECTION, SOLUTION INTRAMUSCULAR; INTRAVENOUS
Status: DISCONTINUED | OUTPATIENT
Start: 2025-05-01 | End: 2025-05-01 | Stop reason: HOSPADM

## 2025-05-01 RX ORDER — HYDROCODONE BITARTRATE AND ACETAMINOPHEN 5; 325 MG/1; MG/1
1 TABLET ORAL ONCE AS NEEDED
Refills: 0 | Status: DISCONTINUED | OUTPATIENT
Start: 2025-05-01 | End: 2025-05-01 | Stop reason: HOSPADM

## 2025-05-01 RX ORDER — BUPIVACAINE HYDROCHLORIDE 2.5 MG/ML
INJECTION, SOLUTION EPIDURAL; INFILTRATION; INTRACAUDAL; PERINEURAL AS NEEDED
Status: DISCONTINUED | OUTPATIENT
Start: 2025-05-01 | End: 2025-05-01 | Stop reason: HOSPADM

## 2025-05-01 RX ORDER — PROMETHAZINE HYDROCHLORIDE 25 MG/1
25 TABLET ORAL ONCE AS NEEDED
Status: DISCONTINUED | OUTPATIENT
Start: 2025-05-01 | End: 2025-05-01 | Stop reason: HOSPADM

## 2025-05-01 RX ORDER — SODIUM CHLORIDE, SODIUM LACTATE, POTASSIUM CHLORIDE, CALCIUM CHLORIDE 600; 310; 30; 20 MG/100ML; MG/100ML; MG/100ML; MG/100ML
9 INJECTION, SOLUTION INTRAVENOUS CONTINUOUS PRN
Status: DISCONTINUED | OUTPATIENT
Start: 2025-05-01 | End: 2025-05-01 | Stop reason: HOSPADM

## 2025-05-01 RX ORDER — DEXMEDETOMIDINE HYDROCHLORIDE 100 UG/ML
INJECTION, SOLUTION INTRAVENOUS AS NEEDED
Status: DISCONTINUED | OUTPATIENT
Start: 2025-05-01 | End: 2025-05-01 | Stop reason: SURG

## 2025-05-01 RX ORDER — EPHEDRINE SULFATE 50 MG/ML
25 INJECTION, SOLUTION INTRAVENOUS ONCE
Status: COMPLETED | OUTPATIENT
Start: 2025-05-01 | End: 2025-05-01

## 2025-05-01 RX ORDER — ONDANSETRON 4 MG/1
4 TABLET, ORALLY DISINTEGRATING ORAL ONCE AS NEEDED
Status: DISCONTINUED | OUTPATIENT
Start: 2025-05-01 | End: 2025-05-01 | Stop reason: HOSPADM

## 2025-05-01 RX ORDER — TRAMADOL HYDROCHLORIDE 50 MG/1
50 TABLET ORAL EVERY 6 HOURS PRN
Qty: 8 TABLET | Refills: 0 | Status: SHIPPED | OUTPATIENT
Start: 2025-05-01

## 2025-05-01 RX ORDER — PHENYLEPHRINE HCL IN 0.9% NACL 1 MG/10 ML
SYRINGE (ML) INTRAVENOUS AS NEEDED
Status: DISCONTINUED | OUTPATIENT
Start: 2025-05-01 | End: 2025-05-01 | Stop reason: SURG

## 2025-05-01 RX ADMIN — DEXMEDETOMIDINE 5 MCG: 100 INJECTION, SOLUTION, CONCENTRATE INTRAVENOUS at 08:24

## 2025-05-01 RX ADMIN — Medication 200 MCG: at 08:57

## 2025-05-01 RX ADMIN — EPHEDRINE SULFATE 25 MG: 50 INJECTION INTRAVENOUS at 09:23

## 2025-05-01 RX ADMIN — ACETAMINOPHEN 1000 MG: 500 TABLET ORAL at 06:57

## 2025-05-01 RX ADMIN — Medication 100 MCG: at 08:28

## 2025-05-01 RX ADMIN — Medication 100 MCG: at 08:47

## 2025-05-01 RX ADMIN — SODIUM CHLORIDE, POTASSIUM CHLORIDE, SODIUM LACTATE AND CALCIUM CHLORIDE 9 ML/HR: 600; 310; 30; 20 INJECTION, SOLUTION INTRAVENOUS at 06:57

## 2025-05-01 RX ADMIN — PROPOFOL 20 MG: 10 INJECTION, EMULSION INTRAVENOUS at 08:19

## 2025-05-01 RX ADMIN — Medication 100 MCG: at 08:33

## 2025-05-01 RX ADMIN — LIDOCAINE HYDROCHLORIDE 40 MG: 20 INJECTION, SOLUTION INTRAVENOUS at 08:19

## 2025-05-01 RX ADMIN — Medication 100 MCG: at 08:44

## 2025-05-01 RX ADMIN — PROPOFOL 175 MCG/KG/MIN: 10 INJECTION, EMULSION INTRAVENOUS at 08:20

## 2025-05-01 RX ADMIN — MIDAZOLAM HYDROCHLORIDE 1 MG: 1 INJECTION, SOLUTION INTRAMUSCULAR; INTRAVENOUS at 07:14

## 2025-05-01 NOTE — H&P
[facility]   HISTORY AND PHYSICAL    Patient Name: Daron Whitfield  : 1945  MRN: [id number]  Primary Care Physician:  Carol Frank MD  Date of admission: 2025    Subjective   Subjective     Chief Complaint: Port in place    HPI:    Daron Whitfield is a 80 y.o. male the patient is an 80-year-old gentleman who presents with a central venous access port in place.  He is no longer using it and wishes to have it removed.    Review of Systems   Respiratory:  Negative for shortness of breath.    Cardiovascular:  Negative for chest pain.       Personal History     Past Medical History:   Diagnosis Date    Bladder outflow obstruction     Cervical spinal stenosis 2018    Colon polyp     COVID     Diabetes mellitus 2019    doesn't check bg on daily basis    Emphysema lung     Emphysema of lung     Hand paresthesia 2018    HTN (hypertension)     Hypercholesteremia     Lung cancer 2016    Muscle atrophy of upper extremity 2018    Numbness and tingling in both hands     OAB (overactive bladder) 2014    Prostate cancer     Ulnar neuropathy 2018       Past Surgical History:   Procedure Laterality Date    BRONCHOSCOPY N/A 2023    Procedure: BRONCHOSCOPY WITH BRONCHOALVEOLAR LAVAGE,  BRONCHIAL WASHINGS;  Surgeon: Roshan Hood MD;  Location: [facility] ENDOSCOPY;  Service: Pulmonary;  Laterality: N/A;  PERSISTENT PNEUMONIA    BRONCHOSCOPY N/A 2024    Procedure: BRONCHOSCOPY W/ BRONCHOALVEOLAR AND BRONCHIAL WASHINGS;  Surgeon: Roshan Hood MD;  Location: [facility] ENDOSCOPY;  Service: Pulmonary;  Laterality: N/A;  MUCOUS PLUGGING    CARDIAC CATHETERIZATION      COLONOSCOPY      DR CUETO    COLONOSCOPY N/A 2023    Procedure: COLONOSCOPY WITH POLYPECTOMIES;  Surgeon: Zhen Cueto MD;  Location: [facility] ENDOSCOPY;  Service: Gastroenterology;  Laterality: N/A;  DIVERTICULOSIS, COLON POLYPS    CRYOABLATION OF PROSTATE  2012    CYSTOSCOPY      HAND SURGERY  Right     LUNG BIOPSY      TEETH EXTRACTION      TURP / TRANSURETHRAL INCISION / DRAINAGE PROSTATE  06/16/2015       Family History: family history includes Cancer in his sister; Colon cancer (age of onset: 60) in his sister; Colon polyps in his sister; Coronary artery disease in his brother; Diabetes in his mother, sister, and sister; Hypertension in his sister. Otherwise pertinent FHx was reviewed and not pertinent to current issue.    Social History:  reports that he quit smoking about 9 years ago. His smoking use included cigarettes. He started smoking about 59 years ago. He has a 50 pack-year smoking history. He has been exposed to tobacco smoke. He has never used smokeless tobacco. He reports current alcohol use of about 1.0 standard drink of alcohol per week. He reports that he does not use drugs.    Home Medications:  Fluticasone-Umeclidin-Vilant, Vibegron, albuterol sulfate HFA, atorvastatin, cetirizine, ipratropium-albuterol, lisinopril, metoprolol succinate XL, and mirtazapine    Allergies:  No Known Allergies    Objective    Objective     Vitals:   Temp:  [96.8 °F (36 °C)] 96.8 °F (36 °C)  Heart Rate:  [97] 97  Resp:  [18] 18  BP: (101)/(62) 101/62    Physical Exam  HENT:      Head: Normocephalic.   Cardiovascular:      Rate and Rhythm: Normal rate.   Pulmonary:      Effort: Pulmonary effort is normal.   Abdominal:      Palpations: Abdomen is soft.   Musculoskeletal:         General: Normal range of motion.      Cervical back: Normal range of motion.   Skin:     General: Skin is warm.   Neurological:      General: No focal deficit present.      Mental Status: He is alert.   Psychiatric:         Mood and Affect: Mood normal.         Result Review    Result Review:  I have personally reviewed the results from the time of this admission to 5/1/2025 08:04 EDT and agree with these findings:  []  Laboratory  []  Microbiology  []  Radiology  []  EKG/Telemetry   []  Cardiology/Vascular   []  Pathology  []  Old  records  []  Other:  Most notable findings include:     Assessment & Plan   Assessment / Plan     Brief Patient Summary:  Daron Whitfield is a 80 y.o. male who presents with a central venous access port in place.    Active Hospital Problems:  Active Hospital Problems    Diagnosis     **Port-A-Cath in place        Plan:   We will proceed with a removal of the central venous access port.  I have described the procedure to him as well as the risk and benefits and he is agreeable to proceeding.    VTE Prophylaxis:  Mechanical VTE prophylaxis orders are present.        CODE STATUS:         Admission Status:  I believe this patient meets outpatient status.    Electronically signed by Ollie Peña MD, 05/01/25, 8:04 AM EDT.

## 2025-05-01 NOTE — ANESTHESIA PREPROCEDURE EVALUATION
Anesthesia Evaluation     Patient summary reviewed and Nursing notes reviewed   no history of anesthetic complications:   NPO Solid Status: > 8 hours  NPO Liquid Status: > 2 hours           Airway   Mallampati: I  TM distance: >3 FB  Neck ROM: full  No difficulty expected  Dental    (+) edentulous    Pulmonary     breath sounds clear to auscultation  (+) a smoker Former, cigarettes, lung cancer, COPD moderate, asthma,home oxygen (2-3 L prn; 2L at night), shortness of breath  Cardiovascular - normal exam  Exercise tolerance: good (4-7 METS)    Patient on routine beta blocker and Beta blocker given within 24 hours of surgery  Rhythm: regular  Rate: normal    (+) hypertension (lisinopril, metoprolol) well controlled 2 medications or greater, valvular problems/murmurs (Mild to mod AI) AI, hyperlipidemia    ROS comment: Echo (10/30/24; dyspnea):  ·  Left ventricular ejection fraction appears to be 56 - 60%.  ·  Left ventricular diastolic function is consistent with (grade I) impaired relaxation and age.  ·  Mild to moderate aortic insufficiency.  ·  Borderline left atrial enlargement.      Neuro/Psych  (+) numbness  GI/Hepatic/Renal/Endo    (+) renal disease- CRI, diabetes mellitus type 2 well controlled    Musculoskeletal     (+) neck pain      ROS comment: Cervical stenosis  Abdominal  - normal exam   Substance History - negative use     OB/GYN negative ob/gyn ROS         Other   arthritis,   history of cancer (lung, prostate)    ROS/Med Hx Other: PAT Nursing Notes unavailable.       Phys Exam Other: 92% on room air                  Anesthesia Plan    ASA 3     general and MAC   total IV anesthesia  intravenous induction     Anesthetic plan, risks, benefits, and alternatives have been provided, discussed and informed consent has been obtained with: patient.  Pre-procedure education provided  Use of blood products discussed with patient .    Plan discussed with CRNA.        CODE STATUS:

## 2025-05-01 NOTE — OP NOTE
REMOVAL VENOUS ACCESS DEVICE  Procedure Report    Patient Name:  Daron Whitfield  YOB: 1945    Date of Surgery:  5/1/2025     Indications: The patient is a an 80-year-old gentleman who presents with a central venous access port in place.  He is no longer using it and presented for removal of the central venous access port.    Pre-op Diagnosis: Central venous access port in place    Post-Op Diagnosis: Same    Procedure/CPT® Codes:    Removal of central venous access port    Staff:  Surgeon(s):  Ollie Peña MD    Assistant: Francesco Black    Anesthesia: Monitored Anesthesia Care    Estimated Blood Loss: 2 mL    Implants:    Nothing was implanted during the procedure    Specimen:          None        Findings: None    Complications: None    Description of Procedure: The patient was taken to the operating room and placed on the table in supine position.  After administration of MAC anesthesia, the right chest was prepped and draped sterilely.  We anesthetized the skin and subcutaneous tissues around the old port incision with quarter percent Marcaine.  I reopened that incision with a 15 blade scalpel.  Dissection was carried down through the subcutaneous tissues to the port reservoir.  The reservoir was grasped and dissected free from the surrounding tissue with cautery.  The port and the line were then removed in their entirety.  I suture-ligated the neck of the catheter tract with a figure-of-eight 2-0 Vicryl suture.  We had good hemostasis.  The subcutaneous tissues were reapproximated with interrupted 3-0 Vicryl sutures and the skin was closed with a running 4-0 Vicryl subcuticular suture.  Sterile dressings were applied and he was taken to the postanesthesia recovery room in stable condition.    Assistant: Francesco Black  was responsible for performing the following activities: Retraction and Placing Dressing and their skilled assistance was necessary for the success of this  case.    Ollie Peña MD     Date: 5/1/2025  Time: 08:50 EDT

## 2025-05-01 NOTE — DISCHARGE INSTRUCTIONS
DISCHARGE INSTRUCTIONS  SURGICAL / AMBULATORY  PROCEDURES      For your surgery you had:  Local anesthesia  Monitored anesthesia Care  You may experience dizziness, drowsiness, or light-headedness for several hours following surgery/procedure.  Do not stay alone today or tonight.  Limit your activity for 24 hours.  Resume your diet slowly.  Follow whatever special dietary instructions you may have been given by your doctor.  You should not drive or operate machinery, drink alcohol, or sign legally binding documents for 24 hours or while you are taking pain medication.    NOTIFY YOUR DOCTOR IF YOU EXPERIENCE ANY OF THE FOLLOWING:  Temperature greater than 101 degrees Fahrenheit  Shaking Chills  Redness or excessive drainage from incision  Nausea, vomiting and/or pain that is not controlled by prescribed medications  Increase in bleeding or bleeding that is excessive  Unable to urinate in 6 hours after surgery  If unable to reach your doctor, please go to the closest Emergency Room  You may remove Band-Aid/dressing in 48 hours.  You may shower in 48 hours.  Apply an ice pack 24-48 hours.      SPECIAL INSTRUCTIONS:  Follow any verbal instructions given by Dr. Peña.    Last dose of pain medication was given at:  Tylenol (1000mg) last at 7am. Do not exceed 4000mg of tylenol in a 24 hour period.

## 2025-05-01 NOTE — ANESTHESIA POSTPROCEDURE EVALUATION
Patient: Daron Whitfield    Procedure Summary       Date: 05/01/25 Room / Location: [facility] OR  / [facility] MAIN OR    Anesthesia Start: 0814 Anesthesia Stop: 0859    Procedure: Removal of central venous access port-remove vascular access port from chest (Right: Chest) Diagnosis:       Port-A-Cath in place      (Port-A-Cath in place [Z95.828])    Surgeons: Ollie Peña MD Provider: Kitty Lindo MD    Anesthesia Type: general, MAC ASA Status: 3            Anesthesia Type: general, MAC    Vitals  Vitals Value Taken Time   BP 99/61 05/01/25 09:50   Temp 36.2 °C (97.2 °F) 05/01/25 09:50   Pulse 83 05/01/25 09:50   Resp 14 05/01/25 09:50   SpO2 90 % 05/01/25 09:50           Post Anesthesia Care and Evaluation    Patient location during evaluation: bedside  Patient participation: complete - patient participated  Level of consciousness: awake  Pain management: adequate    Airway patency: patent  PONV Status: none  Cardiovascular status: acceptable and stable  Respiratory status: acceptable  Hydration status: acceptable

## 2025-05-05 ENCOUNTER — TELEPHONE (OUTPATIENT)
Dept: PULMONOLOGY | Facility: CLINIC | Age: 80
End: 2025-05-05
Payer: MEDICARE

## 2025-05-05 NOTE — TELEPHONE ENCOUNTER
Received renewal order for oxygen on this pt.  Your 9/30/24 note states pt will probably need oxygen for another 3 1/2 month.  Your last note doesn't say anything about oxygen.  Please advise if pt is still using or DC needs done.

## 2025-05-06 ENCOUNTER — OFFICE VISIT (OUTPATIENT)
Dept: UROLOGY | Age: 80
End: 2025-05-06
Payer: MEDICARE

## 2025-05-06 DIAGNOSIS — R35.0 URINARY FREQUENCY: ICD-10-CM

## 2025-05-06 DIAGNOSIS — C61 PROSTATE CANCER: Primary | ICD-10-CM

## 2025-05-06 LAB — URINE VOLUME: NORMAL

## 2025-05-06 RX ORDER — CHOLECALCIFEROL (VITAMIN D3) 25 MCG
TABLET ORAL DAILY
COMMUNITY
Start: 2025-04-28

## 2025-05-06 NOTE — PROGRESS NOTES
UROLOGY OFFICE FOLLOW UP NOTE    Subjective   HPI  Daron Whitfield is a 80 y.o. male. ormer patient of Dr. 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  Per Dr. Ames's note, patient had rising PSA, placed on ADT.  On Casodex and Lupron.  Per chart review, documentation, patient was on ADT 7/2017; prior PSAs or trend of PSA rise unable to obtain.     History of lower urinary tract symptoms, urgency frequency on Gemtesa.     Also with history of lung cancer, managed by oncology, Dr. Flores.     Per patient, he has been on Casodex 50 mg since 2012, following his prostate cryoablation. Lupron injections were initiated approximately 7 to 8 years ago when his PSA levels increased from 0.01 to 0.04 within a 90-day period post-surgery. He reports no adverse effects from these medications and expresses willingness to discontinue them temporarily to assess his condition.     Over the past 2 to 3 months, he has experienced increased urinary frequency, necessitating urination every hour during the day and several times at night, typically between 3:00 and 4:30 AM. He does not always drink fluids before bedtime.   He is currently on Gemtesa, which has not alleviated his symptoms.   He reports satisfactory bladder emptying and is not on any diuretic medication. He recalls undergoing a cysto during his biopsy procedure over 10 years ago. He was previously on tamsulosin and Detrol LA 4 mg for bladder issues, but these were discontinued due to normal urination.     His diabetes is well-controlled, and he was taken off metformin 3 months ago.    Update 5/6/2025: Presents for follow-up urinary frequency, history of prostate cancer on ADT holiday.  History of Present Illness  He reports no significant changes in his urinary frequency, which remains consistent. He is currently on a regimen of Gemtesa and Detrol LA, but has not observed any substantial improvement in his symptoms. However, he notes that  his condition has not deteriorated either. He expresses a desire to discontinue the medication to assess its impact on his symptoms.  His last treatment for prostate cancer was in 2012 via cryotherapy. He is currently on an androgen deprivation holiday.        _________  Prostate cryotherapy 2012     Prostate biopsy pathology 11/17/2011: Adenocarcinoma [place] 3+3= 6 (6/21 cores)     PSA  4/25/25: <0.014  10/9/2024: <0.014  10/10/2023: <0.014  1/4/2023: <0.014  11/4/2020: <0.01  4/28/2020: <0.01     Results for orders placed or performed in visit on 05/06/25   Bladder Scan    Collection Time: 05/06/25  9:19 AM   Result Value Ref Range    Urine Volume 0 ml          Review of systems  A review of systems was performed, and positive findings are noted in the HPI.    Objective     Vital Signs:   There were no vitals taken for this visit.      Physical exam  No acute distress, well-nourished  Awake alert and oriented  Mood normal; affect normal  Physical Exam        Bladder Scan interpretation 05/06/2025    Estimation of residual urine via DacudaI 3000 [place] Bladder Scan  Performed by: FIORELLA Joshua  Residual Urine: 0 ml  Indication: Prostate cancer    Urinary frequency   Position: Supine  Examination: Incremental scanning of the suprapubic area using 2.0 MHz transducer using copious amounts of acoustic gel.   Findings: An anechoic area was demonstrated which represented the bladder, with measurement of residual urine as noted. I inspected this myself. In that the residual urine was stable or insignificant, refer to plan for treatment and plan necessary at this time.     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 Prostate cancer (Primary)  -     PSA DIAGNOSTIC; Future    2. Urinary frequency  -     Bladder Scan        Assessment & Plan  Prostate cancer.  Level remains undetectable  Recommend continued ADT holiday with continued monitoring  He is agreeable with this.    A follow-up appointment will be scheduled in 6 months,      Should there be any changes in his condition or medication requirements, he is encouraged to contact us without hesitation.    Urinary frequency.  He is currently on Gemtesa and Detrol LA (tolterodine).  Uncertain how much this is helping his symptoms.  Denies significant bother at this time.  He reports no significant change in symptoms but also no worsening.     Recommend trial off these medications in a stepwise manner.  He is advised to discontinue Detrol LA and monitor his symptoms for a couple of weeks. If there is no noticeable difference, he may continue with Gemtesa. If he decides to stop Gemtesa, he should do so gradually and observe any changes in symptoms.     If symptoms worsen after stopping the medication, he can contact us for refills.    Follow-up  The patient will follow up in 6 months, PVR, PSA prior.       All questions addressed      Patient  or patient representative verbalized consent for the use of Ambient Listening during the visit with  Vinita Mcnulty MD for chart documentation. 5/6/2025  13:26 EDT

## 2025-05-19 ENCOUNTER — OFFICE VISIT (OUTPATIENT)
Dept: SURGERY | Facility: CLINIC | Age: 80
End: 2025-05-19
Payer: MEDICARE

## 2025-05-19 VITALS
SYSTOLIC BLOOD PRESSURE: 121 MMHG | DIASTOLIC BLOOD PRESSURE: 75 MMHG | RESPIRATION RATE: 18 BRPM | HEART RATE: 92 BPM | WEIGHT: 178.5 LBS | HEIGHT: 68 IN | BODY MASS INDEX: 27.05 KG/M2 | OXYGEN SATURATION: 94 %

## 2025-05-19 DIAGNOSIS — Z95.828 PORT-A-CATH IN PLACE: Primary | ICD-10-CM

## 2025-05-19 RX ORDER — COLCHICINE 0.6 MG/1
1 CAPSULE ORAL EVERY 12 HOURS SCHEDULED
COMMUNITY
Start: 2025-04-30

## 2025-05-19 NOTE — PROGRESS NOTES
Chief Complaint  Post-op Follow-up (PORT PLACEMENT)    Subjective          Daron Whitfield presents to [facility] GENERAL SURGERY  History of Present Illness    Daron Whitfield is a 80 y.o. male  who presents today for a postoperative visit.     Patient is here for a follow-up after a removal of a central venous access port.  He is doing well and had no complaints.    Past History:  Medical History: has a past medical history of Bladder outflow obstruction, Cervical spinal stenosis (01/23/2018), Colon polyp, COVID, Diabetes mellitus (2019), Emphysema lung, Emphysema of lung, Erectile dysfunction (2012), Hand paresthesia (01/23/2018), HTN (hypertension), Hypercholesteremia, Lung cancer (2016), Muscle atrophy of upper extremity (01/23/2018), Numbness and tingling in both hands, OAB (overactive bladder) (12/09/2014), Prostate cancer (2012), and Ulnar neuropathy (01/23/2018).   Surgical History: has a past surgical history that includes Colonoscopy (2019); Cystoscopy; Lung biopsy; Cryoablation of Prostate (01/17/2012); Teeth Extraction; TURP / transurethral incision / drainage prostate (06/16/2015); Cardiac catheterization; Bronchoscopy (N/A, 05/09/2023); Hand surgery (Right); Colonoscopy (N/A, 12/04/2023); Bronchoscopy (N/A, 06/25/2024); Venous Access Device (Port) Removal (Right, 05/01/2025); and Prostate surgery (,2012).   Family History: family history includes Cancer in his sister; Colon cancer (age of onset: 60) in his sister; Colon polyps in his sister; Coronary artery disease in his brother; Diabetes in his mother, sister, and sister; Hypertension in his mother and sister.   Social History: reports that he quit smoking about 9 years ago. His smoking use included cigarettes. He started smoking about 59 years ago. He has a 50 pack-year smoking history. He has been exposed to tobacco smoke. He has never used smokeless tobacco. He reports current alcohol use of about 1.0 standard drink of alcohol per  "week. He reports that he does not use drugs.  Allergies: Patient has no known allergies.       Current Outpatient Medications:     albuterol sulfate  (90 Base) MCG/ACT inhaler, Inhale 2 puffs Every 4 (Four) Hours As Needed for Wheezing., Disp: 18 g, Rfl: 5    atorvastatin (LIPITOR) 40 MG tablet, Take 1 tablet by mouth Daily., Disp: , Rfl:     cetirizine (zyrTEC) 10 MG tablet, Take 1 tablet by mouth Daily., Disp: 90 tablet, Rfl: 3    Cholecalciferol 25 MCG (1000 UT) tablet, Take  by mouth Daily., Disp: , Rfl:     colchicine 0.6 MG capsule capsule, Take 1 capsule by mouth Every 12 (Twelve) Hours., Disp: , Rfl:     Fluticasone-Umeclidin-Vilant (TRELEGY ELLIPTA) 200-62.5-25 MCG/ACT inhaler, Inhale 1 puff Daily., Disp: 60 each, Rfl: 11    Gemtesa 75 MG tablet, Take 1 tablet by mouth Daily for 360 days., Disp: 90 tablet, Rfl: 1    ipratropium-albuterol (DUO-NEB) 0.5-2.5 mg/3 ml nebulizer, Take 3 mL by nebulization Every 4 (Four) Hours As Needed for Wheezing., Disp: 360 mL, Rfl: 4    lisinopril (PRINIVIL,ZESTRIL) 5 MG tablet, Daily., Disp: , Rfl:     metoprolol succinate XL (TOPROL-XL) 25 MG 24 hr tablet, Toprol XL 25 mg oral tablet extended release 24 hr take 1 tablet (25 mg) by oral route once daily   Active, Disp: , Rfl:     mirtazapine (REMERON) 15 MG tablet, Take 1 tablet by mouth Every Night., Disp: , Rfl:     traMADol (ULTRAM) 50 MG tablet, Take 1 tablet by mouth Every 6 (Six) Hours As Needed for Moderate Pain., Disp: 8 tablet, Rfl: 0       Physical Exam  His incision looks good and there is no evidence of infection.  Objective     Vital Signs:   /75   Pulse 92   Resp 18   Ht 172.7 cm (68\")   Wt 81 kg (178 lb 8 oz)   SpO2 94%   BMI 27.14 kg/m²              Assessment and Plan    Diagnoses and all orders for this visit:    1. Port-A-Cath in place (Primary)    I will see him back on an as-needed basis.  I have asked him to call me if he were to have any further questions or concerns.      "

## 2025-06-04 ENCOUNTER — OFFICE VISIT (OUTPATIENT)
Dept: CARDIAC REHAB | Facility: HOSPITAL | Age: 80
End: 2025-06-04
Payer: MEDICARE

## 2025-06-04 VITALS
BODY MASS INDEX: 27.3 KG/M2 | WEIGHT: 180.12 LBS | OXYGEN SATURATION: 93 % | SYSTOLIC BLOOD PRESSURE: 120 MMHG | DIASTOLIC BLOOD PRESSURE: 62 MMHG | HEART RATE: 106 BPM | HEIGHT: 68 IN

## 2025-06-04 DIAGNOSIS — C34.91 ADENOCARCINOMA OF BRONCHUS, RIGHT: Primary | ICD-10-CM

## 2025-06-04 PROCEDURE — 94625 PHY/QHP OP PULM RHB W/O MNTR: CPT

## 2025-06-04 NOTE — PROGRESS NOTES
Chief Complaint  Pulmonary Rehab Phase II    Daron Whitfield presents to [facility] CARDIOPULMONARY REHABILITATION    Physical Exam  Cardiovascular:      Rate and Rhythm: Regular rhythm. Tachycardia present.   Musculoskeletal:         General: Normal range of motion.      Cervical back: Normal range of motion.   Skin:     General: Skin is warm and dry.      Capillary Refill: Capillary refill takes less than 2 seconds.   Neurological:      Mental Status: He is alert.   Psychiatric:         Mood and Affect: Mood normal.      Result Review :          Assessment and Plan    Diagnoses and all orders for this visit:    1. Adenocarcinoma of bronchus, right (Primary)        Saba Hearn, CRT

## 2025-06-06 ENCOUNTER — TREATMENT (OUTPATIENT)
Dept: CARDIAC REHAB | Facility: HOSPITAL | Age: 80
End: 2025-06-06
Payer: MEDICARE

## 2025-06-06 DIAGNOSIS — C34.91 ADENOCARCINOMA OF BRONCHUS, RIGHT: Primary | ICD-10-CM

## 2025-06-06 PROCEDURE — 94625 PHY/QHP OP PULM RHB W/O MNTR: CPT

## 2025-06-09 ENCOUNTER — TREATMENT (OUTPATIENT)
Dept: CARDIAC REHAB | Facility: HOSPITAL | Age: 80
End: 2025-06-09
Payer: MEDICARE

## 2025-06-09 DIAGNOSIS — C34.91 ADENOCARCINOMA OF BRONCHUS, RIGHT: Primary | ICD-10-CM

## 2025-06-09 PROCEDURE — 94625 PHY/QHP OP PULM RHB W/O MNTR: CPT

## 2025-06-11 ENCOUNTER — TREATMENT (OUTPATIENT)
Dept: CARDIAC REHAB | Facility: HOSPITAL | Age: 80
End: 2025-06-11
Payer: MEDICARE

## 2025-06-11 DIAGNOSIS — C34.91 ADENOCARCINOMA OF BRONCHUS, RIGHT: Primary | ICD-10-CM

## 2025-06-11 PROCEDURE — 94625 PHY/QHP OP PULM RHB W/O MNTR: CPT

## 2025-06-13 ENCOUNTER — TREATMENT (OUTPATIENT)
Dept: CARDIAC REHAB | Facility: HOSPITAL | Age: 80
End: 2025-06-13
Payer: MEDICARE

## 2025-06-13 DIAGNOSIS — C34.91 ADENOCARCINOMA OF BRONCHUS, RIGHT: Primary | ICD-10-CM

## 2025-06-13 PROCEDURE — 94625 PHY/QHP OP PULM RHB W/O MNTR: CPT

## 2025-06-16 ENCOUNTER — TELEPHONE (OUTPATIENT)
Dept: PULMONOLOGY | Facility: CLINIC | Age: 80
End: 2025-06-16
Payer: MEDICARE

## 2025-06-16 ENCOUNTER — APPOINTMENT (OUTPATIENT)
Dept: CARDIAC REHAB | Facility: HOSPITAL | Age: 80
End: 2025-06-16
Payer: MEDICARE

## 2025-06-16 DIAGNOSIS — C34.91 ADENOCARCINOMA OF BRONCHUS, RIGHT: ICD-10-CM

## 2025-06-16 DIAGNOSIS — R06.02 SOB (SHORTNESS OF BREATH): ICD-10-CM

## 2025-06-16 DIAGNOSIS — J43.9 PULMONARY EMPHYSEMA, UNSPECIFIED EMPHYSEMA TYPE: ICD-10-CM

## 2025-06-16 DIAGNOSIS — J30.89 ENVIRONMENTAL AND SEASONAL ALLERGIES: ICD-10-CM

## 2025-06-16 DIAGNOSIS — J44.1 COPD WITH ACUTE EXACERBATION: ICD-10-CM

## 2025-06-16 RX ORDER — IPRATROPIUM BROMIDE AND ALBUTEROL SULFATE 2.5; .5 MG/3ML; MG/3ML
3 SOLUTION RESPIRATORY (INHALATION) EVERY 4 HOURS PRN
Qty: 360 ML | Refills: 4 | Status: SHIPPED | OUTPATIENT
Start: 2025-06-16

## 2025-06-16 NOTE — TELEPHONE ENCOUNTER
Patient is needing refills of trelegy and ipratropium albuterol nebulizer medicine sent to [facility]. Please advise, thank you.

## 2025-06-18 ENCOUNTER — TREATMENT (OUTPATIENT)
Dept: CARDIAC REHAB | Facility: HOSPITAL | Age: 80
End: 2025-06-18
Payer: MEDICARE

## 2025-06-18 DIAGNOSIS — C34.91 ADENOCARCINOMA OF BRONCHUS, RIGHT: Primary | ICD-10-CM

## 2025-06-18 PROCEDURE — 94625 PHY/QHP OP PULM RHB W/O MNTR: CPT

## 2025-06-20 ENCOUNTER — APPOINTMENT (OUTPATIENT)
Dept: CARDIAC REHAB | Facility: HOSPITAL | Age: 80
End: 2025-06-20
Payer: MEDICARE

## 2025-06-23 ENCOUNTER — APPOINTMENT (OUTPATIENT)
Dept: CARDIAC REHAB | Facility: HOSPITAL | Age: 80
End: 2025-06-23
Payer: MEDICARE

## 2025-06-25 ENCOUNTER — TREATMENT (OUTPATIENT)
Dept: CARDIAC REHAB | Facility: HOSPITAL | Age: 80
End: 2025-06-25
Payer: MEDICARE

## 2025-06-25 DIAGNOSIS — C34.91 ADENOCARCINOMA OF BRONCHUS, RIGHT: Primary | ICD-10-CM

## 2025-06-25 PROCEDURE — 94625 PHY/QHP OP PULM RHB W/O MNTR: CPT

## 2025-06-27 ENCOUNTER — TREATMENT (OUTPATIENT)
Dept: CARDIAC REHAB | Facility: HOSPITAL | Age: 80
End: 2025-06-27
Payer: MEDICARE

## 2025-06-27 DIAGNOSIS — C34.91 ADENOCARCINOMA OF BRONCHUS, RIGHT: Primary | ICD-10-CM

## 2025-06-27 PROCEDURE — 94625 PHY/QHP OP PULM RHB W/O MNTR: CPT

## 2025-06-30 ENCOUNTER — TREATMENT (OUTPATIENT)
Dept: CARDIAC REHAB | Facility: HOSPITAL | Age: 80
End: 2025-06-30
Payer: MEDICARE

## 2025-06-30 DIAGNOSIS — C34.91 ADENOCARCINOMA OF BRONCHUS, RIGHT: Primary | ICD-10-CM

## 2025-06-30 PROCEDURE — 94625 PHY/QHP OP PULM RHB W/O MNTR: CPT

## 2025-07-02 ENCOUNTER — TREATMENT (OUTPATIENT)
Dept: CARDIAC REHAB | Facility: HOSPITAL | Age: 80
End: 2025-07-02
Payer: MEDICARE

## 2025-07-02 DIAGNOSIS — C34.91 ADENOCARCINOMA OF BRONCHUS, RIGHT: Primary | ICD-10-CM

## 2025-07-02 PROCEDURE — 94625 PHY/QHP OP PULM RHB W/O MNTR: CPT

## 2025-07-07 ENCOUNTER — TREATMENT (OUTPATIENT)
Dept: CARDIAC REHAB | Facility: HOSPITAL | Age: 80
End: 2025-07-07
Payer: MEDICARE

## 2025-07-07 DIAGNOSIS — C34.91 ADENOCARCINOMA OF BRONCHUS, RIGHT: Primary | ICD-10-CM

## 2025-07-07 PROCEDURE — 94625 PHY/QHP OP PULM RHB W/O MNTR: CPT

## 2025-07-09 ENCOUNTER — TREATMENT (OUTPATIENT)
Dept: CARDIAC REHAB | Facility: HOSPITAL | Age: 80
End: 2025-07-09
Payer: MEDICARE

## 2025-07-09 DIAGNOSIS — C34.91 ADENOCARCINOMA OF BRONCHUS, RIGHT: Primary | ICD-10-CM

## 2025-07-09 PROCEDURE — 94625 PHY/QHP OP PULM RHB W/O MNTR: CPT

## 2025-07-09 PROCEDURE — G0239 OTH RESP PROC, GROUP: HCPCS

## 2025-07-11 ENCOUNTER — TREATMENT (OUTPATIENT)
Dept: CARDIAC REHAB | Facility: HOSPITAL | Age: 80
End: 2025-07-11
Payer: MEDICARE

## 2025-07-11 DIAGNOSIS — C34.91 ADENOCARCINOMA OF BRONCHUS, RIGHT: Primary | ICD-10-CM

## 2025-07-11 PROCEDURE — 94625 PHY/QHP OP PULM RHB W/O MNTR: CPT

## 2025-07-14 ENCOUNTER — TREATMENT (OUTPATIENT)
Dept: CARDIAC REHAB | Facility: HOSPITAL | Age: 80
End: 2025-07-14
Payer: MEDICARE

## 2025-07-14 DIAGNOSIS — C34.91 ADENOCARCINOMA OF BRONCHUS, RIGHT: Primary | ICD-10-CM

## 2025-07-14 PROCEDURE — 94625 PHY/QHP OP PULM RHB W/O MNTR: CPT

## 2025-07-16 ENCOUNTER — TREATMENT (OUTPATIENT)
Dept: CARDIAC REHAB | Facility: HOSPITAL | Age: 80
End: 2025-07-16
Payer: MEDICARE

## 2025-07-16 DIAGNOSIS — C34.91 ADENOCARCINOMA OF BRONCHUS, RIGHT: Primary | ICD-10-CM

## 2025-07-16 PROCEDURE — 94625 PHY/QHP OP PULM RHB W/O MNTR: CPT

## 2025-07-18 ENCOUNTER — TREATMENT (OUTPATIENT)
Dept: CARDIAC REHAB | Facility: HOSPITAL | Age: 80
End: 2025-07-18
Payer: MEDICARE

## 2025-07-18 DIAGNOSIS — C34.91 ADENOCARCINOMA OF BRONCHUS, RIGHT: Primary | ICD-10-CM

## 2025-07-18 PROCEDURE — 94625 PHY/QHP OP PULM RHB W/O MNTR: CPT

## 2025-07-21 ENCOUNTER — TREATMENT (OUTPATIENT)
Dept: CARDIAC REHAB | Facility: HOSPITAL | Age: 80
End: 2025-07-21
Payer: MEDICARE

## 2025-07-21 DIAGNOSIS — C34.91 ADENOCARCINOMA OF BRONCHUS, RIGHT: Primary | ICD-10-CM

## 2025-07-21 PROCEDURE — G0239 OTH RESP PROC, GROUP: HCPCS

## 2025-07-23 ENCOUNTER — TREATMENT (OUTPATIENT)
Dept: CARDIAC REHAB | Facility: HOSPITAL | Age: 80
End: 2025-07-23
Payer: MEDICARE

## 2025-07-23 DIAGNOSIS — C34.91 ADENOCARCINOMA OF BRONCHUS, RIGHT: Primary | ICD-10-CM

## 2025-07-23 PROCEDURE — 94625 PHY/QHP OP PULM RHB W/O MNTR: CPT

## 2025-07-25 ENCOUNTER — TREATMENT (OUTPATIENT)
Dept: CARDIAC REHAB | Facility: HOSPITAL | Age: 80
End: 2025-07-25
Payer: MEDICARE

## 2025-07-25 DIAGNOSIS — C34.91 ADENOCARCINOMA OF BRONCHUS, RIGHT: Primary | ICD-10-CM

## 2025-07-25 PROCEDURE — 94625 PHY/QHP OP PULM RHB W/O MNTR: CPT

## 2025-07-28 ENCOUNTER — TREATMENT (OUTPATIENT)
Dept: CARDIAC REHAB | Facility: HOSPITAL | Age: 80
End: 2025-07-28
Payer: MEDICARE

## 2025-07-28 DIAGNOSIS — C34.91 ADENOCARCINOMA OF BRONCHUS, RIGHT: Primary | ICD-10-CM

## 2025-07-28 PROCEDURE — 94625 PHY/QHP OP PULM RHB W/O MNTR: CPT

## 2025-07-30 ENCOUNTER — TREATMENT (OUTPATIENT)
Dept: CARDIAC REHAB | Facility: HOSPITAL | Age: 80
End: 2025-07-30
Payer: MEDICARE

## 2025-07-30 DIAGNOSIS — C34.91 ADENOCARCINOMA OF BRONCHUS, RIGHT: Primary | ICD-10-CM

## 2025-07-30 PROCEDURE — 94625 PHY/QHP OP PULM RHB W/O MNTR: CPT

## 2025-08-01 ENCOUNTER — TREATMENT (OUTPATIENT)
Dept: CARDIAC REHAB | Facility: HOSPITAL | Age: 80
End: 2025-08-01
Payer: MEDICARE

## 2025-08-01 DIAGNOSIS — C34.91 ADENOCARCINOMA OF BRONCHUS, RIGHT: Primary | ICD-10-CM

## 2025-08-01 PROCEDURE — 94625 PHY/QHP OP PULM RHB W/O MNTR: CPT

## 2025-08-04 ENCOUNTER — TREATMENT (OUTPATIENT)
Dept: CARDIAC REHAB | Facility: HOSPITAL | Age: 80
End: 2025-08-04
Payer: MEDICARE

## 2025-08-04 DIAGNOSIS — C34.91 ADENOCARCINOMA OF BRONCHUS, RIGHT: Primary | ICD-10-CM

## 2025-08-04 PROCEDURE — 94625 PHY/QHP OP PULM RHB W/O MNTR: CPT

## 2025-08-08 ENCOUNTER — TREATMENT (OUTPATIENT)
Dept: CARDIAC REHAB | Facility: HOSPITAL | Age: 80
End: 2025-08-08
Payer: MEDICARE

## 2025-08-08 DIAGNOSIS — C34.91 ADENOCARCINOMA OF BRONCHUS, RIGHT: Primary | ICD-10-CM

## 2025-08-08 PROCEDURE — 94625 PHY/QHP OP PULM RHB W/O MNTR: CPT

## 2025-08-11 ENCOUNTER — TREATMENT (OUTPATIENT)
Dept: CARDIAC REHAB | Facility: HOSPITAL | Age: 80
End: 2025-08-11
Payer: MEDICARE

## 2025-08-11 DIAGNOSIS — C34.91 ADENOCARCINOMA OF BRONCHUS, RIGHT: Primary | ICD-10-CM

## 2025-08-11 PROCEDURE — 94625 PHY/QHP OP PULM RHB W/O MNTR: CPT

## 2025-08-13 ENCOUNTER — TREATMENT (OUTPATIENT)
Dept: CARDIAC REHAB | Facility: HOSPITAL | Age: 80
End: 2025-08-13
Payer: MEDICARE

## 2025-08-13 DIAGNOSIS — C34.91 ADENOCARCINOMA OF BRONCHUS, RIGHT: Primary | ICD-10-CM

## 2025-08-13 PROCEDURE — 94625 PHY/QHP OP PULM RHB W/O MNTR: CPT

## 2025-08-18 ENCOUNTER — TELEPHONE (OUTPATIENT)
Dept: CARDIAC REHAB | Facility: HOSPITAL | Age: 80
End: 2025-08-18
Payer: MEDICARE

## 2025-08-19 ENCOUNTER — HOSPITAL ENCOUNTER (EMERGENCY)
Facility: HOSPITAL | Age: 80
Discharge: HOME | End: 2025-08-19
Attending: EMERGENCY MEDICINE | Admitting: EMERGENCY MEDICINE
Payer: MEDICARE

## 2025-08-19 ENCOUNTER — APPOINTMENT (OUTPATIENT)
Dept: CT IMAGING | Facility: HOSPITAL | Age: 80
End: 2025-08-19
Payer: MEDICARE

## 2025-08-22 DIAGNOSIS — R07.9 CHEST PAIN IN ADULT: Primary | ICD-10-CM

## 2025-08-22 LAB
QT INTERVAL: 416 MS
QTC INTERVAL: 456 MS

## 2025-08-22 PROCEDURE — 93005 ELECTROCARDIOGRAM TRACING: CPT

## (undated) DEVICE — INTENDED FOR TISSUE SEPARATION, AND OTHER PROCEDURES THAT REQUIRE A SHARP SURGICAL BLADE TO PUNCTURE OR CUT.: Brand: BARD-PARKER ® CARBON RIB-BACK BLADES

## (undated) DEVICE — APPL CHLORAPREP HI/LITE 26ML ORNG

## (undated) DEVICE — SOLIDIFIER LIQLOC PLS 1500CC BT

## (undated) DEVICE — CONTAINER,SPEC,PNEUM TUBE,4OZ,STRL PATH: Brand: MEDLINE

## (undated) DEVICE — DEV ATOMIZATION MUCOSAL/NASALTRACH

## (undated) DEVICE — MAJOR-LF: Brand: MEDLINE INDUSTRIES, INC.

## (undated) DEVICE — Device

## (undated) DEVICE — SUT VIC 3/0 SH 27IN J416H

## (undated) DEVICE — SOL IRRG H2O PL/BG 1000ML STRL

## (undated) DEVICE — Device: Brand: DEFENDO AIR/WATER/SUCTION AND BIOPSY VALVE

## (undated) DEVICE — SINGLE USE SUCTION VALVE MAJ-209: Brand: SINGLE USE SUCTION VALVE (STERILE)

## (undated) DEVICE — BLCK/BITE BLOX WO/DENTL/RIM W/STRAP 54F

## (undated) DEVICE — SYR LL TP 10ML STRL

## (undated) DEVICE — SINGLE USE BIOPSY VALVE MAJ-210: Brand: SINGLE USE BIOPSY VALVE (STERILE)

## (undated) DEVICE — ANTIBACTERIAL UNDYED BRAIDED (POLYGLACTIN 910), SYNTHETIC ABSORBABLE SUTURE: Brand: COATED VICRYL

## (undated) DEVICE — STERILE POLYISOPRENE POWDER-FREE SURGICAL GLOVES WITH EMOLLIENT COATING: Brand: PROTEXIS

## (undated) DEVICE — LINER SURG CANSTR SXN S/RIGD 1500CC

## (undated) DEVICE — SOL IRR NACL 0.9PCT BO 1000ML

## (undated) DEVICE — GLV SURG SENSICARE PI ORTHO SZ8 LF STRL

## (undated) DEVICE — PENCL SMOKE/EVAC MEGADYNE TELESCP 10FT

## (undated) DEVICE — CUP SPECI 4OZ LF STRL

## (undated) DEVICE — THE STERILE LIGHT HANDLE COVER IS USED WITH STERIS SURGICAL LIGHTING AND VISUALIZATION SYSTEMS.

## (undated) DEVICE — STRIP CLS WND SUTURESTRIP/PLS 0.5X4IN TP1103

## (undated) DEVICE — SINGLE-USE BIOPSY FORCEPS: Brand: RADIAL JAW 4

## (undated) DEVICE — GOWN,SIRUS,POLYRNF,BRTHSLV,2XL,18/CS: Brand: MEDLINE

## (undated) DEVICE — SNAR E/S POLYP SNAREMASTER OVL/10MM 2.8X2300MM YEL

## (undated) DEVICE — ANTIBACTERIAL VIOLET BRAIDED (POLYGLACTIN 910), SYNTHETIC ABSORBABLE SUTURE: Brand: COATED VICRYL

## (undated) DEVICE — NON-WOVEN ADHESIVE WOUND DRESSING: Brand: PRIMAPORE ADHESIVE DRESSING 10*8CM

## (undated) DEVICE — THE SINGLE USE ETRAP – POLYP TRAP IS USED FOR SUCTION RETRIEVAL OF ENDOSCOPICALLY REMOVED POLYPS.: Brand: ETRAP